# Patient Record
Sex: FEMALE | Race: WHITE | NOT HISPANIC OR LATINO | URBAN - METROPOLITAN AREA
[De-identification: names, ages, dates, MRNs, and addresses within clinical notes are randomized per-mention and may not be internally consistent; named-entity substitution may affect disease eponyms.]

---

## 2017-03-28 ENCOUNTER — OUTPATIENT (OUTPATIENT)
Dept: OUTPATIENT SERVICES | Facility: HOSPITAL | Age: 77
LOS: 1 days | Discharge: HOME | End: 2017-03-28

## 2017-06-27 DIAGNOSIS — Z12.31 ENCOUNTER FOR SCREENING MAMMOGRAM FOR MALIGNANT NEOPLASM OF BREAST: ICD-10-CM

## 2017-08-17 ENCOUNTER — OUTPATIENT (OUTPATIENT)
Dept: OUTPATIENT SERVICES | Facility: HOSPITAL | Age: 77
LOS: 1 days | Discharge: HOME | End: 2017-08-17

## 2017-08-17 DIAGNOSIS — Z01.818 ENCOUNTER FOR OTHER PREPROCEDURAL EXAMINATION: ICD-10-CM

## 2017-08-17 DIAGNOSIS — M16.12 UNILATERAL PRIMARY OSTEOARTHRITIS, LEFT HIP: ICD-10-CM

## 2017-08-17 DIAGNOSIS — M16.9 OSTEOARTHRITIS OF HIP, UNSPECIFIED: ICD-10-CM

## 2017-09-07 ENCOUNTER — INPATIENT (INPATIENT)
Facility: HOSPITAL | Age: 77
LOS: 2 days | Discharge: HOME HEALTH | End: 2017-09-10
Attending: ORTHOPAEDIC SURGERY

## 2017-09-07 DIAGNOSIS — M16.9 OSTEOARTHRITIS OF HIP, UNSPECIFIED: ICD-10-CM

## 2017-09-13 DIAGNOSIS — M16.12 UNILATERAL PRIMARY OSTEOARTHRITIS, LEFT HIP: ICD-10-CM

## 2017-09-13 DIAGNOSIS — I10 ESSENTIAL (PRIMARY) HYPERTENSION: ICD-10-CM

## 2017-09-13 DIAGNOSIS — Z87.891 PERSONAL HISTORY OF NICOTINE DEPENDENCE: ICD-10-CM

## 2017-09-13 DIAGNOSIS — E78.5 HYPERLIPIDEMIA, UNSPECIFIED: ICD-10-CM

## 2018-04-25 ENCOUNTER — OUTPATIENT (OUTPATIENT)
Dept: OUTPATIENT SERVICES | Facility: HOSPITAL | Age: 78
LOS: 1 days | Discharge: HOME | End: 2018-04-25

## 2018-04-25 DIAGNOSIS — Z12.31 ENCOUNTER FOR SCREENING MAMMOGRAM FOR MALIGNANT NEOPLASM OF BREAST: ICD-10-CM

## 2018-06-08 PROBLEM — Z00.00 ENCOUNTER FOR PREVENTIVE HEALTH EXAMINATION: Status: ACTIVE | Noted: 2018-06-08

## 2018-09-05 RX ORDER — MELOXICAM 15 MG/1
1 TABLET ORAL
Qty: 30 | Refills: 1
Start: 2018-09-05 | End: 2018-11-03

## 2018-12-20 ENCOUNTER — OUTPATIENT (OUTPATIENT)
Dept: OUTPATIENT SERVICES | Facility: HOSPITAL | Age: 78
LOS: 1 days | Discharge: HOME | End: 2018-12-20

## 2018-12-20 DIAGNOSIS — M54.89 OTHER DORSALGIA: ICD-10-CM

## 2019-02-14 ENCOUNTER — OUTPATIENT (OUTPATIENT)
Dept: OUTPATIENT SERVICES | Facility: HOSPITAL | Age: 79
LOS: 1 days | Discharge: HOME | End: 2019-02-14

## 2019-02-14 DIAGNOSIS — M54.89 OTHER DORSALGIA: ICD-10-CM

## 2019-05-09 ENCOUNTER — OUTPATIENT (OUTPATIENT)
Dept: OUTPATIENT SERVICES | Facility: HOSPITAL | Age: 79
LOS: 1 days | Discharge: HOME | End: 2019-05-09
Payer: MEDICARE

## 2019-05-09 DIAGNOSIS — Z12.31 ENCOUNTER FOR SCREENING MAMMOGRAM FOR MALIGNANT NEOPLASM OF BREAST: ICD-10-CM

## 2019-05-09 PROCEDURE — 77063 BREAST TOMOSYNTHESIS BI: CPT | Mod: 26

## 2019-05-09 PROCEDURE — 77067 SCR MAMMO BI INCL CAD: CPT | Mod: 26

## 2020-06-15 ENCOUNTER — OUTPATIENT (OUTPATIENT)
Dept: OUTPATIENT SERVICES | Facility: HOSPITAL | Age: 80
LOS: 1 days | Discharge: HOME | End: 2020-06-15
Payer: MEDICARE

## 2020-06-15 DIAGNOSIS — Z12.31 ENCOUNTER FOR SCREENING MAMMOGRAM FOR MALIGNANT NEOPLASM OF BREAST: ICD-10-CM

## 2020-06-15 PROCEDURE — 77063 BREAST TOMOSYNTHESIS BI: CPT | Mod: 26

## 2020-06-15 PROCEDURE — 77067 SCR MAMMO BI INCL CAD: CPT | Mod: 26

## 2021-01-13 ENCOUNTER — TRANSCRIPTION ENCOUNTER (OUTPATIENT)
Age: 81
End: 2021-01-13

## 2021-04-13 ENCOUNTER — TRANSCRIPTION ENCOUNTER (OUTPATIENT)
Age: 81
End: 2021-04-13

## 2021-06-23 ENCOUNTER — OUTPATIENT (OUTPATIENT)
Dept: OUTPATIENT SERVICES | Facility: HOSPITAL | Age: 81
LOS: 1 days | Discharge: HOME | End: 2021-06-23
Payer: MEDICARE

## 2021-06-23 DIAGNOSIS — Z12.31 ENCOUNTER FOR SCREENING MAMMOGRAM FOR MALIGNANT NEOPLASM OF BREAST: ICD-10-CM

## 2021-06-23 PROCEDURE — 77067 SCR MAMMO BI INCL CAD: CPT | Mod: 26

## 2021-06-23 PROCEDURE — 77063 BREAST TOMOSYNTHESIS BI: CPT | Mod: 26

## 2022-07-09 ENCOUNTER — OUTPATIENT (OUTPATIENT)
Dept: OUTPATIENT SERVICES | Facility: HOSPITAL | Age: 82
LOS: 1 days | Discharge: HOME | End: 2022-07-09

## 2022-07-09 DIAGNOSIS — Z12.31 ENCOUNTER FOR SCREENING MAMMOGRAM FOR MALIGNANT NEOPLASM OF BREAST: ICD-10-CM

## 2022-07-09 PROCEDURE — 77063 BREAST TOMOSYNTHESIS BI: CPT | Mod: 26

## 2022-07-09 PROCEDURE — 77067 SCR MAMMO BI INCL CAD: CPT | Mod: 26

## 2023-01-01 ENCOUNTER — TRANSCRIPTION ENCOUNTER (OUTPATIENT)
Age: 83
End: 2023-01-01

## 2023-01-01 ENCOUNTER — APPOINTMENT (OUTPATIENT)
Dept: SURGERY | Facility: CLINIC | Age: 83
End: 2023-01-01

## 2023-01-01 ENCOUNTER — APPOINTMENT (OUTPATIENT)
Dept: INFUSION THERAPY | Facility: CLINIC | Age: 83
End: 2023-01-01

## 2023-01-01 ENCOUNTER — OUTPATIENT (OUTPATIENT)
Dept: INPATIENT UNIT | Facility: HOSPITAL | Age: 83
LOS: 1 days | Discharge: HOME | End: 2023-01-01
Payer: MEDICARE

## 2023-01-01 ENCOUNTER — APPOINTMENT (OUTPATIENT)
Dept: HEMATOLOGY ONCOLOGY | Facility: CLINIC | Age: 83
End: 2023-01-01

## 2023-01-01 ENCOUNTER — APPOINTMENT (OUTPATIENT)
Dept: HEMATOLOGY ONCOLOGY | Facility: CLINIC | Age: 83
End: 2023-01-01
Payer: MEDICARE

## 2023-01-01 ENCOUNTER — APPOINTMENT (OUTPATIENT)
Age: 83
End: 2023-01-01

## 2023-01-01 ENCOUNTER — OUTPATIENT (OUTPATIENT)
Dept: OUTPATIENT SERVICES | Facility: HOSPITAL | Age: 83
LOS: 1 days | End: 2023-01-01
Payer: MEDICARE

## 2023-01-01 ENCOUNTER — NON-APPOINTMENT (OUTPATIENT)
Age: 83
End: 2023-01-01

## 2023-01-01 ENCOUNTER — APPOINTMENT (OUTPATIENT)
Dept: UROLOGY | Facility: CLINIC | Age: 83
End: 2023-01-01
Payer: MEDICARE

## 2023-01-01 ENCOUNTER — INPATIENT (INPATIENT)
Facility: HOSPITAL | Age: 83
LOS: 4 days | Discharge: HOME HEALTH | DRG: 698 | End: 2023-04-25
Attending: STUDENT IN AN ORGANIZED HEALTH CARE EDUCATION/TRAINING PROGRAM | Admitting: STUDENT IN AN ORGANIZED HEALTH CARE EDUCATION/TRAINING PROGRAM
Payer: MEDICARE

## 2023-01-01 ENCOUNTER — APPOINTMENT (OUTPATIENT)
Dept: UROLOGY | Facility: HOSPITAL | Age: 83
End: 2023-01-01

## 2023-01-01 ENCOUNTER — INPATIENT (INPATIENT)
Facility: HOSPITAL | Age: 83
LOS: 16 days | Discharge: HOME | DRG: 698 | End: 2023-07-22
Attending: STUDENT IN AN ORGANIZED HEALTH CARE EDUCATION/TRAINING PROGRAM | Admitting: HOSPITALIST
Payer: MEDICARE

## 2023-01-01 ENCOUNTER — INPATIENT (INPATIENT)
Facility: HOSPITAL | Age: 83
LOS: 4 days | Discharge: NURSING, REHAB OR LONG-TERM CARE | DRG: 330 | End: 2023-03-11
Attending: SURGERY | Admitting: SURGERY
Payer: MEDICARE

## 2023-01-01 ENCOUNTER — EMERGENCY (EMERGENCY)
Facility: HOSPITAL | Age: 83
LOS: 0 days | Discharge: HOME | End: 2023-03-16
Attending: EMERGENCY MEDICINE
Payer: MEDICARE

## 2023-01-01 ENCOUNTER — APPOINTMENT (OUTPATIENT)
Dept: GASTROENTEROLOGY | Facility: CLINIC | Age: 83
End: 2023-01-01

## 2023-01-01 ENCOUNTER — RESULT REVIEW (OUTPATIENT)
Age: 83
End: 2023-01-01

## 2023-01-01 ENCOUNTER — RX RENEWAL (OUTPATIENT)
Age: 83
End: 2023-01-01

## 2023-01-01 ENCOUNTER — LABORATORY RESULT (OUTPATIENT)
Age: 83
End: 2023-01-01

## 2023-01-01 ENCOUNTER — INPATIENT (INPATIENT)
Facility: HOSPITAL | Age: 83
LOS: 6 days | Discharge: NURSING, REHAB OR LONG-TERM CARE | DRG: 391 | End: 2023-02-28
Attending: SURGERY | Admitting: SURGERY
Payer: MEDICARE

## 2023-01-01 ENCOUNTER — APPOINTMENT (OUTPATIENT)
Dept: SURGERY | Facility: CLINIC | Age: 83
End: 2023-01-01
Payer: MEDICARE

## 2023-01-01 ENCOUNTER — EMERGENCY (EMERGENCY)
Facility: HOSPITAL | Age: 83
LOS: 0 days | Discharge: HOME | End: 2023-06-08
Attending: EMERGENCY MEDICINE
Payer: MEDICARE

## 2023-01-01 ENCOUNTER — APPOINTMENT (OUTPATIENT)
Dept: UROLOGY | Facility: CLINIC | Age: 83
End: 2023-01-01

## 2023-01-01 VITALS
HEART RATE: 86 BPM | SYSTOLIC BLOOD PRESSURE: 133 MMHG | RESPIRATION RATE: 18 BRPM | DIASTOLIC BLOOD PRESSURE: 63 MMHG | TEMPERATURE: 98 F

## 2023-01-01 VITALS — RESPIRATION RATE: 18 BRPM | OXYGEN SATURATION: 98 % | HEART RATE: 92 BPM

## 2023-01-01 VITALS
HEIGHT: 64 IN | SYSTOLIC BLOOD PRESSURE: 99 MMHG | BODY MASS INDEX: 19.97 KG/M2 | TEMPERATURE: 97.8 F | RESPIRATION RATE: 16 BRPM | HEART RATE: 112 BPM | DIASTOLIC BLOOD PRESSURE: 61 MMHG | WEIGHT: 117 LBS

## 2023-01-01 VITALS
HEIGHT: 64 IN | TEMPERATURE: 97 F | SYSTOLIC BLOOD PRESSURE: 103 MMHG | RESPIRATION RATE: 16 BRPM | HEART RATE: 101 BPM | DIASTOLIC BLOOD PRESSURE: 57 MMHG | OXYGEN SATURATION: 95 %

## 2023-01-01 VITALS
TEMPERATURE: 97.3 F | WEIGHT: 133 LBS | HEIGHT: 64 IN | OXYGEN SATURATION: 97 % | HEART RATE: 106 BPM | BODY MASS INDEX: 22.71 KG/M2 | DIASTOLIC BLOOD PRESSURE: 70 MMHG | SYSTOLIC BLOOD PRESSURE: 128 MMHG

## 2023-01-01 VITALS
TEMPERATURE: 97.1 F | OXYGEN SATURATION: 96 % | HEART RATE: 87 BPM | HEIGHT: 64 IN | SYSTOLIC BLOOD PRESSURE: 100 MMHG | DIASTOLIC BLOOD PRESSURE: 50 MMHG

## 2023-01-01 VITALS
TEMPERATURE: 97 F | WEIGHT: 139.99 LBS | DIASTOLIC BLOOD PRESSURE: 54 MMHG | RESPIRATION RATE: 18 BRPM | SYSTOLIC BLOOD PRESSURE: 129 MMHG | HEART RATE: 99 BPM

## 2023-01-01 VITALS
OXYGEN SATURATION: 99 % | TEMPERATURE: 97 F | HEIGHT: 64 IN | DIASTOLIC BLOOD PRESSURE: 70 MMHG | WEIGHT: 137 LBS | HEART RATE: 96 BPM | BODY MASS INDEX: 23.39 KG/M2 | SYSTOLIC BLOOD PRESSURE: 126 MMHG

## 2023-01-01 VITALS
SYSTOLIC BLOOD PRESSURE: 95 MMHG | TEMPERATURE: 97 F | HEIGHT: 64 IN | OXYGEN SATURATION: 98 % | HEART RATE: 92 BPM | WEIGHT: 108.03 LBS | RESPIRATION RATE: 16 BRPM | DIASTOLIC BLOOD PRESSURE: 52 MMHG

## 2023-01-01 VITALS
HEIGHT: 64 IN | TEMPERATURE: 98 F | OXYGEN SATURATION: 99 % | WEIGHT: 117.07 LBS | SYSTOLIC BLOOD PRESSURE: 111 MMHG | DIASTOLIC BLOOD PRESSURE: 64 MMHG | RESPIRATION RATE: 18 BRPM | HEART RATE: 92 BPM

## 2023-01-01 VITALS
RESPIRATION RATE: 19 BRPM | HEART RATE: 64 BPM | OXYGEN SATURATION: 97 % | DIASTOLIC BLOOD PRESSURE: 62 MMHG | TEMPERATURE: 98 F | SYSTOLIC BLOOD PRESSURE: 104 MMHG | HEIGHT: 64 IN

## 2023-01-01 VITALS
DIASTOLIC BLOOD PRESSURE: 59 MMHG | WEIGHT: 110.01 LBS | OXYGEN SATURATION: 98 % | HEART RATE: 80 BPM | HEIGHT: 64 IN | TEMPERATURE: 98 F | SYSTOLIC BLOOD PRESSURE: 107 MMHG | RESPIRATION RATE: 14 BRPM

## 2023-01-01 VITALS
DIASTOLIC BLOOD PRESSURE: 79 MMHG | TEMPERATURE: 98 F | SYSTOLIC BLOOD PRESSURE: 116 MMHG | HEART RATE: 88 BPM | OXYGEN SATURATION: 98 % | RESPIRATION RATE: 19 BRPM

## 2023-01-01 VITALS
BODY MASS INDEX: 19.46 KG/M2 | DIASTOLIC BLOOD PRESSURE: 67 MMHG | SYSTOLIC BLOOD PRESSURE: 100 MMHG | WEIGHT: 114 LBS | HEIGHT: 64 IN

## 2023-01-01 VITALS
HEIGHT: 64 IN | TEMPERATURE: 96.7 F | HEART RATE: 62 BPM | DIASTOLIC BLOOD PRESSURE: 64 MMHG | OXYGEN SATURATION: 98 % | SYSTOLIC BLOOD PRESSURE: 128 MMHG | BODY MASS INDEX: 23.98 KG/M2 | WEIGHT: 140.44 LBS

## 2023-01-01 VITALS
WEIGHT: 107 LBS | TEMPERATURE: 98 F | BODY MASS INDEX: 18.27 KG/M2 | DIASTOLIC BLOOD PRESSURE: 56 MMHG | HEIGHT: 64 IN | OXYGEN SATURATION: 99 % | HEART RATE: 80 BPM | RESPIRATION RATE: 16 BRPM | SYSTOLIC BLOOD PRESSURE: 112 MMHG

## 2023-01-01 VITALS
DIASTOLIC BLOOD PRESSURE: 60 MMHG | WEIGHT: 114 LBS | HEIGHT: 64 IN | OXYGEN SATURATION: 98 % | SYSTOLIC BLOOD PRESSURE: 90 MMHG | HEART RATE: 99 BPM | TEMPERATURE: 97.3 F | BODY MASS INDEX: 19.46 KG/M2

## 2023-01-01 VITALS
RESPIRATION RATE: 18 BRPM | SYSTOLIC BLOOD PRESSURE: 144 MMHG | HEART RATE: 55 BPM | TEMPERATURE: 98 F | DIASTOLIC BLOOD PRESSURE: 79 MMHG

## 2023-01-01 VITALS
SYSTOLIC BLOOD PRESSURE: 124 MMHG | HEART RATE: 100 BPM | OXYGEN SATURATION: 96 % | DIASTOLIC BLOOD PRESSURE: 72 MMHG | TEMPERATURE: 97.1 F

## 2023-01-01 VITALS
OXYGEN SATURATION: 94 % | SYSTOLIC BLOOD PRESSURE: 167 MMHG | RESPIRATION RATE: 22 BRPM | HEART RATE: 81 BPM | DIASTOLIC BLOOD PRESSURE: 77 MMHG

## 2023-01-01 VITALS
OXYGEN SATURATION: 98 % | SYSTOLIC BLOOD PRESSURE: 101 MMHG | WEIGHT: 100.09 LBS | HEART RATE: 103 BPM | HEIGHT: 64 IN | RESPIRATION RATE: 18 BRPM | DIASTOLIC BLOOD PRESSURE: 72 MMHG | TEMPERATURE: 98 F

## 2023-01-01 VITALS
DIASTOLIC BLOOD PRESSURE: 60 MMHG | TEMPERATURE: 98 F | SYSTOLIC BLOOD PRESSURE: 131 MMHG | OXYGEN SATURATION: 99 % | RESPIRATION RATE: 18 BRPM | HEART RATE: 56 BPM

## 2023-01-01 VITALS — DIASTOLIC BLOOD PRESSURE: 56 MMHG | SYSTOLIC BLOOD PRESSURE: 97 MMHG | HEART RATE: 90 BPM

## 2023-01-01 DIAGNOSIS — N13.30 UNSPECIFIED HYDRONEPHROSIS: ICD-10-CM

## 2023-01-01 DIAGNOSIS — Z98.891 HISTORY OF UTERINE SCAR FROM PREVIOUS SURGERY: Chronic | ICD-10-CM

## 2023-01-01 DIAGNOSIS — K86.89 OTHER SPECIFIED DISEASES OF PANCREAS: ICD-10-CM

## 2023-01-01 DIAGNOSIS — Z20.822 CONTACT WITH AND (SUSPECTED) EXPOSURE TO COVID-19: ICD-10-CM

## 2023-01-01 DIAGNOSIS — C25.1 MALIGNANT NEOPLASM OF BODY OF PANCREAS: ICD-10-CM

## 2023-01-01 DIAGNOSIS — I10 ESSENTIAL (PRIMARY) HYPERTENSION: ICD-10-CM

## 2023-01-01 DIAGNOSIS — E11.9 TYPE 2 DIABETES MELLITUS WITHOUT COMPLICATIONS: ICD-10-CM

## 2023-01-01 DIAGNOSIS — E83.42 HYPOMAGNESEMIA: ICD-10-CM

## 2023-01-01 DIAGNOSIS — R82.71 BACTERIURIA: ICD-10-CM

## 2023-01-01 DIAGNOSIS — Z79.84 LONG TERM (CURRENT) USE OF ORAL HYPOGLYCEMIC DRUGS: ICD-10-CM

## 2023-01-01 DIAGNOSIS — Z96.642 PRESENCE OF LEFT ARTIFICIAL HIP JOINT: Chronic | ICD-10-CM

## 2023-01-01 DIAGNOSIS — E43 UNSPECIFIED SEVERE PROTEIN-CALORIE MALNUTRITION: ICD-10-CM

## 2023-01-01 DIAGNOSIS — R53.1 WEAKNESS: ICD-10-CM

## 2023-01-01 DIAGNOSIS — N39.0 URINARY TRACT INFECTION, SITE NOT SPECIFIED: ICD-10-CM

## 2023-01-01 DIAGNOSIS — K57.30 DIVERTICULOSIS OF LARGE INTESTINE WITHOUT PERFORATION OR ABSCESS WITHOUT BLEEDING: ICD-10-CM

## 2023-01-01 DIAGNOSIS — E11.9 TYPE 2 DIABETES MELLITUS W/OUT COMPLICATIONS: ICD-10-CM

## 2023-01-01 DIAGNOSIS — E78.00 PURE HYPERCHOLESTEROLEMIA, UNSPECIFIED: ICD-10-CM

## 2023-01-01 DIAGNOSIS — Z96.642 PRESENCE OF LEFT ARTIFICIAL HIP JOINT: ICD-10-CM

## 2023-01-01 DIAGNOSIS — K59.00 CONSTIPATION, UNSPECIFIED: ICD-10-CM

## 2023-01-01 DIAGNOSIS — R65.10 SYSTEMIC INFLAMMATORY RESPONSE SYNDROME (SIRS) OF NON-INFECTIOUS ORIGIN WITHOUT ACUTE ORGAN DYSFUNCTION: ICD-10-CM

## 2023-01-01 DIAGNOSIS — R33.9 RETENTION OF URINE, UNSPECIFIED: ICD-10-CM

## 2023-01-01 DIAGNOSIS — A41.9 SEPSIS, UNSPECIFIED ORGANISM: ICD-10-CM
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
## 2023-01-01 DIAGNOSIS — K56.609 UNSPECIFIED INTESTINAL OBSTRUCTION, UNSPECIFIED AS TO PARTIAL VERSUS COMPLETE OBSTRUCTION: ICD-10-CM
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
## 2023-01-01 DIAGNOSIS — Z93.3 COLOSTOMY STATUS: ICD-10-CM

## 2023-01-01 DIAGNOSIS — T83.511A INFECTION AND INFLAMMATORY REACTION DUE TO INDWELLING URETHRAL CATHETER, INITIAL ENCOUNTER: ICD-10-CM

## 2023-01-01 DIAGNOSIS — K57.20 DIVERTICULITIS OF LARGE INTESTINE WITH PERFORATION AND ABSCESS WITHOUT BLEEDING: ICD-10-CM

## 2023-01-01 DIAGNOSIS — Z98.890 OTHER SPECIFIED POSTPROCEDURAL STATES: Chronic | ICD-10-CM

## 2023-01-01 DIAGNOSIS — A49.1 STREPTOCOCCAL INFECTION, UNSPECIFIED SITE: ICD-10-CM

## 2023-01-01 DIAGNOSIS — K94.19 OTHER COMPLICATIONS OF ENTEROSTOMY: ICD-10-CM

## 2023-01-01 DIAGNOSIS — Z01.818 ENCOUNTER FOR OTHER PREPROCEDURAL EXAMINATION: ICD-10-CM

## 2023-01-01 DIAGNOSIS — K57.30 DIVERTICULOSIS OF LARGE INTESTINE W/OUT PERFORATION OR ABSCESS W/OUT BLEEDING: ICD-10-CM

## 2023-01-01 DIAGNOSIS — I49.3 VENTRICULAR PREMATURE DEPOLARIZATION: ICD-10-CM

## 2023-01-01 DIAGNOSIS — K57.92 DIVERTICULITIS OF INTESTINE, PART UNSPECIFIED, WITHOUT PERFORATION OR ABSCESS WITHOUT BLEEDING: ICD-10-CM

## 2023-01-01 DIAGNOSIS — C25.9 MALIGNANT NEOPLASM OF PANCREAS, UNSPECIFIED: ICD-10-CM

## 2023-01-01 DIAGNOSIS — C78.00 SECONDARY MALIGNANT NEOPLASM OF UNSPECIFIED LUNG: ICD-10-CM

## 2023-01-01 DIAGNOSIS — N13.4 HYDROURETER: ICD-10-CM

## 2023-01-01 DIAGNOSIS — Z16.21 RESISTANCE TO VANCOMYCIN: ICD-10-CM

## 2023-01-01 DIAGNOSIS — D72.829 ELEVATED WHITE BLOOD CELL COUNT, UNSPECIFIED: ICD-10-CM

## 2023-01-01 DIAGNOSIS — Z51.5 ENCOUNTER FOR PALLIATIVE CARE: ICD-10-CM

## 2023-01-01 DIAGNOSIS — Z79.01 LONG TERM (CURRENT) USE OF ANTICOAGULANTS: ICD-10-CM

## 2023-01-01 DIAGNOSIS — E78.5 HYPERLIPIDEMIA, UNSPECIFIED: ICD-10-CM

## 2023-01-01 DIAGNOSIS — J98.11 ATELECTASIS: ICD-10-CM

## 2023-01-01 DIAGNOSIS — J91.0 MALIGNANT PLEURAL EFFUSION: ICD-10-CM

## 2023-01-01 DIAGNOSIS — T83.091A OTHER MECHANICAL COMPLICATION OF INDWELLING URETHRAL CATHETER, INITIAL ENCOUNTER: ICD-10-CM

## 2023-01-01 DIAGNOSIS — D63.0 ANEMIA IN NEOPLASTIC DISEASE: ICD-10-CM

## 2023-01-01 DIAGNOSIS — E87.6 HYPOKALEMIA: ICD-10-CM

## 2023-01-01 DIAGNOSIS — K29.50 UNSPECIFIED CHRONIC GASTRITIS WITHOUT BLEEDING: ICD-10-CM

## 2023-01-01 DIAGNOSIS — Z85.07 PERSONAL HISTORY OF MALIGNANT NEOPLASM OF PANCREAS: ICD-10-CM

## 2023-01-01 DIAGNOSIS — Z00.8 ENCOUNTER FOR OTHER GENERAL EXAMINATION: ICD-10-CM

## 2023-01-01 DIAGNOSIS — E44.0 MODERATE PROTEIN-CALORIE MALNUTRITION: ICD-10-CM

## 2023-01-01 DIAGNOSIS — A49.9 URINARY TRACT INFECTION, SITE NOT SPECIFIED: ICD-10-CM

## 2023-01-01 DIAGNOSIS — Z71.89 OTHER SPECIFIED COUNSELING: ICD-10-CM

## 2023-01-01 DIAGNOSIS — I49.1 ATRIAL PREMATURE DEPOLARIZATION: ICD-10-CM

## 2023-01-01 DIAGNOSIS — Z87.891 PERSONAL HISTORY OF NICOTINE DEPENDENCE: ICD-10-CM

## 2023-01-01 DIAGNOSIS — Z53.09 PROCEDURE AND TREATMENT NOT CARRIED OUT BECAUSE OF OTHER CONTRAINDICATION: ICD-10-CM

## 2023-01-01 DIAGNOSIS — J90 PLEURAL EFFUSION, NOT ELSEWHERE CLASSIFIED: ICD-10-CM

## 2023-01-01 DIAGNOSIS — K56.690 OTHER PARTIAL INTESTINAL OBSTRUCTION: ICD-10-CM

## 2023-01-01 DIAGNOSIS — Z87.448 PERSONAL HISTORY OF OTHER DISEASES OF URINARY SYSTEM: ICD-10-CM

## 2023-01-01 DIAGNOSIS — B95.2 ENTEROCOCCUS AS THE CAUSE OF DISEASES CLASSIFIED ELSEWHERE: ICD-10-CM

## 2023-01-01 DIAGNOSIS — Z09 ENCOUNTER FOR FOLLOW-UP EXAMINATION AFTER COMPLETED TREATMENT FOR CONDITIONS OTHER THAN MALIGNANT NEOPLASM: ICD-10-CM

## 2023-01-01 LAB
-  AMIKACIN: SIGNIFICANT CHANGE UP
-  AMIKACIN: SIGNIFICANT CHANGE UP
-  AMPICILLIN: SIGNIFICANT CHANGE UP
-  AZTREONAM: SIGNIFICANT CHANGE UP
-  AZTREONAM: SIGNIFICANT CHANGE UP
-  BLOOD PCR PANEL: SIGNIFICANT CHANGE UP
-  CEFEPIME: SIGNIFICANT CHANGE UP
-  CEFEPIME: SIGNIFICANT CHANGE UP
-  CEFTAZIDIME: SIGNIFICANT CHANGE UP
-  CEFTAZIDIME: SIGNIFICANT CHANGE UP
-  CIPROFLOXACIN: SIGNIFICANT CHANGE UP
-  DAPTOMYCIN: SIGNIFICANT CHANGE UP
-  GENTAMICIN: SIGNIFICANT CHANGE UP
-  GENTAMICIN: SIGNIFICANT CHANGE UP
-  IMIPENEM: SIGNIFICANT CHANGE UP
-  IMIPENEM: SIGNIFICANT CHANGE UP
-  LEVOFLOXACIN: SIGNIFICANT CHANGE UP
-  LINEZOLID: SIGNIFICANT CHANGE UP
-  MEROPENEM: SIGNIFICANT CHANGE UP
-  MEROPENEM: SIGNIFICANT CHANGE UP
-  NITROFURANTOIN: SIGNIFICANT CHANGE UP
-  PIPERACILLIN/TAZOBACTAM: SIGNIFICANT CHANGE UP
-  PIPERACILLIN/TAZOBACTAM: SIGNIFICANT CHANGE UP
-  TETRACYCLINE: SIGNIFICANT CHANGE UP
-  TOBRAMYCIN: SIGNIFICANT CHANGE UP
-  TOBRAMYCIN: SIGNIFICANT CHANGE UP
-  VANCOMYCIN: SIGNIFICANT CHANGE UP
A1C WITH ESTIMATED AVERAGE GLUCOSE RESULT: 6.6 % — HIGH (ref 4–5.6)
A1C WITH ESTIMATED AVERAGE GLUCOSE RESULT: 7.2 % — HIGH (ref 4–5.6)
A1C WITH ESTIMATED AVERAGE GLUCOSE RESULT: 7.3 % — HIGH (ref 4–5.6)
ALBUMIN FLD-MCNC: 2.8 G/DL — SIGNIFICANT CHANGE UP
ALBUMIN SERPL ELPH-MCNC: 2.5 G/DL — LOW (ref 3.5–5.2)
ALBUMIN SERPL ELPH-MCNC: 2.6 G/DL — LOW (ref 3.5–5.2)
ALBUMIN SERPL ELPH-MCNC: 2.7 G/DL — LOW (ref 3.5–5.2)
ALBUMIN SERPL ELPH-MCNC: 2.7 G/DL — LOW (ref 3.5–5.2)
ALBUMIN SERPL ELPH-MCNC: 3 G/DL — LOW (ref 3.5–5.2)
ALBUMIN SERPL ELPH-MCNC: 3.3 G/DL — LOW (ref 3.5–5.2)
ALBUMIN SERPL ELPH-MCNC: 3.3 G/DL — LOW (ref 3.5–5.2)
ALBUMIN SERPL ELPH-MCNC: 3.4 G/DL — LOW (ref 3.5–5.2)
ALBUMIN SERPL ELPH-MCNC: 3.5 G/DL — SIGNIFICANT CHANGE UP (ref 3.5–5.2)
ALBUMIN SERPL ELPH-MCNC: 3.7 G/DL — SIGNIFICANT CHANGE UP (ref 3.5–5.2)
ALBUMIN SERPL ELPH-MCNC: 3.7 G/DL — SIGNIFICANT CHANGE UP (ref 3.5–5.2)
ALBUMIN SERPL ELPH-MCNC: 4 G/DL — SIGNIFICANT CHANGE UP (ref 3.5–5.2)
ALBUMIN SERPL ELPH-MCNC: 4.1 G/DL
ALBUMIN SERPL ELPH-MCNC: 4.2 G/DL — SIGNIFICANT CHANGE UP (ref 3.5–5.2)
ALP BLD-CCNC: 119 U/L
ALP SERPL-CCNC: 100 U/L — SIGNIFICANT CHANGE UP (ref 30–115)
ALP SERPL-CCNC: 101 U/L — SIGNIFICANT CHANGE UP (ref 30–115)
ALP SERPL-CCNC: 105 U/L — SIGNIFICANT CHANGE UP (ref 30–115)
ALP SERPL-CCNC: 108 U/L — SIGNIFICANT CHANGE UP (ref 30–115)
ALP SERPL-CCNC: 111 U/L — SIGNIFICANT CHANGE UP (ref 30–115)
ALP SERPL-CCNC: 76 U/L — SIGNIFICANT CHANGE UP (ref 30–115)
ALP SERPL-CCNC: 79 U/L — SIGNIFICANT CHANGE UP (ref 30–115)
ALP SERPL-CCNC: 79 U/L — SIGNIFICANT CHANGE UP (ref 30–115)
ALP SERPL-CCNC: 81 U/L — SIGNIFICANT CHANGE UP (ref 30–115)
ALP SERPL-CCNC: 81 U/L — SIGNIFICANT CHANGE UP (ref 30–115)
ALP SERPL-CCNC: 84 U/L — SIGNIFICANT CHANGE UP (ref 30–115)
ALP SERPL-CCNC: 84 U/L — SIGNIFICANT CHANGE UP (ref 30–115)
ALP SERPL-CCNC: 85 U/L — SIGNIFICANT CHANGE UP (ref 30–115)
ALP SERPL-CCNC: 86 U/L — SIGNIFICANT CHANGE UP (ref 30–115)
ALP SERPL-CCNC: 86 U/L — SIGNIFICANT CHANGE UP (ref 30–115)
ALP SERPL-CCNC: 89 U/L — SIGNIFICANT CHANGE UP (ref 30–115)
ALP SERPL-CCNC: 89 U/L — SIGNIFICANT CHANGE UP (ref 30–115)
ALP SERPL-CCNC: 91 U/L — SIGNIFICANT CHANGE UP (ref 30–115)
ALP SERPL-CCNC: 92 U/L — SIGNIFICANT CHANGE UP (ref 30–115)
ALP SERPL-CCNC: 93 U/L — SIGNIFICANT CHANGE UP (ref 30–115)
ALT FLD-CCNC: 10 U/L — SIGNIFICANT CHANGE UP (ref 0–41)
ALT FLD-CCNC: 11 U/L — SIGNIFICANT CHANGE UP (ref 0–41)
ALT FLD-CCNC: 5 U/L — SIGNIFICANT CHANGE UP (ref 0–41)
ALT FLD-CCNC: 6 U/L — SIGNIFICANT CHANGE UP (ref 0–41)
ALT FLD-CCNC: 7 U/L — SIGNIFICANT CHANGE UP (ref 0–41)
ALT FLD-CCNC: 8 U/L — SIGNIFICANT CHANGE UP (ref 0–41)
ALT FLD-CCNC: <5 U/L — SIGNIFICANT CHANGE UP (ref 0–41)
ALT SERPL-CCNC: 8 U/L
ANION GAP SERPL CALC-SCNC: 10 MMOL/L — SIGNIFICANT CHANGE UP (ref 7–14)
ANION GAP SERPL CALC-SCNC: 11 MMOL/L — SIGNIFICANT CHANGE UP (ref 7–14)
ANION GAP SERPL CALC-SCNC: 12 MMOL/L — SIGNIFICANT CHANGE UP (ref 7–14)
ANION GAP SERPL CALC-SCNC: 13 MMOL/L
ANION GAP SERPL CALC-SCNC: 13 MMOL/L — SIGNIFICANT CHANGE UP (ref 7–14)
ANION GAP SERPL CALC-SCNC: 14 MMOL/L — SIGNIFICANT CHANGE UP (ref 7–14)
ANION GAP SERPL CALC-SCNC: 15 MMOL/L — HIGH (ref 7–14)
ANION GAP SERPL CALC-SCNC: 17 MMOL/L — HIGH (ref 7–14)
ANION GAP SERPL CALC-SCNC: 17 MMOL/L — HIGH (ref 7–14)
ANION GAP SERPL CALC-SCNC: 8 MMOL/L — SIGNIFICANT CHANGE UP (ref 7–14)
APPEARANCE UR: ABNORMAL
APPEARANCE UR: CLEAR — SIGNIFICANT CHANGE UP
APTT BLD: 28.6 SEC — SIGNIFICANT CHANGE UP (ref 27–39.2)
APTT BLD: 28.7 SEC — SIGNIFICANT CHANGE UP (ref 27–39.2)
APTT BLD: 29.8 SEC — SIGNIFICANT CHANGE UP (ref 27–39.2)
APTT BLD: 32.5 SEC — SIGNIFICANT CHANGE UP (ref 27–39.2)
APTT BLD: 33.2 SEC — SIGNIFICANT CHANGE UP (ref 27–39.2)
APTT BLD: 33.6 SEC — SIGNIFICANT CHANGE UP (ref 27–39.2)
APTT BLD: 34.3 SEC — SIGNIFICANT CHANGE UP (ref 27–39.2)
APTT BLD: 38.8 SEC — SIGNIFICANT CHANGE UP (ref 27–39.2)
AST SERPL-CCNC: 10 U/L — SIGNIFICANT CHANGE UP (ref 0–41)
AST SERPL-CCNC: 11 U/L — SIGNIFICANT CHANGE UP (ref 0–41)
AST SERPL-CCNC: 12 U/L — SIGNIFICANT CHANGE UP (ref 0–41)
AST SERPL-CCNC: 13 U/L
AST SERPL-CCNC: 13 U/L — SIGNIFICANT CHANGE UP (ref 0–41)
AST SERPL-CCNC: 14 U/L — SIGNIFICANT CHANGE UP (ref 0–41)
AST SERPL-CCNC: 15 U/L — SIGNIFICANT CHANGE UP (ref 0–41)
AST SERPL-CCNC: 16 U/L — SIGNIFICANT CHANGE UP (ref 0–41)
AST SERPL-CCNC: 18 U/L — SIGNIFICANT CHANGE UP (ref 0–41)
AST SERPL-CCNC: 19 U/L — SIGNIFICANT CHANGE UP (ref 0–41)
AST SERPL-CCNC: 25 U/L — SIGNIFICANT CHANGE UP (ref 0–41)
B PERT IGG+IGM PNL SER: CLEAR — SIGNIFICANT CHANGE UP
BACTERIA # UR AUTO: ABNORMAL
BACTERIA # UR AUTO: ABNORMAL
BASE EXCESS BLDV CALC-SCNC: 4 MMOL/L — HIGH (ref -2–3)
BASOPHILS # BLD AUTO: 0.02 K/UL — SIGNIFICANT CHANGE UP (ref 0–0.2)
BASOPHILS # BLD AUTO: 0.03 K/UL — SIGNIFICANT CHANGE UP (ref 0–0.2)
BASOPHILS # BLD AUTO: 0.04 K/UL — SIGNIFICANT CHANGE UP (ref 0–0.2)
BASOPHILS # BLD AUTO: 0.05 K/UL — SIGNIFICANT CHANGE UP (ref 0–0.2)
BASOPHILS # BLD AUTO: 0.06 K/UL — SIGNIFICANT CHANGE UP (ref 0–0.2)
BASOPHILS # BLD AUTO: 0.06 K/UL — SIGNIFICANT CHANGE UP (ref 0–0.2)
BASOPHILS # BLD AUTO: 0.07 K/UL — SIGNIFICANT CHANGE UP (ref 0–0.2)
BASOPHILS NFR BLD AUTO: 0.2 % — SIGNIFICANT CHANGE UP (ref 0–1)
BASOPHILS NFR BLD AUTO: 0.3 % — SIGNIFICANT CHANGE UP (ref 0–1)
BASOPHILS NFR BLD AUTO: 0.4 % — SIGNIFICANT CHANGE UP (ref 0–1)
BASOPHILS NFR BLD AUTO: 0.5 % — SIGNIFICANT CHANGE UP (ref 0–1)
BILIRUB DIRECT SERPL-MCNC: 0.2 MG/DL — SIGNIFICANT CHANGE UP (ref 0–0.3)
BILIRUB INDIRECT FLD-MCNC: 0.3 MG/DL — SIGNIFICANT CHANGE UP (ref 0.2–1.2)
BILIRUB SERPL-MCNC: 0.3 MG/DL — SIGNIFICANT CHANGE UP (ref 0.2–1.2)
BILIRUB SERPL-MCNC: 0.4 MG/DL — SIGNIFICANT CHANGE UP (ref 0.2–1.2)
BILIRUB SERPL-MCNC: 0.5 MG/DL — SIGNIFICANT CHANGE UP (ref 0.2–1.2)
BILIRUB SERPL-MCNC: 0.6 MG/DL
BILIRUB SERPL-MCNC: 0.6 MG/DL — SIGNIFICANT CHANGE UP (ref 0.2–1.2)
BILIRUB SERPL-MCNC: 0.7 MG/DL — SIGNIFICANT CHANGE UP (ref 0.2–1.2)
BILIRUB SERPL-MCNC: 0.7 MG/DL — SIGNIFICANT CHANGE UP (ref 0.2–1.2)
BILIRUB SERPL-MCNC: 0.8 MG/DL — SIGNIFICANT CHANGE UP (ref 0.2–1.2)
BILIRUB SERPL-MCNC: 0.8 MG/DL — SIGNIFICANT CHANGE UP (ref 0.2–1.2)
BILIRUB UR-MCNC: NEGATIVE — SIGNIFICANT CHANGE UP
BLD GP AB SCN SERPL QL: SIGNIFICANT CHANGE UP
BUN SERPL-MCNC: 10 MG/DL — SIGNIFICANT CHANGE UP (ref 10–20)
BUN SERPL-MCNC: 11 MG/DL — SIGNIFICANT CHANGE UP (ref 10–20)
BUN SERPL-MCNC: 11 MG/DL — SIGNIFICANT CHANGE UP (ref 10–20)
BUN SERPL-MCNC: 12 MG/DL — SIGNIFICANT CHANGE UP (ref 10–20)
BUN SERPL-MCNC: 12 MG/DL — SIGNIFICANT CHANGE UP (ref 10–20)
BUN SERPL-MCNC: 14 MG/DL — SIGNIFICANT CHANGE UP (ref 10–20)
BUN SERPL-MCNC: 15 MG/DL — SIGNIFICANT CHANGE UP (ref 10–20)
BUN SERPL-MCNC: 15 MG/DL — SIGNIFICANT CHANGE UP (ref 10–20)
BUN SERPL-MCNC: 16 MG/DL — SIGNIFICANT CHANGE UP (ref 10–20)
BUN SERPL-MCNC: 16 MG/DL — SIGNIFICANT CHANGE UP (ref 10–20)
BUN SERPL-MCNC: 17 MG/DL
BUN SERPL-MCNC: 19 MG/DL — SIGNIFICANT CHANGE UP (ref 10–20)
BUN SERPL-MCNC: 22 MG/DL — HIGH (ref 10–20)
BUN SERPL-MCNC: 22 MG/DL — HIGH (ref 10–20)
BUN SERPL-MCNC: 23 MG/DL — HIGH (ref 10–20)
BUN SERPL-MCNC: 25 MG/DL — HIGH (ref 10–20)
BUN SERPL-MCNC: 27 MG/DL — HIGH (ref 10–20)
BUN SERPL-MCNC: 27 MG/DL — HIGH (ref 10–20)
BUN SERPL-MCNC: 30 MG/DL — HIGH (ref 10–20)
BUN SERPL-MCNC: 32 MG/DL — HIGH (ref 10–20)
BUN SERPL-MCNC: 5 MG/DL — LOW (ref 10–20)
BUN SERPL-MCNC: 6 MG/DL — LOW (ref 10–20)
BUN SERPL-MCNC: 7 MG/DL — LOW (ref 10–20)
BUN SERPL-MCNC: 8 MG/DL — LOW (ref 10–20)
BUN SERPL-MCNC: 9 MG/DL — LOW (ref 10–20)
CA-I SERPL-SCNC: 1.25 MMOL/L — SIGNIFICANT CHANGE UP (ref 1.15–1.33)
CALCIUM SERPL-MCNC: 10.1 MG/DL — SIGNIFICANT CHANGE UP (ref 8.4–10.4)
CALCIUM SERPL-MCNC: 7.8 MG/DL — LOW (ref 8.4–10.5)
CALCIUM SERPL-MCNC: 7.9 MG/DL — LOW (ref 8.4–10.5)
CALCIUM SERPL-MCNC: 8.1 MG/DL — LOW (ref 8.4–10.4)
CALCIUM SERPL-MCNC: 8.1 MG/DL — LOW (ref 8.4–10.4)
CALCIUM SERPL-MCNC: 8.1 MG/DL — LOW (ref 8.4–10.5)
CALCIUM SERPL-MCNC: 8.2 MG/DL — LOW (ref 8.4–10.5)
CALCIUM SERPL-MCNC: 8.3 MG/DL — LOW (ref 8.4–10.5)
CALCIUM SERPL-MCNC: 8.4 MG/DL — SIGNIFICANT CHANGE UP (ref 8.4–10.5)
CALCIUM SERPL-MCNC: 8.5 MG/DL — SIGNIFICANT CHANGE UP (ref 8.4–10.4)
CALCIUM SERPL-MCNC: 8.5 MG/DL — SIGNIFICANT CHANGE UP (ref 8.4–10.5)
CALCIUM SERPL-MCNC: 8.6 MG/DL — SIGNIFICANT CHANGE UP (ref 8.4–10.4)
CALCIUM SERPL-MCNC: 8.6 MG/DL — SIGNIFICANT CHANGE UP (ref 8.4–10.5)
CALCIUM SERPL-MCNC: 8.8 MG/DL — SIGNIFICANT CHANGE UP (ref 8.4–10.5)
CALCIUM SERPL-MCNC: 8.9 MG/DL — SIGNIFICANT CHANGE UP (ref 8.4–10.5)
CALCIUM SERPL-MCNC: 9 MG/DL — SIGNIFICANT CHANGE UP (ref 8.4–10.4)
CALCIUM SERPL-MCNC: 9 MG/DL — SIGNIFICANT CHANGE UP (ref 8.4–10.4)
CALCIUM SERPL-MCNC: 9 MG/DL — SIGNIFICANT CHANGE UP (ref 8.4–10.5)
CALCIUM SERPL-MCNC: 9.1 MG/DL — SIGNIFICANT CHANGE UP (ref 8.4–10.5)
CALCIUM SERPL-MCNC: 9.2 MG/DL — SIGNIFICANT CHANGE UP (ref 8.4–10.5)
CALCIUM SERPL-MCNC: 9.3 MG/DL — SIGNIFICANT CHANGE UP (ref 8.4–10.5)
CALCIUM SERPL-MCNC: 9.5 MG/DL — SIGNIFICANT CHANGE UP (ref 8.4–10.4)
CALCIUM SERPL-MCNC: 9.5 MG/DL — SIGNIFICANT CHANGE UP (ref 8.4–10.5)
CALCIUM SERPL-MCNC: 9.9 MG/DL
CEA SERPL-MCNC: 24.4 NG/ML — HIGH (ref 0–3.8)
CHLORIDE SERPL-SCNC: 100 MMOL/L — SIGNIFICANT CHANGE UP (ref 98–110)
CHLORIDE SERPL-SCNC: 101 MMOL/L — SIGNIFICANT CHANGE UP (ref 98–110)
CHLORIDE SERPL-SCNC: 102 MMOL/L — SIGNIFICANT CHANGE UP (ref 98–110)
CHLORIDE SERPL-SCNC: 103 MMOL/L — SIGNIFICANT CHANGE UP (ref 98–110)
CHLORIDE SERPL-SCNC: 104 MMOL/L — SIGNIFICANT CHANGE UP (ref 98–110)
CHLORIDE SERPL-SCNC: 94 MMOL/L — LOW (ref 98–110)
CHLORIDE SERPL-SCNC: 95 MMOL/L — LOW (ref 98–110)
CHLORIDE SERPL-SCNC: 95 MMOL/L — LOW (ref 98–110)
CHLORIDE SERPL-SCNC: 96 MMOL/L
CHLORIDE SERPL-SCNC: 96 MMOL/L — LOW (ref 98–110)
CHLORIDE SERPL-SCNC: 98 MMOL/L — SIGNIFICANT CHANGE UP (ref 98–110)
CHLORIDE SERPL-SCNC: 99 MMOL/L — SIGNIFICANT CHANGE UP (ref 98–110)
CHOLEST SERPL-MCNC: 149 MG/DL — SIGNIFICANT CHANGE UP
CK MB CFR SERPL CALC: 1 NG/ML — SIGNIFICANT CHANGE UP (ref 0.6–6.3)
CK SERPL-CCNC: 32 U/L — SIGNIFICANT CHANGE UP (ref 0–225)
CO2 SERPL-SCNC: 20 MMOL/L — SIGNIFICANT CHANGE UP (ref 17–32)
CO2 SERPL-SCNC: 20 MMOL/L — SIGNIFICANT CHANGE UP (ref 17–32)
CO2 SERPL-SCNC: 21 MMOL/L — SIGNIFICANT CHANGE UP (ref 17–32)
CO2 SERPL-SCNC: 22 MMOL/L — SIGNIFICANT CHANGE UP (ref 17–32)
CO2 SERPL-SCNC: 23 MMOL/L — SIGNIFICANT CHANGE UP (ref 17–32)
CO2 SERPL-SCNC: 24 MMOL/L — SIGNIFICANT CHANGE UP (ref 17–32)
CO2 SERPL-SCNC: 25 MMOL/L — SIGNIFICANT CHANGE UP (ref 17–32)
CO2 SERPL-SCNC: 26 MMOL/L — SIGNIFICANT CHANGE UP (ref 17–32)
CO2 SERPL-SCNC: 27 MMOL/L — SIGNIFICANT CHANGE UP (ref 17–32)
CO2 SERPL-SCNC: 28 MMOL/L
CO2 SERPL-SCNC: 28 MMOL/L — SIGNIFICANT CHANGE UP (ref 17–32)
CO2 SERPL-SCNC: 29 MMOL/L — SIGNIFICANT CHANGE UP (ref 17–32)
COLOR FLD: YELLOW — SIGNIFICANT CHANGE UP
COLOR SPEC: ABNORMAL
COLOR SPEC: YELLOW — SIGNIFICANT CHANGE UP
COLOR SPEC: YELLOW — SIGNIFICANT CHANGE UP
COMMENT - URINE: SIGNIFICANT CHANGE UP
CREAT ?TM UR-MCNC: 50 MG/DL — SIGNIFICANT CHANGE UP
CREAT SERPL-MCNC: 0.5 MG/DL — LOW (ref 0.7–1.5)
CREAT SERPL-MCNC: 0.6 MG/DL — LOW (ref 0.7–1.5)
CREAT SERPL-MCNC: 0.7 MG/DL
CREAT SERPL-MCNC: 0.7 MG/DL — SIGNIFICANT CHANGE UP (ref 0.7–1.5)
CREAT SERPL-MCNC: 0.9 MG/DL — SIGNIFICANT CHANGE UP (ref 0.7–1.5)
CREAT SERPL-MCNC: <0.5 MG/DL — LOW (ref 0.7–1.5)
CULTURE RESULTS: SIGNIFICANT CHANGE UP
DIFF PNL FLD: ABNORMAL
DIFF PNL FLD: NEGATIVE — SIGNIFICANT CHANGE UP
EGFR: 100 ML/MIN/1.73M2 — SIGNIFICANT CHANGE UP
EGFR: 65 ML/MIN/1.73M2 — SIGNIFICANT CHANGE UP
EGFR: 87 ML/MIN/1.73M2
EGFR: 87 ML/MIN/1.73M2 — SIGNIFICANT CHANGE UP
EGFR: 91 ML/MIN/1.73M2 — SIGNIFICANT CHANGE UP
EGFR: 95 ML/MIN/1.73M2 — SIGNIFICANT CHANGE UP
EOSINOPHIL # BLD AUTO: 0.08 K/UL — SIGNIFICANT CHANGE UP (ref 0–0.7)
EOSINOPHIL # BLD AUTO: 0.09 K/UL — SIGNIFICANT CHANGE UP (ref 0–0.7)
EOSINOPHIL # BLD AUTO: 0.09 K/UL — SIGNIFICANT CHANGE UP (ref 0–0.7)
EOSINOPHIL # BLD AUTO: 0.1 K/UL — SIGNIFICANT CHANGE UP (ref 0–0.7)
EOSINOPHIL # BLD AUTO: 0.1 K/UL — SIGNIFICANT CHANGE UP (ref 0–0.7)
EOSINOPHIL # BLD AUTO: 0.11 K/UL — SIGNIFICANT CHANGE UP (ref 0–0.7)
EOSINOPHIL # BLD AUTO: 0.12 K/UL — SIGNIFICANT CHANGE UP (ref 0–0.7)
EOSINOPHIL # BLD AUTO: 0.12 K/UL — SIGNIFICANT CHANGE UP (ref 0–0.7)
EOSINOPHIL # BLD AUTO: 0.14 K/UL — SIGNIFICANT CHANGE UP (ref 0–0.7)
EOSINOPHIL # BLD AUTO: 0.15 K/UL — SIGNIFICANT CHANGE UP (ref 0–0.7)
EOSINOPHIL # BLD AUTO: 0.16 K/UL — SIGNIFICANT CHANGE UP (ref 0–0.7)
EOSINOPHIL # BLD AUTO: 0.19 K/UL — SIGNIFICANT CHANGE UP (ref 0–0.7)
EOSINOPHIL # BLD AUTO: 0.22 K/UL — SIGNIFICANT CHANGE UP (ref 0–0.7)
EOSINOPHIL # BLD AUTO: 0.31 K/UL — SIGNIFICANT CHANGE UP (ref 0–0.7)
EOSINOPHIL # BLD AUTO: 0.37 K/UL — SIGNIFICANT CHANGE UP (ref 0–0.7)
EOSINOPHIL # BLD AUTO: 0.48 K/UL — SIGNIFICANT CHANGE UP (ref 0–0.7)
EOSINOPHIL # BLD AUTO: 0.51 K/UL — SIGNIFICANT CHANGE UP (ref 0–0.7)
EOSINOPHIL # BLD AUTO: 0.57 K/UL — SIGNIFICANT CHANGE UP (ref 0–0.7)
EOSINOPHIL # BLD AUTO: 0.68 K/UL — SIGNIFICANT CHANGE UP (ref 0–0.7)
EOSINOPHIL # BLD AUTO: 0.73 K/UL — HIGH (ref 0–0.7)
EOSINOPHIL # BLD AUTO: 0.79 K/UL — HIGH (ref 0–0.7)
EOSINOPHIL # BLD AUTO: 0.88 K/UL — HIGH (ref 0–0.7)
EOSINOPHIL # BLD AUTO: 1.07 K/UL — HIGH (ref 0–0.7)
EOSINOPHIL # BLD AUTO: 1.1 K/UL — HIGH (ref 0–0.7)
EOSINOPHIL # BLD AUTO: 1.15 K/UL — HIGH (ref 0–0.7)
EOSINOPHIL # BLD AUTO: 1.37 K/UL — HIGH (ref 0–0.7)
EOSINOPHIL # BLD AUTO: 1.44 K/UL — HIGH (ref 0–0.7)
EOSINOPHIL # BLD AUTO: 1.5 K/UL — HIGH (ref 0–0.7)
EOSINOPHIL # BLD AUTO: 1.63 K/UL — HIGH (ref 0–0.7)
EOSINOPHIL # FLD: SIGNIFICANT CHANGE UP %
EOSINOPHIL NFR BLD AUTO: 0.4 % — SIGNIFICANT CHANGE UP (ref 0–8)
EOSINOPHIL NFR BLD AUTO: 0.6 % — SIGNIFICANT CHANGE UP (ref 0–8)
EOSINOPHIL NFR BLD AUTO: 0.7 % — SIGNIFICANT CHANGE UP (ref 0–8)
EOSINOPHIL NFR BLD AUTO: 0.9 % — SIGNIFICANT CHANGE UP (ref 0–8)
EOSINOPHIL NFR BLD AUTO: 1 % — SIGNIFICANT CHANGE UP (ref 0–8)
EOSINOPHIL NFR BLD AUTO: 1.1 % — SIGNIFICANT CHANGE UP (ref 0–8)
EOSINOPHIL NFR BLD AUTO: 1.1 % — SIGNIFICANT CHANGE UP (ref 0–8)
EOSINOPHIL NFR BLD AUTO: 1.2 % — SIGNIFICANT CHANGE UP (ref 0–8)
EOSINOPHIL NFR BLD AUTO: 1.3 % — SIGNIFICANT CHANGE UP (ref 0–8)
EOSINOPHIL NFR BLD AUTO: 1.4 % — SIGNIFICANT CHANGE UP (ref 0–8)
EOSINOPHIL NFR BLD AUTO: 1.5 % — SIGNIFICANT CHANGE UP (ref 0–8)
EOSINOPHIL NFR BLD AUTO: 1.5 % — SIGNIFICANT CHANGE UP (ref 0–8)
EOSINOPHIL NFR BLD AUTO: 1.7 % — SIGNIFICANT CHANGE UP (ref 0–8)
EOSINOPHIL NFR BLD AUTO: 1.9 % — SIGNIFICANT CHANGE UP (ref 0–8)
EOSINOPHIL NFR BLD AUTO: 11.6 % — HIGH (ref 0–8)
EOSINOPHIL NFR BLD AUTO: 12.2 % — HIGH (ref 0–8)
EOSINOPHIL NFR BLD AUTO: 2 % — SIGNIFICANT CHANGE UP (ref 0–8)
EOSINOPHIL NFR BLD AUTO: 2.6 % — SIGNIFICANT CHANGE UP (ref 0–8)
EOSINOPHIL NFR BLD AUTO: 3.5 % — SIGNIFICANT CHANGE UP (ref 0–8)
EOSINOPHIL NFR BLD AUTO: 4.6 % — SIGNIFICANT CHANGE UP (ref 0–8)
EOSINOPHIL NFR BLD AUTO: 5.2 % — SIGNIFICANT CHANGE UP (ref 0–8)
EOSINOPHIL NFR BLD AUTO: 5.9 % — SIGNIFICANT CHANGE UP (ref 0–8)
EOSINOPHIL NFR BLD AUTO: 6.4 % — SIGNIFICANT CHANGE UP (ref 0–8)
EOSINOPHIL NFR BLD AUTO: 7.1 % — SIGNIFICANT CHANGE UP (ref 0–8)
EOSINOPHIL NFR BLD AUTO: 7.5 % — SIGNIFICANT CHANGE UP (ref 0–8)
EOSINOPHIL NFR BLD AUTO: 7.6 % — SIGNIFICANT CHANGE UP (ref 0–8)
EOSINOPHIL NFR BLD AUTO: 8.6 % — HIGH (ref 0–8)
EOSINOPHIL NFR BLD AUTO: 8.6 % — HIGH (ref 0–8)
EOSINOPHIL NFR BLD AUTO: 8.7 % — HIGH (ref 0–8)
EOSINOPHIL NFR BLD AUTO: 9.5 % — HIGH (ref 0–8)
EOSINOPHIL NFR BLD AUTO: 9.9 % — HIGH (ref 0–8)
EPI CELLS # UR: 1 /HPF — SIGNIFICANT CHANGE UP (ref 0–5)
EPI CELLS # UR: 2 /HPF — SIGNIFICANT CHANGE UP (ref 0–5)
ESTIMATED AVERAGE GLUCOSE: 143 MG/DL — HIGH (ref 68–114)
ESTIMATED AVERAGE GLUCOSE: 160 MG/DL — HIGH (ref 68–114)
ESTIMATED AVERAGE GLUCOSE: 163 MG/DL — HIGH (ref 68–114)
FERRITIN SERPL-MCNC: 228 NG/ML
FLUAV AG NPH QL: SIGNIFICANT CHANGE UP
FLUAV AG NPH QL: SIGNIFICANT CHANGE UP
FLUBV AG NPH QL: SIGNIFICANT CHANGE UP
FLUBV AG NPH QL: SIGNIFICANT CHANGE UP
FOLATE SERPL-MCNC: 9.4 NG/ML — SIGNIFICANT CHANGE UP
GAS PNL BLDV: 132 MMOL/L — LOW (ref 136–145)
GAS PNL BLDV: SIGNIFICANT CHANGE UP
GAS PNL BLDV: SIGNIFICANT CHANGE UP
GLUCOSE BLDC GLUCOMTR-MCNC: 109 MG/DL — HIGH (ref 70–99)
GLUCOSE BLDC GLUCOMTR-MCNC: 109 MG/DL — HIGH (ref 70–99)
GLUCOSE BLDC GLUCOMTR-MCNC: 112 MG/DL — HIGH (ref 70–99)
GLUCOSE BLDC GLUCOMTR-MCNC: 113 MG/DL — HIGH (ref 70–99)
GLUCOSE BLDC GLUCOMTR-MCNC: 115 MG/DL — HIGH (ref 70–99)
GLUCOSE BLDC GLUCOMTR-MCNC: 115 MG/DL — HIGH (ref 70–99)
GLUCOSE BLDC GLUCOMTR-MCNC: 116 MG/DL — HIGH (ref 70–99)
GLUCOSE BLDC GLUCOMTR-MCNC: 118 MG/DL — HIGH (ref 70–99)
GLUCOSE BLDC GLUCOMTR-MCNC: 119 MG/DL — HIGH (ref 70–99)
GLUCOSE BLDC GLUCOMTR-MCNC: 119 MG/DL — HIGH (ref 70–99)
GLUCOSE BLDC GLUCOMTR-MCNC: 120 MG/DL — HIGH (ref 70–99)
GLUCOSE BLDC GLUCOMTR-MCNC: 121 MG/DL — HIGH (ref 70–99)
GLUCOSE BLDC GLUCOMTR-MCNC: 123 MG/DL — HIGH (ref 70–99)
GLUCOSE BLDC GLUCOMTR-MCNC: 124 MG/DL — HIGH (ref 70–99)
GLUCOSE BLDC GLUCOMTR-MCNC: 125 MG/DL — HIGH (ref 70–99)
GLUCOSE BLDC GLUCOMTR-MCNC: 126 MG/DL — HIGH (ref 70–99)
GLUCOSE BLDC GLUCOMTR-MCNC: 127 MG/DL — HIGH (ref 70–99)
GLUCOSE BLDC GLUCOMTR-MCNC: 128 MG/DL — HIGH (ref 70–99)
GLUCOSE BLDC GLUCOMTR-MCNC: 129 MG/DL — HIGH (ref 70–99)
GLUCOSE BLDC GLUCOMTR-MCNC: 129 MG/DL — HIGH (ref 70–99)
GLUCOSE BLDC GLUCOMTR-MCNC: 130 MG/DL — HIGH (ref 70–99)
GLUCOSE BLDC GLUCOMTR-MCNC: 131 MG/DL — HIGH (ref 70–99)
GLUCOSE BLDC GLUCOMTR-MCNC: 132 MG/DL — HIGH (ref 70–99)
GLUCOSE BLDC GLUCOMTR-MCNC: 133 MG/DL — HIGH (ref 70–99)
GLUCOSE BLDC GLUCOMTR-MCNC: 134 MG/DL — HIGH (ref 70–99)
GLUCOSE BLDC GLUCOMTR-MCNC: 135 MG/DL — HIGH (ref 70–99)
GLUCOSE BLDC GLUCOMTR-MCNC: 135 MG/DL — HIGH (ref 70–99)
GLUCOSE BLDC GLUCOMTR-MCNC: 136 MG/DL — HIGH (ref 70–99)
GLUCOSE BLDC GLUCOMTR-MCNC: 137 MG/DL — HIGH (ref 70–99)
GLUCOSE BLDC GLUCOMTR-MCNC: 137 MG/DL — HIGH (ref 70–99)
GLUCOSE BLDC GLUCOMTR-MCNC: 138 MG/DL — HIGH (ref 70–99)
GLUCOSE BLDC GLUCOMTR-MCNC: 139 MG/DL — HIGH (ref 70–99)
GLUCOSE BLDC GLUCOMTR-MCNC: 140 MG/DL — HIGH (ref 70–99)
GLUCOSE BLDC GLUCOMTR-MCNC: 140 MG/DL — HIGH (ref 70–99)
GLUCOSE BLDC GLUCOMTR-MCNC: 141 MG/DL — HIGH (ref 70–99)
GLUCOSE BLDC GLUCOMTR-MCNC: 141 MG/DL — HIGH (ref 70–99)
GLUCOSE BLDC GLUCOMTR-MCNC: 142 MG/DL — HIGH (ref 70–99)
GLUCOSE BLDC GLUCOMTR-MCNC: 145 MG/DL — HIGH (ref 70–99)
GLUCOSE BLDC GLUCOMTR-MCNC: 145 MG/DL — HIGH (ref 70–99)
GLUCOSE BLDC GLUCOMTR-MCNC: 146 MG/DL — HIGH (ref 70–99)
GLUCOSE BLDC GLUCOMTR-MCNC: 148 MG/DL — HIGH (ref 70–99)
GLUCOSE BLDC GLUCOMTR-MCNC: 150 MG/DL — HIGH (ref 70–99)
GLUCOSE BLDC GLUCOMTR-MCNC: 150 MG/DL — HIGH (ref 70–99)
GLUCOSE BLDC GLUCOMTR-MCNC: 151 MG/DL — HIGH (ref 70–99)
GLUCOSE BLDC GLUCOMTR-MCNC: 152 MG/DL — HIGH (ref 70–99)
GLUCOSE BLDC GLUCOMTR-MCNC: 153 MG/DL — HIGH (ref 70–99)
GLUCOSE BLDC GLUCOMTR-MCNC: 153 MG/DL — HIGH (ref 70–99)
GLUCOSE BLDC GLUCOMTR-MCNC: 156 MG/DL — HIGH (ref 70–99)
GLUCOSE BLDC GLUCOMTR-MCNC: 159 MG/DL — HIGH (ref 70–99)
GLUCOSE BLDC GLUCOMTR-MCNC: 161 MG/DL — HIGH (ref 70–99)
GLUCOSE BLDC GLUCOMTR-MCNC: 161 MG/DL — HIGH (ref 70–99)
GLUCOSE BLDC GLUCOMTR-MCNC: 163 MG/DL — HIGH (ref 70–99)
GLUCOSE BLDC GLUCOMTR-MCNC: 165 MG/DL — HIGH (ref 70–99)
GLUCOSE BLDC GLUCOMTR-MCNC: 166 MG/DL — HIGH (ref 70–99)
GLUCOSE BLDC GLUCOMTR-MCNC: 167 MG/DL — HIGH (ref 70–99)
GLUCOSE BLDC GLUCOMTR-MCNC: 169 MG/DL — HIGH (ref 70–99)
GLUCOSE BLDC GLUCOMTR-MCNC: 170 MG/DL — HIGH (ref 70–99)
GLUCOSE BLDC GLUCOMTR-MCNC: 171 MG/DL — HIGH (ref 70–99)
GLUCOSE BLDC GLUCOMTR-MCNC: 171 MG/DL — HIGH (ref 70–99)
GLUCOSE BLDC GLUCOMTR-MCNC: 176 MG/DL — HIGH (ref 70–99)
GLUCOSE BLDC GLUCOMTR-MCNC: 177 MG/DL — HIGH (ref 70–99)
GLUCOSE BLDC GLUCOMTR-MCNC: 182 MG/DL — HIGH (ref 70–99)
GLUCOSE BLDC GLUCOMTR-MCNC: 191 MG/DL — HIGH (ref 70–99)
GLUCOSE BLDC GLUCOMTR-MCNC: 194 MG/DL — HIGH (ref 70–99)
GLUCOSE BLDC GLUCOMTR-MCNC: 202 MG/DL — HIGH (ref 70–99)
GLUCOSE BLDC GLUCOMTR-MCNC: 243 MG/DL — HIGH (ref 70–99)
GLUCOSE FLD-MCNC: 128 MG/DL — SIGNIFICANT CHANGE UP
GLUCOSE SERPL-MCNC: 113 MG/DL — HIGH (ref 70–99)
GLUCOSE SERPL-MCNC: 114 MG/DL — HIGH (ref 70–99)
GLUCOSE SERPL-MCNC: 116 MG/DL — HIGH (ref 70–99)
GLUCOSE SERPL-MCNC: 121 MG/DL — HIGH (ref 70–99)
GLUCOSE SERPL-MCNC: 122 MG/DL — HIGH (ref 70–99)
GLUCOSE SERPL-MCNC: 123 MG/DL — HIGH (ref 70–99)
GLUCOSE SERPL-MCNC: 124 MG/DL — HIGH (ref 70–99)
GLUCOSE SERPL-MCNC: 125 MG/DL — HIGH (ref 70–99)
GLUCOSE SERPL-MCNC: 126 MG/DL — HIGH (ref 70–99)
GLUCOSE SERPL-MCNC: 127 MG/DL — HIGH (ref 70–99)
GLUCOSE SERPL-MCNC: 128 MG/DL — HIGH (ref 70–99)
GLUCOSE SERPL-MCNC: 128 MG/DL — HIGH (ref 70–99)
GLUCOSE SERPL-MCNC: 130 MG/DL — HIGH (ref 70–99)
GLUCOSE SERPL-MCNC: 131 MG/DL — HIGH (ref 70–99)
GLUCOSE SERPL-MCNC: 132 MG/DL — HIGH (ref 70–99)
GLUCOSE SERPL-MCNC: 134 MG/DL — HIGH (ref 70–99)
GLUCOSE SERPL-MCNC: 136 MG/DL — HIGH (ref 70–99)
GLUCOSE SERPL-MCNC: 137 MG/DL — HIGH (ref 70–99)
GLUCOSE SERPL-MCNC: 138 MG/DL — HIGH (ref 70–99)
GLUCOSE SERPL-MCNC: 138 MG/DL — HIGH (ref 70–99)
GLUCOSE SERPL-MCNC: 141 MG/DL — HIGH (ref 70–99)
GLUCOSE SERPL-MCNC: 145 MG/DL — HIGH (ref 70–99)
GLUCOSE SERPL-MCNC: 147 MG/DL — HIGH (ref 70–99)
GLUCOSE SERPL-MCNC: 149 MG/DL — HIGH (ref 70–99)
GLUCOSE SERPL-MCNC: 150 MG/DL — HIGH (ref 70–99)
GLUCOSE SERPL-MCNC: 151 MG/DL — HIGH (ref 70–99)
GLUCOSE SERPL-MCNC: 152 MG/DL — HIGH (ref 70–99)
GLUCOSE SERPL-MCNC: 154 MG/DL — HIGH (ref 70–99)
GLUCOSE SERPL-MCNC: 155 MG/DL — HIGH (ref 70–99)
GLUCOSE SERPL-MCNC: 157 MG/DL — HIGH (ref 70–99)
GLUCOSE SERPL-MCNC: 162 MG/DL — HIGH (ref 70–99)
GLUCOSE SERPL-MCNC: 168 MG/DL — HIGH (ref 70–99)
GLUCOSE SERPL-MCNC: 169 MG/DL — HIGH (ref 70–99)
GLUCOSE SERPL-MCNC: 177 MG/DL — HIGH (ref 70–99)
GLUCOSE SERPL-MCNC: 177 MG/DL — HIGH (ref 70–99)
GLUCOSE SERPL-MCNC: 178 MG/DL
GLUCOSE SERPL-MCNC: 178 MG/DL — HIGH (ref 70–99)
GLUCOSE SERPL-MCNC: 181 MG/DL — HIGH (ref 70–99)
GLUCOSE UR QL: NEGATIVE — SIGNIFICANT CHANGE UP
GRAM STN FLD: SIGNIFICANT CHANGE UP
GRAM STN FLD: SIGNIFICANT CHANGE UP
HCO3 BLDV-SCNC: 28 MMOL/L — SIGNIFICANT CHANGE UP (ref 22–29)
HCT VFR BLD CALC: 29.3 % — LOW (ref 37–47)
HCT VFR BLD CALC: 30.8 % — LOW (ref 37–47)
HCT VFR BLD CALC: 30.8 % — LOW (ref 37–47)
HCT VFR BLD CALC: 31.1 % — LOW (ref 37–47)
HCT VFR BLD CALC: 31.2 % — LOW (ref 37–47)
HCT VFR BLD CALC: 31.2 % — LOW (ref 37–47)
HCT VFR BLD CALC: 31.3 % — LOW (ref 37–47)
HCT VFR BLD CALC: 31.5 % — LOW (ref 37–47)
HCT VFR BLD CALC: 31.7 % — LOW (ref 37–47)
HCT VFR BLD CALC: 31.8 % — LOW (ref 37–47)
HCT VFR BLD CALC: 31.8 % — LOW (ref 37–47)
HCT VFR BLD CALC: 31.9 % — LOW (ref 37–47)
HCT VFR BLD CALC: 31.9 % — LOW (ref 37–47)
HCT VFR BLD CALC: 32 % — LOW (ref 37–47)
HCT VFR BLD CALC: 32.1 % — LOW (ref 37–47)
HCT VFR BLD CALC: 32.1 % — LOW (ref 37–47)
HCT VFR BLD CALC: 32.2 % — LOW (ref 37–47)
HCT VFR BLD CALC: 32.3 % — LOW (ref 37–47)
HCT VFR BLD CALC: 32.3 % — LOW (ref 37–47)
HCT VFR BLD CALC: 32.5 % — LOW (ref 37–47)
HCT VFR BLD CALC: 32.5 % — LOW (ref 37–47)
HCT VFR BLD CALC: 32.8 % — LOW (ref 37–47)
HCT VFR BLD CALC: 33.5 % — LOW (ref 37–47)
HCT VFR BLD CALC: 33.6 % — LOW (ref 37–47)
HCT VFR BLD CALC: 33.9 % — LOW (ref 37–47)
HCT VFR BLD CALC: 34.6 % — LOW (ref 37–47)
HCT VFR BLD CALC: 34.9 % — LOW (ref 37–47)
HCT VFR BLD CALC: 35.1 % — LOW (ref 37–47)
HCT VFR BLD CALC: 35.2 % — LOW (ref 37–47)
HCT VFR BLD CALC: 35.7 % — LOW (ref 37–47)
HCT VFR BLD CALC: 35.8 % — LOW (ref 37–47)
HCT VFR BLD CALC: 36 % — LOW (ref 37–47)
HCT VFR BLD CALC: 36.1 %
HCT VFR BLD CALC: 36.1 % — LOW (ref 37–47)
HCT VFR BLDA CALC: 51 % — SIGNIFICANT CHANGE UP (ref 39–51)
HDLC SERPL-MCNC: 57 MG/DL — SIGNIFICANT CHANGE UP
HGB BLD CALC-MCNC: 16.9 G/DL — SIGNIFICANT CHANGE UP (ref 12.6–17.4)
HGB BLD-MCNC: 10 G/DL — LOW (ref 12–16)
HGB BLD-MCNC: 10 G/DL — LOW (ref 12–16)
HGB BLD-MCNC: 10.1 G/DL — LOW (ref 12–16)
HGB BLD-MCNC: 10.2 G/DL — LOW (ref 12–16)
HGB BLD-MCNC: 10.3 G/DL — LOW (ref 12–16)
HGB BLD-MCNC: 10.4 G/DL — LOW (ref 12–16)
HGB BLD-MCNC: 10.4 G/DL — LOW (ref 12–16)
HGB BLD-MCNC: 10.5 G/DL — LOW (ref 12–16)
HGB BLD-MCNC: 10.5 G/DL — LOW (ref 12–16)
HGB BLD-MCNC: 10.6 G/DL — LOW (ref 12–16)
HGB BLD-MCNC: 10.6 G/DL — LOW (ref 12–16)
HGB BLD-MCNC: 10.7 G/DL — LOW (ref 12–16)
HGB BLD-MCNC: 10.7 G/DL — LOW (ref 12–16)
HGB BLD-MCNC: 10.8 G/DL — LOW (ref 12–16)
HGB BLD-MCNC: 10.8 G/DL — LOW (ref 12–16)
HGB BLD-MCNC: 10.9 G/DL — LOW (ref 12–16)
HGB BLD-MCNC: 11 G/DL — LOW (ref 12–16)
HGB BLD-MCNC: 11.3 G/DL — LOW (ref 12–16)
HGB BLD-MCNC: 11.3 G/DL — LOW (ref 12–16)
HGB BLD-MCNC: 11.4 G/DL — LOW (ref 12–16)
HGB BLD-MCNC: 11.6 G/DL
HGB BLD-MCNC: 11.6 G/DL — LOW (ref 12–16)
HGB BLD-MCNC: 11.6 G/DL — LOW (ref 12–16)
HGB BLD-MCNC: 11.9 G/DL — LOW (ref 12–16)
HGB BLD-MCNC: 9.5 G/DL — LOW (ref 12–16)
HYALINE CASTS # UR AUTO: 1 /LPF — SIGNIFICANT CHANGE UP (ref 0–7)
HYALINE CASTS # UR AUTO: 22 /LPF — HIGH (ref 0–7)
IMM GRANULOCYTES NFR BLD AUTO: 0.3 % — SIGNIFICANT CHANGE UP (ref 0.1–0.3)
IMM GRANULOCYTES NFR BLD AUTO: 0.4 % — HIGH (ref 0.1–0.3)
IMM GRANULOCYTES NFR BLD AUTO: 0.5 % — HIGH (ref 0.1–0.3)
IMM GRANULOCYTES NFR BLD AUTO: 0.6 % — HIGH (ref 0.1–0.3)
IMM GRANULOCYTES NFR BLD AUTO: 0.7 % — HIGH (ref 0.1–0.3)
IMM GRANULOCYTES NFR BLD AUTO: 0.8 % — HIGH (ref 0.1–0.3)
INR BLD: 1.15 RATIO — SIGNIFICANT CHANGE UP (ref 0.65–1.3)
INR BLD: 1.17 RATIO — SIGNIFICANT CHANGE UP (ref 0.65–1.3)
INR BLD: 1.25 RATIO — SIGNIFICANT CHANGE UP (ref 0.65–1.3)
INR BLD: 1.31 RATIO — HIGH (ref 0.65–1.3)
INR BLD: 1.34 RATIO — HIGH (ref 0.65–1.3)
INR BLD: 1.38 RATIO — HIGH (ref 0.65–1.3)
INR BLD: 1.55 RATIO — HIGH (ref 0.65–1.3)
INR BLD: 1.79 RATIO — HIGH (ref 0.65–1.3)
IRON SATN MFR SERPL: 12 % — LOW (ref 15–50)
IRON SATN MFR SERPL: 19 UG/DL — LOW (ref 35–150)
KETONES UR-MCNC: SIGNIFICANT CHANGE UP
LACTATE BLDV-MCNC: 1.6 MMOL/L — SIGNIFICANT CHANGE UP (ref 0.5–2)
LACTATE SERPL-SCNC: 1.3 MMOL/L — SIGNIFICANT CHANGE UP (ref 0.7–2)
LACTATE SERPL-SCNC: 1.5 MMOL/L — SIGNIFICANT CHANGE UP (ref 0.7–2)
LDH SERPL L TO P-CCNC: 94 U/L — SIGNIFICANT CHANGE UP
LEUKOCYTE ESTERASE UR-ACNC: ABNORMAL
LEUKOCYTE ESTERASE UR-ACNC: NEGATIVE — SIGNIFICANT CHANGE UP
LIDOCAIN IGE QN: 13 U/L — SIGNIFICANT CHANGE UP (ref 7–60)
LIDOCAIN IGE QN: 8 U/L — SIGNIFICANT CHANGE UP (ref 7–60)
LIDOCAIN IGE QN: 8 U/L — SIGNIFICANT CHANGE UP (ref 7–60)
LIDOCAIN IGE QN: 9 U/L — SIGNIFICANT CHANGE UP (ref 7–60)
LIPID PNL WITH DIRECT LDL SERPL: 58 MG/DL — SIGNIFICANT CHANGE UP
LYMPHOCYTES # BLD AUTO: 0.47 K/UL — LOW (ref 1.2–3.4)
LYMPHOCYTES # BLD AUTO: 0.57 K/UL — LOW (ref 1.2–3.4)
LYMPHOCYTES # BLD AUTO: 0.62 K/UL — LOW (ref 1.2–3.4)
LYMPHOCYTES # BLD AUTO: 0.62 K/UL — LOW (ref 1.2–3.4)
LYMPHOCYTES # BLD AUTO: 0.63 K/UL — LOW (ref 1.2–3.4)
LYMPHOCYTES # BLD AUTO: 0.66 K/UL — LOW (ref 1.2–3.4)
LYMPHOCYTES # BLD AUTO: 0.69 K/UL — LOW (ref 1.2–3.4)
LYMPHOCYTES # BLD AUTO: 0.7 K/UL — LOW (ref 1.2–3.4)
LYMPHOCYTES # BLD AUTO: 0.74 K/UL — LOW (ref 1.2–3.4)
LYMPHOCYTES # BLD AUTO: 0.75 K/UL — LOW (ref 1.2–3.4)
LYMPHOCYTES # BLD AUTO: 0.78 K/UL — LOW (ref 1.2–3.4)
LYMPHOCYTES # BLD AUTO: 0.79 K/UL — LOW (ref 1.2–3.4)
LYMPHOCYTES # BLD AUTO: 0.84 K/UL — LOW (ref 1.2–3.4)
LYMPHOCYTES # BLD AUTO: 0.85 K/UL — LOW (ref 1.2–3.4)
LYMPHOCYTES # BLD AUTO: 0.93 K/UL — LOW (ref 1.2–3.4)
LYMPHOCYTES # BLD AUTO: 0.95 K/UL — LOW (ref 1.2–3.4)
LYMPHOCYTES # BLD AUTO: 0.96 K/UL — LOW (ref 1.2–3.4)
LYMPHOCYTES # BLD AUTO: 1.03 K/UL — LOW (ref 1.2–3.4)
LYMPHOCYTES # BLD AUTO: 1.1 K/UL — LOW (ref 1.2–3.4)
LYMPHOCYTES # BLD AUTO: 1.14 K/UL — LOW (ref 1.2–3.4)
LYMPHOCYTES # BLD AUTO: 1.16 K/UL — LOW (ref 1.2–3.4)
LYMPHOCYTES # BLD AUTO: 1.21 K/UL — SIGNIFICANT CHANGE UP (ref 1.2–3.4)
LYMPHOCYTES # BLD AUTO: 1.3 K/UL — SIGNIFICANT CHANGE UP (ref 1.2–3.4)
LYMPHOCYTES # BLD AUTO: 1.31 K/UL — SIGNIFICANT CHANGE UP (ref 1.2–3.4)
LYMPHOCYTES # BLD AUTO: 1.36 K/UL — SIGNIFICANT CHANGE UP (ref 1.2–3.4)
LYMPHOCYTES # BLD AUTO: 1.43 K/UL — SIGNIFICANT CHANGE UP (ref 1.2–3.4)
LYMPHOCYTES # BLD AUTO: 1.44 K/UL — SIGNIFICANT CHANGE UP (ref 1.2–3.4)
LYMPHOCYTES # BLD AUTO: 1.46 K/UL — SIGNIFICANT CHANGE UP (ref 1.2–3.4)
LYMPHOCYTES # BLD AUTO: 1.51 K/UL — SIGNIFICANT CHANGE UP (ref 1.2–3.4)
LYMPHOCYTES # BLD AUTO: 1.65 K/UL — SIGNIFICANT CHANGE UP (ref 1.2–3.4)
LYMPHOCYTES # BLD AUTO: 1.71 K/UL — SIGNIFICANT CHANGE UP (ref 1.2–3.4)
LYMPHOCYTES # BLD AUTO: 10 % — LOW (ref 20.5–51.1)
LYMPHOCYTES # BLD AUTO: 11.2 % — LOW (ref 20.5–51.1)
LYMPHOCYTES # BLD AUTO: 11.5 % — LOW (ref 20.5–51.1)
LYMPHOCYTES # BLD AUTO: 12.2 % — LOW (ref 20.5–51.1)
LYMPHOCYTES # BLD AUTO: 12.4 % — LOW (ref 20.5–51.1)
LYMPHOCYTES # BLD AUTO: 12.7 % — LOW (ref 20.5–51.1)
LYMPHOCYTES # BLD AUTO: 16.1 % — LOW (ref 20.5–51.1)
LYMPHOCYTES # BLD AUTO: 16.3 % — LOW (ref 20.5–51.1)
LYMPHOCYTES # BLD AUTO: 3.9 % — LOW (ref 20.5–51.1)
LYMPHOCYTES # BLD AUTO: 4.9 % — LOW (ref 20.5–51.1)
LYMPHOCYTES # BLD AUTO: 5.1 % — LOW (ref 20.5–51.1)
LYMPHOCYTES # BLD AUTO: 6.2 % — LOW (ref 20.5–51.1)
LYMPHOCYTES # BLD AUTO: 6.2 % — LOW (ref 20.5–51.1)
LYMPHOCYTES # BLD AUTO: 6.3 % — LOW (ref 20.5–51.1)
LYMPHOCYTES # BLD AUTO: 6.9 % — LOW (ref 20.5–51.1)
LYMPHOCYTES # BLD AUTO: 7 % — LOW (ref 20.5–51.1)
LYMPHOCYTES # BLD AUTO: 7.3 % — LOW (ref 20.5–51.1)
LYMPHOCYTES # BLD AUTO: 7.4 % — LOW (ref 20.5–51.1)
LYMPHOCYTES # BLD AUTO: 7.5 % — LOW (ref 20.5–51.1)
LYMPHOCYTES # BLD AUTO: 7.9 % — LOW (ref 20.5–51.1)
LYMPHOCYTES # BLD AUTO: 7.9 % — LOW (ref 20.5–51.1)
LYMPHOCYTES # BLD AUTO: 8 % — LOW (ref 20.5–51.1)
LYMPHOCYTES # BLD AUTO: 8.1 % — LOW (ref 20.5–51.1)
LYMPHOCYTES # BLD AUTO: 8.7 % — LOW (ref 20.5–51.1)
LYMPHOCYTES # BLD AUTO: 8.9 % — LOW (ref 20.5–51.1)
LYMPHOCYTES # BLD AUTO: 9 % — LOW (ref 20.5–51.1)
LYMPHOCYTES # BLD AUTO: 9.1 % — LOW (ref 20.5–51.1)
LYMPHOCYTES # BLD AUTO: 9.3 % — LOW (ref 20.5–51.1)
LYMPHOCYTES # BLD AUTO: 9.6 % — LOW (ref 20.5–51.1)
LYMPHOCYTES # BLD AUTO: 9.8 % — LOW (ref 20.5–51.1)
LYMPHOCYTES # FLD: SIGNIFICANT CHANGE UP
MAGNESIUM SERPL-MCNC: 1.6 MG/DL — LOW (ref 1.8–2.4)
MAGNESIUM SERPL-MCNC: 1.7 MG/DL — LOW (ref 1.8–2.4)
MAGNESIUM SERPL-MCNC: 1.8 MG/DL — SIGNIFICANT CHANGE UP (ref 1.8–2.4)
MAGNESIUM SERPL-MCNC: 1.9 MG/DL — SIGNIFICANT CHANGE UP (ref 1.8–2.4)
MAGNESIUM SERPL-MCNC: 2 MG/DL — SIGNIFICANT CHANGE UP (ref 1.8–2.4)
MAGNESIUM SERPL-MCNC: 2.1 MG/DL — SIGNIFICANT CHANGE UP (ref 1.8–2.4)
MCHC RBC-ENTMCNC: 28.3 PG
MCHC RBC-ENTMCNC: 28.4 PG — SIGNIFICANT CHANGE UP (ref 27–31)
MCHC RBC-ENTMCNC: 28.5 PG — SIGNIFICANT CHANGE UP (ref 27–31)
MCHC RBC-ENTMCNC: 28.7 PG — SIGNIFICANT CHANGE UP (ref 27–31)
MCHC RBC-ENTMCNC: 28.7 PG — SIGNIFICANT CHANGE UP (ref 27–31)
MCHC RBC-ENTMCNC: 28.8 PG — SIGNIFICANT CHANGE UP (ref 27–31)
MCHC RBC-ENTMCNC: 28.9 PG — SIGNIFICANT CHANGE UP (ref 27–31)
MCHC RBC-ENTMCNC: 29 PG — SIGNIFICANT CHANGE UP (ref 27–31)
MCHC RBC-ENTMCNC: 29.1 PG — SIGNIFICANT CHANGE UP (ref 27–31)
MCHC RBC-ENTMCNC: 29.1 PG — SIGNIFICANT CHANGE UP (ref 27–31)
MCHC RBC-ENTMCNC: 29.2 PG — SIGNIFICANT CHANGE UP (ref 27–31)
MCHC RBC-ENTMCNC: 29.2 PG — SIGNIFICANT CHANGE UP (ref 27–31)
MCHC RBC-ENTMCNC: 29.3 PG — SIGNIFICANT CHANGE UP (ref 27–31)
MCHC RBC-ENTMCNC: 29.3 PG — SIGNIFICANT CHANGE UP (ref 27–31)
MCHC RBC-ENTMCNC: 29.4 PG — SIGNIFICANT CHANGE UP (ref 27–31)
MCHC RBC-ENTMCNC: 29.4 PG — SIGNIFICANT CHANGE UP (ref 27–31)
MCHC RBC-ENTMCNC: 29.5 PG — SIGNIFICANT CHANGE UP (ref 27–31)
MCHC RBC-ENTMCNC: 29.5 PG — SIGNIFICANT CHANGE UP (ref 27–31)
MCHC RBC-ENTMCNC: 29.6 PG — SIGNIFICANT CHANGE UP (ref 27–31)
MCHC RBC-ENTMCNC: 29.7 PG — SIGNIFICANT CHANGE UP (ref 27–31)
MCHC RBC-ENTMCNC: 29.9 PG — SIGNIFICANT CHANGE UP (ref 27–31)
MCHC RBC-ENTMCNC: 29.9 PG — SIGNIFICANT CHANGE UP (ref 27–31)
MCHC RBC-ENTMCNC: 30 PG — SIGNIFICANT CHANGE UP (ref 27–31)
MCHC RBC-ENTMCNC: 30.1 PG — SIGNIFICANT CHANGE UP (ref 27–31)
MCHC RBC-ENTMCNC: 30.4 PG — SIGNIFICANT CHANGE UP (ref 27–31)
MCHC RBC-ENTMCNC: 30.7 G/DL — LOW (ref 32–37)
MCHC RBC-ENTMCNC: 31.3 G/DL — LOW (ref 32–37)
MCHC RBC-ENTMCNC: 31.4 G/DL — LOW (ref 32–37)
MCHC RBC-ENTMCNC: 31.6 G/DL — LOW (ref 32–37)
MCHC RBC-ENTMCNC: 31.6 G/DL — LOW (ref 32–37)
MCHC RBC-ENTMCNC: 31.7 G/DL — LOW (ref 32–37)
MCHC RBC-ENTMCNC: 31.8 G/DL — LOW (ref 32–37)
MCHC RBC-ENTMCNC: 31.8 G/DL — LOW (ref 32–37)
MCHC RBC-ENTMCNC: 31.9 G/DL — LOW (ref 32–37)
MCHC RBC-ENTMCNC: 32.1 G/DL
MCHC RBC-ENTMCNC: 32.1 G/DL — SIGNIFICANT CHANGE UP (ref 32–37)
MCHC RBC-ENTMCNC: 32.2 G/DL — SIGNIFICANT CHANGE UP (ref 32–37)
MCHC RBC-ENTMCNC: 32.4 G/DL — SIGNIFICANT CHANGE UP (ref 32–37)
MCHC RBC-ENTMCNC: 32.5 G/DL — SIGNIFICANT CHANGE UP (ref 32–37)
MCHC RBC-ENTMCNC: 32.5 G/DL — SIGNIFICANT CHANGE UP (ref 32–37)
MCHC RBC-ENTMCNC: 32.6 G/DL — SIGNIFICANT CHANGE UP (ref 32–37)
MCHC RBC-ENTMCNC: 32.7 G/DL — SIGNIFICANT CHANGE UP (ref 32–37)
MCHC RBC-ENTMCNC: 32.7 G/DL — SIGNIFICANT CHANGE UP (ref 32–37)
MCHC RBC-ENTMCNC: 32.8 G/DL — SIGNIFICANT CHANGE UP (ref 32–37)
MCHC RBC-ENTMCNC: 32.8 G/DL — SIGNIFICANT CHANGE UP (ref 32–37)
MCHC RBC-ENTMCNC: 33 G/DL — SIGNIFICANT CHANGE UP (ref 32–37)
MCHC RBC-ENTMCNC: 33.1 G/DL — SIGNIFICANT CHANGE UP (ref 32–37)
MCHC RBC-ENTMCNC: 33.2 G/DL — SIGNIFICANT CHANGE UP (ref 32–37)
MCHC RBC-ENTMCNC: 33.3 G/DL — SIGNIFICANT CHANGE UP (ref 32–37)
MCV RBC AUTO: 87.3 FL — SIGNIFICANT CHANGE UP (ref 81–99)
MCV RBC AUTO: 87.5 FL — SIGNIFICANT CHANGE UP (ref 81–99)
MCV RBC AUTO: 87.7 FL — SIGNIFICANT CHANGE UP (ref 81–99)
MCV RBC AUTO: 88 FL
MCV RBC AUTO: 88 FL — SIGNIFICANT CHANGE UP (ref 81–99)
MCV RBC AUTO: 88.5 FL — SIGNIFICANT CHANGE UP (ref 81–99)
MCV RBC AUTO: 88.7 FL — SIGNIFICANT CHANGE UP (ref 81–99)
MCV RBC AUTO: 88.8 FL — SIGNIFICANT CHANGE UP (ref 81–99)
MCV RBC AUTO: 88.8 FL — SIGNIFICANT CHANGE UP (ref 81–99)
MCV RBC AUTO: 89.1 FL — SIGNIFICANT CHANGE UP (ref 81–99)
MCV RBC AUTO: 89.1 FL — SIGNIFICANT CHANGE UP (ref 81–99)
MCV RBC AUTO: 89.3 FL — SIGNIFICANT CHANGE UP (ref 81–99)
MCV RBC AUTO: 89.4 FL — SIGNIFICANT CHANGE UP (ref 81–99)
MCV RBC AUTO: 89.4 FL — SIGNIFICANT CHANGE UP (ref 81–99)
MCV RBC AUTO: 89.6 FL — SIGNIFICANT CHANGE UP (ref 81–99)
MCV RBC AUTO: 89.6 FL — SIGNIFICANT CHANGE UP (ref 81–99)
MCV RBC AUTO: 89.8 FL — SIGNIFICANT CHANGE UP (ref 81–99)
MCV RBC AUTO: 90.2 FL — SIGNIFICANT CHANGE UP (ref 81–99)
MCV RBC AUTO: 90.4 FL — SIGNIFICANT CHANGE UP (ref 81–99)
MCV RBC AUTO: 90.6 FL — SIGNIFICANT CHANGE UP (ref 81–99)
MCV RBC AUTO: 90.7 FL — SIGNIFICANT CHANGE UP (ref 81–99)
MCV RBC AUTO: 90.9 FL — SIGNIFICANT CHANGE UP (ref 81–99)
MCV RBC AUTO: 90.9 FL — SIGNIFICANT CHANGE UP (ref 81–99)
MCV RBC AUTO: 91.1 FL — SIGNIFICANT CHANGE UP (ref 81–99)
MCV RBC AUTO: 91.4 FL — SIGNIFICANT CHANGE UP (ref 81–99)
MCV RBC AUTO: 91.6 FL — SIGNIFICANT CHANGE UP (ref 81–99)
MCV RBC AUTO: 92 FL — SIGNIFICANT CHANGE UP (ref 81–99)
MCV RBC AUTO: 92.4 FL — SIGNIFICANT CHANGE UP (ref 81–99)
MCV RBC AUTO: 93 FL — SIGNIFICANT CHANGE UP (ref 81–99)
MCV RBC AUTO: 93 FL — SIGNIFICANT CHANGE UP (ref 81–99)
MCV RBC AUTO: 93.4 FL — SIGNIFICANT CHANGE UP (ref 81–99)
MCV RBC AUTO: 93.9 FL — SIGNIFICANT CHANGE UP (ref 81–99)
MCV RBC AUTO: 94 FL — SIGNIFICANT CHANGE UP (ref 81–99)
MCV RBC AUTO: 94.7 FL — SIGNIFICANT CHANGE UP (ref 81–99)
MCV RBC AUTO: 95.1 FL — SIGNIFICANT CHANGE UP (ref 81–99)
METHOD TYPE: SIGNIFICANT CHANGE UP
MONOCYTES # BLD AUTO: 0.65 K/UL — HIGH (ref 0.1–0.6)
MONOCYTES # BLD AUTO: 0.68 K/UL — HIGH (ref 0.1–0.6)
MONOCYTES # BLD AUTO: 0.7 K/UL — HIGH (ref 0.1–0.6)
MONOCYTES # BLD AUTO: 0.73 K/UL — HIGH (ref 0.1–0.6)
MONOCYTES # BLD AUTO: 0.74 K/UL — HIGH (ref 0.1–0.6)
MONOCYTES # BLD AUTO: 0.76 K/UL — HIGH (ref 0.1–0.6)
MONOCYTES # BLD AUTO: 0.77 K/UL — HIGH (ref 0.1–0.6)
MONOCYTES # BLD AUTO: 0.78 K/UL — HIGH (ref 0.1–0.6)
MONOCYTES # BLD AUTO: 0.8 K/UL — HIGH (ref 0.1–0.6)
MONOCYTES # BLD AUTO: 0.8 K/UL — HIGH (ref 0.1–0.6)
MONOCYTES # BLD AUTO: 0.82 K/UL — HIGH (ref 0.1–0.6)
MONOCYTES # BLD AUTO: 0.82 K/UL — HIGH (ref 0.1–0.6)
MONOCYTES # BLD AUTO: 0.83 K/UL — HIGH (ref 0.1–0.6)
MONOCYTES # BLD AUTO: 0.88 K/UL — HIGH (ref 0.1–0.6)
MONOCYTES # BLD AUTO: 0.88 K/UL — HIGH (ref 0.1–0.6)
MONOCYTES # BLD AUTO: 0.9 K/UL — HIGH (ref 0.1–0.6)
MONOCYTES # BLD AUTO: 0.91 K/UL — HIGH (ref 0.1–0.6)
MONOCYTES # BLD AUTO: 0.91 K/UL — HIGH (ref 0.1–0.6)
MONOCYTES # BLD AUTO: 0.93 K/UL — HIGH (ref 0.1–0.6)
MONOCYTES # BLD AUTO: 0.94 K/UL — HIGH (ref 0.1–0.6)
MONOCYTES # BLD AUTO: 0.95 K/UL — HIGH (ref 0.1–0.6)
MONOCYTES # BLD AUTO: 0.95 K/UL — HIGH (ref 0.1–0.6)
MONOCYTES # BLD AUTO: 0.96 K/UL — HIGH (ref 0.1–0.6)
MONOCYTES # BLD AUTO: 0.98 K/UL — HIGH (ref 0.1–0.6)
MONOCYTES # BLD AUTO: 1 K/UL — HIGH (ref 0.1–0.6)
MONOCYTES # BLD AUTO: 1.03 K/UL — HIGH (ref 0.1–0.6)
MONOCYTES # BLD AUTO: 1.03 K/UL — HIGH (ref 0.1–0.6)
MONOCYTES # BLD AUTO: 1.04 K/UL — HIGH (ref 0.1–0.6)
MONOCYTES # BLD AUTO: 1.06 K/UL — HIGH (ref 0.1–0.6)
MONOCYTES # BLD AUTO: 1.08 K/UL — HIGH (ref 0.1–0.6)
MONOCYTES # BLD AUTO: 1.11 K/UL — HIGH (ref 0.1–0.6)
MONOCYTES # BLD AUTO: 1.14 K/UL — HIGH (ref 0.1–0.6)
MONOCYTES # BLD AUTO: 1.25 K/UL — HIGH (ref 0.1–0.6)
MONOCYTES NFR BLD AUTO: 10.1 % — HIGH (ref 1.7–9.3)
MONOCYTES NFR BLD AUTO: 10.3 % — HIGH (ref 1.7–9.3)
MONOCYTES NFR BLD AUTO: 11.3 % — HIGH (ref 1.7–9.3)
MONOCYTES NFR BLD AUTO: 11.3 % — HIGH (ref 1.7–9.3)
MONOCYTES NFR BLD AUTO: 12.4 % — HIGH (ref 1.7–9.3)
MONOCYTES NFR BLD AUTO: 4.3 % — SIGNIFICANT CHANGE UP (ref 1.7–9.3)
MONOCYTES NFR BLD AUTO: 5.3 % — SIGNIFICANT CHANGE UP (ref 1.7–9.3)
MONOCYTES NFR BLD AUTO: 5.6 % — SIGNIFICANT CHANGE UP (ref 1.7–9.3)
MONOCYTES NFR BLD AUTO: 6.1 % — SIGNIFICANT CHANGE UP (ref 1.7–9.3)
MONOCYTES NFR BLD AUTO: 6.3 % — SIGNIFICANT CHANGE UP (ref 1.7–9.3)
MONOCYTES NFR BLD AUTO: 6.6 % — SIGNIFICANT CHANGE UP (ref 1.7–9.3)
MONOCYTES NFR BLD AUTO: 6.6 % — SIGNIFICANT CHANGE UP (ref 1.7–9.3)
MONOCYTES NFR BLD AUTO: 6.8 % — SIGNIFICANT CHANGE UP (ref 1.7–9.3)
MONOCYTES NFR BLD AUTO: 7.1 % — SIGNIFICANT CHANGE UP (ref 1.7–9.3)
MONOCYTES NFR BLD AUTO: 7.3 % — SIGNIFICANT CHANGE UP (ref 1.7–9.3)
MONOCYTES NFR BLD AUTO: 7.3 % — SIGNIFICANT CHANGE UP (ref 1.7–9.3)
MONOCYTES NFR BLD AUTO: 7.5 % — SIGNIFICANT CHANGE UP (ref 1.7–9.3)
MONOCYTES NFR BLD AUTO: 7.6 % — SIGNIFICANT CHANGE UP (ref 1.7–9.3)
MONOCYTES NFR BLD AUTO: 7.6 % — SIGNIFICANT CHANGE UP (ref 1.7–9.3)
MONOCYTES NFR BLD AUTO: 7.9 % — SIGNIFICANT CHANGE UP (ref 1.7–9.3)
MONOCYTES NFR BLD AUTO: 8 % — SIGNIFICANT CHANGE UP (ref 1.7–9.3)
MONOCYTES NFR BLD AUTO: 8.2 % — SIGNIFICANT CHANGE UP (ref 1.7–9.3)
MONOCYTES NFR BLD AUTO: 8.2 % — SIGNIFICANT CHANGE UP (ref 1.7–9.3)
MONOCYTES NFR BLD AUTO: 8.3 % — SIGNIFICANT CHANGE UP (ref 1.7–9.3)
MONOCYTES NFR BLD AUTO: 8.5 % — SIGNIFICANT CHANGE UP (ref 1.7–9.3)
MONOCYTES NFR BLD AUTO: 8.5 % — SIGNIFICANT CHANGE UP (ref 1.7–9.3)
MONOCYTES NFR BLD AUTO: 8.6 % — SIGNIFICANT CHANGE UP (ref 1.7–9.3)
MONOCYTES NFR BLD AUTO: 8.8 % — SIGNIFICANT CHANGE UP (ref 1.7–9.3)
MONOCYTES NFR BLD AUTO: 8.9 % — SIGNIFICANT CHANGE UP (ref 1.7–9.3)
MONOCYTES NFR BLD AUTO: 9 % — SIGNIFICANT CHANGE UP (ref 1.7–9.3)
MONOCYTES NFR BLD AUTO: 9.1 % — SIGNIFICANT CHANGE UP (ref 1.7–9.3)
MONOCYTES NFR BLD AUTO: 9.6 % — HIGH (ref 1.7–9.3)
MONOCYTES NFR BLD AUTO: 9.8 % — HIGH (ref 1.7–9.3)
MONOS+MACROS # FLD: SIGNIFICANT CHANGE UP %
NEUTROPHILS # BLD AUTO: 10.81 K/UL — HIGH (ref 1.4–6.5)
NEUTROPHILS # BLD AUTO: 11.62 K/UL — HIGH (ref 1.4–6.5)
NEUTROPHILS # BLD AUTO: 11.85 K/UL — HIGH (ref 1.4–6.5)
NEUTROPHILS # BLD AUTO: 11.89 K/UL — HIGH (ref 1.4–6.5)
NEUTROPHILS # BLD AUTO: 11.99 K/UL — HIGH (ref 1.4–6.5)
NEUTROPHILS # BLD AUTO: 12.27 K/UL — HIGH (ref 1.4–6.5)
NEUTROPHILS # BLD AUTO: 13.57 K/UL — HIGH (ref 1.4–6.5)
NEUTROPHILS # BLD AUTO: 14.47 K/UL — HIGH (ref 1.4–6.5)
NEUTROPHILS # BLD AUTO: 16.34 K/UL — HIGH (ref 1.4–6.5)
NEUTROPHILS # BLD AUTO: 5.14 K/UL — SIGNIFICANT CHANGE UP (ref 1.4–6.5)
NEUTROPHILS # BLD AUTO: 5.77 K/UL — SIGNIFICANT CHANGE UP (ref 1.4–6.5)
NEUTROPHILS # BLD AUTO: 6.14 K/UL — SIGNIFICANT CHANGE UP (ref 1.4–6.5)
NEUTROPHILS # BLD AUTO: 6.22 K/UL — SIGNIFICANT CHANGE UP (ref 1.4–6.5)
NEUTROPHILS # BLD AUTO: 6.57 K/UL — HIGH (ref 1.4–6.5)
NEUTROPHILS # BLD AUTO: 6.59 K/UL — HIGH (ref 1.4–6.5)
NEUTROPHILS # BLD AUTO: 6.86 K/UL — HIGH (ref 1.4–6.5)
NEUTROPHILS # BLD AUTO: 6.93 K/UL — HIGH (ref 1.4–6.5)
NEUTROPHILS # BLD AUTO: 7.12 K/UL — HIGH (ref 1.4–6.5)
NEUTROPHILS # BLD AUTO: 7.2 K/UL — HIGH (ref 1.4–6.5)
NEUTROPHILS # BLD AUTO: 7.34 K/UL — HIGH (ref 1.4–6.5)
NEUTROPHILS # BLD AUTO: 7.96 K/UL — HIGH (ref 1.4–6.5)
NEUTROPHILS # BLD AUTO: 7.97 K/UL — HIGH (ref 1.4–6.5)
NEUTROPHILS # BLD AUTO: 8.22 K/UL — HIGH (ref 1.4–6.5)
NEUTROPHILS # BLD AUTO: 8.27 K/UL — HIGH (ref 1.4–6.5)
NEUTROPHILS # BLD AUTO: 8.42 K/UL — HIGH (ref 1.4–6.5)
NEUTROPHILS # BLD AUTO: 8.45 K/UL — HIGH (ref 1.4–6.5)
NEUTROPHILS # BLD AUTO: 8.6 K/UL — HIGH (ref 1.4–6.5)
NEUTROPHILS # BLD AUTO: 8.86 K/UL — HIGH (ref 1.4–6.5)
NEUTROPHILS # BLD AUTO: 8.88 K/UL — HIGH (ref 1.4–6.5)
NEUTROPHILS # BLD AUTO: 9 K/UL — HIGH (ref 1.4–6.5)
NEUTROPHILS # BLD AUTO: 9.14 K/UL — HIGH (ref 1.4–6.5)
NEUTROPHILS # BLD AUTO: 9.28 K/UL — HIGH (ref 1.4–6.5)
NEUTROPHILS # BLD AUTO: 9.79 K/UL — HIGH (ref 1.4–6.5)
NEUTROPHILS NFR BLD AUTO: 67.4 % — SIGNIFICANT CHANGE UP (ref 42.2–75.2)
NEUTROPHILS NFR BLD AUTO: 67.6 % — SIGNIFICANT CHANGE UP (ref 42.2–75.2)
NEUTROPHILS NFR BLD AUTO: 68.5 % — SIGNIFICANT CHANGE UP (ref 42.2–75.2)
NEUTROPHILS NFR BLD AUTO: 69.9 % — SIGNIFICANT CHANGE UP (ref 42.2–75.2)
NEUTROPHILS NFR BLD AUTO: 70.2 % — SIGNIFICANT CHANGE UP (ref 42.2–75.2)
NEUTROPHILS NFR BLD AUTO: 71.6 % — SIGNIFICANT CHANGE UP (ref 42.2–75.2)
NEUTROPHILS NFR BLD AUTO: 72.7 % — SIGNIFICANT CHANGE UP (ref 42.2–75.2)
NEUTROPHILS NFR BLD AUTO: 73.6 % — SIGNIFICANT CHANGE UP (ref 42.2–75.2)
NEUTROPHILS NFR BLD AUTO: 74 % — SIGNIFICANT CHANGE UP (ref 42.2–75.2)
NEUTROPHILS NFR BLD AUTO: 74.4 % — SIGNIFICANT CHANGE UP (ref 42.2–75.2)
NEUTROPHILS NFR BLD AUTO: 74.6 % — SIGNIFICANT CHANGE UP (ref 42.2–75.2)
NEUTROPHILS NFR BLD AUTO: 76.3 % — HIGH (ref 42.2–75.2)
NEUTROPHILS NFR BLD AUTO: 76.5 % — HIGH (ref 42.2–75.2)
NEUTROPHILS NFR BLD AUTO: 76.6 % — HIGH (ref 42.2–75.2)
NEUTROPHILS NFR BLD AUTO: 77.4 % — HIGH (ref 42.2–75.2)
NEUTROPHILS NFR BLD AUTO: 77.9 % — HIGH (ref 42.2–75.2)
NEUTROPHILS NFR BLD AUTO: 78 % — HIGH (ref 42.2–75.2)
NEUTROPHILS NFR BLD AUTO: 78.9 % — HIGH (ref 42.2–75.2)
NEUTROPHILS NFR BLD AUTO: 79.2 % — HIGH (ref 42.2–75.2)
NEUTROPHILS NFR BLD AUTO: 79.4 % — HIGH (ref 42.2–75.2)
NEUTROPHILS NFR BLD AUTO: 79.9 % — HIGH (ref 42.2–75.2)
NEUTROPHILS NFR BLD AUTO: 81.1 % — HIGH (ref 42.2–75.2)
NEUTROPHILS NFR BLD AUTO: 81.2 % — HIGH (ref 42.2–75.2)
NEUTROPHILS NFR BLD AUTO: 81.3 % — HIGH (ref 42.2–75.2)
NEUTROPHILS NFR BLD AUTO: 81.8 % — HIGH (ref 42.2–75.2)
NEUTROPHILS NFR BLD AUTO: 82.1 % — HIGH (ref 42.2–75.2)
NEUTROPHILS NFR BLD AUTO: 82.8 % — HIGH (ref 42.2–75.2)
NEUTROPHILS NFR BLD AUTO: 83 % — HIGH (ref 42.2–75.2)
NEUTROPHILS NFR BLD AUTO: 84.9 % — HIGH (ref 42.2–75.2)
NEUTROPHILS NFR BLD AUTO: 85.9 % — HIGH (ref 42.2–75.2)
NEUTROPHILS NFR BLD AUTO: 86.8 % — HIGH (ref 42.2–75.2)
NEUTROPHILS NFR BLD AUTO: 87.9 % — HIGH (ref 42.2–75.2)
NEUTROPHILS NFR BLD AUTO: 90.3 % — HIGH (ref 42.2–75.2)
NEUTROPHILS-BODY FLUID: SIGNIFICANT CHANGE UP %
NITRITE UR-MCNC: NEGATIVE — SIGNIFICANT CHANGE UP
NITRITE UR-MCNC: POSITIVE
NITRITE UR-MCNC: POSITIVE
NON HDL CHOLESTEROL: 92 MG/DL — SIGNIFICANT CHANGE UP
NON-GYNECOLOGICAL CYTOLOGY STUDY: SIGNIFICANT CHANGE UP
NRBC # BLD: 0 /100 WBCS — SIGNIFICANT CHANGE UP (ref 0–0)
NT-PROBNP SERPL-SCNC: 369 PG/ML — HIGH (ref 0–300)
NT-PROBNP SERPL-SCNC: 504 PG/ML — HIGH (ref 0–300)
NT-PROBNP SERPL-SCNC: 780 PG/ML — HIGH (ref 0–300)
NT-PROBNP SERPL-SCNC: 795 PG/ML — HIGH (ref 0–300)
ORGANISM # SPEC MICROSCOPIC CNT: SIGNIFICANT CHANGE UP
OSMOLALITY UR: 466 MOS/KG — SIGNIFICANT CHANGE UP (ref 50–1200)
PCO2 BLDV: 41 MMHG — SIGNIFICANT CHANGE UP (ref 39–42)
PH BLDV: 7.45 — HIGH (ref 7.32–7.43)
PH FLD: 7.7 — SIGNIFICANT CHANGE UP
PH UR: 6 — SIGNIFICANT CHANGE UP (ref 5–8)
PH UR: 6.5 — SIGNIFICANT CHANGE UP (ref 5–8)
PH UR: 6.5 — SIGNIFICANT CHANGE UP (ref 5–8)
PHOSPHATE SERPL-MCNC: 2.1 MG/DL — SIGNIFICANT CHANGE UP (ref 2.1–4.9)
PHOSPHATE SERPL-MCNC: 2.5 MG/DL — SIGNIFICANT CHANGE UP (ref 2.1–4.9)
PHOSPHATE SERPL-MCNC: 2.6 MG/DL — SIGNIFICANT CHANGE UP (ref 2.1–4.9)
PHOSPHATE SERPL-MCNC: 2.7 MG/DL — SIGNIFICANT CHANGE UP (ref 2.1–4.9)
PHOSPHATE SERPL-MCNC: 3 MG/DL — SIGNIFICANT CHANGE UP (ref 2.1–4.9)
PHOSPHATE SERPL-MCNC: 3 MG/DL — SIGNIFICANT CHANGE UP (ref 2.1–4.9)
PHOSPHATE SERPL-MCNC: 3.1 MG/DL — SIGNIFICANT CHANGE UP (ref 2.1–4.9)
PHOSPHATE SERPL-MCNC: 3.2 MG/DL — SIGNIFICANT CHANGE UP (ref 2.1–4.9)
PHOSPHATE SERPL-MCNC: 3.4 MG/DL — SIGNIFICANT CHANGE UP (ref 2.1–4.9)
PHOSPHATE SERPL-MCNC: 3.6 MG/DL — SIGNIFICANT CHANGE UP (ref 2.1–4.9)
PHOSPHATE SERPL-MCNC: 3.8 MG/DL — SIGNIFICANT CHANGE UP (ref 2.1–4.9)
PHOSPHATE SERPL-MCNC: 3.9 MG/DL — SIGNIFICANT CHANGE UP (ref 2.1–4.9)
PLATELET # BLD AUTO: 158 K/UL — SIGNIFICANT CHANGE UP (ref 130–400)
PLATELET # BLD AUTO: 161 K/UL — SIGNIFICANT CHANGE UP (ref 130–400)
PLATELET # BLD AUTO: 185 K/UL — SIGNIFICANT CHANGE UP (ref 130–400)
PLATELET # BLD AUTO: 187 K/UL — SIGNIFICANT CHANGE UP (ref 130–400)
PLATELET # BLD AUTO: 207 K/UL — SIGNIFICANT CHANGE UP (ref 130–400)
PLATELET # BLD AUTO: 227 K/UL — SIGNIFICANT CHANGE UP (ref 130–400)
PLATELET # BLD AUTO: 232 K/UL — SIGNIFICANT CHANGE UP (ref 130–400)
PLATELET # BLD AUTO: 244 K/UL — SIGNIFICANT CHANGE UP (ref 130–400)
PLATELET # BLD AUTO: 259 K/UL — SIGNIFICANT CHANGE UP (ref 130–400)
PLATELET # BLD AUTO: 260 K/UL — SIGNIFICANT CHANGE UP (ref 130–400)
PLATELET # BLD AUTO: 261 K/UL — SIGNIFICANT CHANGE UP (ref 130–400)
PLATELET # BLD AUTO: 277 K/UL — SIGNIFICANT CHANGE UP (ref 130–400)
PLATELET # BLD AUTO: 287 K/UL — SIGNIFICANT CHANGE UP (ref 130–400)
PLATELET # BLD AUTO: 288 K/UL — SIGNIFICANT CHANGE UP (ref 130–400)
PLATELET # BLD AUTO: 294 K/UL — SIGNIFICANT CHANGE UP (ref 130–400)
PLATELET # BLD AUTO: 295 K/UL — SIGNIFICANT CHANGE UP (ref 130–400)
PLATELET # BLD AUTO: 303 K/UL — SIGNIFICANT CHANGE UP (ref 130–400)
PLATELET # BLD AUTO: 305 K/UL — SIGNIFICANT CHANGE UP (ref 130–400)
PLATELET # BLD AUTO: 309 K/UL — SIGNIFICANT CHANGE UP (ref 130–400)
PLATELET # BLD AUTO: 317 K/UL — SIGNIFICANT CHANGE UP (ref 130–400)
PLATELET # BLD AUTO: 321 K/UL — SIGNIFICANT CHANGE UP (ref 130–400)
PLATELET # BLD AUTO: 322 K/UL — SIGNIFICANT CHANGE UP (ref 130–400)
PLATELET # BLD AUTO: 330 K/UL — SIGNIFICANT CHANGE UP (ref 130–400)
PLATELET # BLD AUTO: 331 K/UL — SIGNIFICANT CHANGE UP (ref 130–400)
PLATELET # BLD AUTO: 333 K/UL — SIGNIFICANT CHANGE UP (ref 130–400)
PLATELET # BLD AUTO: 340 K/UL — SIGNIFICANT CHANGE UP (ref 130–400)
PLATELET # BLD AUTO: 347 K/UL — SIGNIFICANT CHANGE UP (ref 130–400)
PLATELET # BLD AUTO: 349 K/UL — SIGNIFICANT CHANGE UP (ref 130–400)
PLATELET # BLD AUTO: 358 K/UL — SIGNIFICANT CHANGE UP (ref 130–400)
PLATELET # BLD AUTO: 380 K/UL — SIGNIFICANT CHANGE UP (ref 130–400)
PLATELET # BLD AUTO: 417 K/UL
PLATELET # BLD AUTO: 418 K/UL — HIGH (ref 130–400)
PLATELET # BLD AUTO: 419 K/UL — HIGH (ref 130–400)
PLATELET # BLD AUTO: 476 K/UL — HIGH (ref 130–400)
PLATELET # BLD AUTO: 521 K/UL — HIGH (ref 130–400)
PMV BLD: 10.1 FL — SIGNIFICANT CHANGE UP (ref 7.4–10.4)
PMV BLD: 10.3 FL — SIGNIFICANT CHANGE UP (ref 7.4–10.4)
PMV BLD: 10.3 FL — SIGNIFICANT CHANGE UP (ref 7.4–10.4)
PMV BLD: 10.4 FL — SIGNIFICANT CHANGE UP (ref 7.4–10.4)
PMV BLD: 10.5 FL — HIGH (ref 7.4–10.4)
PMV BLD: 10.6 FL — HIGH (ref 7.4–10.4)
PMV BLD: 10.7 FL — HIGH (ref 7.4–10.4)
PMV BLD: 10.8 FL — HIGH (ref 7.4–10.4)
PMV BLD: 10.8 FL — HIGH (ref 7.4–10.4)
PMV BLD: 11 FL — HIGH (ref 7.4–10.4)
PMV BLD: 11.1 FL — HIGH (ref 7.4–10.4)
PMV BLD: 11.1 FL — HIGH (ref 7.4–10.4)
PMV BLD: 11.2 FL — HIGH (ref 7.4–10.4)
PMV BLD: 9.2 FL
PO2 BLDV: 44 MMHG — SIGNIFICANT CHANGE UP
POTASSIUM BLDV-SCNC: 3.7 MMOL/L — SIGNIFICANT CHANGE UP (ref 3.5–5.1)
POTASSIUM SERPL-MCNC: 2.7 MMOL/L — CRITICAL LOW (ref 3.5–5)
POTASSIUM SERPL-MCNC: 3.2 MMOL/L — LOW (ref 3.5–5)
POTASSIUM SERPL-MCNC: 3.3 MMOL/L — LOW (ref 3.5–5)
POTASSIUM SERPL-MCNC: 3.4 MMOL/L — LOW (ref 3.5–5)
POTASSIUM SERPL-MCNC: 3.4 MMOL/L — LOW (ref 3.5–5)
POTASSIUM SERPL-MCNC: 3.5 MMOL/L — SIGNIFICANT CHANGE UP (ref 3.5–5)
POTASSIUM SERPL-MCNC: 3.6 MMOL/L — SIGNIFICANT CHANGE UP (ref 3.5–5)
POTASSIUM SERPL-MCNC: 3.7 MMOL/L — SIGNIFICANT CHANGE UP (ref 3.5–5)
POTASSIUM SERPL-MCNC: 3.8 MMOL/L — SIGNIFICANT CHANGE UP (ref 3.5–5)
POTASSIUM SERPL-MCNC: 3.9 MMOL/L — SIGNIFICANT CHANGE UP (ref 3.5–5)
POTASSIUM SERPL-MCNC: 4 MMOL/L — SIGNIFICANT CHANGE UP (ref 3.5–5)
POTASSIUM SERPL-MCNC: 4.1 MMOL/L — SIGNIFICANT CHANGE UP (ref 3.5–5)
POTASSIUM SERPL-MCNC: 4.2 MMOL/L — SIGNIFICANT CHANGE UP (ref 3.5–5)
POTASSIUM SERPL-MCNC: 4.2 MMOL/L — SIGNIFICANT CHANGE UP (ref 3.5–5)
POTASSIUM SERPL-MCNC: 4.3 MMOL/L — SIGNIFICANT CHANGE UP (ref 3.5–5)
POTASSIUM SERPL-MCNC: 4.4 MMOL/L — SIGNIFICANT CHANGE UP (ref 3.5–5)
POTASSIUM SERPL-MCNC: 4.6 MMOL/L — SIGNIFICANT CHANGE UP (ref 3.5–5)
POTASSIUM SERPL-SCNC: 2.7 MMOL/L — CRITICAL LOW (ref 3.5–5)
POTASSIUM SERPL-SCNC: 3.2 MMOL/L — LOW (ref 3.5–5)
POTASSIUM SERPL-SCNC: 3.3 MMOL/L — LOW (ref 3.5–5)
POTASSIUM SERPL-SCNC: 3.4 MMOL/L — LOW (ref 3.5–5)
POTASSIUM SERPL-SCNC: 3.4 MMOL/L — LOW (ref 3.5–5)
POTASSIUM SERPL-SCNC: 3.5 MMOL/L — SIGNIFICANT CHANGE UP (ref 3.5–5)
POTASSIUM SERPL-SCNC: 3.6 MMOL/L — SIGNIFICANT CHANGE UP (ref 3.5–5)
POTASSIUM SERPL-SCNC: 3.7 MMOL/L — SIGNIFICANT CHANGE UP (ref 3.5–5)
POTASSIUM SERPL-SCNC: 3.8 MMOL/L — SIGNIFICANT CHANGE UP (ref 3.5–5)
POTASSIUM SERPL-SCNC: 3.9 MMOL/L — SIGNIFICANT CHANGE UP (ref 3.5–5)
POTASSIUM SERPL-SCNC: 4 MMOL/L — SIGNIFICANT CHANGE UP (ref 3.5–5)
POTASSIUM SERPL-SCNC: 4.1 MMOL/L — SIGNIFICANT CHANGE UP (ref 3.5–5)
POTASSIUM SERPL-SCNC: 4.2 MMOL/L — SIGNIFICANT CHANGE UP (ref 3.5–5)
POTASSIUM SERPL-SCNC: 4.2 MMOL/L — SIGNIFICANT CHANGE UP (ref 3.5–5)
POTASSIUM SERPL-SCNC: 4.3 MMOL/L
POTASSIUM SERPL-SCNC: 4.3 MMOL/L — SIGNIFICANT CHANGE UP (ref 3.5–5)
POTASSIUM SERPL-SCNC: 4.4 MMOL/L — SIGNIFICANT CHANGE UP (ref 3.5–5)
POTASSIUM SERPL-SCNC: 4.6 MMOL/L — SIGNIFICANT CHANGE UP (ref 3.5–5)
PROCALCITONIN SERPL-MCNC: 0.09 NG/ML — SIGNIFICANT CHANGE UP (ref 0.02–0.1)
PROT FLD-MCNC: 3.9 G/DL — SIGNIFICANT CHANGE UP
PROT SERPL-MCNC: 4.4 G/DL — LOW (ref 6–8)
PROT SERPL-MCNC: 4.4 G/DL — LOW (ref 6–8)
PROT SERPL-MCNC: 4.5 G/DL — LOW (ref 6–8)
PROT SERPL-MCNC: 4.6 G/DL — LOW (ref 6–8)
PROT SERPL-MCNC: 4.6 G/DL — LOW (ref 6–8)
PROT SERPL-MCNC: 4.7 G/DL — LOW (ref 6–8)
PROT SERPL-MCNC: 4.8 G/DL — LOW (ref 6–8)
PROT SERPL-MCNC: 5.1 G/DL — LOW (ref 6–8)
PROT SERPL-MCNC: 5.3 G/DL — LOW (ref 6–8)
PROT SERPL-MCNC: 5.4 G/DL — LOW (ref 6–8)
PROT SERPL-MCNC: 5.6 G/DL — LOW (ref 6–8)
PROT SERPL-MCNC: 5.7 G/DL — LOW (ref 6–8)
PROT SERPL-MCNC: 5.8 G/DL — LOW (ref 6–8)
PROT SERPL-MCNC: 6 G/DL — SIGNIFICANT CHANGE UP (ref 6–8)
PROT SERPL-MCNC: 6 G/DL — SIGNIFICANT CHANGE UP (ref 6–8)
PROT SERPL-MCNC: 6.4 G/DL — SIGNIFICANT CHANGE UP (ref 6–8)
PROT SERPL-MCNC: 6.8 G/DL — SIGNIFICANT CHANGE UP (ref 6–8)
PROT SERPL-MCNC: 7 G/DL
PROT SERPL-MCNC: 7.1 G/DL — SIGNIFICANT CHANGE UP (ref 6–8)
PROT UR-MCNC: ABNORMAL
PROTHROM AB SERPL-ACNC: 13.2 SEC — HIGH (ref 9.95–12.87)
PROTHROM AB SERPL-ACNC: 13.4 SEC — HIGH (ref 9.95–12.87)
PROTHROM AB SERPL-ACNC: 14.4 SEC — HIGH (ref 9.95–12.87)
PROTHROM AB SERPL-ACNC: 15 SEC — HIGH (ref 9.95–12.87)
PROTHROM AB SERPL-ACNC: 15.4 SEC — HIGH (ref 9.95–12.87)
PROTHROM AB SERPL-ACNC: 15.9 SEC — HIGH (ref 9.95–12.87)
PROTHROM AB SERPL-ACNC: 17.9 SEC — HIGH (ref 9.95–12.87)
PROTHROM AB SERPL-ACNC: 20.8 SEC — HIGH (ref 9.95–12.87)
RBC # BLD: 3.3 M/UL — LOW (ref 4.2–5.4)
RBC # BLD: 3.4 M/UL — LOW (ref 4.2–5.4)
RBC # BLD: 3.41 M/UL — LOW (ref 4.2–5.4)
RBC # BLD: 3.42 M/UL — LOW (ref 4.2–5.4)
RBC # BLD: 3.44 M/UL — LOW (ref 4.2–5.4)
RBC # BLD: 3.45 M/UL — LOW (ref 4.2–5.4)
RBC # BLD: 3.45 M/UL — LOW (ref 4.2–5.4)
RBC # BLD: 3.47 M/UL — LOW (ref 4.2–5.4)
RBC # BLD: 3.48 M/UL — LOW (ref 4.2–5.4)
RBC # BLD: 3.48 M/UL — LOW (ref 4.2–5.4)
RBC # BLD: 3.49 M/UL — LOW (ref 4.2–5.4)
RBC # BLD: 3.5 M/UL — LOW (ref 4.2–5.4)
RBC # BLD: 3.52 M/UL — LOW (ref 4.2–5.4)
RBC # BLD: 3.53 M/UL — LOW (ref 4.2–5.4)
RBC # BLD: 3.53 M/UL — LOW (ref 4.2–5.4)
RBC # BLD: 3.56 M/UL — LOW (ref 4.2–5.4)
RBC # BLD: 3.57 M/UL — LOW (ref 4.2–5.4)
RBC # BLD: 3.57 M/UL — LOW (ref 4.2–5.4)
RBC # BLD: 3.58 M/UL — LOW (ref 4.2–5.4)
RBC # BLD: 3.59 M/UL — LOW (ref 4.2–5.4)
RBC # BLD: 3.59 M/UL — LOW (ref 4.2–5.4)
RBC # BLD: 3.61 M/UL — LOW (ref 4.2–5.4)
RBC # BLD: 3.62 M/UL — LOW (ref 4.2–5.4)
RBC # BLD: 3.63 M/UL — LOW (ref 4.2–5.4)
RBC # BLD: 3.67 M/UL — LOW (ref 4.2–5.4)
RBC # BLD: 3.69 M/UL — LOW (ref 4.2–5.4)
RBC # BLD: 3.72 M/UL — LOW (ref 4.2–5.4)
RBC # BLD: 3.75 M/UL — LOW (ref 4.2–5.4)
RBC # BLD: 3.75 M/UL — LOW (ref 4.2–5.4)
RBC # BLD: 3.78 M/UL — LOW (ref 4.2–5.4)
RBC # BLD: 3.83 M/UL — LOW (ref 4.2–5.4)
RBC # BLD: 3.84 M/UL — LOW (ref 4.2–5.4)
RBC # BLD: 3.93 M/UL — LOW (ref 4.2–5.4)
RBC # BLD: 3.93 M/UL — LOW (ref 4.2–5.4)
RBC # BLD: 3.94 M/UL — LOW (ref 4.2–5.4)
RBC # BLD: 3.96 M/UL — LOW (ref 4.2–5.4)
RBC # BLD: 4.1 M/UL
RBC # FLD: 13.6 % — SIGNIFICANT CHANGE UP (ref 11.5–14.5)
RBC # FLD: 13.8 %
RBC # FLD: 13.8 % — SIGNIFICANT CHANGE UP (ref 11.5–14.5)
RBC # FLD: 13.8 % — SIGNIFICANT CHANGE UP (ref 11.5–14.5)
RBC # FLD: 13.9 % — SIGNIFICANT CHANGE UP (ref 11.5–14.5)
RBC # FLD: 14 % — SIGNIFICANT CHANGE UP (ref 11.5–14.5)
RBC # FLD: 14.1 % — SIGNIFICANT CHANGE UP (ref 11.5–14.5)
RBC # FLD: 14.2 % — SIGNIFICANT CHANGE UP (ref 11.5–14.5)
RBC # FLD: 14.2 % — SIGNIFICANT CHANGE UP (ref 11.5–14.5)
RBC # FLD: 14.3 % — SIGNIFICANT CHANGE UP (ref 11.5–14.5)
RBC # FLD: 15 % — HIGH (ref 11.5–14.5)
RBC # FLD: 15 % — HIGH (ref 11.5–14.5)
RBC # FLD: 15.1 % — HIGH (ref 11.5–14.5)
RBC # FLD: 15.1 % — HIGH (ref 11.5–14.5)
RBC # FLD: 15.2 % — HIGH (ref 11.5–14.5)
RBC # FLD: 15.3 % — HIGH (ref 11.5–14.5)
RBC # FLD: 15.3 % — HIGH (ref 11.5–14.5)
RBC # FLD: 15.4 % — HIGH (ref 11.5–14.5)
RBC # FLD: 15.5 % — HIGH (ref 11.5–14.5)
RBC # FLD: 15.7 % — HIGH (ref 11.5–14.5)
RBC # FLD: 15.7 % — HIGH (ref 11.5–14.5)
RBC # FLD: 16 % — HIGH (ref 11.5–14.5)
RBC # FLD: 16.4 % — HIGH (ref 11.5–14.5)
RBC # FLD: 16.7 % — HIGH (ref 11.5–14.5)
RBC # FLD: 17 % — HIGH (ref 11.5–14.5)
RBC # FLD: 17.5 % — HIGH (ref 11.5–14.5)
RBC # FLD: 17.7 % — HIGH (ref 11.5–14.5)
RBC # FLD: 17.9 % — HIGH (ref 11.5–14.5)
RBC # FLD: 17.9 % — HIGH (ref 11.5–14.5)
RBC # FLD: 18 % — HIGH (ref 11.5–14.5)
RBC CASTS # UR COMP ASSIST: 116 /HPF — HIGH (ref 0–4)
RBC CASTS # UR COMP ASSIST: 241 /HPF — HIGH (ref 0–4)
RCV VOL RI: 0 /UL — SIGNIFICANT CHANGE UP (ref 0–0)
RSV RNA NPH QL NAA+NON-PROBE: SIGNIFICANT CHANGE UP
RSV RNA NPH QL NAA+NON-PROBE: SIGNIFICANT CHANGE UP
SAO2 % BLDV: 78.6 % — SIGNIFICANT CHANGE UP
SARS-COV-2 RNA SPEC QL NAA+PROBE: SIGNIFICANT CHANGE UP
SODIUM SERPL-SCNC: 134 MMOL/L — LOW (ref 135–146)
SODIUM SERPL-SCNC: 135 MMOL/L — SIGNIFICANT CHANGE UP (ref 135–146)
SODIUM SERPL-SCNC: 136 MMOL/L — SIGNIFICANT CHANGE UP (ref 135–146)
SODIUM SERPL-SCNC: 137 MMOL/L
SODIUM SERPL-SCNC: 137 MMOL/L — SIGNIFICANT CHANGE UP (ref 135–146)
SODIUM SERPL-SCNC: 138 MMOL/L — SIGNIFICANT CHANGE UP (ref 135–146)
SODIUM SERPL-SCNC: 139 MMOL/L — SIGNIFICANT CHANGE UP (ref 135–146)
SODIUM SERPL-SCNC: 140 MMOL/L — SIGNIFICANT CHANGE UP (ref 135–146)
SODIUM SERPL-SCNC: 141 MMOL/L — SIGNIFICANT CHANGE UP (ref 135–146)
SODIUM UR-SCNC: 37 MMOL/L — SIGNIFICANT CHANGE UP
SP GR SPEC: 1.02 — SIGNIFICANT CHANGE UP (ref 1.01–1.03)
SP GR SPEC: 1.02 — SIGNIFICANT CHANGE UP (ref 1.01–1.03)
SP GR SPEC: 1.03 — HIGH (ref 1.01–1.03)
SP GR SPEC: 1.04 — HIGH (ref 1.01–1.03)
SP GR SPEC: >1.05 (ref 1.01–1.03)
SPECIMEN SOURCE: SIGNIFICANT CHANGE UP
SURGICAL PATHOLOGY STUDY: SIGNIFICANT CHANGE UP
TIBC SERPL-MCNC: 165 UG/DL — LOW (ref 220–430)
TOTAL NUCLEATED CELL COUNT, BODY FLUID: 326 /UL — SIGNIFICANT CHANGE UP
TRIGL SERPL-MCNC: 167 MG/DL — HIGH
TROPONIN T SERPL-MCNC: <0.01 NG/ML — SIGNIFICANT CHANGE UP
TSH SERPL-MCNC: 3.81 UIU/ML — SIGNIFICANT CHANGE UP (ref 0.27–4.2)
UIBC SERPL-MCNC: 146 UG/DL — SIGNIFICANT CHANGE UP (ref 110–370)
URINE CULTURE <10: ABNORMAL
UROBILINOGEN FLD QL: SIGNIFICANT CHANGE UP
UUN UR-MCNC: 504 MG/DL — SIGNIFICANT CHANGE UP
VIT B12 SERPL-MCNC: 1412 PG/ML — HIGH (ref 232–1245)
WBC # BLD: 10.05 K/UL — SIGNIFICANT CHANGE UP (ref 4.8–10.8)
WBC # BLD: 10.25 K/UL — SIGNIFICANT CHANGE UP (ref 4.8–10.8)
WBC # BLD: 10.46 K/UL — SIGNIFICANT CHANGE UP (ref 4.8–10.8)
WBC # BLD: 10.53 K/UL — SIGNIFICANT CHANGE UP (ref 4.8–10.8)
WBC # BLD: 10.68 K/UL — SIGNIFICANT CHANGE UP (ref 4.8–10.8)
WBC # BLD: 10.69 K/UL — SIGNIFICANT CHANGE UP (ref 4.8–10.8)
WBC # BLD: 10.71 K/UL — SIGNIFICANT CHANGE UP (ref 4.8–10.8)
WBC # BLD: 10.96 K/UL — HIGH (ref 4.8–10.8)
WBC # BLD: 11.57 K/UL — HIGH (ref 4.8–10.8)
WBC # BLD: 11.78 K/UL — HIGH (ref 4.8–10.8)
WBC # BLD: 11.83 K/UL — HIGH (ref 4.8–10.8)
WBC # BLD: 12.13 K/UL — HIGH (ref 4.8–10.8)
WBC # BLD: 12.74 K/UL — HIGH (ref 4.8–10.8)
WBC # BLD: 12.75 K/UL — HIGH (ref 4.8–10.8)
WBC # BLD: 13.23 K/UL — HIGH (ref 4.8–10.8)
WBC # BLD: 14.54 K/UL — HIGH (ref 4.8–10.8)
WBC # BLD: 15.17 K/UL — HIGH (ref 4.8–10.8)
WBC # BLD: 15.22 K/UL — HIGH (ref 4.8–10.8)
WBC # BLD: 15.43 K/UL — HIGH (ref 4.8–10.8)
WBC # BLD: 15.71 K/UL — HIGH (ref 4.8–10.8)
WBC # BLD: 16 K/UL — HIGH (ref 4.8–10.8)
WBC # BLD: 16.44 K/UL — HIGH (ref 4.8–10.8)
WBC # BLD: 18.83 K/UL — HIGH (ref 4.8–10.8)
WBC # BLD: 7.5 K/UL — SIGNIFICANT CHANGE UP (ref 4.8–10.8)
WBC # BLD: 7.65 K/UL — SIGNIFICANT CHANGE UP (ref 4.8–10.8)
WBC # BLD: 7.93 K/UL — SIGNIFICANT CHANGE UP (ref 4.8–10.8)
WBC # BLD: 7.93 K/UL — SIGNIFICANT CHANGE UP (ref 4.8–10.8)
WBC # BLD: 8.31 K/UL — SIGNIFICANT CHANGE UP (ref 4.8–10.8)
WBC # BLD: 8.42 K/UL — SIGNIFICANT CHANGE UP (ref 4.8–10.8)
WBC # BLD: 8.45 K/UL — SIGNIFICANT CHANGE UP (ref 4.8–10.8)
WBC # BLD: 8.52 K/UL — SIGNIFICANT CHANGE UP (ref 4.8–10.8)
WBC # BLD: 8.85 K/UL — SIGNIFICANT CHANGE UP (ref 4.8–10.8)
WBC # BLD: 9.04 K/UL — SIGNIFICANT CHANGE UP (ref 4.8–10.8)
WBC # BLD: 9.26 K/UL — SIGNIFICANT CHANGE UP (ref 4.8–10.8)
WBC # BLD: 9.63 K/UL — SIGNIFICANT CHANGE UP (ref 4.8–10.8)
WBC # BLD: 9.68 K/UL — SIGNIFICANT CHANGE UP (ref 4.8–10.8)
WBC # FLD AUTO: 10.05 K/UL — SIGNIFICANT CHANGE UP (ref 4.8–10.8)
WBC # FLD AUTO: 10.25 K/UL — SIGNIFICANT CHANGE UP (ref 4.8–10.8)
WBC # FLD AUTO: 10.46 K/UL — SIGNIFICANT CHANGE UP (ref 4.8–10.8)
WBC # FLD AUTO: 10.53 K/UL — SIGNIFICANT CHANGE UP (ref 4.8–10.8)
WBC # FLD AUTO: 10.68 K/UL — SIGNIFICANT CHANGE UP (ref 4.8–10.8)
WBC # FLD AUTO: 10.69 K/UL — SIGNIFICANT CHANGE UP (ref 4.8–10.8)
WBC # FLD AUTO: 10.71 K/UL — SIGNIFICANT CHANGE UP (ref 4.8–10.8)
WBC # FLD AUTO: 10.96 K/UL — HIGH (ref 4.8–10.8)
WBC # FLD AUTO: 11.57 K/UL — HIGH (ref 4.8–10.8)
WBC # FLD AUTO: 11.78 K/UL — HIGH (ref 4.8–10.8)
WBC # FLD AUTO: 11.83 K/UL — HIGH (ref 4.8–10.8)
WBC # FLD AUTO: 12.13 K/UL — HIGH (ref 4.8–10.8)
WBC # FLD AUTO: 12.74 K/UL — HIGH (ref 4.8–10.8)
WBC # FLD AUTO: 12.75 K/UL — HIGH (ref 4.8–10.8)
WBC # FLD AUTO: 13.23 K/UL — HIGH (ref 4.8–10.8)
WBC # FLD AUTO: 14.54 K/UL — HIGH (ref 4.8–10.8)
WBC # FLD AUTO: 15.17 K/UL — HIGH (ref 4.8–10.8)
WBC # FLD AUTO: 15.22 K/UL — HIGH (ref 4.8–10.8)
WBC # FLD AUTO: 15.43 K/UL — HIGH (ref 4.8–10.8)
WBC # FLD AUTO: 15.71 K/UL — HIGH (ref 4.8–10.8)
WBC # FLD AUTO: 16 K/UL — HIGH (ref 4.8–10.8)
WBC # FLD AUTO: 16.44 K/UL — HIGH (ref 4.8–10.8)
WBC # FLD AUTO: 18.83 K/UL — HIGH (ref 4.8–10.8)
WBC # FLD AUTO: 22.82 K/UL
WBC # FLD AUTO: 7.5 K/UL — SIGNIFICANT CHANGE UP (ref 4.8–10.8)
WBC # FLD AUTO: 7.65 K/UL — SIGNIFICANT CHANGE UP (ref 4.8–10.8)
WBC # FLD AUTO: 7.93 K/UL — SIGNIFICANT CHANGE UP (ref 4.8–10.8)
WBC # FLD AUTO: 7.93 K/UL — SIGNIFICANT CHANGE UP (ref 4.8–10.8)
WBC # FLD AUTO: 8.31 K/UL — SIGNIFICANT CHANGE UP (ref 4.8–10.8)
WBC # FLD AUTO: 8.42 K/UL — SIGNIFICANT CHANGE UP (ref 4.8–10.8)
WBC # FLD AUTO: 8.45 K/UL — SIGNIFICANT CHANGE UP (ref 4.8–10.8)
WBC # FLD AUTO: 8.52 K/UL — SIGNIFICANT CHANGE UP (ref 4.8–10.8)
WBC # FLD AUTO: 8.85 K/UL — SIGNIFICANT CHANGE UP (ref 4.8–10.8)
WBC # FLD AUTO: 9.04 K/UL — SIGNIFICANT CHANGE UP (ref 4.8–10.8)
WBC # FLD AUTO: 9.26 K/UL — SIGNIFICANT CHANGE UP (ref 4.8–10.8)
WBC # FLD AUTO: 9.63 K/UL — SIGNIFICANT CHANGE UP (ref 4.8–10.8)
WBC # FLD AUTO: 9.68 K/UL — SIGNIFICANT CHANGE UP (ref 4.8–10.8)
WBC UR QL: >720 /HPF — HIGH (ref 0–5)
WBC UR QL: >720 /HPF — HIGH (ref 0–5)

## 2023-01-01 PROCEDURE — 99285 EMERGENCY DEPT VISIT HI MDM: CPT | Mod: GC

## 2023-01-01 PROCEDURE — 99233 SBSQ HOSP IP/OBS HIGH 50: CPT

## 2023-01-01 PROCEDURE — 84484 ASSAY OF TROPONIN QUANT: CPT

## 2023-01-01 PROCEDURE — 51701 INSERT BLADDER CATHETER: CPT

## 2023-01-01 PROCEDURE — 99231 SBSQ HOSP IP/OBS SF/LOW 25: CPT

## 2023-01-01 PROCEDURE — U0003: CPT

## 2023-01-01 PROCEDURE — 71045 X-RAY EXAM CHEST 1 VIEW: CPT | Mod: 26

## 2023-01-01 PROCEDURE — 36415 COLL VENOUS BLD VENIPUNCTURE: CPT

## 2023-01-01 PROCEDURE — 84157 ASSAY OF PROTEIN OTHER: CPT

## 2023-01-01 PROCEDURE — 82962 GLUCOSE BLOOD TEST: CPT

## 2023-01-01 PROCEDURE — 99285 EMERGENCY DEPT VISIT HI MDM: CPT | Mod: FS

## 2023-01-01 PROCEDURE — 84540 ASSAY OF URINE/UREA-N: CPT

## 2023-01-01 PROCEDURE — 83880 ASSAY OF NATRIURETIC PEPTIDE: CPT

## 2023-01-01 PROCEDURE — 99223 1ST HOSP IP/OBS HIGH 75: CPT

## 2023-01-01 PROCEDURE — 83935 ASSAY OF URINE OSMOLALITY: CPT

## 2023-01-01 PROCEDURE — 93005 ELECTROCARDIOGRAM TRACING: CPT

## 2023-01-01 PROCEDURE — 97162 PT EVAL MOD COMPLEX 30 MIN: CPT | Mod: GP

## 2023-01-01 PROCEDURE — 99222 1ST HOSP IP/OBS MODERATE 55: CPT

## 2023-01-01 PROCEDURE — 88173 CYTOPATH EVAL FNA REPORT: CPT

## 2023-01-01 PROCEDURE — 83036 HEMOGLOBIN GLYCOSYLATED A1C: CPT

## 2023-01-01 PROCEDURE — 78815 PET IMAGE W/CT SKULL-THIGH: CPT | Mod: 26,PI,MH

## 2023-01-01 PROCEDURE — 99024 POSTOP FOLLOW-UP VISIT: CPT

## 2023-01-01 PROCEDURE — 32550 INSERT PLEURAL CATH: CPT

## 2023-01-01 PROCEDURE — 99233 SBSQ HOSP IP/OBS HIGH 50: CPT | Mod: 25

## 2023-01-01 PROCEDURE — 99214 OFFICE O/P EST MOD 30 MIN: CPT

## 2023-01-01 PROCEDURE — 99232 SBSQ HOSP IP/OBS MODERATE 35: CPT

## 2023-01-01 PROCEDURE — 85025 COMPLETE CBC W/AUTO DIFF WBC: CPT

## 2023-01-01 PROCEDURE — 87086 URINE CULTURE/COLONY COUNT: CPT

## 2023-01-01 PROCEDURE — 71260 CT THORAX DX C+: CPT | Mod: 26,MH

## 2023-01-01 PROCEDURE — C9290: CPT

## 2023-01-01 PROCEDURE — 99222 1ST HOSP IP/OBS MODERATE 55: CPT | Mod: 25

## 2023-01-01 PROCEDURE — 85027 COMPLETE CBC AUTOMATED: CPT

## 2023-01-01 PROCEDURE — 93306 TTE W/DOPPLER COMPLETE: CPT

## 2023-01-01 PROCEDURE — C1769: CPT

## 2023-01-01 PROCEDURE — C1758: CPT

## 2023-01-01 PROCEDURE — 80053 COMPREHEN METABOLIC PANEL: CPT

## 2023-01-01 PROCEDURE — 86900 BLOOD TYPING SEROLOGIC ABO: CPT

## 2023-01-01 PROCEDURE — 99205 OFFICE O/P NEW HI 60 MIN: CPT

## 2023-01-01 PROCEDURE — 88173 CYTOPATH EVAL FNA REPORT: CPT | Mod: 26

## 2023-01-01 PROCEDURE — U0005: CPT

## 2023-01-01 PROCEDURE — 86850 RBC ANTIBODY SCREEN: CPT

## 2023-01-01 PROCEDURE — 88312 SPECIAL STAINS GROUP 1: CPT

## 2023-01-01 PROCEDURE — 99213 OFFICE O/P EST LOW 20 MIN: CPT

## 2023-01-01 PROCEDURE — 88172 CYTP DX EVAL FNA 1ST EA SITE: CPT

## 2023-01-01 PROCEDURE — 80061 LIPID PANEL: CPT

## 2023-01-01 PROCEDURE — 87186 SC STD MICRODIL/AGAR DIL: CPT

## 2023-01-01 PROCEDURE — 99239 HOSP IP/OBS DSCHRG MGMT >30: CPT

## 2023-01-01 PROCEDURE — 74018 RADEX ABDOMEN 1 VIEW: CPT | Mod: 26

## 2023-01-01 PROCEDURE — 82607 VITAMIN B-12: CPT

## 2023-01-01 PROCEDURE — 88313 SPECIAL STAINS GROUP 2: CPT | Mod: 26

## 2023-01-01 PROCEDURE — 99284 EMERGENCY DEPT VISIT MOD MDM: CPT | Mod: 25

## 2023-01-01 PROCEDURE — 85610 PROTHROMBIN TIME: CPT

## 2023-01-01 PROCEDURE — 84100 ASSAY OF PHOSPHORUS: CPT

## 2023-01-01 PROCEDURE — 43239 EGD BIOPSY SINGLE/MULTIPLE: CPT | Mod: XU

## 2023-01-01 PROCEDURE — 32555 ASPIRATE PLEURA W/ IMAGING: CPT | Mod: LT

## 2023-01-01 PROCEDURE — 83735 ASSAY OF MAGNESIUM: CPT

## 2023-01-01 PROCEDURE — 86901 BLOOD TYPING SEROLOGIC RH(D): CPT

## 2023-01-01 PROCEDURE — 88342 IMHCHEM/IMCYTCHM 1ST ANTB: CPT

## 2023-01-01 PROCEDURE — 82553 CREATINE MB FRACTION: CPT

## 2023-01-01 PROCEDURE — 88342 IMHCHEM/IMCYTCHM 1ST ANTB: CPT | Mod: 26

## 2023-01-01 PROCEDURE — 80048 BASIC METABOLIC PNL TOTAL CA: CPT

## 2023-01-01 PROCEDURE — 44320 COLOSTOMY: CPT

## 2023-01-01 PROCEDURE — 87077 CULTURE AEROBIC IDENTIFY: CPT

## 2023-01-01 PROCEDURE — 88341 IMHCHEM/IMCYTCHM EA ADD ANTB: CPT

## 2023-01-01 PROCEDURE — C2625: CPT

## 2023-01-01 PROCEDURE — 99221 1ST HOSP IP/OBS SF/LOW 40: CPT

## 2023-01-01 PROCEDURE — 88172 CYTP DX EVAL FNA 1ST EA SITE: CPT | Mod: 26

## 2023-01-01 PROCEDURE — 93010 ELECTROCARDIOGRAM REPORT: CPT

## 2023-01-01 PROCEDURE — 88112 CYTOPATH CELL ENHANCE TECH: CPT

## 2023-01-01 PROCEDURE — 99214 OFFICE O/P EST MOD 30 MIN: CPT | Mod: 25

## 2023-01-01 PROCEDURE — 83550 IRON BINDING TEST: CPT

## 2023-01-01 PROCEDURE — 88341 IMHCHEM/IMCYTCHM EA ADD ANTB: CPT | Mod: 26

## 2023-01-01 PROCEDURE — 78815 PET IMAGE W/CT SKULL-THIGH: CPT | Mod: PI

## 2023-01-01 PROCEDURE — 87635 SARS-COV-2 COVID-19 AMP PRB: CPT

## 2023-01-01 PROCEDURE — 88305 TISSUE EXAM BY PATHOLOGIST: CPT | Mod: 26

## 2023-01-01 PROCEDURE — 85730 THROMBOPLASTIN TIME PARTIAL: CPT

## 2023-01-01 PROCEDURE — 71046 X-RAY EXAM CHEST 2 VIEWS: CPT

## 2023-01-01 PROCEDURE — 87040 BLOOD CULTURE FOR BACTERIA: CPT

## 2023-01-01 PROCEDURE — C2617: CPT

## 2023-01-01 PROCEDURE — A9552: CPT

## 2023-01-01 PROCEDURE — 99283 EMERGENCY DEPT VISIT LOW MDM: CPT

## 2023-01-01 PROCEDURE — 71275 CT ANGIOGRAPHY CHEST: CPT

## 2023-01-01 PROCEDURE — 71045 X-RAY EXAM CHEST 1 VIEW: CPT

## 2023-01-01 PROCEDURE — 88112 CYTOPATH CELL ENHANCE TECH: CPT | Mod: 26

## 2023-01-01 PROCEDURE — 82042 OTHER SOURCE ALBUMIN QUAN EA: CPT

## 2023-01-01 PROCEDURE — 87205 SMEAR GRAM STAIN: CPT

## 2023-01-01 PROCEDURE — 43242 EGD US FINE NEEDLE BX/ASPIR: CPT

## 2023-01-01 PROCEDURE — 71275 CT ANGIOGRAPHY CHEST: CPT | Mod: 26

## 2023-01-01 PROCEDURE — 84145 PROCALCITONIN (PCT): CPT

## 2023-01-01 PROCEDURE — 93306 TTE W/DOPPLER COMPLETE: CPT | Mod: 26

## 2023-01-01 PROCEDURE — 88305 TISSUE EXAM BY PATHOLOGIST: CPT

## 2023-01-01 PROCEDURE — 82945 GLUCOSE OTHER FLUID: CPT

## 2023-01-01 PROCEDURE — 81001 URINALYSIS AUTO W/SCOPE: CPT

## 2023-01-01 PROCEDURE — 99448 NTRPROF PH1/NTRNET/EHR 21-30: CPT

## 2023-01-01 PROCEDURE — 71260 CT THORAX DX C+: CPT

## 2023-01-01 PROCEDURE — 89051 BODY FLUID CELL COUNT: CPT

## 2023-01-01 PROCEDURE — 83540 ASSAY OF IRON: CPT

## 2023-01-01 PROCEDURE — 74018 RADEX ABDOMEN 1 VIEW: CPT

## 2023-01-01 PROCEDURE — 82378 CARCINOEMBRYONIC ANTIGEN: CPT

## 2023-01-01 PROCEDURE — 76770 US EXAM ABDO BACK WALL COMP: CPT

## 2023-01-01 PROCEDURE — 74177 CT ABD & PELVIS W/CONTRAST: CPT

## 2023-01-01 PROCEDURE — 74177 CT ABD & PELVIS W/CONTRAST: CPT | Mod: 26,MA

## 2023-01-01 PROCEDURE — 88344 IMHCHEM/IMCYTCHM EA MLT ANTB: CPT

## 2023-01-01 PROCEDURE — 45330 DIAGNOSTIC SIGMOIDOSCOPY: CPT | Mod: 53

## 2023-01-01 PROCEDURE — 87070 CULTURE OTHR SPECIMN AEROBIC: CPT

## 2023-01-01 PROCEDURE — 82550 ASSAY OF CK (CPK): CPT

## 2023-01-01 PROCEDURE — 82570 ASSAY OF URINE CREATININE: CPT

## 2023-01-01 PROCEDURE — 88313 SPECIAL STAINS GROUP 2: CPT

## 2023-01-01 PROCEDURE — 83986 ASSAY PH BODY FLUID NOS: CPT

## 2023-01-01 PROCEDURE — 84300 ASSAY OF URINE SODIUM: CPT

## 2023-01-01 PROCEDURE — 71046 X-RAY EXAM CHEST 2 VIEWS: CPT | Mod: 26

## 2023-01-01 PROCEDURE — 82746 ASSAY OF FOLIC ACID SERUM: CPT

## 2023-01-01 PROCEDURE — 82728 ASSAY OF FERRITIN: CPT

## 2023-01-01 PROCEDURE — C9399: CPT

## 2023-01-01 PROCEDURE — 99223 1ST HOSP IP/OBS HIGH 75: CPT | Mod: 24

## 2023-01-01 PROCEDURE — 96374 THER/PROPH/DIAG INJ IV PUSH: CPT

## 2023-01-01 PROCEDURE — 88312 SPECIAL STAINS GROUP 1: CPT | Mod: 26

## 2023-01-01 PROCEDURE — 83615 LACTATE (LD) (LDH) ENZYME: CPT

## 2023-01-01 PROCEDURE — 99285 EMERGENCY DEPT VISIT HI MDM: CPT

## 2023-01-01 PROCEDURE — 97116 GAIT TRAINING THERAPY: CPT | Mod: GP

## 2023-01-01 PROCEDURE — 99232 SBSQ HOSP IP/OBS MODERATE 35: CPT | Mod: GC

## 2023-01-01 PROCEDURE — 76770 US EXAM ABDO BACK WALL COMP: CPT | Mod: 26

## 2023-01-01 PROCEDURE — 71275 CT ANGIOGRAPHY CHEST: CPT | Mod: 26,MA

## 2023-01-01 PROCEDURE — 87102 FUNGUS ISOLATION CULTURE: CPT

## 2023-01-01 PROCEDURE — 99223 1ST HOSP IP/OBS HIGH 75: CPT | Mod: AI

## 2023-01-01 PROCEDURE — 74177 CT ABD & PELVIS W/CONTRAST: CPT | Mod: 26,MH

## 2023-01-01 PROCEDURE — 71045 X-RAY EXAM CHEST 1 VIEW: CPT | Mod: 26,76

## 2023-01-01 PROCEDURE — 71260 CT THORAX DX C+: CPT | Mod: MH

## 2023-01-01 PROCEDURE — 52332 CYSTOSCOPY AND TREATMENT: CPT | Mod: LT,RT

## 2023-01-01 PROCEDURE — 97110 THERAPEUTIC EXERCISES: CPT | Mod: GP

## 2023-01-01 PROCEDURE — 87075 CULTR BACTERIA EXCEPT BLOOD: CPT

## 2023-01-01 PROCEDURE — 84443 ASSAY THYROID STIM HORMONE: CPT

## 2023-01-01 RX ORDER — DEXTROSE 50 % IN WATER 50 %
15 SYRINGE (ML) INTRAVENOUS ONCE
Refills: 0 | Status: DISCONTINUED | OUTPATIENT
Start: 2023-01-01 | End: 2023-01-01

## 2023-01-01 RX ORDER — SODIUM CHLORIDE 9 MG/ML
1000 INJECTION, SOLUTION INTRAVENOUS ONCE
Refills: 0 | Status: COMPLETED | OUTPATIENT
Start: 2023-01-01 | End: 2023-01-01

## 2023-01-01 RX ORDER — SODIUM CHLORIDE 9 MG/ML
1000 INJECTION, SOLUTION INTRAVENOUS
Refills: 0 | Status: DISCONTINUED | OUTPATIENT
Start: 2023-01-01 | End: 2023-01-01

## 2023-01-01 RX ORDER — POTASSIUM CHLORIDE 20 MEQ
20 PACKET (EA) ORAL
Refills: 0 | Status: COMPLETED | OUTPATIENT
Start: 2023-01-01 | End: 2023-01-01

## 2023-01-01 RX ORDER — ENOXAPARIN SODIUM 100 MG/ML
40 INJECTION SUBCUTANEOUS EVERY 24 HOURS
Refills: 0 | Status: DISCONTINUED | OUTPATIENT
Start: 2023-01-01 | End: 2023-01-01

## 2023-01-01 RX ORDER — MAGNESIUM SULFATE 500 MG/ML
2 VIAL (ML) INJECTION ONCE
Refills: 0 | Status: COMPLETED | OUTPATIENT
Start: 2023-01-01 | End: 2023-01-01

## 2023-01-01 RX ORDER — ROSUVASTATIN CALCIUM 5 MG/1
1 TABLET ORAL
Qty: 30 | Refills: 0
Start: 2023-01-01 | End: 2023-01-01

## 2023-01-01 RX ORDER — SODIUM CHLORIDE 9 MG/ML
500 INJECTION INTRAMUSCULAR; INTRAVENOUS; SUBCUTANEOUS ONCE
Refills: 0 | Status: COMPLETED | OUTPATIENT
Start: 2023-01-01 | End: 2023-01-01

## 2023-01-01 RX ORDER — TAMSULOSIN HYDROCHLORIDE 0.4 MG/1
0.4 CAPSULE ORAL AT BEDTIME
Refills: 0 | Status: DISCONTINUED | OUTPATIENT
Start: 2023-01-01 | End: 2023-01-01

## 2023-01-01 RX ORDER — METFORMIN HYDROCHLORIDE 850 MG/1
1 TABLET ORAL
Qty: 60 | Refills: 0
Start: 2023-01-01 | End: 2023-01-01

## 2023-01-01 RX ORDER — DEXTROSE 50 % IN WATER 50 %
25 SYRINGE (ML) INTRAVENOUS ONCE
Refills: 0 | Status: DISCONTINUED | OUTPATIENT
Start: 2023-01-01 | End: 2023-01-01

## 2023-01-01 RX ORDER — AMOXICILLIN AND CLAVULANATE POTASSIUM 875; 125 MG/1; MG/1
875-125 TABLET, COATED ORAL
Qty: 14 | Refills: 2 | Status: ACTIVE | COMMUNITY
Start: 2023-01-01 | End: 1900-01-01

## 2023-01-01 RX ORDER — DEXTROSE 50 % IN WATER 50 %
12.5 SYRINGE (ML) INTRAVENOUS ONCE
Refills: 0 | Status: DISCONTINUED | OUTPATIENT
Start: 2023-01-01 | End: 2023-01-01

## 2023-01-01 RX ORDER — ACETAMINOPHEN 500 MG
2 TABLET ORAL
Qty: 0 | Refills: 0 | DISCHARGE
Start: 2023-01-01

## 2023-01-01 RX ORDER — IBUPROFEN 200 MG
400 TABLET ORAL EVERY 6 HOURS
Refills: 0 | Status: DISCONTINUED | OUTPATIENT
Start: 2023-01-01 | End: 2023-01-01

## 2023-01-01 RX ORDER — POLYETHYLENE GLYCOL 3350 17 G/17G
17 POWDER, FOR SOLUTION ORAL
Qty: 1020 | Refills: 0
Start: 2023-01-01 | End: 2023-01-01

## 2023-01-01 RX ORDER — VANCOMYCIN HCL 1 G
1000 VIAL (EA) INTRAVENOUS ONCE
Refills: 0 | Status: COMPLETED | OUTPATIENT
Start: 2023-01-01 | End: 2023-01-01

## 2023-01-01 RX ORDER — LACTULOSE 10 G/15ML
15 SOLUTION ORAL
Qty: 630 | Refills: 0
Start: 2023-01-01 | End: 2023-01-01

## 2023-01-01 RX ORDER — ATORVASTATIN CALCIUM 80 MG/1
20 TABLET, FILM COATED ORAL AT BEDTIME
Refills: 0 | Status: DISCONTINUED | OUTPATIENT
Start: 2023-01-01 | End: 2023-01-01

## 2023-01-01 RX ORDER — PANTOPRAZOLE SODIUM 20 MG/1
1 TABLET, DELAYED RELEASE ORAL
Qty: 30 | Refills: 0
Start: 2023-01-01 | End: 2023-01-01

## 2023-01-01 RX ORDER — IRON SUCROSE 20 MG/ML
200 INJECTION, SOLUTION INTRAVENOUS EVERY 24 HOURS
Refills: 0 | Status: COMPLETED | OUTPATIENT
Start: 2023-01-01 | End: 2023-01-01

## 2023-01-01 RX ORDER — ACETAMINOPHEN 500 MG
650 TABLET ORAL ONCE
Refills: 0 | Status: COMPLETED | OUTPATIENT
Start: 2023-01-01 | End: 2023-01-01

## 2023-01-01 RX ORDER — TAMSULOSIN HYDROCHLORIDE 0.4 MG/1
1 CAPSULE ORAL
Qty: 30 | Refills: 0
Start: 2023-01-01 | End: 2023-01-01

## 2023-01-01 RX ORDER — FERROUS FUMARATE 350(115)MG
1 TABLET ORAL
Refills: 0 | DISCHARGE

## 2023-01-01 RX ORDER — POLYETHYLENE GLYCOL 3350 17 G/17G
17 POWDER, FOR SOLUTION ORAL
Qty: 0 | Refills: 0 | DISCHARGE
Start: 2023-01-01

## 2023-01-01 RX ORDER — INSULIN LISPRO 100/ML
VIAL (ML) SUBCUTANEOUS
Refills: 0 | Status: DISCONTINUED | OUTPATIENT
Start: 2023-01-01 | End: 2023-01-01

## 2023-01-01 RX ORDER — MIDODRINE HYDROCHLORIDE 2.5 MG/1
1 TABLET ORAL
Qty: 90 | Refills: 0
Start: 2023-01-01 | End: 2023-08-17

## 2023-01-01 RX ORDER — PANTOPRAZOLE SODIUM 20 MG/1
40 TABLET, DELAYED RELEASE ORAL
Refills: 0 | Status: DISCONTINUED | OUTPATIENT
Start: 2023-01-01 | End: 2023-01-01

## 2023-01-01 RX ORDER — MIDODRINE HYDROCHLORIDE 2.5 MG/1
5 TABLET ORAL THREE TIMES A DAY
Refills: 0 | Status: DISCONTINUED | OUTPATIENT
Start: 2023-01-01 | End: 2023-01-01

## 2023-01-01 RX ORDER — PIPERACILLIN AND TAZOBACTAM 4; .5 G/20ML; G/20ML
3.38 INJECTION, POWDER, LYOPHILIZED, FOR SOLUTION INTRAVENOUS EVERY 8 HOURS
Refills: 0 | Status: DISCONTINUED | OUTPATIENT
Start: 2023-01-01 | End: 2023-01-01

## 2023-01-01 RX ORDER — FUROSEMIDE 40 MG
20 TABLET ORAL ONCE
Refills: 0 | Status: COMPLETED | OUTPATIENT
Start: 2023-01-01 | End: 2023-01-01

## 2023-01-01 RX ORDER — ONDANSETRON 8 MG/1
4 TABLET, FILM COATED ORAL ONCE
Refills: 0 | Status: COMPLETED | OUTPATIENT
Start: 2023-01-01 | End: 2023-01-01

## 2023-01-01 RX ORDER — LINEZOLID 600 MG/300ML
600 INJECTION, SOLUTION INTRAVENOUS EVERY 12 HOURS
Refills: 0 | Status: DISCONTINUED | OUTPATIENT
Start: 2023-01-01 | End: 2023-01-01

## 2023-01-01 RX ORDER — TAMSULOSIN HYDROCHLORIDE 0.4 MG/1
0.4 CAPSULE ORAL
Qty: 90 | Refills: 0 | Status: ACTIVE | COMMUNITY
Start: 2023-01-01 | End: 1900-01-01

## 2023-01-01 RX ORDER — PIPERACILLIN AND TAZOBACTAM 4; .5 G/20ML; G/20ML
3.38 INJECTION, POWDER, LYOPHILIZED, FOR SOLUTION INTRAVENOUS ONCE
Refills: 0 | Status: COMPLETED | OUTPATIENT
Start: 2023-01-01 | End: 2023-01-01

## 2023-01-01 RX ORDER — SODIUM CHLORIDE 9 MG/ML
500 INJECTION, SOLUTION INTRAVENOUS ONCE
Refills: 0 | Status: COMPLETED | OUTPATIENT
Start: 2023-01-01 | End: 2023-01-01

## 2023-01-01 RX ORDER — LINEZOLID 600 MG/300ML
600 INJECTION, SOLUTION INTRAVENOUS ONCE
Refills: 0 | Status: COMPLETED | OUTPATIENT
Start: 2023-01-01 | End: 2023-01-01

## 2023-01-01 RX ORDER — TAMSULOSIN HYDROCHLORIDE 0.4 MG/1
1 CAPSULE ORAL
Qty: 0 | Refills: 0 | DISCHARGE
Start: 2023-01-01

## 2023-01-01 RX ORDER — SENNA PLUS 8.6 MG/1
2 TABLET ORAL
Qty: 60 | Refills: 0
Start: 2023-01-01 | End: 2023-01-01

## 2023-01-01 RX ORDER — LACTULOSE 10 G/15ML
30 SOLUTION ORAL ONCE
Refills: 0 | Status: COMPLETED | OUTPATIENT
Start: 2023-01-01 | End: 2023-01-01

## 2023-01-01 RX ORDER — HEPARIN SODIUM 5000 [USP'U]/ML
5000 INJECTION INTRAVENOUS; SUBCUTANEOUS EVERY 12 HOURS
Refills: 0 | Status: DISCONTINUED | OUTPATIENT
Start: 2023-01-01 | End: 2023-01-01

## 2023-01-01 RX ORDER — POTASSIUM CHLORIDE 20 MEQ
20 PACKET (EA) ORAL ONCE
Refills: 0 | Status: COMPLETED | OUTPATIENT
Start: 2023-01-01 | End: 2023-01-01

## 2023-01-01 RX ORDER — ENOXAPARIN SODIUM 100 MG/ML
40 INJECTION SUBCUTANEOUS
Qty: 0 | Refills: 0 | DISCHARGE
Start: 2023-01-01

## 2023-01-01 RX ORDER — ENOXAPARIN SODIUM 100 MG/ML
40 INJECTION SUBCUTANEOUS
Qty: 0 | Refills: 0 | DISCHARGE
Start: 2023-01-01 | End: 2023-01-01

## 2023-01-01 RX ORDER — HYDROMORPHONE HYDROCHLORIDE 2 MG/ML
1 INJECTION INTRAMUSCULAR; INTRAVENOUS; SUBCUTANEOUS
Refills: 0 | Status: DISCONTINUED | OUTPATIENT
Start: 2023-01-01 | End: 2023-01-01

## 2023-01-01 RX ORDER — OXYCODONE HYDROCHLORIDE 5 MG/1
5 TABLET ORAL EVERY 6 HOURS
Refills: 0 | Status: DISCONTINUED | OUTPATIENT
Start: 2023-01-01 | End: 2023-01-01

## 2023-01-01 RX ORDER — CEFEPIME 1 G/1
1000 INJECTION, POWDER, FOR SOLUTION INTRAMUSCULAR; INTRAVENOUS ONCE
Refills: 0 | Status: COMPLETED | OUTPATIENT
Start: 2023-01-01 | End: 2023-01-01

## 2023-01-01 RX ORDER — METRONIDAZOLE 500 MG
TABLET ORAL
Refills: 0 | Status: DISCONTINUED | OUTPATIENT
Start: 2023-01-01 | End: 2023-01-01

## 2023-01-01 RX ORDER — METFORMIN HYDROCHLORIDE 850 MG/1
1 TABLET ORAL
Qty: 0 | Refills: 0 | DISCHARGE

## 2023-01-01 RX ORDER — POLYETHYLENE GLYCOL 3350 17 G/17G
17 POWDER, FOR SOLUTION ORAL
Refills: 0 | Status: COMPLETED | OUTPATIENT
Start: 2023-01-01 | End: 2023-01-01

## 2023-01-01 RX ORDER — SENNA PLUS 8.6 MG/1
1 TABLET ORAL DAILY
Refills: 0 | Status: DISCONTINUED | OUTPATIENT
Start: 2023-01-01 | End: 2023-01-01

## 2023-01-01 RX ORDER — ONDANSETRON 8 MG/1
4 TABLET, FILM COATED ORAL EVERY 6 HOURS
Refills: 0 | Status: DISCONTINUED | OUTPATIENT
Start: 2023-01-01 | End: 2023-01-01

## 2023-01-01 RX ORDER — ROSUVASTATIN CALCIUM 5 MG/1
1 TABLET ORAL
Qty: 0 | Refills: 0 | DISCHARGE

## 2023-01-01 RX ORDER — HYDROMORPHONE HYDROCHLORIDE 2 MG/ML
0.5 INJECTION INTRAMUSCULAR; INTRAVENOUS; SUBCUTANEOUS
Refills: 0 | Status: DISCONTINUED | OUTPATIENT
Start: 2023-01-01 | End: 2023-01-01

## 2023-01-01 RX ORDER — DIATRIZOATE MEGLUMINE 180 MG/ML
30 INJECTION, SOLUTION INTRAVESICAL ONCE
Refills: 0 | Status: COMPLETED | OUTPATIENT
Start: 2023-01-01 | End: 2023-01-01

## 2023-01-01 RX ORDER — SODIUM CHLORIDE 9 MG/ML
1000 INJECTION INTRAMUSCULAR; INTRAVENOUS; SUBCUTANEOUS
Refills: 0 | Status: DISCONTINUED | OUTPATIENT
Start: 2023-01-01 | End: 2023-01-01

## 2023-01-01 RX ORDER — POLYETHYLENE GLYCOL 3350 17 G/17G
17 POWDER, FOR SOLUTION ORAL DAILY
Refills: 0 | Status: DISCONTINUED | OUTPATIENT
Start: 2023-01-01 | End: 2023-01-01

## 2023-01-01 RX ORDER — MEROPENEM 1 G/30ML
1000 INJECTION INTRAVENOUS ONCE
Refills: 0 | Status: COMPLETED | OUTPATIENT
Start: 2023-01-01 | End: 2023-01-01

## 2023-01-01 RX ORDER — ACETAMINOPHEN 500 MG
650 TABLET ORAL EVERY 6 HOURS
Refills: 0 | Status: DISCONTINUED | OUTPATIENT
Start: 2023-01-01 | End: 2023-01-01

## 2023-01-01 RX ORDER — MORPHINE SULFATE 50 MG/1
4 CAPSULE, EXTENDED RELEASE ORAL ONCE
Refills: 0 | Status: DISCONTINUED | OUTPATIENT
Start: 2023-01-01 | End: 2023-01-01

## 2023-01-01 RX ORDER — SODIUM CHLORIDE 9 MG/ML
1000 INJECTION, SOLUTION INTRAVENOUS
Refills: 0 | Status: COMPLETED | OUTPATIENT
Start: 2023-01-01 | End: 2023-01-01

## 2023-01-01 RX ORDER — SOD SULF/SODIUM/NAHCO3/KCL/PEG
4000 SOLUTION, RECONSTITUTED, ORAL ORAL ONCE
Refills: 0 | Status: DISCONTINUED | OUTPATIENT
Start: 2023-01-01 | End: 2023-01-01

## 2023-01-01 RX ORDER — POLYETHYLENE GLYCOL 3350 17 G/17G
17 POWDER, FOR SOLUTION ORAL
Qty: 2 | Refills: 0
Start: 2023-01-01 | End: 2023-01-01

## 2023-01-01 RX ORDER — KETOROLAC TROMETHAMINE 30 MG/ML
15 SYRINGE (ML) INJECTION ONCE
Refills: 0 | Status: DISCONTINUED | OUTPATIENT
Start: 2023-01-01 | End: 2023-01-01

## 2023-01-01 RX ORDER — VANCOMYCIN HCL 1 G
750 VIAL (EA) INTRAVENOUS EVERY 12 HOURS
Refills: 0 | Status: COMPLETED | OUTPATIENT
Start: 2023-01-01 | End: 2023-01-01

## 2023-01-01 RX ORDER — FERROUS SULFATE 325(65) MG
325 TABLET ORAL
Refills: 0 | Status: DISCONTINUED | OUTPATIENT
Start: 2023-01-01 | End: 2023-01-01

## 2023-01-01 RX ORDER — HYDROCHLOROTHIAZIDE 25 MG
1 TABLET ORAL
Qty: 0 | Refills: 0 | DISCHARGE

## 2023-01-01 RX ORDER — MAGNESIUM SULFATE 500 MG/ML
1 VIAL (ML) INJECTION ONCE
Refills: 0 | Status: COMPLETED | OUTPATIENT
Start: 2023-01-01 | End: 2023-01-01

## 2023-01-01 RX ORDER — POTASSIUM CHLORIDE 20 MEQ
40 PACKET (EA) ORAL ONCE
Refills: 0 | Status: COMPLETED | OUTPATIENT
Start: 2023-01-01 | End: 2023-01-01

## 2023-01-01 RX ORDER — GLUCAGON INJECTION, SOLUTION 0.5 MG/.1ML
1 INJECTION, SOLUTION SUBCUTANEOUS ONCE
Refills: 0 | Status: DISCONTINUED | OUTPATIENT
Start: 2023-01-01 | End: 2023-01-01

## 2023-01-01 RX ORDER — SODIUM CHLORIDE 9 MG/ML
2000 INJECTION, SOLUTION INTRAVENOUS ONCE
Refills: 0 | Status: COMPLETED | OUTPATIENT
Start: 2023-01-01 | End: 2023-01-01

## 2023-01-01 RX ORDER — FUROSEMIDE 40 MG
40 TABLET ORAL ONCE
Refills: 0 | Status: COMPLETED | OUTPATIENT
Start: 2023-01-01 | End: 2023-01-01

## 2023-01-01 RX ORDER — METRONIDAZOLE 500 MG
500 TABLET ORAL ONCE
Refills: 0 | Status: COMPLETED | OUTPATIENT
Start: 2023-01-01 | End: 2023-01-01

## 2023-01-01 RX ORDER — LACTULOSE 10 G/15ML
20 SOLUTION ORAL ONCE
Refills: 0 | Status: DISCONTINUED | OUTPATIENT
Start: 2023-01-01 | End: 2023-01-01

## 2023-01-01 RX ORDER — VANCOMYCIN HCL 1 G
VIAL (EA) INTRAVENOUS
Refills: 0 | Status: COMPLETED | OUTPATIENT
Start: 2023-01-01 | End: 2023-01-01

## 2023-01-01 RX ORDER — HYDROMORPHONE HYDROCHLORIDE 2 MG/ML
0.5 INJECTION INTRAMUSCULAR; INTRAVENOUS; SUBCUTANEOUS EVERY 4 HOURS
Refills: 0 | Status: DISCONTINUED | OUTPATIENT
Start: 2023-01-01 | End: 2023-01-01

## 2023-01-01 RX ORDER — POTASSIUM PHOSPHATE, MONOBASIC POTASSIUM PHOSPHATE, DIBASIC 236; 224 MG/ML; MG/ML
30 INJECTION, SOLUTION INTRAVENOUS ONCE
Refills: 0 | Status: COMPLETED | OUTPATIENT
Start: 2023-01-01 | End: 2023-01-01

## 2023-01-01 RX ORDER — ONDANSETRON 8 MG/1
4 TABLET, FILM COATED ORAL ONCE
Refills: 0 | Status: DISCONTINUED | OUTPATIENT
Start: 2023-01-01 | End: 2023-01-01

## 2023-01-01 RX ORDER — CEFEPIME 1 G/1
1000 INJECTION, POWDER, FOR SOLUTION INTRAMUSCULAR; INTRAVENOUS EVERY 8 HOURS
Refills: 0 | Status: DISCONTINUED | OUTPATIENT
Start: 2023-01-01 | End: 2023-01-01

## 2023-01-01 RX ORDER — SENNA PLUS 8.6 MG/1
2 TABLET ORAL AT BEDTIME
Refills: 0 | Status: DISCONTINUED | OUTPATIENT
Start: 2023-01-01 | End: 2023-01-01

## 2023-01-01 RX ORDER — MORPHINE SULFATE 50 MG/1
2 CAPSULE, EXTENDED RELEASE ORAL EVERY 4 HOURS
Refills: 0 | Status: DISCONTINUED | OUTPATIENT
Start: 2023-01-01 | End: 2023-01-01

## 2023-01-01 RX ORDER — ONDANSETRON 8 MG/1
8 TABLET ORAL EVERY 8 HOURS
Qty: 30 | Refills: 3 | Status: ACTIVE | COMMUNITY
Start: 2023-01-01 | End: 1900-01-01

## 2023-01-01 RX ORDER — CEFEPIME 1 G/1
INJECTION, POWDER, FOR SOLUTION INTRAMUSCULAR; INTRAVENOUS
Refills: 0 | Status: DISCONTINUED | OUTPATIENT
Start: 2023-01-01 | End: 2023-01-01

## 2023-01-01 RX ORDER — LACTULOSE 10 G/15ML
30 SOLUTION ORAL EVERY 6 HOURS
Refills: 0 | Status: DISCONTINUED | OUTPATIENT
Start: 2023-01-01 | End: 2023-01-01

## 2023-01-01 RX ORDER — DOCUSATE SODIUM 100 MG
1 CAPSULE ORAL
Qty: 42 | Refills: 0
Start: 2023-01-01 | End: 2023-01-01

## 2023-01-01 RX ORDER — MEROPENEM 1 G/30ML
INJECTION INTRAVENOUS
Refills: 0 | Status: DISCONTINUED | OUTPATIENT
Start: 2023-01-01 | End: 2023-01-01

## 2023-01-01 RX ORDER — LACTULOSE 10 G/15ML
10 SOLUTION ORAL ONCE
Refills: 0 | Status: COMPLETED | OUTPATIENT
Start: 2023-01-01 | End: 2023-01-01

## 2023-01-01 RX ORDER — PROCHLORPERAZINE MALEATE 10 MG/1
10 TABLET ORAL EVERY 6 HOURS
Qty: 30 | Refills: 3 | Status: ACTIVE | COMMUNITY
Start: 2023-01-01 | End: 1900-01-01

## 2023-01-01 RX ORDER — POLYETHYLENE GLYCOL 3350 17 G/17G
17 POWDER, FOR SOLUTION ORAL
Refills: 0 | Status: DISCONTINUED | OUTPATIENT
Start: 2023-01-01 | End: 2023-01-01

## 2023-01-01 RX ORDER — SOD SULF/SODIUM/NAHCO3/KCL/PEG
4000 SOLUTION, RECONSTITUTED, ORAL ORAL ONCE
Refills: 0 | Status: COMPLETED | OUTPATIENT
Start: 2023-01-01 | End: 2023-01-01

## 2023-01-01 RX ORDER — POTASSIUM CHLORIDE 20 MEQ
20 PACKET (EA) ORAL
Refills: 0 | Status: DISCONTINUED | OUTPATIENT
Start: 2023-01-01 | End: 2023-01-01

## 2023-01-01 RX ORDER — SODIUM CHLORIDE 9 MG/ML
1000 INJECTION INTRAMUSCULAR; INTRAVENOUS; SUBCUTANEOUS ONCE
Refills: 0 | Status: COMPLETED | OUTPATIENT
Start: 2023-01-01 | End: 2023-01-01

## 2023-01-01 RX ORDER — MORPHINE SULFATE 50 MG/1
6 CAPSULE, EXTENDED RELEASE ORAL
Refills: 0 | Status: DISCONTINUED | OUTPATIENT
Start: 2023-01-01 | End: 2023-01-01

## 2023-01-01 RX ORDER — MAGNESIUM HYDROXIDE 400 MG/1
30 TABLET, CHEWABLE ORAL DAILY
Refills: 0 | Status: DISCONTINUED | OUTPATIENT
Start: 2023-01-01 | End: 2023-01-01

## 2023-01-01 RX ORDER — MEROPENEM 1 G/30ML
1000 INJECTION INTRAVENOUS EVERY 8 HOURS
Refills: 0 | Status: DISCONTINUED | OUTPATIENT
Start: 2023-01-01 | End: 2023-01-01

## 2023-01-01 RX ORDER — PANTOPRAZOLE SODIUM 20 MG/1
1 TABLET, DELAYED RELEASE ORAL
Qty: 0 | Refills: 0 | DISCHARGE
Start: 2023-01-01

## 2023-01-01 RX ORDER — METRONIDAZOLE 500 MG
500 TABLET ORAL EVERY 8 HOURS
Refills: 0 | Status: DISCONTINUED | OUTPATIENT
Start: 2023-01-01 | End: 2023-01-01

## 2023-01-01 RX ADMIN — LACTULOSE 10 GRAM(S): 10 SOLUTION ORAL at 11:33

## 2023-01-01 RX ADMIN — Medication 100 GRAM(S): at 15:01

## 2023-01-01 RX ADMIN — PIPERACILLIN AND TAZOBACTAM 25 GRAM(S): 4; .5 INJECTION, POWDER, LYOPHILIZED, FOR SOLUTION INTRAVENOUS at 05:13

## 2023-01-01 RX ADMIN — Medication 400 MILLIGRAM(S): at 22:24

## 2023-01-01 RX ADMIN — LINEZOLID 300 MILLIGRAM(S): 600 INJECTION, SOLUTION INTRAVENOUS at 18:09

## 2023-01-01 RX ADMIN — Medication 50 MILLIEQUIVALENT(S): at 01:38

## 2023-01-01 RX ADMIN — Medication 650 MILLIGRAM(S): at 17:37

## 2023-01-01 RX ADMIN — ATORVASTATIN CALCIUM 20 MILLIGRAM(S): 80 TABLET, FILM COATED ORAL at 21:54

## 2023-01-01 RX ADMIN — Medication 400 MILLIGRAM(S): at 06:13

## 2023-01-01 RX ADMIN — HEPARIN SODIUM 5000 UNIT(S): 5000 INJECTION INTRAVENOUS; SUBCUTANEOUS at 05:16

## 2023-01-01 RX ADMIN — HEPARIN SODIUM 5000 UNIT(S): 5000 INJECTION INTRAVENOUS; SUBCUTANEOUS at 06:10

## 2023-01-01 RX ADMIN — ONDANSETRON 4 MILLIGRAM(S): 8 TABLET, FILM COATED ORAL at 14:03

## 2023-01-01 RX ADMIN — ATORVASTATIN CALCIUM 20 MILLIGRAM(S): 80 TABLET, FILM COATED ORAL at 21:22

## 2023-01-01 RX ADMIN — CEFEPIME 100 MILLIGRAM(S): 1 INJECTION, POWDER, FOR SOLUTION INTRAMUSCULAR; INTRAVENOUS at 04:09

## 2023-01-01 RX ADMIN — Medication 25 GRAM(S): at 00:21

## 2023-01-01 RX ADMIN — Medication 2: at 17:25

## 2023-01-01 RX ADMIN — IRON SUCROSE 110 MILLIGRAM(S): 20 INJECTION, SOLUTION INTRAVENOUS at 13:04

## 2023-01-01 RX ADMIN — HEPARIN SODIUM 5000 UNIT(S): 5000 INJECTION INTRAVENOUS; SUBCUTANEOUS at 18:16

## 2023-01-01 RX ADMIN — TAMSULOSIN HYDROCHLORIDE 0.4 MILLIGRAM(S): 0.4 CAPSULE ORAL at 21:03

## 2023-01-01 RX ADMIN — Medication 650 MILLIGRAM(S): at 17:04

## 2023-01-01 RX ADMIN — SENNA PLUS 1 TABLET(S): 8.6 TABLET ORAL at 11:17

## 2023-01-01 RX ADMIN — SODIUM CHLORIDE 75 MILLILITER(S): 9 INJECTION, SOLUTION INTRAVENOUS at 11:41

## 2023-01-01 RX ADMIN — SODIUM CHLORIDE 100 MILLILITER(S): 9 INJECTION, SOLUTION INTRAVENOUS at 06:43

## 2023-01-01 RX ADMIN — HEPARIN SODIUM 5000 UNIT(S): 5000 INJECTION INTRAVENOUS; SUBCUTANEOUS at 18:07

## 2023-01-01 RX ADMIN — TAMSULOSIN HYDROCHLORIDE 0.4 MILLIGRAM(S): 0.4 CAPSULE ORAL at 21:41

## 2023-01-01 RX ADMIN — Medication 2: at 12:04

## 2023-01-01 RX ADMIN — Medication 20 MILLIEQUIVALENT(S): at 05:20

## 2023-01-01 RX ADMIN — MIDODRINE HYDROCHLORIDE 5 MILLIGRAM(S): 2.5 TABLET ORAL at 05:27

## 2023-01-01 RX ADMIN — Medication 650 MILLIGRAM(S): at 05:14

## 2023-01-01 RX ADMIN — Medication 650 MILLIGRAM(S): at 17:07

## 2023-01-01 RX ADMIN — Medication 2: at 17:32

## 2023-01-01 RX ADMIN — CEFEPIME 100 MILLIGRAM(S): 1 INJECTION, POWDER, FOR SOLUTION INTRAMUSCULAR; INTRAVENOUS at 15:29

## 2023-01-01 RX ADMIN — TAMSULOSIN HYDROCHLORIDE 0.4 MILLIGRAM(S): 0.4 CAPSULE ORAL at 21:58

## 2023-01-01 RX ADMIN — TAMSULOSIN HYDROCHLORIDE 0.4 MILLIGRAM(S): 0.4 CAPSULE ORAL at 21:39

## 2023-01-01 RX ADMIN — Medication 325 MILLIGRAM(S): at 17:04

## 2023-01-01 RX ADMIN — PANTOPRAZOLE SODIUM 40 MILLIGRAM(S): 20 TABLET, DELAYED RELEASE ORAL at 05:23

## 2023-01-01 RX ADMIN — ENOXAPARIN SODIUM 40 MILLIGRAM(S): 100 INJECTION SUBCUTANEOUS at 17:11

## 2023-01-01 RX ADMIN — ENOXAPARIN SODIUM 40 MILLIGRAM(S): 100 INJECTION SUBCUTANEOUS at 12:06

## 2023-01-01 RX ADMIN — Medication 250 MILLIGRAM(S): at 05:39

## 2023-01-01 RX ADMIN — Medication 100 MILLIGRAM(S): at 21:57

## 2023-01-01 RX ADMIN — Medication 650 MILLIGRAM(S): at 01:00

## 2023-01-01 RX ADMIN — HEPARIN SODIUM 5000 UNIT(S): 5000 INJECTION INTRAVENOUS; SUBCUTANEOUS at 17:26

## 2023-01-01 RX ADMIN — SENNA PLUS 1 TABLET(S): 8.6 TABLET ORAL at 12:03

## 2023-01-01 RX ADMIN — PANTOPRAZOLE SODIUM 40 MILLIGRAM(S): 20 TABLET, DELAYED RELEASE ORAL at 05:44

## 2023-01-01 RX ADMIN — SENNA PLUS 1 TABLET(S): 8.6 TABLET ORAL at 11:14

## 2023-01-01 RX ADMIN — PANTOPRAZOLE SODIUM 40 MILLIGRAM(S): 20 TABLET, DELAYED RELEASE ORAL at 08:29

## 2023-01-01 RX ADMIN — CEFEPIME 100 MILLIGRAM(S): 1 INJECTION, POWDER, FOR SOLUTION INTRAMUSCULAR; INTRAVENOUS at 05:05

## 2023-01-01 RX ADMIN — Medication 25 GRAM(S): at 00:48

## 2023-01-01 RX ADMIN — Medication 100 MILLIGRAM(S): at 05:41

## 2023-01-01 RX ADMIN — Medication 50 MILLIEQUIVALENT(S): at 01:57

## 2023-01-01 RX ADMIN — Medication 20 MILLIGRAM(S): at 21:04

## 2023-01-01 RX ADMIN — TAMSULOSIN HYDROCHLORIDE 0.4 MILLIGRAM(S): 0.4 CAPSULE ORAL at 21:01

## 2023-01-01 RX ADMIN — CEFEPIME 100 MILLIGRAM(S): 1 INJECTION, POWDER, FOR SOLUTION INTRAMUSCULAR; INTRAVENOUS at 22:04

## 2023-01-01 RX ADMIN — TAMSULOSIN HYDROCHLORIDE 0.4 MILLIGRAM(S): 0.4 CAPSULE ORAL at 22:49

## 2023-01-01 RX ADMIN — Medication 25 GRAM(S): at 11:44

## 2023-01-01 RX ADMIN — ENOXAPARIN SODIUM 40 MILLIGRAM(S): 100 INJECTION SUBCUTANEOUS at 17:33

## 2023-01-01 RX ADMIN — Medication 650 MILLIGRAM(S): at 11:54

## 2023-01-01 RX ADMIN — Medication 100 MILLIGRAM(S): at 14:49

## 2023-01-01 RX ADMIN — PANTOPRAZOLE SODIUM 40 MILLIGRAM(S): 20 TABLET, DELAYED RELEASE ORAL at 05:51

## 2023-01-01 RX ADMIN — PANTOPRAZOLE SODIUM 40 MILLIGRAM(S): 20 TABLET, DELAYED RELEASE ORAL at 05:05

## 2023-01-01 RX ADMIN — Medication 100 MILLIGRAM(S): at 13:18

## 2023-01-01 RX ADMIN — Medication 100 MILLIGRAM(S): at 21:25

## 2023-01-01 RX ADMIN — MORPHINE SULFATE 6 MILLIGRAM(S): 50 CAPSULE, EXTENDED RELEASE ORAL at 12:41

## 2023-01-01 RX ADMIN — HEPARIN SODIUM 5000 UNIT(S): 5000 INJECTION INTRAVENOUS; SUBCUTANEOUS at 05:40

## 2023-01-01 RX ADMIN — MIDODRINE HYDROCHLORIDE 5 MILLIGRAM(S): 2.5 TABLET ORAL at 05:53

## 2023-01-01 RX ADMIN — TAMSULOSIN HYDROCHLORIDE 0.4 MILLIGRAM(S): 0.4 CAPSULE ORAL at 21:20

## 2023-01-01 RX ADMIN — PANTOPRAZOLE SODIUM 40 MILLIGRAM(S): 20 TABLET, DELAYED RELEASE ORAL at 06:05

## 2023-01-01 RX ADMIN — Medication 50 MILLIEQUIVALENT(S): at 02:25

## 2023-01-01 RX ADMIN — Medication 100 MILLIGRAM(S): at 04:09

## 2023-01-01 RX ADMIN — CEFEPIME 100 MILLIGRAM(S): 1 INJECTION, POWDER, FOR SOLUTION INTRAMUSCULAR; INTRAVENOUS at 21:57

## 2023-01-01 RX ADMIN — SENNA PLUS 1 TABLET(S): 8.6 TABLET ORAL at 11:43

## 2023-01-01 RX ADMIN — Medication 1 ENEMA: at 04:30

## 2023-01-01 RX ADMIN — PANTOPRAZOLE SODIUM 40 MILLIGRAM(S): 20 TABLET, DELAYED RELEASE ORAL at 06:03

## 2023-01-01 RX ADMIN — Medication 400 MILLIGRAM(S): at 12:04

## 2023-01-01 RX ADMIN — HEPARIN SODIUM 5000 UNIT(S): 5000 INJECTION INTRAVENOUS; SUBCUTANEOUS at 17:04

## 2023-01-01 RX ADMIN — POLYETHYLENE GLYCOL 3350 17 GRAM(S): 17 POWDER, FOR SOLUTION ORAL at 12:07

## 2023-01-01 RX ADMIN — Medication 2: at 11:35

## 2023-01-01 RX ADMIN — Medication 25 GRAM(S): at 12:01

## 2023-01-01 RX ADMIN — Medication 5 MILLIGRAM(S): at 14:49

## 2023-01-01 RX ADMIN — ATORVASTATIN CALCIUM 20 MILLIGRAM(S): 80 TABLET, FILM COATED ORAL at 21:38

## 2023-01-01 RX ADMIN — Medication 20 MILLIEQUIVALENT(S): at 22:24

## 2023-01-01 RX ADMIN — HYDROMORPHONE HYDROCHLORIDE 0.5 MILLIGRAM(S): 2 INJECTION INTRAMUSCULAR; INTRAVENOUS; SUBCUTANEOUS at 16:13

## 2023-01-01 RX ADMIN — Medication 20 MILLIEQUIVALENT(S): at 09:51

## 2023-01-01 RX ADMIN — Medication 100 MILLIGRAM(S): at 14:46

## 2023-01-01 RX ADMIN — HEPARIN SODIUM 5000 UNIT(S): 5000 INJECTION INTRAVENOUS; SUBCUTANEOUS at 17:08

## 2023-01-01 RX ADMIN — SODIUM CHLORIDE 75 MILLILITER(S): 9 INJECTION, SOLUTION INTRAVENOUS at 05:43

## 2023-01-01 RX ADMIN — Medication 650 MILLIGRAM(S): at 05:58

## 2023-01-01 RX ADMIN — HEPARIN SODIUM 5000 UNIT(S): 5000 INJECTION INTRAVENOUS; SUBCUTANEOUS at 18:11

## 2023-01-01 RX ADMIN — ATORVASTATIN CALCIUM 20 MILLIGRAM(S): 80 TABLET, FILM COATED ORAL at 21:09

## 2023-01-01 RX ADMIN — TAMSULOSIN HYDROCHLORIDE 0.4 MILLIGRAM(S): 0.4 CAPSULE ORAL at 21:16

## 2023-01-01 RX ADMIN — POLYETHYLENE GLYCOL 3350 17 GRAM(S): 17 POWDER, FOR SOLUTION ORAL at 12:03

## 2023-01-01 RX ADMIN — CEFEPIME 100 MILLIGRAM(S): 1 INJECTION, POWDER, FOR SOLUTION INTRAMUSCULAR; INTRAVENOUS at 13:06

## 2023-01-01 RX ADMIN — POTASSIUM PHOSPHATE, MONOBASIC POTASSIUM PHOSPHATE, DIBASIC 83.33 MILLIMOLE(S): 236; 224 INJECTION, SOLUTION INTRAVENOUS at 02:22

## 2023-01-01 RX ADMIN — Medication 50 MILLIEQUIVALENT(S): at 03:56

## 2023-01-01 RX ADMIN — Medication 2: at 17:04

## 2023-01-01 RX ADMIN — SODIUM CHLORIDE 75 MILLILITER(S): 9 INJECTION, SOLUTION INTRAVENOUS at 15:43

## 2023-01-01 RX ADMIN — ATORVASTATIN CALCIUM 20 MILLIGRAM(S): 80 TABLET, FILM COATED ORAL at 22:24

## 2023-01-01 RX ADMIN — Medication 50 MILLIEQUIVALENT(S): at 20:34

## 2023-01-01 RX ADMIN — ATORVASTATIN CALCIUM 20 MILLIGRAM(S): 80 TABLET, FILM COATED ORAL at 21:21

## 2023-01-01 RX ADMIN — ENOXAPARIN SODIUM 40 MILLIGRAM(S): 100 INJECTION SUBCUTANEOUS at 05:33

## 2023-01-01 RX ADMIN — POLYETHYLENE GLYCOL 3350 17 GRAM(S): 17 POWDER, FOR SOLUTION ORAL at 17:11

## 2023-01-01 RX ADMIN — HEPARIN SODIUM 5000 UNIT(S): 5000 INJECTION INTRAVENOUS; SUBCUTANEOUS at 06:08

## 2023-01-01 RX ADMIN — PANTOPRAZOLE SODIUM 40 MILLIGRAM(S): 20 TABLET, DELAYED RELEASE ORAL at 05:33

## 2023-01-01 RX ADMIN — SENNA PLUS 1 TABLET(S): 8.6 TABLET ORAL at 13:04

## 2023-01-01 RX ADMIN — Medication 2: at 08:15

## 2023-01-01 RX ADMIN — HEPARIN SODIUM 5000 UNIT(S): 5000 INJECTION INTRAVENOUS; SUBCUTANEOUS at 17:05

## 2023-01-01 RX ADMIN — Medication 325 MILLIGRAM(S): at 06:06

## 2023-01-01 RX ADMIN — Medication 15 MILLIGRAM(S): at 12:27

## 2023-01-01 RX ADMIN — Medication 2: at 13:42

## 2023-01-01 RX ADMIN — Medication 2: at 11:37

## 2023-01-01 RX ADMIN — Medication 400 MILLIGRAM(S): at 17:40

## 2023-01-01 RX ADMIN — Medication 650 MILLIGRAM(S): at 02:14

## 2023-01-01 RX ADMIN — POLYETHYLENE GLYCOL 3350 17 GRAM(S): 17 POWDER, FOR SOLUTION ORAL at 18:02

## 2023-01-01 RX ADMIN — POLYETHYLENE GLYCOL 3350 17 GRAM(S): 17 POWDER, FOR SOLUTION ORAL at 11:24

## 2023-01-01 RX ADMIN — SODIUM CHLORIDE 75 MILLILITER(S): 9 INJECTION, SOLUTION INTRAVENOUS at 06:05

## 2023-01-01 RX ADMIN — Medication 400 MILLIGRAM(S): at 11:34

## 2023-01-01 RX ADMIN — Medication 650 MILLIGRAM(S): at 00:10

## 2023-01-01 RX ADMIN — MIDODRINE HYDROCHLORIDE 5 MILLIGRAM(S): 2.5 TABLET ORAL at 11:16

## 2023-01-01 RX ADMIN — MIDODRINE HYDROCHLORIDE 5 MILLIGRAM(S): 2.5 TABLET ORAL at 17:45

## 2023-01-01 RX ADMIN — Medication 650 MILLIGRAM(S): at 06:54

## 2023-01-01 RX ADMIN — Medication 20 MILLIEQUIVALENT(S): at 06:13

## 2023-01-01 RX ADMIN — PIPERACILLIN AND TAZOBACTAM 200 GRAM(S): 4; .5 INJECTION, POWDER, LYOPHILIZED, FOR SOLUTION INTRAVENOUS at 20:40

## 2023-01-01 RX ADMIN — HEPARIN SODIUM 5000 UNIT(S): 5000 INJECTION INTRAVENOUS; SUBCUTANEOUS at 05:33

## 2023-01-01 RX ADMIN — Medication 650 MILLIGRAM(S): at 02:47

## 2023-01-01 RX ADMIN — TAMSULOSIN HYDROCHLORIDE 0.4 MILLIGRAM(S): 0.4 CAPSULE ORAL at 21:14

## 2023-01-01 RX ADMIN — Medication 400 MILLIGRAM(S): at 05:14

## 2023-01-01 RX ADMIN — POLYETHYLENE GLYCOL 3350 17 GRAM(S): 17 POWDER, FOR SOLUTION ORAL at 13:08

## 2023-01-01 RX ADMIN — LACTULOSE 30 GRAM(S): 10 SOLUTION ORAL at 17:00

## 2023-01-01 RX ADMIN — ATORVASTATIN CALCIUM 20 MILLIGRAM(S): 80 TABLET, FILM COATED ORAL at 21:25

## 2023-01-01 RX ADMIN — MIDODRINE HYDROCHLORIDE 5 MILLIGRAM(S): 2.5 TABLET ORAL at 13:03

## 2023-01-01 RX ADMIN — SODIUM CHLORIDE 40 MILLILITER(S): 9 INJECTION, SOLUTION INTRAVENOUS at 04:10

## 2023-01-01 RX ADMIN — MORPHINE SULFATE 6 MILLIGRAM(S): 50 CAPSULE, EXTENDED RELEASE ORAL at 20:08

## 2023-01-01 RX ADMIN — ATORVASTATIN CALCIUM 20 MILLIGRAM(S): 80 TABLET, FILM COATED ORAL at 21:20

## 2023-01-01 RX ADMIN — Medication 325 MILLIGRAM(S): at 18:08

## 2023-01-01 RX ADMIN — SODIUM CHLORIDE 75 MILLILITER(S): 9 INJECTION, SOLUTION INTRAVENOUS at 05:26

## 2023-01-01 RX ADMIN — PIPERACILLIN AND TAZOBACTAM 200 GRAM(S): 4; .5 INJECTION, POWDER, LYOPHILIZED, FOR SOLUTION INTRAVENOUS at 20:39

## 2023-01-01 RX ADMIN — Medication 650 MILLIGRAM(S): at 03:00

## 2023-01-01 RX ADMIN — Medication 50 MILLIEQUIVALENT(S): at 01:48

## 2023-01-01 RX ADMIN — Medication 400 MILLIGRAM(S): at 23:00

## 2023-01-01 RX ADMIN — Medication 650 MILLIGRAM(S): at 11:35

## 2023-01-01 RX ADMIN — POLYETHYLENE GLYCOL 3350 17 GRAM(S): 17 POWDER, FOR SOLUTION ORAL at 05:49

## 2023-01-01 RX ADMIN — Medication 650 MILLIGRAM(S): at 14:55

## 2023-01-01 RX ADMIN — ATORVASTATIN CALCIUM 20 MILLIGRAM(S): 80 TABLET, FILM COATED ORAL at 21:24

## 2023-01-01 RX ADMIN — HEPARIN SODIUM 5000 UNIT(S): 5000 INJECTION INTRAVENOUS; SUBCUTANEOUS at 05:04

## 2023-01-01 RX ADMIN — Medication 400 MILLIGRAM(S): at 06:45

## 2023-01-01 RX ADMIN — Medication 2: at 16:58

## 2023-01-01 RX ADMIN — MORPHINE SULFATE 6 MILLIGRAM(S): 50 CAPSULE, EXTENDED RELEASE ORAL at 05:54

## 2023-01-01 RX ADMIN — SENNA PLUS 1 TABLET(S): 8.6 TABLET ORAL at 12:45

## 2023-01-01 RX ADMIN — Medication 100 MILLIGRAM(S): at 21:44

## 2023-01-01 RX ADMIN — Medication 50 MILLIEQUIVALENT(S): at 04:15

## 2023-01-01 RX ADMIN — Medication 650 MILLIGRAM(S): at 17:39

## 2023-01-01 RX ADMIN — Medication 100 MILLIGRAM(S): at 21:04

## 2023-01-01 RX ADMIN — HEPARIN SODIUM 5000 UNIT(S): 5000 INJECTION INTRAVENOUS; SUBCUTANEOUS at 05:26

## 2023-01-01 RX ADMIN — SODIUM CHLORIDE 1000 MILLILITER(S): 9 INJECTION INTRAMUSCULAR; INTRAVENOUS; SUBCUTANEOUS at 21:41

## 2023-01-01 RX ADMIN — PIPERACILLIN AND TAZOBACTAM 25 GRAM(S): 4; .5 INJECTION, POWDER, LYOPHILIZED, FOR SOLUTION INTRAVENOUS at 13:30

## 2023-01-01 RX ADMIN — TAMSULOSIN HYDROCHLORIDE 0.4 MILLIGRAM(S): 0.4 CAPSULE ORAL at 22:17

## 2023-01-01 RX ADMIN — PANTOPRAZOLE SODIUM 40 MILLIGRAM(S): 20 TABLET, DELAYED RELEASE ORAL at 05:38

## 2023-01-01 RX ADMIN — LINEZOLID 300 MILLIGRAM(S): 600 INJECTION, SOLUTION INTRAVENOUS at 05:40

## 2023-01-01 RX ADMIN — PANTOPRAZOLE SODIUM 40 MILLIGRAM(S): 20 TABLET, DELAYED RELEASE ORAL at 06:06

## 2023-01-01 RX ADMIN — Medication 50 MILLIEQUIVALENT(S): at 06:26

## 2023-01-01 RX ADMIN — HEPARIN SODIUM 5000 UNIT(S): 5000 INJECTION INTRAVENOUS; SUBCUTANEOUS at 05:51

## 2023-01-01 RX ADMIN — PIPERACILLIN AND TAZOBACTAM 25 GRAM(S): 4; .5 INJECTION, POWDER, LYOPHILIZED, FOR SOLUTION INTRAVENOUS at 21:14

## 2023-01-01 RX ADMIN — SODIUM CHLORIDE 100 MILLILITER(S): 9 INJECTION, SOLUTION INTRAVENOUS at 18:48

## 2023-01-01 RX ADMIN — CEFEPIME 100 MILLIGRAM(S): 1 INJECTION, POWDER, FOR SOLUTION INTRAMUSCULAR; INTRAVENOUS at 05:46

## 2023-01-01 RX ADMIN — Medication 25 GRAM(S): at 09:51

## 2023-01-01 RX ADMIN — PANTOPRAZOLE SODIUM 40 MILLIGRAM(S): 20 TABLET, DELAYED RELEASE ORAL at 06:53

## 2023-01-01 RX ADMIN — PANTOPRAZOLE SODIUM 40 MILLIGRAM(S): 20 TABLET, DELAYED RELEASE ORAL at 06:04

## 2023-01-01 RX ADMIN — ATORVASTATIN CALCIUM 20 MILLIGRAM(S): 80 TABLET, FILM COATED ORAL at 21:42

## 2023-01-01 RX ADMIN — SODIUM CHLORIDE 100 MILLILITER(S): 9 INJECTION, SOLUTION INTRAVENOUS at 15:56

## 2023-01-01 RX ADMIN — TAMSULOSIN HYDROCHLORIDE 0.4 MILLIGRAM(S): 0.4 CAPSULE ORAL at 22:24

## 2023-01-01 RX ADMIN — PIPERACILLIN AND TAZOBACTAM 25 GRAM(S): 4; .5 INJECTION, POWDER, LYOPHILIZED, FOR SOLUTION INTRAVENOUS at 22:52

## 2023-01-01 RX ADMIN — Medication 20 MILLIGRAM(S): at 09:17

## 2023-01-01 RX ADMIN — PIPERACILLIN AND TAZOBACTAM 25 GRAM(S): 4; .5 INJECTION, POWDER, LYOPHILIZED, FOR SOLUTION INTRAVENOUS at 21:28

## 2023-01-01 RX ADMIN — Medication 20 MILLIEQUIVALENT(S): at 11:58

## 2023-01-01 RX ADMIN — SODIUM CHLORIDE 60 MILLILITER(S): 9 INJECTION, SOLUTION INTRAVENOUS at 22:49

## 2023-01-01 RX ADMIN — PANTOPRAZOLE SODIUM 40 MILLIGRAM(S): 20 TABLET, DELAYED RELEASE ORAL at 06:08

## 2023-01-01 RX ADMIN — Medication 2: at 07:52

## 2023-01-01 RX ADMIN — PIPERACILLIN AND TAZOBACTAM 25 GRAM(S): 4; .5 INJECTION, POWDER, LYOPHILIZED, FOR SOLUTION INTRAVENOUS at 16:34

## 2023-01-01 RX ADMIN — Medication 50 MILLIEQUIVALENT(S): at 03:46

## 2023-01-01 RX ADMIN — PIPERACILLIN AND TAZOBACTAM 25 GRAM(S): 4; .5 INJECTION, POWDER, LYOPHILIZED, FOR SOLUTION INTRAVENOUS at 06:12

## 2023-01-01 RX ADMIN — Medication 2: at 08:21

## 2023-01-01 RX ADMIN — Medication 50 MILLIEQUIVALENT(S): at 11:23

## 2023-01-01 RX ADMIN — MORPHINE SULFATE 4 MILLIGRAM(S): 50 CAPSULE, EXTENDED RELEASE ORAL at 15:35

## 2023-01-01 RX ADMIN — SODIUM CHLORIDE 1000 MILLILITER(S): 9 INJECTION, SOLUTION INTRAVENOUS at 14:32

## 2023-01-01 RX ADMIN — MIDODRINE HYDROCHLORIDE 5 MILLIGRAM(S): 2.5 TABLET ORAL at 18:08

## 2023-01-01 RX ADMIN — SODIUM CHLORIDE 500 MILLILITER(S): 9 INJECTION, SOLUTION INTRAVENOUS at 15:01

## 2023-01-01 RX ADMIN — HEPARIN SODIUM 5000 UNIT(S): 5000 INJECTION INTRAVENOUS; SUBCUTANEOUS at 06:04

## 2023-01-01 RX ADMIN — SODIUM CHLORIDE 75 MILLILITER(S): 9 INJECTION, SOLUTION INTRAVENOUS at 18:09

## 2023-01-01 RX ADMIN — SODIUM CHLORIDE 75 MILLILITER(S): 9 INJECTION, SOLUTION INTRAVENOUS at 00:36

## 2023-01-01 RX ADMIN — ATORVASTATIN CALCIUM 20 MILLIGRAM(S): 80 TABLET, FILM COATED ORAL at 21:56

## 2023-01-01 RX ADMIN — MORPHINE SULFATE 6 MILLIGRAM(S): 50 CAPSULE, EXTENDED RELEASE ORAL at 21:18

## 2023-01-01 RX ADMIN — Medication 20 MILLIEQUIVALENT(S): at 12:31

## 2023-01-01 RX ADMIN — POLYETHYLENE GLYCOL 3350 17 GRAM(S): 17 POWDER, FOR SOLUTION ORAL at 11:16

## 2023-01-01 RX ADMIN — PIPERACILLIN AND TAZOBACTAM 25 GRAM(S): 4; .5 INJECTION, POWDER, LYOPHILIZED, FOR SOLUTION INTRAVENOUS at 06:00

## 2023-01-01 RX ADMIN — ATORVASTATIN CALCIUM 20 MILLIGRAM(S): 80 TABLET, FILM COATED ORAL at 21:58

## 2023-01-01 RX ADMIN — CEFEPIME 100 MILLIGRAM(S): 1 INJECTION, POWDER, FOR SOLUTION INTRAMUSCULAR; INTRAVENOUS at 13:18

## 2023-01-01 RX ADMIN — CEFEPIME 100 MILLIGRAM(S): 1 INJECTION, POWDER, FOR SOLUTION INTRAMUSCULAR; INTRAVENOUS at 14:49

## 2023-01-01 RX ADMIN — MEROPENEM 100 MILLIGRAM(S): 1 INJECTION INTRAVENOUS at 14:44

## 2023-01-01 RX ADMIN — Medication 2: at 12:09

## 2023-01-01 RX ADMIN — ATORVASTATIN CALCIUM 20 MILLIGRAM(S): 80 TABLET, FILM COATED ORAL at 21:01

## 2023-01-01 RX ADMIN — ENOXAPARIN SODIUM 40 MILLIGRAM(S): 100 INJECTION SUBCUTANEOUS at 18:02

## 2023-01-01 RX ADMIN — Medication 650 MILLIGRAM(S): at 21:43

## 2023-01-01 RX ADMIN — ENOXAPARIN SODIUM 40 MILLIGRAM(S): 100 INJECTION SUBCUTANEOUS at 05:59

## 2023-01-01 RX ADMIN — PIPERACILLIN AND TAZOBACTAM 200 GRAM(S): 4; .5 INJECTION, POWDER, LYOPHILIZED, FOR SOLUTION INTRAVENOUS at 15:56

## 2023-01-01 RX ADMIN — MIDODRINE HYDROCHLORIDE 5 MILLIGRAM(S): 2.5 TABLET ORAL at 12:42

## 2023-01-01 RX ADMIN — SODIUM CHLORIDE 100 MILLILITER(S): 9 INJECTION, SOLUTION INTRAVENOUS at 20:32

## 2023-01-01 RX ADMIN — HEPARIN SODIUM 5000 UNIT(S): 5000 INJECTION INTRAVENOUS; SUBCUTANEOUS at 18:27

## 2023-01-01 RX ADMIN — HEPARIN SODIUM 5000 UNIT(S): 5000 INJECTION INTRAVENOUS; SUBCUTANEOUS at 05:38

## 2023-01-01 RX ADMIN — LACTULOSE 30 GRAM(S): 10 SOLUTION ORAL at 17:01

## 2023-01-01 RX ADMIN — Medication 100 MILLIGRAM(S): at 05:23

## 2023-01-01 RX ADMIN — CEFEPIME 100 MILLIGRAM(S): 1 INJECTION, POWDER, FOR SOLUTION INTRAMUSCULAR; INTRAVENOUS at 22:58

## 2023-01-01 RX ADMIN — Medication 40 MILLIGRAM(S): at 18:24

## 2023-01-01 RX ADMIN — MIDODRINE HYDROCHLORIDE 5 MILLIGRAM(S): 2.5 TABLET ORAL at 11:42

## 2023-01-01 RX ADMIN — Medication 50 MILLIEQUIVALENT(S): at 05:45

## 2023-01-01 RX ADMIN — PANTOPRAZOLE SODIUM 40 MILLIGRAM(S): 20 TABLET, DELAYED RELEASE ORAL at 05:22

## 2023-01-01 RX ADMIN — CEFEPIME 100 MILLIGRAM(S): 1 INJECTION, POWDER, FOR SOLUTION INTRAMUSCULAR; INTRAVENOUS at 05:24

## 2023-01-01 RX ADMIN — Medication 650 MILLIGRAM(S): at 12:05

## 2023-01-01 RX ADMIN — TAMSULOSIN HYDROCHLORIDE 0.4 MILLIGRAM(S): 0.4 CAPSULE ORAL at 21:22

## 2023-01-01 RX ADMIN — Medication 400 MILLIGRAM(S): at 15:03

## 2023-01-01 RX ADMIN — Medication 400 MILLIGRAM(S): at 17:04

## 2023-01-01 RX ADMIN — CEFEPIME 100 MILLIGRAM(S): 1 INJECTION, POWDER, FOR SOLUTION INTRAMUSCULAR; INTRAVENOUS at 21:54

## 2023-01-01 RX ADMIN — ENOXAPARIN SODIUM 40 MILLIGRAM(S): 100 INJECTION SUBCUTANEOUS at 11:24

## 2023-01-01 RX ADMIN — SODIUM CHLORIDE 40 MILLILITER(S): 9 INJECTION, SOLUTION INTRAVENOUS at 22:38

## 2023-01-01 RX ADMIN — PANTOPRAZOLE SODIUM 40 MILLIGRAM(S): 20 TABLET, DELAYED RELEASE ORAL at 05:40

## 2023-01-01 RX ADMIN — Medication 100 MILLIGRAM(S): at 05:06

## 2023-01-01 RX ADMIN — Medication 100 GRAM(S): at 01:51

## 2023-01-01 RX ADMIN — Medication 0: at 11:19

## 2023-01-01 RX ADMIN — HEPARIN SODIUM 5000 UNIT(S): 5000 INJECTION INTRAVENOUS; SUBCUTANEOUS at 06:14

## 2023-01-01 RX ADMIN — PANTOPRAZOLE SODIUM 40 MILLIGRAM(S): 20 TABLET, DELAYED RELEASE ORAL at 06:20

## 2023-01-01 RX ADMIN — TAMSULOSIN HYDROCHLORIDE 0.4 MILLIGRAM(S): 0.4 CAPSULE ORAL at 21:09

## 2023-01-01 RX ADMIN — Medication 2: at 18:12

## 2023-01-01 RX ADMIN — IRON SUCROSE 110 MILLIGRAM(S): 20 INJECTION, SOLUTION INTRAVENOUS at 17:02

## 2023-01-01 RX ADMIN — SODIUM CHLORIDE 75 MILLILITER(S): 9 INJECTION, SOLUTION INTRAVENOUS at 11:14

## 2023-01-01 RX ADMIN — PIPERACILLIN AND TAZOBACTAM 25 GRAM(S): 4; .5 INJECTION, POWDER, LYOPHILIZED, FOR SOLUTION INTRAVENOUS at 05:19

## 2023-01-01 RX ADMIN — Medication 2: at 08:30

## 2023-01-01 RX ADMIN — CEFEPIME 100 MILLIGRAM(S): 1 INJECTION, POWDER, FOR SOLUTION INTRAMUSCULAR; INTRAVENOUS at 15:32

## 2023-01-01 RX ADMIN — Medication 2: at 08:55

## 2023-01-01 RX ADMIN — Medication 650 MILLIGRAM(S): at 06:45

## 2023-01-01 RX ADMIN — ATORVASTATIN CALCIUM 20 MILLIGRAM(S): 80 TABLET, FILM COATED ORAL at 21:51

## 2023-01-01 RX ADMIN — MAGNESIUM HYDROXIDE 30 MILLILITER(S): 400 TABLET, CHEWABLE ORAL at 02:29

## 2023-01-01 RX ADMIN — Medication 650 MILLIGRAM(S): at 11:24

## 2023-01-01 RX ADMIN — POLYETHYLENE GLYCOL 3350 17 GRAM(S): 17 POWDER, FOR SOLUTION ORAL at 11:43

## 2023-01-01 RX ADMIN — HEPARIN SODIUM 5000 UNIT(S): 5000 INJECTION INTRAVENOUS; SUBCUTANEOUS at 05:43

## 2023-01-01 RX ADMIN — Medication 650 MILLIGRAM(S): at 23:59

## 2023-01-01 RX ADMIN — ATORVASTATIN CALCIUM 20 MILLIGRAM(S): 80 TABLET, FILM COATED ORAL at 21:31

## 2023-01-01 RX ADMIN — IRON SUCROSE 110 MILLIGRAM(S): 20 INJECTION, SOLUTION INTRAVENOUS at 16:30

## 2023-01-01 RX ADMIN — MORPHINE SULFATE 4 MILLIGRAM(S): 50 CAPSULE, EXTENDED RELEASE ORAL at 15:05

## 2023-01-01 RX ADMIN — MEROPENEM 100 MILLIGRAM(S): 1 INJECTION INTRAVENOUS at 05:43

## 2023-01-01 RX ADMIN — CEFEPIME 100 MILLIGRAM(S): 1 INJECTION, POWDER, FOR SOLUTION INTRAMUSCULAR; INTRAVENOUS at 05:40

## 2023-01-01 RX ADMIN — HYDROMORPHONE HYDROCHLORIDE 0.5 MILLIGRAM(S): 2 INJECTION INTRAMUSCULAR; INTRAVENOUS; SUBCUTANEOUS at 11:03

## 2023-01-01 RX ADMIN — Medication 100 MILLIGRAM(S): at 11:55

## 2023-01-01 RX ADMIN — ATORVASTATIN CALCIUM 20 MILLIGRAM(S): 80 TABLET, FILM COATED ORAL at 21:34

## 2023-01-01 RX ADMIN — Medication 650 MILLIGRAM(S): at 21:58

## 2023-01-01 RX ADMIN — POLYETHYLENE GLYCOL 3350 17 GRAM(S): 17 POWDER, FOR SOLUTION ORAL at 12:53

## 2023-01-01 RX ADMIN — Medication 50 MILLIEQUIVALENT(S): at 03:35

## 2023-01-01 RX ADMIN — PIPERACILLIN AND TAZOBACTAM 25 GRAM(S): 4; .5 INJECTION, POWDER, LYOPHILIZED, FOR SOLUTION INTRAVENOUS at 22:24

## 2023-01-01 RX ADMIN — PIPERACILLIN AND TAZOBACTAM 25 GRAM(S): 4; .5 INJECTION, POWDER, LYOPHILIZED, FOR SOLUTION INTRAVENOUS at 05:06

## 2023-01-01 RX ADMIN — HEPARIN SODIUM 5000 UNIT(S): 5000 INJECTION INTRAVENOUS; SUBCUTANEOUS at 05:58

## 2023-01-01 RX ADMIN — HYDROMORPHONE HYDROCHLORIDE 0.5 MILLIGRAM(S): 2 INJECTION INTRAMUSCULAR; INTRAVENOUS; SUBCUTANEOUS at 00:15

## 2023-01-01 RX ADMIN — Medication 50 MILLIEQUIVALENT(S): at 06:13

## 2023-01-01 RX ADMIN — TAMSULOSIN HYDROCHLORIDE 0.4 MILLIGRAM(S): 0.4 CAPSULE ORAL at 21:31

## 2023-01-01 RX ADMIN — SODIUM CHLORIDE 60 MILLILITER(S): 9 INJECTION, SOLUTION INTRAVENOUS at 02:21

## 2023-01-01 RX ADMIN — MORPHINE SULFATE 2 MILLIGRAM(S): 50 CAPSULE, EXTENDED RELEASE ORAL at 13:47

## 2023-01-01 RX ADMIN — SODIUM CHLORIDE 100 MILLILITER(S): 9 INJECTION, SOLUTION INTRAVENOUS at 21:43

## 2023-01-01 RX ADMIN — PIPERACILLIN AND TAZOBACTAM 25 GRAM(S): 4; .5 INJECTION, POWDER, LYOPHILIZED, FOR SOLUTION INTRAVENOUS at 21:35

## 2023-01-01 RX ADMIN — Medication 50 MILLIEQUIVALENT(S): at 05:10

## 2023-01-01 RX ADMIN — Medication 325 MILLIGRAM(S): at 17:23

## 2023-01-01 RX ADMIN — HYDROMORPHONE HYDROCHLORIDE 0.5 MILLIGRAM(S): 2 INJECTION INTRAMUSCULAR; INTRAVENOUS; SUBCUTANEOUS at 06:05

## 2023-01-01 RX ADMIN — TAMSULOSIN HYDROCHLORIDE 0.4 MILLIGRAM(S): 0.4 CAPSULE ORAL at 21:00

## 2023-01-01 RX ADMIN — POLYETHYLENE GLYCOL 3350 17 GRAM(S): 17 POWDER, FOR SOLUTION ORAL at 11:17

## 2023-01-01 RX ADMIN — SODIUM CHLORIDE 100 MILLILITER(S): 9 INJECTION, SOLUTION INTRAVENOUS at 05:26

## 2023-01-01 RX ADMIN — PANTOPRAZOLE SODIUM 40 MILLIGRAM(S): 20 TABLET, DELAYED RELEASE ORAL at 07:19

## 2023-01-01 RX ADMIN — SENNA PLUS 1 TABLET(S): 8.6 TABLET ORAL at 12:53

## 2023-01-01 RX ADMIN — POLYETHYLENE GLYCOL 3350 17 GRAM(S): 17 POWDER, FOR SOLUTION ORAL at 12:58

## 2023-01-01 RX ADMIN — Medication 50 MILLIEQUIVALENT(S): at 14:28

## 2023-01-01 RX ADMIN — SODIUM CHLORIDE 100 MILLILITER(S): 9 INJECTION, SOLUTION INTRAVENOUS at 18:11

## 2023-01-01 RX ADMIN — POLYETHYLENE GLYCOL 3350 17 GRAM(S): 17 POWDER, FOR SOLUTION ORAL at 11:38

## 2023-01-01 RX ADMIN — ENOXAPARIN SODIUM 40 MILLIGRAM(S): 100 INJECTION SUBCUTANEOUS at 14:56

## 2023-01-01 RX ADMIN — Medication 50 MILLIEQUIVALENT(S): at 02:29

## 2023-01-01 RX ADMIN — MIDODRINE HYDROCHLORIDE 5 MILLIGRAM(S): 2.5 TABLET ORAL at 17:19

## 2023-01-01 RX ADMIN — CEFEPIME 100 MILLIGRAM(S): 1 INJECTION, POWDER, FOR SOLUTION INTRAMUSCULAR; INTRAVENOUS at 15:11

## 2023-01-01 RX ADMIN — Medication 50 MILLIEQUIVALENT(S): at 15:49

## 2023-01-01 RX ADMIN — Medication 650 MILLIGRAM(S): at 06:23

## 2023-01-01 RX ADMIN — TAMSULOSIN HYDROCHLORIDE 0.4 MILLIGRAM(S): 0.4 CAPSULE ORAL at 21:51

## 2023-01-01 RX ADMIN — PIPERACILLIN AND TAZOBACTAM 25 GRAM(S): 4; .5 INJECTION, POWDER, LYOPHILIZED, FOR SOLUTION INTRAVENOUS at 05:10

## 2023-01-01 RX ADMIN — PIPERACILLIN AND TAZOBACTAM 25 GRAM(S): 4; .5 INJECTION, POWDER, LYOPHILIZED, FOR SOLUTION INTRAVENOUS at 13:55

## 2023-01-01 RX ADMIN — POLYETHYLENE GLYCOL 3350 17 GRAM(S): 17 POWDER, FOR SOLUTION ORAL at 02:39

## 2023-01-01 RX ADMIN — ATORVASTATIN CALCIUM 20 MILLIGRAM(S): 80 TABLET, FILM COATED ORAL at 21:43

## 2023-01-01 RX ADMIN — Medication 40 MILLIEQUIVALENT(S): at 15:15

## 2023-01-01 RX ADMIN — SODIUM CHLORIDE 1000 MILLILITER(S): 9 INJECTION, SOLUTION INTRAVENOUS at 12:28

## 2023-01-01 RX ADMIN — HEPARIN SODIUM 5000 UNIT(S): 5000 INJECTION INTRAVENOUS; SUBCUTANEOUS at 18:10

## 2023-01-01 RX ADMIN — SODIUM CHLORIDE 2000 MILLILITER(S): 9 INJECTION, SOLUTION INTRAVENOUS at 15:05

## 2023-01-01 RX ADMIN — Medication 20 MILLIEQUIVALENT(S): at 08:36

## 2023-01-01 RX ADMIN — MORPHINE SULFATE 6 MILLIGRAM(S): 50 CAPSULE, EXTENDED RELEASE ORAL at 08:56

## 2023-01-01 RX ADMIN — Medication 20 MILLIEQUIVALENT(S): at 20:33

## 2023-01-01 RX ADMIN — Medication 250 MILLIGRAM(S): at 21:28

## 2023-01-01 RX ADMIN — MIDODRINE HYDROCHLORIDE 5 MILLIGRAM(S): 2.5 TABLET ORAL at 06:03

## 2023-01-01 RX ADMIN — PANTOPRAZOLE SODIUM 40 MILLIGRAM(S): 20 TABLET, DELAYED RELEASE ORAL at 05:53

## 2023-01-01 RX ADMIN — ATORVASTATIN CALCIUM 20 MILLIGRAM(S): 80 TABLET, FILM COATED ORAL at 22:49

## 2023-01-01 RX ADMIN — MORPHINE SULFATE 4 MILLIGRAM(S): 50 CAPSULE, EXTENDED RELEASE ORAL at 18:49

## 2023-01-01 RX ADMIN — Medication 50 MILLIEQUIVALENT(S): at 14:39

## 2023-01-01 RX ADMIN — HEPARIN SODIUM 5000 UNIT(S): 5000 INJECTION INTRAVENOUS; SUBCUTANEOUS at 17:23

## 2023-01-01 RX ADMIN — CEFEPIME 100 MILLIGRAM(S): 1 INJECTION, POWDER, FOR SOLUTION INTRAMUSCULAR; INTRAVENOUS at 21:50

## 2023-01-01 RX ADMIN — PIPERACILLIN AND TAZOBACTAM 25 GRAM(S): 4; .5 INJECTION, POWDER, LYOPHILIZED, FOR SOLUTION INTRAVENOUS at 17:37

## 2023-01-01 RX ADMIN — HYDROMORPHONE HYDROCHLORIDE 0.5 MILLIGRAM(S): 2 INJECTION INTRAMUSCULAR; INTRAVENOUS; SUBCUTANEOUS at 16:48

## 2023-01-01 RX ADMIN — Medication 100 MILLIGRAM(S): at 13:32

## 2023-01-01 RX ADMIN — Medication 10 MILLIGRAM(S): at 08:57

## 2023-01-01 RX ADMIN — TAMSULOSIN HYDROCHLORIDE 0.4 MILLIGRAM(S): 0.4 CAPSULE ORAL at 21:34

## 2023-01-01 RX ADMIN — ONDANSETRON 4 MILLIGRAM(S): 8 TABLET, FILM COATED ORAL at 23:59

## 2023-01-01 RX ADMIN — SODIUM CHLORIDE 40 MILLILITER(S): 9 INJECTION, SOLUTION INTRAVENOUS at 21:04

## 2023-01-01 RX ADMIN — ATORVASTATIN CALCIUM 20 MILLIGRAM(S): 80 TABLET, FILM COATED ORAL at 23:42

## 2023-01-01 RX ADMIN — TAMSULOSIN HYDROCHLORIDE 0.4 MILLIGRAM(S): 0.4 CAPSULE ORAL at 21:19

## 2023-01-01 RX ADMIN — POLYETHYLENE GLYCOL 3350 17 GRAM(S): 17 POWDER, FOR SOLUTION ORAL at 12:45

## 2023-01-01 RX ADMIN — SODIUM CHLORIDE 75 MILLILITER(S): 9 INJECTION, SOLUTION INTRAVENOUS at 21:50

## 2023-01-01 RX ADMIN — Medication 650 MILLIGRAM(S): at 00:28

## 2023-01-01 RX ADMIN — Medication 50 MILLIEQUIVALENT(S): at 03:06

## 2023-01-01 RX ADMIN — Medication 20 MILLIEQUIVALENT(S): at 01:50

## 2023-01-01 RX ADMIN — Medication 325 MILLIGRAM(S): at 05:51

## 2023-01-01 RX ADMIN — SODIUM CHLORIDE 1000 MILLILITER(S): 9 INJECTION, SOLUTION INTRAVENOUS at 06:33

## 2023-01-01 RX ADMIN — Medication 100 MILLIGRAM(S): at 22:53

## 2023-01-01 RX ADMIN — ATORVASTATIN CALCIUM 20 MILLIGRAM(S): 80 TABLET, FILM COATED ORAL at 21:16

## 2023-01-01 RX ADMIN — TAMSULOSIN HYDROCHLORIDE 0.4 MILLIGRAM(S): 0.4 CAPSULE ORAL at 21:30

## 2023-01-01 RX ADMIN — SENNA PLUS 1 TABLET(S): 8.6 TABLET ORAL at 11:37

## 2023-01-01 RX ADMIN — Medication 50 MILLIEQUIVALENT(S): at 23:06

## 2023-01-01 RX ADMIN — SENNA PLUS 1 TABLET(S): 8.6 TABLET ORAL at 11:39

## 2023-01-01 RX ADMIN — POLYETHYLENE GLYCOL 3350 17 GRAM(S): 17 POWDER, FOR SOLUTION ORAL at 11:39

## 2023-01-01 RX ADMIN — Medication 100 MILLIGRAM(S): at 22:04

## 2023-01-01 RX ADMIN — Medication 100 MILLIGRAM(S): at 15:30

## 2023-01-01 RX ADMIN — ATORVASTATIN CALCIUM 20 MILLIGRAM(S): 80 TABLET, FILM COATED ORAL at 21:13

## 2023-01-01 RX ADMIN — Medication 25 GRAM(S): at 01:01

## 2023-01-01 RX ADMIN — Medication 650 MILLIGRAM(S): at 06:13

## 2023-01-01 RX ADMIN — PIPERACILLIN AND TAZOBACTAM 25 GRAM(S): 4; .5 INJECTION, POWDER, LYOPHILIZED, FOR SOLUTION INTRAVENOUS at 20:23

## 2023-01-01 RX ADMIN — ATORVASTATIN CALCIUM 20 MILLIGRAM(S): 80 TABLET, FILM COATED ORAL at 21:30

## 2023-01-01 RX ADMIN — Medication 4000 MILLILITER(S): at 17:26

## 2023-01-01 RX ADMIN — ATORVASTATIN CALCIUM 20 MILLIGRAM(S): 80 TABLET, FILM COATED ORAL at 21:19

## 2023-01-01 RX ADMIN — Medication 25 GRAM(S): at 18:55

## 2023-01-01 RX ADMIN — PANTOPRAZOLE SODIUM 40 MILLIGRAM(S): 20 TABLET, DELAYED RELEASE ORAL at 06:02

## 2023-01-01 RX ADMIN — ATORVASTATIN CALCIUM 20 MILLIGRAM(S): 80 TABLET, FILM COATED ORAL at 22:52

## 2023-01-01 RX ADMIN — SENNA PLUS 1 TABLET(S): 8.6 TABLET ORAL at 11:16

## 2023-01-01 RX ADMIN — SENNA PLUS 1 TABLET(S): 8.6 TABLET ORAL at 11:42

## 2023-01-01 RX ADMIN — HEPARIN SODIUM 5000 UNIT(S): 5000 INJECTION INTRAVENOUS; SUBCUTANEOUS at 05:27

## 2023-01-01 RX ADMIN — Medication 2: at 11:24

## 2023-01-01 RX ADMIN — SENNA PLUS 1 TABLET(S): 8.6 TABLET ORAL at 14:30

## 2023-01-01 RX ADMIN — PANTOPRAZOLE SODIUM 40 MILLIGRAM(S): 20 TABLET, DELAYED RELEASE ORAL at 09:01

## 2023-01-01 RX ADMIN — Medication 400 MILLIGRAM(S): at 17:37

## 2023-01-01 RX ADMIN — SODIUM CHLORIDE 75 MILLILITER(S): 9 INJECTION, SOLUTION INTRAVENOUS at 11:46

## 2023-01-01 RX ADMIN — Medication 400 MILLIGRAM(S): at 16:35

## 2023-01-01 RX ADMIN — ATORVASTATIN CALCIUM 20 MILLIGRAM(S): 80 TABLET, FILM COATED ORAL at 22:05

## 2023-01-01 RX ADMIN — PIPERACILLIN AND TAZOBACTAM 25 GRAM(S): 4; .5 INJECTION, POWDER, LYOPHILIZED, FOR SOLUTION INTRAVENOUS at 14:55

## 2023-01-01 RX ADMIN — HYDROMORPHONE HYDROCHLORIDE 0.5 MILLIGRAM(S): 2 INJECTION INTRAMUSCULAR; INTRAVENOUS; SUBCUTANEOUS at 11:09

## 2023-01-01 RX ADMIN — PANTOPRAZOLE SODIUM 40 MILLIGRAM(S): 20 TABLET, DELAYED RELEASE ORAL at 05:26

## 2023-01-01 RX ADMIN — POLYETHYLENE GLYCOL 3350 17 GRAM(S): 17 POWDER, FOR SOLUTION ORAL at 11:20

## 2023-01-01 RX ADMIN — ENOXAPARIN SODIUM 40 MILLIGRAM(S): 100 INJECTION SUBCUTANEOUS at 11:38

## 2023-01-01 RX ADMIN — PIPERACILLIN AND TAZOBACTAM 25 GRAM(S): 4; .5 INJECTION, POWDER, LYOPHILIZED, FOR SOLUTION INTRAVENOUS at 05:21

## 2023-01-01 RX ADMIN — SODIUM CHLORIDE 75 MILLILITER(S): 9 INJECTION INTRAMUSCULAR; INTRAVENOUS; SUBCUTANEOUS at 01:41

## 2023-01-01 RX ADMIN — Medication 650 MILLIGRAM(S): at 05:20

## 2023-01-01 RX ADMIN — LINEZOLID 300 MILLIGRAM(S): 600 INJECTION, SOLUTION INTRAVENOUS at 00:57

## 2023-01-01 RX ADMIN — MIDODRINE HYDROCHLORIDE 5 MILLIGRAM(S): 2.5 TABLET ORAL at 09:20

## 2023-01-01 RX ADMIN — ATORVASTATIN CALCIUM 20 MILLIGRAM(S): 80 TABLET, FILM COATED ORAL at 21:03

## 2023-01-01 RX ADMIN — POLYETHYLENE GLYCOL 3350 17 GRAM(S): 17 POWDER, FOR SOLUTION ORAL at 11:15

## 2023-01-01 RX ADMIN — ATORVASTATIN CALCIUM 20 MILLIGRAM(S): 80 TABLET, FILM COATED ORAL at 21:49

## 2023-01-01 RX ADMIN — Medication 100 MILLIGRAM(S): at 05:22

## 2023-01-01 RX ADMIN — SENNA PLUS 1 TABLET(S): 8.6 TABLET ORAL at 12:41

## 2023-01-01 RX ADMIN — MORPHINE SULFATE 6 MILLIGRAM(S): 50 CAPSULE, EXTENDED RELEASE ORAL at 22:16

## 2023-01-01 RX ADMIN — Medication 50 MILLIEQUIVALENT(S): at 02:08

## 2023-01-01 RX ADMIN — DIATRIZOATE MEGLUMINE 30 MILLILITER(S): 180 INJECTION, SOLUTION INTRAVESICAL at 14:11

## 2023-01-01 RX ADMIN — PANTOPRAZOLE SODIUM 40 MILLIGRAM(S): 20 TABLET, DELAYED RELEASE ORAL at 06:13

## 2023-01-01 RX ADMIN — Medication 100 MILLIGRAM(S): at 05:45

## 2023-01-01 RX ADMIN — Medication 2: at 11:50

## 2023-01-01 RX ADMIN — PANTOPRAZOLE SODIUM 40 MILLIGRAM(S): 20 TABLET, DELAYED RELEASE ORAL at 05:06

## 2023-01-01 RX ADMIN — Medication 400 MILLIGRAM(S): at 21:39

## 2023-01-01 RX ADMIN — POLYETHYLENE GLYCOL 3350 17 GRAM(S): 17 POWDER, FOR SOLUTION ORAL at 12:41

## 2023-01-01 RX ADMIN — Medication 12.5 GRAM(S): at 02:29

## 2023-01-01 RX ADMIN — Medication 50 MILLIEQUIVALENT(S): at 17:37

## 2023-01-01 RX ADMIN — ENOXAPARIN SODIUM 40 MILLIGRAM(S): 100 INJECTION SUBCUTANEOUS at 05:38

## 2023-01-01 RX ADMIN — Medication 25 GRAM(S): at 01:55

## 2023-01-01 RX ADMIN — Medication 2: at 13:02

## 2023-01-01 RX ADMIN — Medication 25 GRAM(S): at 02:08

## 2023-01-01 RX ADMIN — CEFEPIME 100 MILLIGRAM(S): 1 INJECTION, POWDER, FOR SOLUTION INTRAMUSCULAR; INTRAVENOUS at 05:23

## 2023-01-01 RX ADMIN — Medication 100 MILLIGRAM(S): at 15:32

## 2023-01-01 RX ADMIN — SODIUM CHLORIDE 500 MILLILITER(S): 9 INJECTION INTRAMUSCULAR; INTRAVENOUS; SUBCUTANEOUS at 05:33

## 2023-01-01 RX ADMIN — CEFEPIME 100 MILLIGRAM(S): 1 INJECTION, POWDER, FOR SOLUTION INTRAMUSCULAR; INTRAVENOUS at 21:05

## 2023-01-01 RX ADMIN — MORPHINE SULFATE 2 MILLIGRAM(S): 50 CAPSULE, EXTENDED RELEASE ORAL at 14:21

## 2023-01-01 RX ADMIN — SODIUM CHLORIDE 60 MILLILITER(S): 9 INJECTION, SOLUTION INTRAVENOUS at 09:31

## 2023-01-01 RX ADMIN — ATORVASTATIN CALCIUM 20 MILLIGRAM(S): 80 TABLET, FILM COATED ORAL at 21:04

## 2023-01-01 RX ADMIN — PIPERACILLIN AND TAZOBACTAM 25 GRAM(S): 4; .5 INJECTION, POWDER, LYOPHILIZED, FOR SOLUTION INTRAVENOUS at 14:39

## 2023-01-01 RX ADMIN — ATORVASTATIN CALCIUM 20 MILLIGRAM(S): 80 TABLET, FILM COATED ORAL at 21:39

## 2023-01-01 RX ADMIN — SENNA PLUS 1 TABLET(S): 8.6 TABLET ORAL at 11:20

## 2023-01-01 RX ADMIN — Medication 400 MILLIGRAM(S): at 21:20

## 2023-01-01 RX ADMIN — PANTOPRAZOLE SODIUM 40 MILLIGRAM(S): 20 TABLET, DELAYED RELEASE ORAL at 05:58

## 2023-01-01 RX ADMIN — ONDANSETRON 4 MILLIGRAM(S): 8 TABLET, FILM COATED ORAL at 15:05

## 2023-01-01 RX ADMIN — Medication 25 GRAM(S): at 01:56

## 2023-01-01 RX ADMIN — HEPARIN SODIUM 5000 UNIT(S): 5000 INJECTION INTRAVENOUS; SUBCUTANEOUS at 17:00

## 2023-01-01 RX ADMIN — POLYETHYLENE GLYCOL 3350 17 GRAM(S): 17 POWDER, FOR SOLUTION ORAL at 05:45

## 2023-01-01 RX ADMIN — Medication 25 GRAM(S): at 16:39

## 2023-01-01 RX ADMIN — Medication 50 MILLIEQUIVALENT(S): at 00:21

## 2023-01-01 RX ADMIN — Medication 20 MILLIEQUIVALENT(S): at 12:05

## 2023-01-01 RX ADMIN — HEPARIN SODIUM 5000 UNIT(S): 5000 INJECTION INTRAVENOUS; SUBCUTANEOUS at 17:03

## 2023-01-19 PROBLEM — E11.9 DIABETES: Status: ACTIVE | Noted: 2023-01-01

## 2023-01-20 NOTE — HISTORY OF PRESENT ILLNESS
[Home] : at home, [unfilled] , at the time of the visit. [Other Location: e.g. Home (Enter Location, City,State)___] : at [unfilled] [Verbal consent obtained from patient] : the patient, [unfilled] [FreeTextEntry4] : [place]

## 2023-01-20 NOTE — REASON FOR VISIT
[Consultation] : a consultation visit [FreeTextEntry1] : NP - Ref by Dr. De Santiago for EUS- Possible Pancreatic Mass

## 2023-01-21 NOTE — HISTORY OF PRESENT ILLNESS
[de-identified] : Patient referred by Dr. Agrawal for evaluation of pancreatic mass. Patient reports 10 lb weight loss over the last month.History goes back to the fall when patient had difficulty moving her bowels. PAtient was found to have a positive cologuard. Referred to Gi DR. Agrawal for a colonoscopy. Colonoscopy revealed a serrated adenoma in the rectum and a tubular adenoma october 2022. No malignancy was noted. PAtient report not feeling well since late last fall when she was diagnosed with influenza.  January 2023 patient had a ct scan for continued complaints of abdominal pain/ rule out diverticulitis. \par CT abd/ pelvis 1/16/23 revealed findings suspicious for pancreatic tumor and right pelvic pericolonic mass

## 2023-01-21 NOTE — CONSULT LETTER
[Dear  ___] : Dear  [unfilled], [Consult Letter:] : I had the pleasure of evaluating your patient, [unfilled]. [Please see my note below.] : Please see my note below. [Consult Closing:] : Thank you very much for allowing me to participate in the care of this patient.  If you have any questions, please do not hesitate to contact me. [Sincerely,] : Sincerely, [FreeTextEntry3] : Sanjuanita De Santiago MD

## 2023-01-21 NOTE — HISTORY OF PRESENT ILLNESS
[de-identified] : Patient referred by Dr. Agrawal for evaluation of pancreatic mass. Patient reports 10 lb weight loss over the last month.History goes back to the fall when patient had difficulty moving her bowels. PAtient was found to have a positive cologuard. Referred to Gi DR. Agrawal for a colonoscopy. Colonoscopy revealed a serrated adenoma in the rectum and a tubular adenoma october 2022. No malignancy was noted. PAtient report not feeling well since late last fall when she was diagnosed with influenza.  January 2023 patient had a ct scan for continued complaints of abdominal pain/ rule out diverticulitis. \par CT abd/ pelvis 1/16/23 revealed findings suspicious for pancreatic tumor and right pelvic pericolonic mass

## 2023-01-21 NOTE — ASSESSMENT
[FreeTextEntry1] : Patient referred by Dr. Agrawal for evaluation of pancreatic mass. Patient reports 10 lb weight loss over the last month.History goes back to the fall when patient had difficulty moving her bowels. PAtient was found to have a positive cologuard. Referred to Gi DR. Agrawal for a colonoscopy. Colonoscopy revealed a serrated adenoma in the rectum and a tubular adenoma october 2022. No malignancy was noted. PAtient report not feeling well since late last fall when she was diagnosed with influenza.  January 2023 patient had a ct scan for continued complaints of abdominal pain/ rule out diverticulitis. \par CT abd/ pelvis 1/16/23 revealed findings suspicious for pancreatic tumor and right pelvic pericolonic mass \par \par \par 1/17/2023: discussed possible DD inflammatory vs neoplastic including diverticulitis/ovarian/colonic/pancreatic neoplasm\par \par sending for CT and tumor markers (ordered already by Dr. Vaca, will also send for EUS for her panc mass\par \par the above plan of care with discussed in details to the patient and all questions were answered to patient satisfaction. patient instructed to follow up with the referring physician and patient primary care provider\par

## 2023-01-21 NOTE — DATA REVIEWED
[FreeTextEntry1] : [facility] CT abd/pelvis 1/16/23 -> Poorly marginated 3.5 cm masslike process in the proximal pancreas, partly encasing celiac trunk. There is obstruction of the splenic vein. BAckground of marked pancreatic fatty replacement. Sigmoid diverticulitis. Extrinsic soft tissue mass inseparable from the sigmoid diverticular disease measuring 4.7 cm suspicious for phlegmon. There is secondary moderate right hydronephrosis to the level of the phlegmon. The right ovary is not seen separate from the phlegmon. A right ovarian mass cannot be excluded. Mild left para-aortic adenopathy. Cholelithiasis.

## 2023-02-13 NOTE — ASU DISCHARGE PLAN (ADULT/PEDIATRIC) - CARE PROVIDER_API CALL
Vic Godoy)  Gastroenterology; Internal Medicine  [address] [place] [address]  Phone: [phone]  Fax: [phone]  Follow Up Time: 2 weeks

## 2023-02-13 NOTE — CHART NOTE - NSCHARTNOTEFT_GEN_A_CORE
PACU ANESTHESIA ADMISSION NOTE      Procedure:   Post op diagnosis:      ____  Intubated  TV:______       Rate: ______      FiO2: ______    __x__  Patent Airway    __x__  Full return of protective reflexes    __x__  Full recovery from anesthesia / back to baseline status    Vitals  HR: 81  BP: 133/62  RR: 18  O2 Sat: 98  Temp: 97.3    Mental Status:  __x__ Awake   ___x__ Alert   _____ Drowsy   _____ Sedated    Nausea/Vomiting:  __x__ NO  ______Yes,   See Post - Op Orders          Pain Scale (0-10):  _____    Treatment: ____ None    __x__ See Post - Op/PCA Orders    Post - Operative Fluids:   ____ Oral   __x__ See Post - Op Orders    Plan: Discharge when criteria met:   _x___Home       _____Floor     _____Critical Care   Other:_________________    Comments: Patient had smooth intraoperative event, no anesthesia complication.

## 2023-02-13 NOTE — H&P PST ADULT - HISTORY OF PRESENT ILLNESS
an 83 yo lady is here for EUS with FNB for pancreatic mass  CT 2/8/23  IMPRESSION:  Since 1/16/2023:    1. Fatty atrophy of pancreas, with unchanged enhancing 3.6 cm soft tissue   density lesion, abutting portosplenic vein confluence, encasing splenic   artery, abutting common hepatic artery. Splenic vein not visualized, with   collateral vessel formation, suggesting chronic splenic vein thrombosis.   Findings suspicious for primary pancreatic neoplasm.    2. Unchanged perisigmoid inflammatory stranding, right pericolonic   heterogeneous 4 cm structure with several foci of gas, contiguous with   sigmoid, tethering of right ureter (associated unchanged mild-to-moderate   right hydroureteronephrosis). Findings suspicious for sigmoid   diverticulitis, with either large inflamed diverticulum, perisigmoid   phlegmonous collection or colo-ovarian fistula.    Spoke with Dr. De Santiago

## 2023-02-13 NOTE — ASU DISCHARGE PLAN (ADULT/PEDIATRIC) - NS MD DC FALL RISK RISK
For information on Fall & Injury Prevention, visit: [email or web address] OR  [email or web address] OR  https://www.cdc.gov/steadi/patient.html

## 2023-02-14 PROBLEM — E78.00 PURE HYPERCHOLESTEROLEMIA, UNSPECIFIED: Chronic | Status: ACTIVE | Noted: 2023-01-01

## 2023-02-14 PROBLEM — I10 ESSENTIAL (PRIMARY) HYPERTENSION: Chronic | Status: ACTIVE | Noted: 2023-01-01

## 2023-02-14 PROBLEM — E11.9 TYPE 2 DIABETES MELLITUS WITHOUT COMPLICATIONS: Chronic | Status: ACTIVE | Noted: 2023-01-01

## 2023-02-18 NOTE — ASSESSMENT
[FreeTextEntry1] : Patient referred by Dr. Agrawal for evaluation of pancreatic mass. Patient reports 10 lb weight loss over the last month.History goes back to the fall when patient had difficulty moving her bowels. PAtient was found to have a positive cologuard. Referred to Gi DR. Agrawal for a colonoscopy. Colonoscopy revealed a serrated adenoma in the rectum and a tubular adenoma october 2022. No malignancy was noted. PAtient report not feeling well since late last fall when she was diagnosed with influenza.  January 2023 patient had a ct scan for continued complaints of abdominal pain/ rule out diverticulitis. \par CT abd/ pelvis 1/16/23 revealed findings suspicious for pancreatic tumor and right pelvic pericolonic mass \par \par \par 1/17/2023: discussed possible DD inflammatory vs neoplastic including diverticulitis/ovarian/colonic/pancreatic neoplasm\par \par sending for CT and tumor markers (ordered already by Dr. Vaca, will also send for EUS for her panc mass\par \par 2/13/2023 endoscopic ultrasound and pancreatic body mass core needle biopsy\par \par 2/14/2023: She reported no new symptoms, waiting for final path of EUS\par ordered PET to eval her binta lesions\par \par the above plan of care with discussed in details to the patient and all questions were answered to patient satisfaction. patient instructed to follow up with the referring physician and patient primary care provider\par

## 2023-02-18 NOTE — HISTORY OF PRESENT ILLNESS
[de-identified] : Patient referred by Dr. Agrawal for evaluation of pancreatic mass. Patient reports 10 lb weight loss over the last month.History goes back to the fall when patient had difficulty moving her bowels. PAtient was found to have a positive cologuard. Referred to Gi DR. Agrawal for a colonoscopy. Colonoscopy revealed a serrated adenoma in the rectum and a tubular adenoma october 2022. No malignancy was noted. PAtient report not feeling well since late last fall when she was diagnosed with influenza.  January 2023 patient had a ct scan for continued complaints of abdominal pain/ rule out diverticulitis. \par CT abd/ pelvis 1/16/23 revealed findings suspicious for pancreatic tumor and right pelvic pericolonic mass \par \par 2/8/2023: CT chest showed left lung base lower nodule 4 mm,pleural-based mass adjacent measuring pleural-based mass adjacent measuring 2.7 cm\par 2/13/2023 endoscopic ultrasound and pancreatic body mass core needle biopsy\par \par 2/14/2023: She reported no new symptoms

## 2023-02-21 NOTE — ED ADULT NURSE NOTE - NSIMPLEMENTINTERV_GEN_ALL_ED
Implemented All Fall Risk Interventions:  [place] to call system. Call bell, personal items and telephone within reach. Instruct patient to call for assistance. Room bathroom lighting operational. Non-slip footwear when patient is off stretcher. Physically safe environment: no spills, clutter or unnecessary equipment. Stretcher in lowest position, wheels locked, appropriate side rails in place. Provide visual cue, wrist band, yellow gown, etc. Monitor gait and stability. Monitor for mental status changes and reorient to person, place, and time. Review medications for side effects contributing to fall risk. Reinforce activity limits and safety measures with patient and family.

## 2023-02-21 NOTE — ED PROVIDER NOTE - OBJECTIVE STATEMENT
82-year-old female past medical history of diabetes and hypertension with new diagnosis of pancreatic cancer today on antibiotics for diverticulitis diagnosed a few weeks ago.  Patient sent in by Dr. Mead to r/o out diverticulitis as patient is to start chemo soon.  Spoke to Dr. De Santiago who wants patient admitted under his service.  No other complaints.

## 2023-02-21 NOTE — H&P ADULT - ASSESSMENT
ASSESSMENT:  82yF w/ PMHx of HTN, DM, HLD, h/o  (50yrs ago), recent dx of pancreatic adenocarcinoma (23) presents with vague intermittent lower abdominal pain. Patient was diagnosed with diverticulitis recently and has been on antibiotics for the last week. Denies fevers/chills or n/v. Last BM was yesterday (small). Patient reports decreased PO intake over the last couple months 2/2 decrease appetite. Patient currently following Dr. De Santiago and Dr. Mead.  Outpatient PET scan (23) shows 2 left basilar nodular opacities; Rt pelvic pericolonic soft tissue mass; severe uptake in sigmoid colon). Last c-scope was 10/2022 - patient reports polypectomy and suspicious sigmoid lesion.. Physical exam findings, imaging, and labs as documented above.     PLAN:  -Admit to surgical service under Dr. De Santiago   -NPO w/ IVF   -ID c/s for Abx   -Urology c/s for ureteral stent placement  -IR c/s or possible drainage / possible sigmoid bx  -Repeat CT PO/IV contrast tomorrow  -CEA  -Home medications started  -ISS; glucose monitoring  -CEA level   -DVT/GI ppx     Above plan discussed with Attending Surgeon Dr. De Santiago    Blue Team Spectra: [phone]

## 2023-02-21 NOTE — ASSESSMENT
[FreeTextEntry1] : Patient referred by Dr. Agrawal for evaluation of pancreatic mass. Patient reports 10 lb weight loss over the last month.History goes back to the fall when patient had difficulty moving her bowels. PAtient was found to have a positive cologuard. Referred to Gi DR. Agrawal for a colonoscopy. Colonoscopy revealed a serrated adenoma in the rectum and a tubular adenoma october 2022. No malignancy was noted. PAtient report not feeling well since late last fall when she was diagnosed with influenza.  January 2023 patient had a ct scan for continued complaints of abdominal pain/ rule out diverticulitis. \par CT abd/ pelvis 1/16/23 revealed findings suspicious for pancreatic tumor and right pelvic pericolonic mass \par \par \par 1/17/2023: discussed possible DD inflammatory vs neoplastic including diverticulitis/ovarian/colonic/pancreatic neoplasm\par \par sending for CT and tumor markers (ordered already by Dr. Vaca, will also send for EUS for her panc mass\par \par 2/13/2023 endoscopic ultrasound and pancreatic body mass core needle biopsy\par \par 2/14/2023: She reported no new symptoms, waiting for final path of EUS\par ordered PET to eval her binta lesions\par \par 2/21/2023: PET showed \par FDG avid pancreatic body mass, SUV max 7.6 consistent with biopsy-proven malignancy.\par \par 2 left basilar nodular opacities are FDG avid, SUV max 3.0, measures up to 2.7 cm, unchanged since recent chest CT, indeterminate for biologic tumor activity.\par \par Right upper lobe groundglass opacity is FDG avid on nonattenuation corrected images, suspicious for biologic tumor activity (adenocarcinoma).\par \par FDG avid right pelvic pericolonic soft tissue, SUV max 15.0 is anatomically unchanged since prior CT on 2/8/2023. Differential diagnosis includes a contained perforation versus colo-ovarian fistula.\par \par Stable chronic moderate right hydroureteronephrosis secondary to mass effect from the right pelvic soft tissue.\par \par her EUS bx showed adenocarcinoma\par \par will send her to dr Mead for systemic therapy, will defer decision of lung bx to dr Mead, her sigmoid process could be met vs inflammatory process , being >3 months of possible last diverticulitis would agree to proceed with chemo, she was instructed if she developed sepsis/peritonitis picture to go to ED and call for me for possible izzy for [place] \par

## 2023-02-21 NOTE — ED ADULT NURSE NOTE - OBJECTIVE STATEMENT
Pt reports having abd discomfort for a few weeks, was dx with diverticulitis and was sent in by her MD to having CT scan of ABD.

## 2023-02-21 NOTE — HISTORY OF PRESENT ILLNESS
[de-identified] : Patient referred by Dr. Agrawal for evaluation of pancreatic mass. Patient reports 10 lb weight loss over the last month.History goes back to the fall when patient had difficulty moving her bowels. PAtient was found to have a positive cologuard. Referred to Gi DR. Agrawal for a colonoscopy. Colonoscopy revealed a serrated adenoma in the rectum and a tubular adenoma october 2022. No malignancy was noted. PAtient report not feeling well since late last fall when she was diagnosed with influenza.  January 2023 patient had a ct scan for continued complaints of abdominal pain/ rule out diverticulitis. \par CT abd/ pelvis 1/16/23 revealed findings suspicious for pancreatic tumor and right pelvic pericolonic mass \par \par 2/8/2023: CT chest showed left lung base lower nodule 4 mm,pleural-based mass adjacent measuring pleural-based mass adjacent measuring 2.7 cm\par 2/13/2023 endoscopic ultrasound and pancreatic body mass core needle biopsy\par \par 2/14/2023: She reported no new symptoms\par \par 2/21/2023: PET showed \par FDG avid pancreatic body mass, SUV max 7.6 consistent with biopsy-proven malignancy.\par \par 2 left basilar nodular opacities are FDG avid, SUV max 3.0, measures up to 2.7 cm, unchanged since recent chest CT, indeterminate for biologic tumor activity.\par \par Right upper lobe groundglass opacity is FDG avid on nonattenuation corrected images, suspicious for biologic tumor activity (adenocarcinoma).\par \par FDG avid right pelvic pericolonic soft tissue, SUV max 15.0 is anatomically unchanged since prior CT on 2/8/2023. Differential diagnosis includes a contained perforation versus colo-ovarian fistula.\par \par Stable chronic moderate right hydroureteronephrosis secondary to mass effect from the right pelvic soft tissue.\par \par

## 2023-02-21 NOTE — CONSULT NOTE ADULT - NS ATTEND AMEND GEN_ALL_CORE FT
CT reviewed.  severe right hydronephrosis to level of diverticular phlegmon.  rec IR consult for possible n tube placement.  preferable to avoid cystoscopic stenting procedure as this would transverse inflammatory process. discussed with pt

## 2023-02-21 NOTE — ED PROVIDER NOTE - ATTENDING CONTRIBUTION TO CARE
82 y.o. female, PMHx of HTN, DM, HLD, recent dx of pancreatic adenocarcinoma (2/13/23) presents with vague intermittent lower abdominal pain. Patient was diagnosed with diverticulitis recently and has been on antibiotics for the last week. Denies fevers/chills or n/v. Last BM was yesterday (small). Patient reports decreased PO intake over the last couple months 2/2 decrease appetite. Patient currently following Dr. De Santiago and Dr. Mead.  Outpatient PET scan (2/16/23) shows 2 left basilar nodular opacities; Rt pelvic pericolonic soft tissue mass; severe uptake in sigmoid colon). Last c-scope was 10/2022 - patient reports polypectomy and suspicious sigmoid lesion. Denies CP, SOB, dysuria, hematochezia, weakness/numbness. ON exam, pt in NAD, AAOx3, head NC/AT, CN II-XII intact, PEERL, EOMi, neck (-) midline tenderness, lungs CTA B/L, CV S1S2 regular, abdomen soft/(+) discomfort over LLQ/ND/(+)BS, ext (-) edema, motor 5/5x4, sensation intact, ambulating with steady gait. Work up reviewed. IV Abx given. WIll admit.

## 2023-02-21 NOTE — PATIENT PROFILE ADULT - FALL HARM RISK - RISK INTERVENTIONS

## 2023-02-21 NOTE — CONSULT NOTE ADULT - ASSESSMENT
81 yo F with PMH of HTN, HLD, Recent dx of pancreatic adenocarcinoma (2/13/23) presents with vague intermittent lower abdominal pain. CT A/P shows slightly increased moderate right-sided hydronephroureter with associated delayed right nephrogram, extending to the level of the right pelvic inflammatory process. Previous CT A/P with oral and IV contrast shows Unchanged perisigmoid inflammatory stranding, right pericolonic heterogeneous 4 cm structure with several foci of gas, contiguous with sigmoid, tethering of right ureter (associated unchanged mild-to-moderate right hydroureteronephrosis.    Plan:   - Obtain UA/UCx   - Continue to Trend Cr, stable 0.5 at this time. If Cr worsens consider surgical intervention for stent vs PCN placement   - Continue management per primary team   - Will d/w attending    83 yo F with PMH of HTN, HLD, Recent dx of pancreatic adenocarcinoma (2/13/23) presents with vague intermittent lower abdominal pain. CT A/P shows slightly increased moderate right-sided hydronephroureter with associated delayed right nephrogram, extending to the level of the right pelvic inflammatory process. Previous CT A/P with oral and IV contrast shows Unchanged perisigmoid inflammatory stranding, right pericolonic heterogeneous 4 cm structure with several foci of gas, contiguous with sigmoid, tethering of right ureter (associated unchanged mild-to-moderate right hydroureteronephrosis.    Plan:   - Obtain UA/UCx   - Continue to Trend Cr, stable 0.5 at this time. If Cr worsens consider surgical intervention for PCN placement  - Continue management per primary team   - Will d/w attending    83 yo F with PMH of HTN, HLD, Recent dx of pancreatic adenocarcinoma (2/13/23) presents with vague intermittent lower abdominal pain. CT A/P shows slightly increased moderate right-sided hydronephroureter with associated delayed right nephrogram, extending to the level of the right pelvic inflammatory process. Previous CT A/P with oral and IV contrast shows Unchanged perisigmoid inflammatory stranding, right pericolonic heterogeneous 4 cm structure with several foci of gas, contiguous with sigmoid, tethering of right ureter (associated unchanged mild-to-moderate right hydroureteronephrosis.    Plan:   - Obtain UA/UCx   - Continue to Trend Cr, stable 0.5 at this time. If Cr worsens consider IR consult for PCN placement  - Continue management per primary team   - Will d/w attending

## 2023-02-21 NOTE — H&P ADULT - HISTORY OF PRESENT ILLNESS
82 year old female with PMHx of HTN, DM, HLD, recent dx of pancreatic adenocarcinoma (2/13/23) presents with vague intermittent lower abdominal pain. Patient was diagnosed with diverticulitis recently and has been on antibiotics for the last week. Denies fevers/chills or n/v. Last BM was yesterday (small). Patient reports decreased PO intake over the last couple months 2/2 decrease appetite. Patient currently following Dr. De Santiago and Dr. Mead.  Outpatient PET scan (2/16/23) shows 2 left basilar nodular opacities; Rt pelvic pericolonic soft tissue mass; severe uptake in sigmoid colon). Last c-scope was 10/2022 - patient reports polypectomy and suspicious sigmoid lesion. Denies CP, SOB, dysuria, hematochezia, weakness/numbness.

## 2023-02-21 NOTE — ED PROVIDER NOTE - CARE PLAN
Principal Discharge DX:	Diverticulitis  Secondary Diagnosis:	Pancreatic cancer   1 [Follow-Up: _____] : a [unfilled] follow-up visit

## 2023-02-21 NOTE — H&P ADULT - NSHPPHYSICALEXAM_GEN_ALL_CORE
PHYSICAL EXAM:  General: NAD, AAOx3, calm and cooperative  HEENT: NCAT, EOMI  Cardiac: S1, S2  Respiratory: normal respiratory effort, b/l breath sounds   Abdomen: Soft, non-distended, mild suprapubic and LLQ tenderness, no rebound, no guarding  Skin:  no jaundice

## 2023-02-21 NOTE — ED ADULT TRIAGE NOTE - PATIENT ON (OXYGEN DELIVERY METHOD)
66 yo female pt  w/ hx of HTN, HLD, DM, CKD, OA chronic knee pain, anxiety, presenting s/p mechanical fall yesterday. Pt states she tripped over her granddaughter's toys while walking falling onto her BL knees and then forward hitting her head. Pt remembers all event regarding the fall. Pt later went to bed with no complaints, then woke up this morning with headache and confusion. Pt lives with son who noticed that pt was more somnolent than usual. Pt endorses bl shin and knee pain at the time of my examination. She denies any headache, dizziness, nausea, vomit, chest pain, SOB, diarrhea, or constipation. ROS was positive for urinary dribbling and urgency for the past week, but no pain upon urination. Pt did also mention that her PO intake has decreased over the past few weeks.     ED course:   VS afebrile, HR 72, SpO2 99% on RA, BP 83/38 improved to 110/56 sp 2L LR   labs: no white cnt, Hgb 11. 3, Crea 3.3   Trauma w/up including CTH, CT cervical spine, CTAP, CT chest, XRAY pelvis negative for fracture  EKG:   room air 68 yo female pt  w/ hx of HTN, HLD, DM, CKD, OA chronic knee pain, anxiety, presenting s/p mechanical fall yesterday. Pt states she tripped over her granddaughter's toys while walking falling onto her BL knees and then forward hitting her head. Pt remembers all event regarding the fall. Pt later went to bed with no complaints, then woke up this morning with headache and confusion. Pt lives with son who noticed that pt was more somnolent than usual. Pt endorses bl shin and knee pain at the time of my examination. She denies any headache, dizziness, nausea, vomit, chest pain, SOB, diarrhea, or constipation. ROS was positive for urinary dribbling and urgency for the past week, but no pain upon urination. Pt did also mention that her PO intake has decreased over the past few weeks.     ED course:   VS afebrile, HR 72, SpO2 99% on RA, BP 83/38 improved to 110/56 sp 2L LR   labs: no white cnt, Hgb 11. 3, Crea 3.3,    Trauma w/up including CTH, CT cervical spine, CTAP, CT chest, XRAY pelvis negative for fracture

## 2023-02-21 NOTE — H&P ADULT - NSHPLABSRESULTS_GEN_ALL_CORE
LAB/STUDIES:                        10.9   18.83 )-----------( 358      ( 21 Feb 2023 12:44 )             33.6     02-21    136  |  95<L>  |  15  ----------------------------<  130<H>  3.8   |  28  |  0.5<L>    Ca    9.3      21 Feb 2023 12:44    TPro  6.4  /  Alb  3.7  /  TBili  0.5  /  DBili  0.2  /  AST  12  /  ALT  7   /  AlkPhos  111  02-21    LIVER FUNCTIONS - ( 21 Feb 2023 12:44 )  Alb: 3.7 g/dL / Pro: 6.4 g/dL / ALK PHOS: 111 U/L / ALT: 7 U/L / AST: 12 U/L / GGT: x           CARDIAC MARKERS ( 21 Feb 2023 12:44 )  x     / <0.01 ng/mL / x     / x     / x        IMAGING:  < from: CT Abdomen and Pelvis w/ IV Cont (02.21.23 @ 13:00) >  IMPRESSION:    1. Since February 8, 2023, no significant change in moderate to severe   mural thickening involving a short segment of sigmoid colon, with   pericolonic fat stranding and overall unchanged 4.1 cm heterogeneous   right pericolonic mixed density structure, possibly reflecting phlegmon,   however underlying neoplastic process is a possibility; slightly   increased moderate right-sided hydronephroureter with associated delayed   right nephrogram, extending to the level of the right pelvic inflammatory   process.    2. Remainder of findings are overall unchanged.  --- End of Report ---

## 2023-02-21 NOTE — H&P ADULT - ATTENDING COMMENTS
panc body adenoca encasing splenic artery, abutting hepatic artery, pet avid lung lesions suggestive of met disease, right pelvic inflammatory /neoplastic mass    discussed iv abx with possible surgical intervention to address her colonic inflammatory mass

## 2023-02-21 NOTE — CONSULT NOTE ADULT - SUBJECTIVE AND OBJECTIVE BOX
INTERVENTIONAL RADIOLOGY CONSULT:     Procedure Requested: LLQ drainage / sigmoid biopsy    HPI:  82 year old female with PMHx of HTN, DM, HLD, recent dx of pancreatic adenocarcinoma (23) presents with vague intermittent lower abdominal pain. Patient was diagnosed with diverticulitis recently and has been on antibiotics for the last week. Denies fevers/chills or n/v. Last BM was yesterday (small). Patient reports decreased PO intake over the last couple months 2/2 decrease appetite. Patient currently following Dr. De Santiago and Dr. Mead.  Outpatient PET scan (23) shows 2 left basilar nodular opacities; Rt pelvic pericolonic soft tissue mass; severe uptake in sigmoid colon). Last c-scope was 10/2022 - patient reports polypectomy and suspicious sigmoid lesion. Denies CP, SOB, dysuria, hematochezia, weakness/numbness.  (2023 14:04)      PAST MEDICAL & SURGICAL HISTORY:  HTN (hypertension)      Diabetes      Hypercholesteremia      History of           MEDICATIONS  (STANDING):  atorvastatin 20 milliGRAM(s) Oral at bedtime  dextrose 5%. 1000 milliLiter(s) (50 mL/Hr) IV Continuous <Continuous>  dextrose 5%. 1000 milliLiter(s) (100 mL/Hr) IV Continuous <Continuous>  dextrose 50% Injectable 25 Gram(s) IV Push once  dextrose 50% Injectable 12.5 Gram(s) IV Push once  dextrose 50% Injectable 25 Gram(s) IV Push once  glucagon  Injectable 1 milliGRAM(s) IntraMuscular once  hydrochlorothiazide 12.5 milliGRAM(s) Oral daily  insulin lispro (ADMELOG) corrective regimen sliding scale   SubCutaneous three times a day before meals  lactated ringers. 1000 milliLiter(s) (100 mL/Hr) IV Continuous <Continuous>  pantoprazole    Tablet 40 milliGRAM(s) Oral before breakfast  piperacillin/tazobactam IVPB. 3.375 Gram(s) IV Intermittent once  piperacillin/tazobactam IVPB.- 3.375 Gram(s) IV Intermittent once    MEDICATIONS  (PRN):  acetaminophen     Tablet .. 650 milliGRAM(s) Oral every 6 hours PRN Temp greater or equal to 38C (100.4F), Mild Pain (1 - 3)  dextrose Oral Gel 15 Gram(s) Oral once PRN Blood Glucose LESS THAN 70 milliGRAM(s)/deciliter      Allergies    No Known Allergies    Intolerances        Social History:   Smoking: Yes [ ]  No [ ]   ______pk yrs  ETOH  Yes [ ]  No [ ]  Social [ ]  DRUGS:  Yes [ ]  No [ ]  if so what______________    FAMILY HISTORY:      Physical Exam:   Vital Signs Last 24 Hrs  T(C): 36.8 (2023 11:04), Max: 36.8 (2023 11:04)  T(F): 98.2 (2023 11:04), Max: 98.2 (2023 11:04)  HR: 56 (:04) (56 - 56)  BP: 131/60 (2023 11:04) (131/60 - 131/60)  BP(mean): --  RR: 18 (:) (18 - 18)  SpO2: 99% (:) (99% - 99%)    Parameters below as of 2023 11:04  Patient On (Oxygen Delivery Method): room air      Labs:                         10.9   18. )-----------( 358      ( 2023 12:44 )             33.6     -    136  |  95<L>  |  15  ----------------------------<  130<H>  3.8   |  28  |  0.5<L>    Ca    9.3      2023 12:44    TPro  6.4  /  Alb  3.7  /  TBili  0.5  /  DBili  0.2  /  AST  12  /  ALT  7   /  AlkPhos  111  -        Pertinent labs:                      10.9   18.83 )-----------( 358      ( 2023 12:44 )             33.6       02-    136  |  95<L>  |  15  ----------------------------<  130<H>  3.8   |  28  |  0.5<L>    Ca    9.3      2023 12:44    TPro  6.4  /  Alb  3.7  /  TBili  0.5  /  DBili  0.2  /  AST  12  /  ALT  7   /  AlkPhos  111        Radiology & Additional Studies:     IMPRESSION:    1. Since 2023, no significant change in moderate to severe   mural thickening involving a short segment of sigmoid colon, with   pericolonic fat stranding and overall unchanged 4.1 cm heterogeneous   right pericolonic mixed density structure, possibly reflecting phlegmon,   however underlying neoplastic process is a possibility; slightly   increased moderate right-sided hydronephroureter with associated delayed   right nephrogram, extending to the level of the right pelvic inflammatory   process.    2. Remainder of findings are overall unchanged.    --- End of Report ---      Radiology imaging reviewed.       ASSESSMENT/ PLAN:   82 year old female with PMHx of HTN, DM, HLD, recent dx of pancreatic adenocarcinoma (23) presents with vague intermittent lower abdominal pain. Patient was diagnosed with diverticulitis recently and has been on antibiotics for the last week. Denies fevers/chills or n/v. Last BM was yesterday (small). Patient reports decreased PO intake over the last couple months 2/2 decrease appetite. Patient currently following Dr. De Santiago and Dr. Mead.  Outpatient PET scan (23) shows 2 left basilar nodular opacities; Rt pelvic pericolonic soft tissue mass; severe uptake in sigmoid colon). Dr. De Santiago recommended possible drainage/possible sigmoid biopsy by IR.   - images reviewed by attending   - no significant collection to drain at this time  - IR does not do sigmoid biopsies, please consult GI   - no IR intervention at this time     Thank you for the courtesy of this consult, please call [phone] with any further questions.

## 2023-02-21 NOTE — ED PROVIDER NOTE - PHYSICAL EXAMINATION
CONSTITUTIONAL: nontoxic appearing, in no acute distress  HEAD:  normocephalic, atraumatic  EYES:  no conjunctival injection, no eye discharge, tracking well  ENT:  tympanic membranes intact bilaterally, moist mucous membranes, no oropharyngeal ulcerations or lesions, no tonsillar swelling or erythema, no tonsillar exudates  NECK:  supple, no masses, no tender anterior/posterior cervical lymphadenopathy  CV:  regular rate and rhythm, cap refill < 2 seconds  RESP:  normal respiratory effort, lungs clear to auscultation bilaterally, no wheezes, no crackles, no retractions, no stridor  ABD:  soft, LLQ ttp, nondistended, no masses, no organomegaly  LYMPH:  no significant lymphadenopathy  MSK/NEURO:  normal movement, normal tone  SKIN:  warm, dry, no rash

## 2023-02-21 NOTE — ED ADULT NURSE NOTE - EXTENSIONS OF SELF_ADULT
May 9, 2022      [facility] - Pediatrics  [address] [place] [address]  Phone: [phone]  Fax: [phone]       Patient: Annamaria Pradhan   YOB: 2014  Date of Visit: 05/09/2022    To Whom It May Concern:    Aleksey Pradhan  was at [facility] on 05/09/2022. The patient may return to work/school on 05.11.2022 with no restrictions. If you have any questions or concerns, or if I can be of further assistance, please do not hesitate to contact me.    Sincerely,    Rosita Love RN     
Adequate: hears normal conversation without difficulty
Cane

## 2023-02-21 NOTE — CONSULT NOTE ADULT - SUBJECTIVE AND OBJECTIVE BOX
UROLOGY CONSULT     81 yo F with PMH of HTN, HLD, Recent dx of pancreatic adenocarcinoma (23) presents with vague intermittent lower abdominal pain. Patient recently diagnosed with diverticulitis, prescribed antibiotics this week -  c/s for evaluation for ureteral stent placement. Patient seen and examined at bedside. She reports she has been having trouble with bowel movements, last one yesterday which was small.  Patient denies any urological symptoms including urinary frequency, urgency, dysuria, hematuria, flank pain. Patient denies following an urologist.     CT A/P shows slightly increased moderate right-sided hydronephroureter with associated delayed right nephrogram, extending to the level of the right pelvic inflammatory process. Previous CT A/P with oral and IV contrast shows Unchanged perisigmoid inflammatory stranding, right pericolonic heterogeneous 4 cm structure with several foci of gas, contiguous with sigmoid, tethering of right ureter (associated unchanged mild-to-moderate right hydroureteronephrosis.    HPI:  82 year old female with PMHx of HTN, DM, HLD, recent dx of pancreatic adenocarcinoma (23) presents with vague intermittent lower abdominal pain. Patient was diagnosed with diverticulitis recently and has been on antibiotics for the last week. Denies fevers/chills or n/v. Last BM was yesterday (small). Patient reports decreased PO intake over the last couple months 2/2 decrease appetite. Patient currently following Dr. De Santiago and Dr. Mead.  Outpatient PET scan (23) shows 2 left basilar nodular opacities; Rt pelvic pericolonic soft tissue mass; severe uptake in sigmoid colon). Last c-scope was 10/2022 - patient reports polypectomy and suspicious sigmoid lesion. Denies CP, SOB, dysuria, hematochezia, weakness/numbness.  (2023 14:04)      PAST MEDICAL & SURGICAL HISTORY:  HTN (hypertension)      Diabetes      Hypercholesteremia      History of           MEDICATIONS  (STANDING):  atorvastatin 20 milliGRAM(s) Oral at bedtime  dextrose 5%. 1000 milliLiter(s) (50 mL/Hr) IV Continuous <Continuous>  dextrose 5%. 1000 milliLiter(s) (100 mL/Hr) IV Continuous <Continuous>  dextrose 50% Injectable 25 Gram(s) IV Push once  dextrose 50% Injectable 12.5 Gram(s) IV Push once  dextrose 50% Injectable 25 Gram(s) IV Push once  glucagon  Injectable 1 milliGRAM(s) IntraMuscular once  hydrochlorothiazide 12.5 milliGRAM(s) Oral daily  insulin lispro (ADMELOG) corrective regimen sliding scale   SubCutaneous three times a day before meals  lactated ringers. 1000 milliLiter(s) (100 mL/Hr) IV Continuous <Continuous>  pantoprazole    Tablet 40 milliGRAM(s) Oral before breakfast  piperacillin/tazobactam IVPB.- 3.375 Gram(s) IV Intermittent once    MEDICATIONS  (PRN):  acetaminophen     Tablet .. 650 milliGRAM(s) Oral every 6 hours PRN Temp greater or equal to 38C (100.4F), Mild Pain (1 - 3)  dextrose Oral Gel 15 Gram(s) Oral once PRN Blood Glucose LESS THAN 70 milliGRAM(s)/deciliter      Allergies    No Known Allergies    Intolerances        SOCIAL HISTORY: No illicit drug use    FAMILY HISTORY:      REVIEW OF SYSTEMS:  [ ] A ten-point review of systems was otherwise negative except as noted.     Vital Signs Last 24 Hrs  T(C): 36.8 (2023 11:04), Max: 36.8 (2023 11:04)  T(F): 98.2 (2023 11:04), Max: 98.2 (2023 11:04)  HR: 56 (2023 11:04) (56 - 56)  BP: 131/60 (2023 11:04) (131/60 - 131/60)  RR: 18 (2023 11:04) (18 - 18)  SpO2: 99% (2023 11:04) (99% - 99%)    Parameters below as of 2023 11:04  Patient On (Oxygen Delivery Method): room air        PHYSICAL EXAM:    GEN: NAD, well-developed, awake and alert.  SKIN: Good color, non diaphoretic.  HEENT: NC/AT.  RESP: No dyspnea, non-labored breathing. No use of accessory muscles.  CARDIO: +S1/S2  ABDO: Soft, NT/ND, no palpable bladder, no suprapubic tenderness  BACK: No CVAT B/L  : Patient voids freely       I&O's Summary      LABS:                        10.9   18.83 )-----------( 358      ( 2023 12:44 )             33.6         136  |  95<L>  |  15  ----------------------------<  130<H>  3.8   |  28  |  0.5<L>    Ca    9.3      2023 12:44    TPro  6.4  /  Alb  3.7  /  TBili  0.5  /  DBili  0.2  /  AST  12  /  ALT  7   /  AlkPhos  111        RADIOLOGY & ADDITIONAL STUDIES:  < from: CT Abdomen and Pelvis w/ IV Cont (23 @ 13:00) >  ACC: [id number] EXAM:  CT ABDOMEN AND PELVIS IC   ORDERED BY: SHABBIR CRAWFORD     PROCEDURE DATE:  2023          INTERPRETATION:  CLINICAL STATEMENT: Diverticulitis.    TECHNIQUE: Contiguous axial CT images were obtained from the lower chest   to the pubic symphysis following administration of 100cc Optiray 320   intravenous contrast.  Oral contrast was not administered.  Reformatted   images in the coronal and sagittal planes were acquired.    COMPARISON CT: 2023. Correlation is made with PET/CT dated   2023.    FINDINGS:    LOWER CHEST: No significant change in a partially imaged left basilar   nodular opacities, which were previously demonstrated to be mildly FDG   avid.    HEPATOBILIARY: Diffuse hepatic steatosis. Coarse calcification along the   right hepatic dome. Cholelithiasis. No intrahepatic or extra hepatic   biliary ductal dilatation.    SPLEEN: Unremarkable. No significant change in diffuse fatty atrophy of   the pancreas, with unchanged 3.6 cm soft tissue density lesion abutting   the magdalena splenic confluence indicating the splenic artery. 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    PANCREAS: Unremarkable.    ADRENAL GLANDS: Unremarkable.    KIDNEYS: Slightly increased moderate right-sided hydronephroureter with   associated delayed nephrogram extending to the level of a pelvic   inflammatory process. Unremarkable left kidney.    ABDOMINOPELVIC NODES: Unremarkable.    PELVIC ORGANS: Unchanged lobulated fibroid uterus,with multiple coarse   calcifications. Urinary bladder is decompressed, limiting further   evaluation.    PERITONEUM/MESENTERY/BOWEL: No significant change in moderate to severe   mural thickening involving a short segment of sigmoid colon, with   pericolonic fat stranding and an overall unchanged 4.1 x 3.0 x 3.2 cm   heterogeneous right pericolonic mixed density structure containing   several foci of gas, possibly reflecting phlegmon. No evidence of bowel   obstruction. No ascites. Normal caliber appendix.    BONES/SOFT TISSUES: Status post left total hip replacement, with a stable   postoperative appearance. Unchanged T12 vertebral body hemangioma.   Degenerative changes of the spine. No acute osseous abnormality.    OTHER: Atherosclerotic vascular calcification.      IMPRESSION:    1. Since 2023, no significant change in moderate to severe   mural thickening involving a short segment of sigmoid colon, with   pericolonic fat stranding and overall unchanged 4.1 cm heterogeneous   right pericolonic mixed density structure, possibly reflecting phlegmon,   however underlying neoplastic process is a possibility; slightly   increased moderate right-sided hydronephroureter with associated delayed   right nephrogram, extending to the level of the right pelvic inflammatory   process.    2. Remainder of findings are overall unchanged.    --- End of Report ---      NORMA GALLEGOS MD; Attending Radiologist  This document has been electronically signed. 2023  1:29PM        < from: CT Abdomen and Pelvis w/ Oral Cont and w/ IV Cont (23 @ 12:51) >  IMPRESSION:  Since 2023:    1. Fatty atrophy of pancreas, with unchanged enhancing 3.6 cm soft tissue   density lesion, abutting portosplenic vein confluence, encasing splenic   artery, abutting common hepatic artery. Splenic vein not visualized, with   collateral vessel formation, suggesting chronic splenic vein thrombosis.   Findings suspicious for primary pancreatic neoplasm.    2. Unchanged perisigmoid inflammatory stranding, right pericolonic   heterogeneous 4 cm structure with several foci of gas, contiguous with   sigmoid, tethering of right ureter (associated unchanged mild-to-moderate   right hydroureteronephrosis). Findings suspicious for sigmoid   diverticulitis, with either large inflamed diverticulum, perisigmoid   phlegmonous collection or colo-ovarian fistula.    Spoke with Dr. De Santiago    --- End of Report ---      MARIAH WOMACK MD; Attending Radiologist  This document has been electronically signed. 2023  2:03PM    < end of copied text >

## 2023-02-22 NOTE — CONSULT NOTE ADULT - ASSESSMENT
82 year old female with PMHx of HTN, DM, HLD, recent dx of pancreatic adenocarcinoma (2/13/23) presents with vague intermittent lower abdominal pain. Patient was diagnosed with diverticulitis recently and has been on antibiotics for the last week. Denies fevers/chills or n/v. Last BM was yesterday (small). Patient reports decreased PO intake over the last couple months 2/2 decrease appetite. Patient currently following Dr. De Santiago and Dr. Mead.  Outpatient PET scan (2/16/23) shows 2 left basilar nodular opacities; Rt pelvic pericolonic soft tissue mass; severe uptake in sigmoid colon). Last c-scope was 10/2022 - patient reports polypectomy and suspicious sigmoid lesion.    IMPRESSION;   Sigmoid diverticulitis with possible phlegmon  Rt pelvic pericolonic soft tissue mass  2/21 IVR no intervention.  WBc 18.8  Clinically no pyelonephritis  R hydroureter probably related to Rt pelvic pericolonic soft tissue mass    RECOMMENDATIONS;  Sx f/u  UCx   BCx  Cefepime 1 gm iv q8h  Flagyl 500 mg iv q8h

## 2023-02-22 NOTE — CONSULT NOTE ADULT - RESPIRATORY
On Treatment Visit       Patient: Marisa Portillo   YOB: 1957   Medical Record Number: [id number]     Date of Visit  November 22, 2022   Primary Diagnosis:Malignant neoplasm of overlapping sites of right breast in female, estrogen receptor positive (HCC) [C50.811, Z17.0]  Cancer Staging: Cancer Staging   Malignant neoplasm of overlapping sites of right breast in female, estrogen receptor positive (HCC)  Staging form: Breast, AJCC 8th Edition  - Pathologic stage from 12/28/2021: Stage IB (pT2, pN2a, cM0, G2, ER+, HI+, HER2-) - Signed by Starr Mendez MD PhD on 1/30/2022         was seen today for an on treatment visit.  She is receiving radiation therapy to the right chest wall. She  has received 1400 cGy in 7 fractions out of a planned dose of 5000 cGy in 25 fractions.     Today on exam the patient is tolerating radiation therapy well and has no new disease or treatment-related complaints.                                           Review of Systems:   Review of Systems   Constitutional: Positive for fatigue (1-2/10). Negative for appetite change and unexpected weight change.   HENT: Negative for sore throat and trouble swallowing.    Respiratory: Negative for cough and shortness of breath.    Cardiovascular: Negative for chest pain, palpitations and leg swelling.   Gastrointestinal: Negative for constipation, diarrhea, nausea and vomiting.   Endocrine: Positive for heat intolerance. Polyphagia: On Effexor, tolerable.   Genitourinary: Negative for difficulty urinating, dysuria, frequency and urgency.   Musculoskeletal: Positive for arthralgias (Ongoing, r/t osteoarthritis) and joint swelling (Left ankle, ongoing). Negative for back pain and gait problem.        Occasional right sided chest pain (Muscle)     Skin: Positive for color change (mild erythema). Negative for rash.   Neurological: Negative for dizziness, weakness and headaches.   Psychiatric/Behavioral: Positive for sleep  disturbance (Insomnia, ongoing since last December.).       Vitals:     Vitals:    11/22/22 1047   BP: 135/82   Pulse: 85   Resp: 16   Temp: 98.2 °F (36.8 °C)       Weight:   Wt Readings from Last 3 Encounters:   11/22/22 79.8 kg (175 lb 14.8 oz)   11/15/22 81.2 kg (179 lb 0.2 oz)   11/07/22 79.8 kg (175 lb 14.8 oz)      Pain:    There were no vitals filed for this visit.      Physical Exam:  Gen: WD/WN; NAD  HEENT: MMM  Trachea: midline  Chest: symmetric; faint rubor  Resp: normal respiratory effort  Extr: warm, well-perfused  Neuro: awake and alert; no aphasia or neglect    Plan: I have reviewed treatment setup notes, checked and approved the daily guidance images.  I reviewed dose delivery, treatment parameters and deemed them appropriate. We plan to continue radiation therapy as prescribed.    Skin care instructions provided as well as emollients provided      Radiation Oncology    Electronically signed by Dudley Martinez MD 11/22/2022  11:42 EST    normal/clear to auscultation bilaterally/no wheezes/no rales/no rhonchi

## 2023-02-22 NOTE — CONSULT NOTE ADULT - SUBJECTIVE AND OBJECTIVE BOX
MYRNADELORIS  82y, Female  Allergy: No Known Allergies      All historical available data reviewed.    HPI:  82 year old female with PMHx of HTN, DM, HLD, recent dx of pancreatic adenocarcinoma (23) presents with vague intermittent lower abdominal pain. Patient was diagnosed with diverticulitis recently and has been on antibiotics for the last week. Denies fevers/chills or n/v. Last BM was yesterday (small). Patient reports decreased PO intake over the last couple months 2/2 decrease appetite. Patient currently following Dr. De Santiago and Dr. Mead.  Outpatient PET scan (23) shows 2 left basilar nodular opacities; Rt pelvic pericolonic soft tissue mass; severe uptake in sigmoid colon). Last c-scope was 10/2022 - patient reports polypectomy and suspicious sigmoid lesion. Denies CP, SOB, dysuria, hematochezia, weakness/numbness.  (2023 14:04)    FAMILY HISTORY:    PAST MEDICAL & SURGICAL HISTORY:  HTN (hypertension)      Diabetes      Hypercholesteremia      History of             VITALS:  T(F): 97.5, Max: 98.2 (23 @ 11:04)  HR: 75  BP: 121/62  RR: 19Vital Signs Last 24 Hrs  T(C): 36.4 (2023 07:54), Max: 36.8 (2023 11:04)  T(F): 97.5 (2023 07:54), Max: 98.2 (2023 11:04)  HR: 75 (2023 07:54) (56 - 80)  BP: 121/62 (2023 07:54) (115/58 - 131/60)  BP(mean): --  RR: 19 (2023 07:54) (18 - 19)  SpO2: 98% (2023 18:09) (98% - 99%)    Parameters below as of 2023 18:09  Patient On (Oxygen Delivery Method): room air        TESTS & MEASUREMENTS:                        10.0   12.13 )-----------( 322      ( 2023 20:59 )             31.1     02-21    134<L>  |  96<L>  |  14  ----------------------------<  122<H>  4.1   |  25  |  0.6<L>    Ca    8.6      2023 20:59  Phos  3.8       Mg     1.7         TPro  6.4  /  Alb  3.7  /  TBili  0.5  /  DBili  0.2  /  AST  12  /  ALT  7   /  AlkPhos  111      LIVER FUNCTIONS - ( 2023 12:44 )  Alb: 3.7 g/dL / Pro: 6.4 g/dL / ALK PHOS: 111 U/L / ALT: 7 U/L / AST: 12 U/L / GGT: x             Urinalysis Basic - ( 2023 05:15 )    Color: Yellow / Appearance: Clear / SG: >1.050 / pH: x  Gluc: x / Ketone: Trace  / Bili: Negative / Urobili: <2 mg/dL   Blood: x / Protein: 30 mg/dL / Nitrite: Negative   Leuk Esterase: Negative / RBC: 5 /HPF / WBC 4 /HPF   Sq Epi: x / Non Sq Epi: 3 /HPF / Bacteria: Negative          RADIOLOGY & ADDITIONAL TESTS:  Personal review of radiological diagnostics performed  Echo and EKG results noted when applicable.     MEDICATIONS:  acetaminophen     Tablet .. 650 milliGRAM(s) Oral every 6 hours PRN  atorvastatin 20 milliGRAM(s) Oral at bedtime  dextrose 5%. 1000 milliLiter(s) IV Continuous <Continuous>  dextrose 5%. 1000 milliLiter(s) IV Continuous <Continuous>  dextrose 50% Injectable 25 Gram(s) IV Push once  dextrose 50% Injectable 12.5 Gram(s) IV Push once  dextrose 50% Injectable 25 Gram(s) IV Push once  dextrose Oral Gel 15 Gram(s) Oral once PRN  enoxaparin Injectable 40 milliGRAM(s) SubCutaneous every 24 hours  glucagon  Injectable 1 milliGRAM(s) IntraMuscular once  hydrochlorothiazide 12.5 milliGRAM(s) Oral daily  insulin lispro (ADMELOG) corrective regimen sliding scale   SubCutaneous three times a day before meals  lactated ringers. 1000 milliLiter(s) IV Continuous <Continuous>  pantoprazole    Tablet 40 milliGRAM(s) Oral before breakfast  piperacillin/tazobactam IVPB.. 3.375 Gram(s) IV Intermittent every 8 hours      ANTIBIOTICS:  piperacillin/tazobactam IVPB.. 3.375 Gram(s) IV Intermittent every 8 hours

## 2023-02-22 NOTE — CONSULT NOTE ADULT - ASSESSMENT
Impression:     #diverticulitis  - Currently on cefepime + flagyl per ID  - ID: no intervention at this time  - 4.1 cm hyperdensity possibly reflecting phlegmon, but cannot r/o neoplastic process with moderate to severe mural thickening in short segment in sigmoid colon    #Pancreatic Adenocarinoma  - follows with Dr. De Santiago and Dr. Mead    Plan:   Impression:     #diverticulitis  - Currently on cefepime + flagyl per ID  - ID: no intervention at this time  - 4.1 cm hyperdensity possibly reflecting phlegmon, but cannot r/o neoplastic process with moderate to severe mural thickening in short segment in sigmoid colon      #Pancreatic Adenocarinoma  - follows with Dr. De Santiago and Dr. Mead    Plan:  - continue IV Abx  - supportive care by primary team  - avoid constipation  - GI and DVT PPX, bowel regimine   Impression:     # Diverticulitis  - Currently on cefepime + flagyl per ID  - ID: no intervention at this time  - 4.1 cm hyperdensity possibly reflecting phlegmon, but cannot r/o neoplastic process with moderate to severe mural thickening in short segment in sigmoid colon    # Pancreatic Adenocarcinoma  - follows with Dr. De Santiago and Dr. Mead    Plan:  - continue IV Abx  - supportive care by primary team  - avoid constipation  - GI and DVT PPX, bowel regimen       Impression:     # complicated Diverticulitis with collection: r/o underlying neoplastic process:  - Currently on cefepime + flagyl per ID  - ID: no intervention at this time  - 4.1 cm hyperdensity possibly reflecting phlegmon, but cannot r/o neoplastic process with moderate to severe mural thickening in short segment in sigmoid colon  - IR evaluated the patient and not amenable for drainage  - elevated CEA  - recent colonoscopy in 10/2022 (no report avlaible)    Plan:  - clear liquid diet and advance as tolerated  - continue IV Abx  - supportive care by primary team  - avoid constipation  - GI and DVT PPX, bowel regimen  - patient will need colonoscopy in 6-8 weeks after resolution of the infection, can follow up with primary GI Dr. Ivan or can Follow up with our [facility] located at [address] [place] [address]. Phone Number: [phone]    - discussed with patient and understands importance of follow up for colonoscopy     # Pancreatic Adenocarcinoma  - follows with Dr. De Santiago and Dr. Mead  - outpatient follow up     recall PRN         82 year old female with PMHx of HTN, DM, HLD, recent dx of pancreatic adenocarcinoma (2/13/23) presents with vague intermittent lower abdominal pain, evaluated for diverticulitis.     # complicated Diverticulitis with collection: r/o underlying neoplastic process  # constipation  - Currently on cefepime + flagyl per ID  - IR: no intervention at this time  - 4.1 cm hyperdensity possibly reflecting phlegmon, but cannot r/o neoplastic process with moderate to severe mural thickening in short segment in sigmoid colon  - IR evaluated the patient and not amenable for drainage  - elevated CEA  - recent colonoscopy in 10/2022 (no report available    Plan:  - clear liquid diet for now  - continue IV Abx  - supportive care by primary team  - IR recs appreciated  - avoid constipation - recommend miralax TID for constipation  - GI and DVT PPX, bowel regimen    Patient had a colonoscopy in October 2022 so we will obtain records from Dr. Miller's office. Pending review of patient's last colonoscopy, may consider inpatient colonoscopy prior to discharge.  Ideally, would wait 4 to 6 weeks prior to performing colonoscopy in patients with diverticulitis due to slightly increased risk of perforation. However, given patient's persistent mural thickening and inability to rule out a malignant process on imaging, may consider inpatient colonoscopy with minimal insufflation.    - discussed with patient and understands importance of follow up for colonoscopy     # Pancreatic Adenocarcinoma  - follows with Dr. De Santiago and Dr. Mead  - outpatient follow up   - no evidence of biliary obstruction    we will follow

## 2023-02-22 NOTE — CONSULT NOTE ADULT - SUBJECTIVE AND OBJECTIVE BOX
Gastroenterology Consultation:    Patient is a 82y old  Female who presents with a chief complaint of       Admitted on: 23      HPI:  82 year old female with PMHx of HTN, DM, HLD, recent dx of pancreatic adenocarcinoma (23) presents with vague intermittent lower abdominal pain. Patient was diagnosed with diverticulitis recently and has been on antibiotics for the last week. Denies fevers/chills or n/v. Last BM was yesterday (small). Patient reports decreased PO intake over the last couple months 2/2 decrease appetite. Patient currently following Dr. De Santiago and Dr. Mead.  Outpatient PET scan (23) shows 2 left basilar nodular opacities; Rt pelvic pericolonic soft tissue mass; severe uptake in sigmoid colon). Last c-scope was 10/2022 - patient reports polypectomy and suspicious sigmoid lesion. Denies CP, SOB, dysuria, hematochezia, weakness/numbness.  (2023 14:04)        Prior EGD: 2023    Prior Colonoscopy: 10/2022      PAST MEDICAL & SURGICAL HISTORY:  HTN (hypertension)      Diabetes      Hypercholesteremia      History of             FAMILY HISTORY: Possible colon cancer in father      Social History:  Tobacco: 10-15 years, 1 pack a day quit in 1970s  Alcohol: none  Drugs: none    Home Medications:  hydroCHLOROthiazide 12.5 mg oral capsule: 1 cap(s) orally once a day (2023 08:41)  metFORMIN 500 mg oral tablet: 1 tab(s) orally 2 times a day (2023 08:41)  rosuvastatin 5 mg oral tablet: 1 tab(s) orally once a day (2023 08:41)        MEDICATIONS  (STANDING):  atorvastatin 20 milliGRAM(s) Oral at bedtime  dextrose 5%. 1000 milliLiter(s) (50 mL/Hr) IV Continuous <Continuous>  dextrose 5%. 1000 milliLiter(s) (100 mL/Hr) IV Continuous <Continuous>  dextrose 50% Injectable 25 Gram(s) IV Push once  dextrose 50% Injectable 12.5 Gram(s) IV Push once  dextrose 50% Injectable 25 Gram(s) IV Push once  enoxaparin Injectable 40 milliGRAM(s) SubCutaneous every 24 hours  glucagon  Injectable 1 milliGRAM(s) IntraMuscular once  hydrochlorothiazide 12.5 milliGRAM(s) Oral daily  insulin lispro (ADMELOG) corrective regimen sliding scale   SubCutaneous three times a day before meals  lactated ringers. 1000 milliLiter(s) (100 mL/Hr) IV Continuous <Continuous>  pantoprazole    Tablet 40 milliGRAM(s) Oral before breakfast  piperacillin/tazobactam IVPB.. 3.375 Gram(s) IV Intermittent every 8 hours    MEDICATIONS  (PRN):  acetaminophen     Tablet .. 650 milliGRAM(s) Oral every 6 hours PRN Temp greater or equal to 38C (100.4F), Mild Pain (1 - 3)  dextrose Oral Gel 15 Gram(s) Oral once PRN Blood Glucose LESS THAN 70 milliGRAM(s)/deciliter      Allergies  No Known Allergies      Review of Systems:   Constitutional:  No Fever, No Chills  ENT/Mouth:  No Hearing Changes,  No Difficulty Swallowing  Eyes:  No Eye Pain, No Vision Changes  Cardiovascular:  No Chest Pain, No Palpitations  Respiratory:  No Cough, No Dyspnea  Gastrointestinal:  As described in HPI  Musculoskeletal:  No Joint Swelling, No Back Pain  Skin:  No Skin Lesions, No Jaundice  Neuro:  No Syncope, No Dizziness  Heme/Lymph:  No Bruising, No Bleeding.          Physical Examination:  T(C): 36.4 (23 @ 07:54), Max: 36.8 (23 @ 11:04)  HR: 75 (23 @ 07:54) (56 - 80)  BP: 121/62 (23 @ 07:54) (115/58 - 131/60)  RR: 19 (23 @ 07:54) (18 - 19)  SpO2: 98% (23 @ 18:09) (98% - 99%)      23 @ 07:01  -  23 @ 07:00  --------------------------------------------------------  IN: 125 mL / OUT: 0 mL / NET: 125 mL    23 @ 07:01  -  23 @ 10:50  --------------------------------------------------------  IN: 350 mL / OUT: 0 mL / NET: 350 mL          GENERAL: AAOx3, no acute distress.  HEAD:  Atraumatic, Normocephalic  EYES: conjunctiva and sclera clear  NECK: Supple, no JVD or thyromegaly  CHEST/LUNG: Clear to auscultation bilaterally; No wheeze, rhonchi, or rales  HEART: Regular rate and rhythm; normal S1, S2, No murmurs.  ABDOMEN: Soft, nontender, nondistended; Bowel sounds present  NEUROLOGY: No asterixis or tremor.   SKIN: Intact, no jaundice        Data:                        10.0   12 )-----------( 322      ( 2023 20:59 )             31.1     Hgb Trend:  10.0  23 @ 20:59  10.9  23 @ 12:44            134<L>  |  96<L>  |  14  ----------------------------<  122<H>  4.1   |  25  |  0.6<L>    Ca    8.6      2023 20:59  Phos  3.8       Mg     1.7         TPro  6.4  /  Alb  3.7  /  TBili  0.5  /  DBili  0.2  /  AST  12  /  ALT  7   /  AlkPhos  111      Liver panel trend:  TBili 0.5   /   AST 12   /   ALT 7   /   AlkP 111   /   Tptn 6.4   /   Alb 3.7    /   DBili 0.2            PT/INR - ( 2023 20:59 )   PT: 15.40 sec;   INR: 1.34 ratio         PTT - ( 2023 20:59 )  PTT:33.6 sec        Radiology:  CT Abdomen and Pelvis w/ IV Cont:   ACC: [id number] EXAM:  CT ABDOMEN AND PELVIS IC   ORDERED BY: SHABBIR CRAWFORD     PROCEDURE DATE:  2023          INTERPRETATION:  CLINICAL STATEMENT: Diverticulitis.    TECHNIQUE: Contiguous axial CT images were obtained from the lower chest   to the pubic symphysis following administration of 100cc Optiray 320   intravenous contrast.  Oral contrast was not administered.  Reformatted   images in the coronal and sagittal planes were acquired.    COMPARISON CT: 2023. Correlation is made with PET/CT dated   2023.    FINDINGS:    LOWER CHEST: No significant change in a partially imaged left basilar   nodular opacities, which were previously demonstrated to be mildly FDG   avid.    HEPATOBILIARY: Diffuse hepatic steatosis. Coarse calcification along the   right hepatic dome. Cholelithiasis. No intrahepatic or extra hepatic   biliary ductal dilatation.    SPLEEN: Unremarkable. No significant change in diffuse fatty atrophy of   the pancreas, with unchanged 3.6 cm soft tissue density lesion abutting   the magdalena splenic confluence indicating the splenic artery. 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    PANCREAS: Unremarkable.    ADRENAL GLANDS: Unremarkable.    KIDNEYS: Slightly increased moderate right-sided hydronephroureter with   associated delayed nephrogram extending to the level of a pelvic   inflammatory process. Unremarkable left kidney.    ABDOMINOPELVIC NODES: Unremarkable.    PELVIC ORGANS: Unchanged lobulated fibroid uterus,with multiple coarse   calcifications. Urinary bladder is decompressed, limiting further   evaluation.    PERITONEUM/MESENTERY/BOWEL: No significant change in moderate to severe   mural thickening involving a short segment of sigmoid colon, with   pericolonic fat stranding and an overall unchanged 4.1 x 3.0 x 3.2 cm   heterogeneous right pericolonic mixed density structure containing   several foci of gas, possibly reflecting phlegmon. No evidence of bowel   obstruction. No ascites. Normal caliber appendix.    BONES/SOFT TISSUES: Status post left total hip replacement, with a stable   postoperative appearance. Unchanged T12 vertebral body hemangioma.   Degenerative changes of the spine. No acute osseous abnormality.    OTHER: Atherosclerotic vascular calcification.      IMPRESSION:    1. Since 2023, no significant change in moderate to severe   mural thickening involving a short segment of sigmoid colon, with   pericolonic fat stranding and overall unchanged 4.1 cm heterogeneous   right pericolonic mixed density structure, possibly reflecting phlegmon,   however underlying neoplastic process is a possibility; slightly   increased moderate right-sided hydronephroureter with associated delayed   right nephrogram, extending to the level of the right pelvic inflammatory   process.    2. Remainder of findings are overall unchanged.    --- End of Report ---            NORMA GALLEGOS MD; Attending Radiologist  This document has been electronically signed. 2023  1:29PM (23 @ 13:00)       Gastroenterology Consultation:    Patient is a 82y old  Female who presents with a chief complaint of abdominal pain       Admitted on: 23      HPI:  82 year old female with PMHx of HTN, DM, HLD, recent dx of pancreatic adenocarcinoma (23) presents with vague intermittent lower abdominal pain. Patient was diagnosed with diverticulitis recently and has been on antibiotics for the last week. Denies fevers/chills or n/v. Last BM was yesterday (small). Patient reports decreased PO intake over the last couple months 2/2 decrease appetite. Patient currently following Dr. De Santiago and Dr. Mead.  Outpatient PET scan (23) shows 2 left basilar nodular opacities; Rt pelvic pericolonic soft tissue mass; severe uptake in sigmoid colon). Last c-scope was 10/2022 - patient reports polypectomy and suspicious sigmoid lesion. Denies CP, SOB, dysuria, hematochezia, weakness/numbness.          Prior EGD: 2023    Prior Colonoscopy: 10/2022      PAST MEDICAL & SURGICAL HISTORY:  HTN (hypertension)      Diabetes      Hypercholesteremia      History of             FAMILY HISTORY: Possible colon cancer in father      Social History:  Tobacco: 10-15 years, 1 pack a day quit in 1970s  Alcohol: none  Drugs: none    Home Medications:  hydroCHLOROthiazide 12.5 mg oral capsule: 1 cap(s) orally once a day (2023 08:41)  metFORMIN 500 mg oral tablet: 1 tab(s) orally 2 times a day (2023 08:41)  rosuvastatin 5 mg oral tablet: 1 tab(s) orally once a day (2023 08:41)        MEDICATIONS  (STANDING):  atorvastatin 20 milliGRAM(s) Oral at bedtime  dextrose 5%. 1000 milliLiter(s) (50 mL/Hr) IV Continuous <Continuous>  dextrose 5%. 1000 milliLiter(s) (100 mL/Hr) IV Continuous <Continuous>  dextrose 50% Injectable 25 Gram(s) IV Push once  dextrose 50% Injectable 12.5 Gram(s) IV Push once  dextrose 50% Injectable 25 Gram(s) IV Push once  enoxaparin Injectable 40 milliGRAM(s) SubCutaneous every 24 hours  glucagon  Injectable 1 milliGRAM(s) IntraMuscular once  hydrochlorothiazide 12.5 milliGRAM(s) Oral daily  insulin lispro (ADMELOG) corrective regimen sliding scale   SubCutaneous three times a day before meals  lactated ringers. 1000 milliLiter(s) (100 mL/Hr) IV Continuous <Continuous>  pantoprazole    Tablet 40 milliGRAM(s) Oral before breakfast  piperacillin/tazobactam IVPB.. 3.375 Gram(s) IV Intermittent every 8 hours    MEDICATIONS  (PRN):  acetaminophen     Tablet .. 650 milliGRAM(s) Oral every 6 hours PRN Temp greater or equal to 38C (100.4F), Mild Pain (1 - 3)  dextrose Oral Gel 15 Gram(s) Oral once PRN Blood Glucose LESS THAN 70 milliGRAM(s)/deciliter      Allergies  No Known Allergies      Review of Systems:   Constitutional:  No Fever, No Chills  ENT/Mouth:  No Hearing Changes,  No Difficulty Swallowing  Eyes:  No Eye Pain, No Vision Changes  Cardiovascular:  No Chest Pain, No Palpitations  Respiratory:  No Cough, No Dyspnea  Gastrointestinal:  As described in HPI  Musculoskeletal:  No Joint Swelling, No Back Pain  Skin:  No Skin Lesions, No Jaundice  Neuro:  No Syncope, No Dizziness  Heme/Lymph:  No Bruising, No Bleeding.          Physical Examination:  T(C): 36.4 (23 @ 07:54), Max: 36.8 (23 @ 11:04)  HR: 75 (23 @ 07:54) (56 - 80)  BP: 121/62 (23 @ 07:54) (115/58 - 131/60)  RR: 19 (23 @ 07:54) (18 - 19)  SpO2: 98% (23 @ 18:09) (98% - 99%)      23 @ 07:01  -  23 @ 07:00  --------------------------------------------------------  IN: 125 mL / OUT: 0 mL / NET: 125 mL    23 @ 07:01  -  23 @ 10:50  --------------------------------------------------------  IN: 350 mL / OUT: 0 mL / NET: 350 mL          GENERAL: AAOx3, no acute distress.  HEAD:  Atraumatic, Normocephalic  EYES: conjunctiva and sclera clear  NECK: Supple, no JVD or thyromegaly  CHEST/LUNG: Clear to auscultation bilaterally; No wheeze, rhonchi, or rales  HEART: Regular rate and rhythm; normal S1, S2, No murmurs.  ABDOMEN: Soft, nontender, nondistended; Bowel sounds present  NEUROLOGY: No asterixis or tremor.   SKIN: Intact, no jaundice        Data:                        10.0   12.13 )-----------( 322      ( 2023 20:59 )             31.1     Hgb Trend:  10.0  23 @ 20:59  10.9  23 @ 12:44            134<L>  |  96<L>  |  14  ----------------------------<  122<H>  4.1   |  25  |  0.6<L>    Ca    8.6      2023 20:59  Phos  3.8       Mg     1.7         TPro  6.4  /  Alb  3.7  /  TBili  0.5  /  DBili  0.2  /  AST  12  /  ALT  7   /  AlkPhos  111      Liver panel trend:  TBili 0.5   /   AST 12   /   ALT 7   /   AlkP 111   /   Tptn 6.4   /   Alb 3.7    /   DBili 0.2            PT/INR - ( 2023 20:59 )   PT: 15.40 sec;   INR: 1.34 ratio         PTT - ( 2023 20:59 )  PTT:33.6 sec        Radiology:  CT Abdomen and Pelvis w/ IV Cont:   ACC: [id number] EXAM:  CT ABDOMEN AND PELVIS IC   ORDERED BY: SHABBIR CRAWFORD     PROCEDURE DATE:  2023          INTERPRETATION:  CLINICAL STATEMENT: Diverticulitis.    TECHNIQUE: Contiguous axial CT images were obtained from the lower chest   to the pubic symphysis following administration of 100cc Optiray 320   intravenous contrast.  Oral contrast was not administered.  Reformatted   images in the coronal and sagittal planes were acquired.    COMPARISON CT: 2023. Correlation is made with PET/CT dated   2023.    FINDINGS:    LOWER CHEST: No significant change in a partially imaged left basilar   nodular opacities, which were previously demonstrated to be mildly FDG   avid.    HEPATOBILIARY: Diffuse hepatic steatosis. Coarse calcification along the   right hepatic dome. Cholelithiasis. No intrahepatic or extra hepatic   biliary ductal dilatation.    SPLEEN: Unremarkable. No significant change in diffuse fatty atrophy of   the pancreas, with unchanged 3.6 cm soft tissue density lesion abutting   the magdalena splenic confluence indicating the splenic artery. 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    PANCREAS: Unremarkable.    ADRENAL GLANDS: Unremarkable.    KIDNEYS: Slightly increased moderate right-sided hydronephroureter with   associated delayed nephrogram extending to the level of a pelvic   inflammatory process. Unremarkable left kidney.    ABDOMINOPELVIC NODES: Unremarkable.    PELVIC ORGANS: Unchanged lobulated fibroid uterus,with multiple coarse   calcifications. Urinary bladder is decompressed, limiting further   evaluation.    PERITONEUM/MESENTERY/BOWEL: No significant change in moderate to severe   mural thickening involving a short segment of sigmoid colon, with   pericolonic fat stranding and an overall unchanged 4.1 x 3.0 x 3.2 cm   heterogeneous right pericolonic mixed density structure containing   several foci of gas, possibly reflecting phlegmon. No evidence of bowel   obstruction. No ascites. Normal caliber appendix.    BONES/SOFT TISSUES: Status post left total hip replacement, with a stable   postoperative appearance. Unchanged T12 vertebral body hemangioma.   Degenerative changes of the spine. No acute osseous abnormality.    OTHER: Atherosclerotic vascular calcification.      IMPRESSION:    1. Since 2023, no significant change in moderate to severe   mural thickening involving a short segment of sigmoid colon, with   pericolonic fat stranding and overall unchanged 4.1 cm heterogeneous   right pericolonic mixed density structure, possibly reflecting phlegmon,   however underlying neoplastic process is a possibility; slightly   increased moderate right-sided hydronephroureter with associated delayed   right nephrogram, extending to the level of the right pelvic inflammatory   process.    2. Remainder of findings are overall unchanged.    --- End of Report ---            NORMA GALLEGOS MD; Attending Radiologist  This document has been electronically signed. 2023  1:29PM (23 @ 13:00)       Gastroenterology Consultation:    Patient is a 82y old  Female who presents with a chief complaint of abdominal pain       Admitted on: 23      HPI:  82 year old female with PMHx of HTN, DM, HLD, recent dx of pancreatic adenocarcinoma (23) presents with vague intermittent lower abdominal pain, evaluated for diverticulitis.   Patient was diagnosed with diverticulitis recently and has been on antibiotics for the last week. Denies fevers/chills or n/v. Last BM was yesterday (small).   Patient reports decreased PO intake over the last couple months due to decrease appetite.   Patient currently following Dr. De Santiago and Dr. Mead for her pancreatic adenoCa.    Denies CP, SOB, dysuria, hematochezia, weakness/numbness.  She reports constipation but denies any hematochezia or melena.  Outpatient PET scan (23) shows 2 left basilar nodular opacities; Rt pelvic pericolonic soft tissue mass; severe uptake in sigmoid colon).     Last c-scope was 10/2022 - patient reports polypectomy and suspicious sigmoid lesion.   Patient had recent CT of the abdomen on 2021 showing perisigmoid inflammatory stranding in the right pericolonic heterogeneous 4 cm structure with foci of gas contiguous with the sigmoid suggestive of sigmoid diverticulitis with large inflamed diverticulum versus phlegmon versus colovaginal fistula.    On admission, she had a CT with IV contrast revealing no change in the moderate to severe mural thickening of the sigmoid with pericolonic fat stranding and an overall unchanged 4.1 x 3.0 x 3.2 cm heterogeneous right pericolonic mixed density structure containing several foci of gas, possibly reflecting phlegmon however underlying neoplastic process is a possibility. No evidence of bowel obstruction. No ascites. Normal caliber appendix.      Prior EGD: 2023    Prior Colonoscopy: 10/2022      PAST MEDICAL & SURGICAL HISTORY:  HTN (hypertension)  Diabetes  Hypercholesteremia  History of       FAMILY HISTORY: Possible colon cancer in father      Social History:  Tobacco: 10-15 years, 1 pack a day quit in   Alcohol: none  Drugs: none    Home Medications:  hydroCHLOROthiazide 12.5 mg oral capsule: 1 cap(s) orally once a day (2023 08:41)  metFORMIN 500 mg oral tablet: 1 tab(s) orally 2 times a day (2023 08:41)  rosuvastatin 5 mg oral tablet: 1 tab(s) orally once a day (2023 08:41)        MEDICATIONS  (STANDING):  atorvastatin 20 milliGRAM(s) Oral at bedtime  dextrose 5%. 1000 milliLiter(s) (50 mL/Hr) IV Continuous <Continuous>  dextrose 5%. 1000 milliLiter(s) (100 mL/Hr) IV Continuous <Continuous>  dextrose 50% Injectable 25 Gram(s) IV Push once  dextrose 50% Injectable 12.5 Gram(s) IV Push once  dextrose 50% Injectable 25 Gram(s) IV Push once  enoxaparin Injectable 40 milliGRAM(s) SubCutaneous every 24 hours  glucagon  Injectable 1 milliGRAM(s) IntraMuscular once  hydrochlorothiazide 12.5 milliGRAM(s) Oral daily  insulin lispro (ADMELOG) corrective regimen sliding scale   SubCutaneous three times a day before meals  lactated ringers. 1000 milliLiter(s) (100 mL/Hr) IV Continuous <Continuous>  pantoprazole    Tablet 40 milliGRAM(s) Oral before breakfast  piperacillin/tazobactam IVPB.. 3.375 Gram(s) IV Intermittent every 8 hours    MEDICATIONS  (PRN):  acetaminophen     Tablet .. 650 milliGRAM(s) Oral every 6 hours PRN Temp greater or equal to 38C (100.4F), Mild Pain (1 - 3)  dextrose Oral Gel 15 Gram(s) Oral once PRN Blood Glucose LESS THAN 70 milliGRAM(s)/deciliter      Allergies  No Known Allergies      Review of Systems:   Constitutional:  No Fever, No Chills  ENT/Mouth:  No Hearing Changes,  No Difficulty Swallowing  Eyes:  No Eye Pain, No Vision Changes  Cardiovascular:  No Chest Pain, No Palpitations  Respiratory:  No Cough, No Dyspnea  Gastrointestinal:  As described in HPI  Musculoskeletal:  No Joint Swelling, No Back Pain  Skin:  No Skin Lesions, No Jaundice  Neuro:  No Syncope, No Dizziness  Heme/Lymph:  No Bruising, No Bleeding.          Physical Examination:  T(C): 36.4 (23 @ 07:54), Max: 36.8 (23 @ 11:04)  HR: 75 (23 @ 07:54) (56 - 80)  BP: 121/62 (23 @ 07:54) (115/58 - 131/60)  RR: 19 (23 @ 07:54) (18 - 19)  SpO2: 98% (23 @ 18:09) (98% - 99%)      23 @ 07:01  -  23 @ 07:00  --------------------------------------------------------  IN: 125 mL / OUT: 0 mL / NET: 125 mL    23 @ 07:01  -  23 @ 10:50  --------------------------------------------------------  IN: 350 mL / OUT: 0 mL / NET: 350 mL          GENERAL: AAOx3, no acute distress.  HEAD:  Atraumatic, Normocephalic  EYES: conjunctiva and sclera clear  NECK: Supple, no JVD or thyromegaly  CHEST/LUNG: Clear to auscultation bilaterally; No wheeze, rhonchi, or rales  HEART: Regular rate and rhythm; normal S1, S2, No murmurs.  ABDOMEN: Soft, mild LLQ tenderness, nondistended; Bowel sounds present  NEUROLOGY: No asterixis or tremor.   SKIN: Intact, no jaundice        Data:                        10.0   12.13 )-----------( 322      ( 2023 20:59 )             31.1     Hgb Trend:  10.0  23 @ 20:59  10.9  23 @ 12:44        02-21    134<L>  |  96<L>  |  14  ----------------------------<  122<H>  4.1   |  25  |  0.6<L>    Ca    8.6      2023 20:59  Phos  3.8       Mg     1.7         TPro  6.4  /  Alb  3.7  /  TBili  0.5  /  DBili  0.2  /  AST  12    ALT  7   /  AlkPhos  111      Liver panel trend:  TBili 0.5   /   AST 12   /   ALT 7   /   AlkP 111   /   Tptn 6.4   /   Alb 3.7    /   DBili 0.2            PT/INR - ( 2023 20:59 )   PT: 15.40 sec;   INR: 1.34 ratio         PTT - ( 2023 20:59 )  PTT:33.6 sec        Radiology:  CT Abdomen and Pelvis w/ IV Cont:   ACC: [id number] EXAM:  CT ABDOMEN AND PELVIS IC   ORDERED BY: SHABBIR CRAWFORD     PROCEDURE DATE:  2023          INTERPRETATION:  CLINICAL STATEMENT: Diverticulitis.    TECHNIQUE: Contiguous axial CT images were obtained from the lower chest   to the pubic symphysis following administration of 100cc Optiray 320   intravenous contrast.  Oral contrast was not administered.  Reformatted   images in the coronal and sagittal planes were acquired.    COMPARISON CT: 2023. Correlation is made with PET/CT dated   2023.    FINDINGS:    LOWER CHEST: No significant change in a partially imaged left basilar   nodular opacities, which were previously demonstrated to be mildly FDG   avid.    HEPATOBILIARY: Diffuse hepatic steatosis. Coarse calcification along the   right hepatic dome. Cholelithiasis. No intrahepatic or extra hepatic   biliary ductal dilatation.    SPLEEN: Unremarkable. No significant change in diffuse fatty atrophy of   the pancreas, with unchanged 3.6 cm soft tissue density lesion abutting   the magdalena splenic confluence indicating the splenic artery. 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    PANCREAS: Unremarkable.    ADRENAL GLANDS: Unremarkable.    KIDNEYS: Slightly increased moderate right-sided hydronephroureter with   associated delayed nephrogram extending to the level of a pelvic   inflammatory process. Unremarkable left kidney.    ABDOMINOPELVIC NODES: Unremarkable.    PELVIC ORGANS: Unchanged lobulated fibroid uterus,with multiple coarse   calcifications. Urinary bladder is decompressed, limiting further   evaluation.    PERITONEUM/MESENTERY/BOWEL: No significant change in moderate to severe   mural thickening involving a short segment of sigmoid colon, with   pericolonic fat stranding and an overall unchanged 4.1 x 3.0 x 3.2 cm   heterogeneous right pericolonic mixed density structure containing   several foci of gas, possibly reflecting phlegmon. No evidence of bowel   obstruction. No ascites. Normal caliber appendix.    BONES/SOFT TISSUES: Status post left total hip replacement, with a stable   postoperative appearance. Unchanged T12 vertebral body hemangioma.   Degenerative changes of the spine. No acute osseous abnormality.    OTHER: Atherosclerotic vascular calcification.      IMPRESSION:    1. Since 2023, no significant change in moderate to severe   mural thickening involving a short segment of sigmoid colon, with   pericolonic fat stranding and overall unchanged 4.1 cm heterogeneous   right pericolonic mixed density structure, possibly reflecting phlegmon,   however underlying neoplastic process is a possibility; slightly   increased moderate right-sided hydronephroureter with associated delayed   right nephrogram, extending to the level of the right pelvic inflammatory   process.    2. Remainder of findings are overall unchanged.    --- End of Report ---            NORMA GALLEGOS MD; Attending Radiologist  This document has been electronically signed. 2023  1:29PM (23 @ 13:00)

## 2023-02-22 NOTE — PROGRESS NOTE ADULT - ASSESSMENT
82yF w/ PMHx of HTN, DM, HLD, h/o  (50yrs ago), recent dx of pancreatic adenocarcinoma (23) presents with vague intermittent lower abdominal pain. Patient was diagnosed with diverticulitis recently and has been on antibiotics for the last week. Denies fevers/chills or n/v. Last BM was yesterday (small). Patient reports decreased PO intake over the last couple months 2/2 decrease appetite. Patient currently following Dr. De Santiago and Dr. Mead.  Outpatient PET scan (23) shows 2 left basilar nodular opacities; Rt pelvic pericolonic soft tissue mass; severe uptake in sigmoid colon). Last c-scope was 10/2022 - patient reports polypectomy and suspicious sigmoid lesion.. Physical exam findings, imaging, and labs as documented above.     PLAN:  #Sigmoid Diverticulitis, possible mass   - CLD, monitor for N/V   - Monitor I/O   - IV abx (Cefepime / Flagyl)   - F/u urology recs for possible ureteral stent / nephrostomy tube   - Heme/Onc consult pending (Dr. Michele)   - GI consult pending for colonoscopy -> sigmoid biopsy   - F/u CEA   - Pain control: APAP    #HTN  #Atorvastatin   - HCTZ 12.5mg   - Atorvastatin 20mg    #DM   - ISS   - Monitor FS    [facility]  SPECTRA [id number]   82yF w/ PMHx of HTN, DM, HLD, h/o  (50yrs ago), recent dx of pancreatic adenocarcinoma (23) presents with vague intermittent lower abdominal pain. Patient was diagnosed with diverticulitis recently and has been on antibiotics for the last week. Denies fevers/chills or n/v. Last BM was yesterday (small). Patient reports decreased PO intake over the last couple months 2/2 decrease appetite. Patient currently following Dr. De Santiago and Dr. Mead.  Outpatient PET scan (23) shows 2 left basilar nodular opacities; Rt pelvic pericolonic soft tissue mass; severe uptake in sigmoid colon). Last c-scope was 10/2022 - patient reports polypectomy and suspicious sigmoid lesion.. Physical exam findings, imaging, and labs as documented above.     PLAN:  #Sigmoid Diverticulitis, possible mass   - CLD, monitor for N/V   - Monitor I/O   - IV abx (Cefepime / Flagyl)   - F/u urology recs for possible ureteral stent / nephrostomy tube   - Heme/Onc consult pending (Dr. Michele)   - GI consult pending for colonoscopy -> sigmoid biopsy   - F/u CEA   - Pain control: APAP    #HTN  #Atorvastatin   - HCTZ 12.5mg   - Atorvastatin 20mg    #DM   - ISS   - Monitor FS    #Other  Diet: CLD  GI ppx: IV protonix 40  DVT ppx: lovenox 40 q24  Dispo: pending    [facility]  SPECTRA [id number]

## 2023-02-22 NOTE — PROGRESS NOTE ADULT - SUBJECTIVE AND OBJECTIVE BOX
GENERAL SURGERY PROGRESS NOTE    Admission: Malignant neoplasm of pancreas  Diverticulitis of intestine without perforation or abscess without bleeding  Sigmoid Mass    Hospital Day: 2    24 Hour Events:  Patient seen resting comfortably in bed.   N/V(-)  Pain controlled at rest.   No other complaints at this time.     Vitals:  T(F): 97.5 (02-22-23 @ 07:54), Max: 98.1 (02-21-23 @ 18:09)  HR: 75 (02-22-23 @ 07:54)  BP: 121/62 (02-22-23 @ 07:54)  RR: 19 (02-22-23 @ 07:54)  SpO2: 98% (02-21-23 @ 18:09)    Diet, Clear Liquid    Fluids: lactated ringers.: Solution, 1000 milliLiter(s) infuse at 100 mL/Hr    I & O's:    02-21-23 @ 07:01  -  02-22-23 @ 07:00  --------------------------------------------------------  IN:    IV PiggyBack: 25 mL    Lactated Ringers: 100 mL  Total IN: 125 mL    OUT:  Total OUT: 0 mL    Total NET: 125 mL    PHYSICAL EXAM:  General: NAD  Cardiac: RRR  Respiratory: unlabored breathing at rest  Abdomen: Soft, non-distended, non-tender, no rebound, no guarding.  Musculoskeletal: FROM in b/l UE and LE  Neuro: Sensation grossly intact and equal throughout, no focal deficits  Skin: Warm/dry, normal color, no jaundice    MEDICATIONS  (STANDING):  atorvastatin 20 milliGRAM(s) Oral at bedtime  dextrose 5%. 1000 milliLiter(s) (50 mL/Hr) IV Continuous <Continuous>  dextrose 5%. 1000 milliLiter(s) (100 mL/Hr) IV Continuous <Continuous>  dextrose 50% Injectable 25 Gram(s) IV Push once  dextrose 50% Injectable 12.5 Gram(s) IV Push once  dextrose 50% Injectable 25 Gram(s) IV Push once  enoxaparin Injectable 40 milliGRAM(s) SubCutaneous every 24 hours  glucagon  Injectable 1 milliGRAM(s) IntraMuscular once  hydrochlorothiazide 12.5 milliGRAM(s) Oral daily  insulin lispro (ADMELOG) corrective regimen sliding scale   SubCutaneous three times a day before meals  lactated ringers. 1000 milliLiter(s) (100 mL/Hr) IV Continuous <Continuous>  pantoprazole    Tablet 40 milliGRAM(s) Oral before breakfast  piperacillin/tazobactam IVPB.. 3.375 Gram(s) IV Intermittent every 8 hours    MEDICATIONS  (PRN):  acetaminophen     Tablet .. 650 milliGRAM(s) Oral every 6 hours PRN Temp greater or equal to 38C (100.4F), Mild Pain (1 - 3)  dextrose Oral Gel 15 Gram(s) Oral once PRN Blood Glucose LESS THAN 70 milliGRAM(s)/deciliter    DVT PROPHYLAXIS: enoxaparin Injectable 40 milliGRAM(s) SubCutaneous every 24 hours    GI PROPHYLAXIS: pantoprazole    Tablet 40 milliGRAM(s) Oral before breakfast    ANTICOAGULATION:   ANTIBIOTICS:  piperacillin/tazobactam IVPB.. 3.375 Gram(s)    LAB/STUDIES:  Labs:  CAPILLARY BLOOD GLUCOSE    POCT Blood Glucose.: 150 mg/dL (22 Feb 2023 08:03)  POCT Blood Glucose.: 136 mg/dL (21 Feb 2023 20:28)                          10.0   12.13 )-----------( 322      ( 21 Feb 2023 20:59 )             31.1       Auto Neutrophil %: 76.5 % (02-21-23 @ 20:59)  Auto Immature Granulocyte %: 0.6 % (02-21-23 @ 20:59)  Auto Neutrophil %: 86.8 % (02-21-23 @ 12:44)  Auto Immature Granulocyte %: 0.7 % (02-21-23 @ 12:44)    02-21    134<L>  |  96<L>  |  14  ----------------------------<  122<H>  4.1   |  25  |  0.6<L>    Calcium, Total Serum: 8.6 mg/dL (02-21-23 @ 20:59)    LFTs:             6.4  | 0.5  | 12       ------------------[111     ( 21 Feb 2023 12:44 )  3.7  | 0.2  | 7           Lipase:8      Amylase:x             Coags:     15.40  ----< 1.34    ( 21 Feb 2023 20:59 )     33.6     CARDIAC MARKERS ( 21 Feb 2023 12:44 )  x     / <0.01 ng/mL / x     / x     / x        Serum Pro-Brain Natriuretic Peptide: 780 pg/mL (02-21-23 @ 12:44)    Urinalysis Basic - ( 22 Feb 2023 05:15 )    Color: Yellow / Appearance: Clear / SG: >1.050 / pH: x  Gluc: x / Ketone: Trace  / Bili: Negative / Urobili: <2 mg/dL   Blood: x / Protein: 30 mg/dL / Nitrite: Negative   Leuk Esterase: Negative / RBC: 5 /HPF / WBC 4 /HPF   Sq Epi: x / Non Sq Epi: 3 /HPF / Bacteria: Negative

## 2023-02-23 NOTE — PROGRESS NOTE ADULT - SUBJECTIVE AND OBJECTIVE BOX
Interventional Radiology Follow- Up Note    Procedure Requested: LLQ drainage / sigmoid biopsy    HPI:  82 year old female with PMHx of HTN, DM, HLD, recent dx of pancreatic adenocarcinoma (2/13/23) presents with vague intermittent lower abdominal pain. Patient was diagnosed with diverticulitis recently and has been on antibiotics for the last week. Denies fevers/chills or n/v. Last BM was yesterday (small). Patient reports decreased PO intake over the last couple months 2/2 decrease appetite. Patient currently following Dr. De Santiago and Dr. Mead.  Outpatient PET scan (2/16/23) shows 2 left basilar nodular opacities; Rt pelvic pericolonic soft tissue mass; severe uptake in sigmoid colon). Last c-scope was 10/2022 - patient reports polypectomy and suspicious sigmoid lesion. Denies CP, SOB, dysuria, hematochezia, weakness/numbness.  (21 Feb 2023 14:04)    Vitals: T(F): 97.9 (02-22-23 @ 23:58), Max: 99 (02-22-23 @ 16:07)  HR: 91 (02-22-23 @ 23:58) (63 - 91)  BP: 134/63 (02-22-23 @ 23:58) (130/61 - 134/63)  RR: 18 (02-22-23 @ 23:58) (18 - 18)  SpO2: 99% (02-22-23 @ 16:07) (99% - 99%)  Wt(kg): --    LABS:                        10.3   13.23 )-----------( 340      ( 23 Feb 2023 00:38 )             32.0     02-23    139  |  100  |  10  ----------------------------<  128<H>  4.0   |  26  |  0.7    Ca    8.6      23 Feb 2023 00:38  Phos  3.9     02-23  Mg     2.0     02-23    TPro  6.4  /  Alb  3.7  /  TBili  0.5  /  DBili  0.2  /  AST  12  /  ALT  7   /  AlkPhos  111  02-21    PT/INR - ( 21 Feb 2023 20:59 )   PT: 15.40 sec;   INR: 1.34 ratio         PTT - ( 21 Feb 2023 20:59 )  PTT:33.6 sec          Impression: 82y Female admitted with Malignant neoplasm of pancreas    Diverticulitis of intestine without perforation or abscess without bleeding    ASSESSMENT/ PLAN:   82 year old female with PMHx of HTN, DM, HLD, recent dx of pancreatic adenocarcinoma (2/13/23) presents with vague intermittent lower abdominal pain. Patient was diagnosed with diverticulitis recently and has been on antibiotics for the last week. Denies fevers/chills or n/v. Last BM was yesterday (small). Patient reports decreased PO intake over the last couple months 2/2 decrease appetite. Patient currently following Dr. De Santiago and Dr. Mead.  Outpatient PET scan (2/16/23) shows 2 left basilar nodular opacities; Rt pelvic pericolonic soft tissue mass; severe uptake in sigmoid colon). Dr. De Santiago recommended possible drainage/possible sigmoid biopsy by IR. At time of initial consult, images were reviewed by attending, deemed no significant collection to drain. Additionally IR does not do sigmoid biopsies, please recommended consulting GI. Re-consulted for R PCN placement as CT shows slightly increased moderate right sided hydronephroureter with associated delayed right nephrogram, extending to the level of the right pelvic inflammatory process. Urology initially consulted for intervention but states preferable to avoid cystoscopic stenting procedure as this would transverse inflammatory process.   - patient HD stable, WBC: 13.23 (trending down since admission), Cr normal (0.7)  - can plan for routine R PCN placement as outpatient  - please contact IR at [phone] prior to discharge for scheduling     Please call Interventional Radiology [phone] with any questions, concerns, or issues regarding above.

## 2023-02-23 NOTE — PROGRESS NOTE ADULT - ASSESSMENT
82yF w/ PMHx of HTN, DM, HLD, h/o  (50yrs ago), recent dx of pancreatic adenocarcinoma (23) presents with vague intermittent lower abdominal pain. Patient was diagnosed with diverticulitis recently and has been on antibiotics for the last week. Denies fevers/chills or n/v. Last BM was yesterday (small). Patient reports decreased PO intake over the last couple months 2/2 decrease appetite. Patient currently following Dr. De Santiago and Dr. Mead.  Outpatient PET scan (23) shows 2 left basilar nodular opacities; Rt pelvic pericolonic soft tissue mass; severe uptake in sigmoid colon). Last c-scope was 10/2022 - patient reports polypectomy and suspicious sigmoid lesion.. Physical exam findings, imaging, and labs as documented above.     PLAN:  #Sigmoid Diverticulitis, possible mass   - CLD, monitor for N/V   - Monitor I/O   - IV abx (Cefepime / Flagyl) continue for now, plan for Ertapenem on discharge  -Patient to follow up with Heme Onc outpatient  -Patient potentially to have IR procedure as outpatient   - Pain control: APAP    #HTN  #Atorvastatin   - HCTZ 12.5mg   - Atorvastatin 20mg    #DM   - ISS   - Monitor FS    #Other  Diet: CLD  GI ppx: IV protonix 40  DVT ppx: lovenox 40 q24  Dispo: pending    [facility]

## 2023-02-23 NOTE — PROGRESS NOTE ADULT - SUBJECTIVE AND OBJECTIVE BOX
DELORIS NORRIS  82y, Female    All available historical data reviewed    OVERNIGHT EVENTS:  feels well and has no new complaints  No fevers     ROS:  General: Denies rigors, nightsweats  HEENT: Denies headache, rhinorrhea, sore throat, eye pain  CV: Denies CP, palpitations  PULM: Denies wheezing, hemoptysis  GI: Denies hematemesis, hematochezia, melena  : Denies discharge, hematuria  MSK: Denies arthralgias, myalgias  SKIN: Denies rash, lesions  NEURO: Denies paresthesias, weakness  PSYCH: Denies depression, anxiety    VITALS:  T(F): 97.9, Max: 99 (02-22-23 @ 16:07)  HR: 91  BP: 134/63  RR: 18Vital Signs Last 24 Hrs  T(C): 36.6 (22 Feb 2023 23:58), Max: 37.2 (22 Feb 2023 16:07)  T(F): 97.9 (22 Feb 2023 23:58), Max: 99 (22 Feb 2023 16:07)  HR: 91 (22 Feb 2023 23:58) (63 - 91)  BP: 134/63 (22 Feb 2023 23:58) (130/61 - 134/63)  BP(mean): --  RR: 18 (22 Feb 2023 23:58) (18 - 18)  SpO2: 99% (22 Feb 2023 16:07) (99% - 99%)    Parameters below as of 22 Feb 2023 16:07  Patient On (Oxygen Delivery Method): room air        TESTS & MEASUREMENTS:                        10.3   13.23 )-----------( 340      ( 23 Feb 2023 00:38 )             32.0     02-23    139  |  100  |  10  ----------------------------<  128<H>  4.0   |  26  |  0.7    Ca    8.6      23 Feb 2023 00:38  Phos  3.9     02-23  Mg     2.0     02-23    TPro  6.4  /  Alb  3.7  /  TBili  0.5  /  DBili  0.2  /  AST  12  /  ALT  7   /  AlkPhos  111  02-21    LIVER FUNCTIONS - ( 21 Feb 2023 12:44 )  Alb: 3.7 g/dL / Pro: 6.4 g/dL / ALK PHOS: 111 U/L / ALT: 7 U/L / AST: 12 U/L / GGT: x             Urinalysis Basic - ( 22 Feb 2023 05:15 )    Color: Yellow / Appearance: Clear / SG: >1.050 / pH: x  Gluc: x / Ketone: Trace  / Bili: Negative / Urobili: <2 mg/dL   Blood: x / Protein: 30 mg/dL / Nitrite: Negative   Leuk Esterase: Negative / RBC: 5 /HPF / WBC 4 /HPF   Sq Epi: x / Non Sq Epi: 3 /HPF / Bacteria: Negative          RADIOLOGY & ADDITIONAL TESTS:  Personal review of radiological diagnostics performed  Echo and EKG results noted when applicable.     MEDICATIONS:  acetaminophen     Tablet .. 650 milliGRAM(s) Oral every 6 hours PRN  atorvastatin 20 milliGRAM(s) Oral at bedtime  cefepime   IVPB      cefepime   IVPB 1000 milliGRAM(s) IV Intermittent every 8 hours  dextrose 5% + sodium chloride 0.45%. 1000 milliLiter(s) IV Continuous <Continuous>  dextrose 5%. 1000 milliLiter(s) IV Continuous <Continuous>  dextrose 5%. 1000 milliLiter(s) IV Continuous <Continuous>  dextrose 50% Injectable 25 Gram(s) IV Push once  dextrose 50% Injectable 12.5 Gram(s) IV Push once  dextrose 50% Injectable 25 Gram(s) IV Push once  dextrose Oral Gel 15 Gram(s) Oral once PRN  enoxaparin Injectable 40 milliGRAM(s) SubCutaneous every 24 hours  glucagon  Injectable 1 milliGRAM(s) IntraMuscular once  hydrochlorothiazide 12.5 milliGRAM(s) Oral daily  insulin lispro (ADMELOG) corrective regimen sliding scale   SubCutaneous three times a day before meals  metroNIDAZOLE  IVPB      metroNIDAZOLE  IVPB 500 milliGRAM(s) IV Intermittent every 8 hours  pantoprazole    Tablet 40 milliGRAM(s) Oral before breakfast      ANTIBIOTICS:  cefepime   IVPB      cefepime   IVPB 1000 milliGRAM(s) IV Intermittent every 8 hours  metroNIDAZOLE  IVPB      metroNIDAZOLE  IVPB 500 milliGRAM(s) IV Intermittent every 8 hours

## 2023-02-23 NOTE — PROGRESS NOTE ADULT - SUBJECTIVE AND OBJECTIVE BOX
Gastroenterology Consultation:    Patient is a 82y old  Female who presents with a chief complaint of diverticulitis (2023 08:30)        Admitted on: 23      HPI:  82 year old female with PMHx of HTN, DM, HLD, recent dx of pancreatic adenocarcinoma (23) presents with vague intermittent lower abdominal pain. Patient was diagnosed with diverticulitis recently and has been on antibiotics for the last week. Denies fevers/chills or n/v. Last BM was yesterday (small). Patient reports decreased PO intake over the last couple months 2/2 decrease appetite. Patient currently following Dr. De Santiago and Dr. Mead.  Outpatient PET scan (23) shows 2 left basilar nodular opacities; Rt pelvic pericolonic soft tissue mass; severe uptake in sigmoid colon). Last c-scope was 10/2022 - patient reports polypectomy and suspicious sigmoid lesion. Denies CP, SOB, dysuria, hematochezia, weakness/numbness.  (2023 14:04)        Prior EGD:    Prior Colonoscopy:      PAST MEDICAL & SURGICAL HISTORY:  HTN (hypertension)      Diabetes      Hypercholesteremia      History of             FAMILY HISTORY:      Social History:  Tobacco:  Alcohol:  Drugs:    Home Medications:  hydroCHLOROthiazide 12.5 mg oral capsule: 1 cap(s) orally once a day (2023 08:41)  metFORMIN 500 mg oral tablet: 1 tab(s) orally 2 times a day (2023 08:41)  rosuvastatin 5 mg oral tablet: 1 tab(s) orally once a day (2023 08:41)        MEDICATIONS  (STANDING):  atorvastatin 20 milliGRAM(s) Oral at bedtime  cefepime   IVPB      cefepime   IVPB 1000 milliGRAM(s) IV Intermittent every 8 hours  dextrose 5% + sodium chloride 0.45%. 1000 milliLiter(s) (40 mL/Hr) IV Continuous <Continuous>  dextrose 5%. 1000 milliLiter(s) (50 mL/Hr) IV Continuous <Continuous>  dextrose 5%. 1000 milliLiter(s) (100 mL/Hr) IV Continuous <Continuous>  dextrose 50% Injectable 25 Gram(s) IV Push once  dextrose 50% Injectable 12.5 Gram(s) IV Push once  dextrose 50% Injectable 25 Gram(s) IV Push once  enoxaparin Injectable 40 milliGRAM(s) SubCutaneous every 24 hours  glucagon  Injectable 1 milliGRAM(s) IntraMuscular once  hydrochlorothiazide 12.5 milliGRAM(s) Oral daily  insulin lispro (ADMELOG) corrective regimen sliding scale   SubCutaneous three times a day before meals  metroNIDAZOLE  IVPB      metroNIDAZOLE  IVPB 500 milliGRAM(s) IV Intermittent every 8 hours  pantoprazole    Tablet 40 milliGRAM(s) Oral before breakfast    MEDICATIONS  (PRN):  acetaminophen     Tablet .. 650 milliGRAM(s) Oral every 6 hours PRN Temp greater or equal to 38C (100.4F), Mild Pain (1 - 3)  dextrose Oral Gel 15 Gram(s) Oral once PRN Blood Glucose LESS THAN 70 milliGRAM(s)/deciliter      Allergies  No Known Allergies      Review of Systems:   Constitutional:  No Fever, No Chills  ENT/Mouth:  No Hearing Changes,  No Difficulty Swallowing  Eyes:  No Eye Pain, No Vision Changes  Cardiovascular:  No Chest Pain, No Palpitations  Respiratory:  No Cough, No Dyspnea  Gastrointestinal:  As described in HPI  Musculoskeletal:  No Joint Swelling, No Back Pain  Skin:  No Skin Lesions, No Jaundice  Neuro:  No Syncope, No Dizziness  Heme/Lymph:  No Bruising, No Bleeding.          Physical Examination:  T(C): 36.6 (23 @ 23:58), Max: 37.2 (23 @ 16:07)  HR: 91 (23 @ 23:58) (63 - 91)  BP: 134/63 (23 @ 23:58) (130/61 - 134/63)  RR: 18 (23 @ 23:58) (18 - 18)  SpO2: 99% (23 @ 16:07) (99% - 99%)      23 @ 07:01  -  23 @ 07:00  --------------------------------------------------------  IN: 125 mL / OUT: 0 mL / NET: 125 mL    23 @ 07:01  -  23 @ 07:00  --------------------------------------------------------  IN: 1320 mL / OUT: 0 mL / NET: 1320 mL          GENERAL: AAOx3, no acute distress.  HEAD:  Atraumatic, Normocephalic  EYES: conjunctiva and sclera clear  NECK: Supple, no JVD or thyromegaly  CHEST/LUNG: Clear to auscultation bilaterally; No wheeze, rhonchi, or rales  HEART: Regular rate and rhythm; normal S1, S2, No murmurs.  ABDOMEN: Soft, nontender, nondistended; Bowel sounds present  NEUROLOGY: No asterixis or tremor.   SKIN: Intact, no jaundice        Data:                        10.3    )-----------( 340      ( 2023 00:38 )             32.0     Hgb Trend:  10.3  23 @ 00:38  10.0  23 @ 20:59  10.9  23 @ 12:44        23    139  |  100  |  10  ----------------------------<  128<H>  4.0   |  26  |  0.7    Ca    8.6      2023 00:38  Phos  3.9       Mg     2.0         TPro  6.4  /  Alb  3.7  /  TBili  0.5  /  DBili  0.2  /  AST  12  /  ALT  7   /  AlkPhos  111      Liver panel trend:  TBili 0.5   /   AST 12   /   ALT 7   /   AlkP 111   /   Tptn 6.4   /   Alb 3.7    /   DBili 0.2            PT/INR - ( 2023 20:59 )   PT: 15.40 sec;   INR: 1.34 ratio         PTT - ( 2023 20:59 )  PTT:33.6 sec        Radiology:       Gastroenterology progress note:     Patient is a 82y old  Female who presents with a chief complaint of lower abdominal pain (2023 10:29)       Admitted on: 23    We are following the patient for: diverticulitis        Interval History:    No acute events overnight.   had 1 bm this morning       PAST MEDICAL & SURGICAL HISTORY:  HTN (hypertension)      Diabetes      Hypercholesteremia      History of           MEDICATIONS  (STANDING):  atorvastatin 20 milliGRAM(s) Oral at bedtime  cefepime   IVPB      cefepime   IVPB 1000 milliGRAM(s) IV Intermittent every 8 hours  dextrose 5% + sodium chloride 0.45%. 1000 milliLiter(s) (40 mL/Hr) IV Continuous <Continuous>  dextrose 5%. 1000 milliLiter(s) (50 mL/Hr) IV Continuous <Continuous>  dextrose 5%. 1000 milliLiter(s) (100 mL/Hr) IV Continuous <Continuous>  dextrose 50% Injectable 25 Gram(s) IV Push once  dextrose 50% Injectable 12.5 Gram(s) IV Push once  dextrose 50% Injectable 25 Gram(s) IV Push once  enoxaparin Injectable 40 milliGRAM(s) SubCutaneous every 24 hours  glucagon  Injectable 1 milliGRAM(s) IntraMuscular once  hydrochlorothiazide 12.5 milliGRAM(s) Oral daily  insulin lispro (ADMELOG) corrective regimen sliding scale   SubCutaneous three times a day before meals  metroNIDAZOLE  IVPB      metroNIDAZOLE  IVPB 500 milliGRAM(s) IV Intermittent every 8 hours  pantoprazole    Tablet 40 milliGRAM(s) Oral before breakfast    MEDICATIONS  (PRN):  acetaminophen     Tablet .. 650 milliGRAM(s) Oral every 6 hours PRN Temp greater or equal to 38C (100.4F), Mild Pain (1 - 3)  dextrose Oral Gel 15 Gram(s) Oral once PRN Blood Glucose LESS THAN 70 milliGRAM(s)/deciliter      Allergies  No Known Allergies      Review of Systems:   Cardiovascular:  No Chest Pain, No Palpitations  Respiratory:  No Cough, No Dyspnea  Gastrointestinal:  As described in HPI  Skin:  No Skin Lesions, No Jaundice  Neuro:  No Syncope, No Dizziness    Physical Examination:  T(C): 36.6 (23 @ 23:58), Max: 37.2 (23 @ 16:07)  HR: 91 (23 @ 23:58) (63 - 91)  BP: 134/63 (23 @ 23:58) (130/61 - 134/63)  RR: 18 (23 @ 23:58) (18 - 18)  SpO2: 99% (23 @ 16:07) (99% - 99%)      23 @ 07:01  -  23 @ 07:00  --------------------------------------------------------  IN: 1320 mL / OUT: 0 mL / NET: 1320 mL        GENERAL: AAOx3, no acute distress.  HEAD:  Atraumatic, Normocephalic  EYES: conjunctiva and sclera clear  NECK: Supple, no JVD or thyromegaly  CHEST/LUNG: Clear to auscultation bilaterally; No wheeze, rhonchi, or rales  HEART: Regular rate and rhythm; normal S1, S2, No murmurs.  ABDOMEN: Soft, nontender, nondistended; Bowel sounds present  NEUROLOGY: No asterixis or tremor.   SKIN: Intact, no jaundice     Data:                        10.3    )-----------( 340      ( 2023 00:38 )             32.0     Hgb trend:  10.3  23 @ 00:38  10.0  23 @ 20:59  10.9  23 @ 12:44            139  |  100  |  10  ----------------------------<  128<H>  4.0   |  26  |  0.7    Ca    8.6      2023 00:38  Phos  3.9       Mg     2.0           Liver panel trend:  TBili 0.5   /   AST 12   /   ALT 7   /   AlkP 111   /   Tptn 6.4   /   Alb 3.7    /   DBili 0.2            PT/INR - ( 2023 20:59 )   PT: 15.40 sec;   INR: 1.34 ratio         PTT - ( 2023 20:59 )  PTT:33.6 sec     :       Gastroenterology progress note:     Patient is a 82y old  Female who presents with a chief complaint of lower abdominal pain (2023 10:29)       Admitted on: 23    We are following the patient for: diverticulitis        Interval History:    No acute events overnight.   had 1 bm this morning       PAST MEDICAL & SURGICAL HISTORY:  HTN (hypertension)      Diabetes      Hypercholesteremia      History of           MEDICATIONS  (STANDING):  atorvastatin 20 milliGRAM(s) Oral at bedtime  cefepime   IVPB      cefepime   IVPB 1000 milliGRAM(s) IV Intermittent every 8 hours  dextrose 5% + sodium chloride 0.45%. 1000 milliLiter(s) (40 mL/Hr) IV Continuous <Continuous>  dextrose 5%. 1000 milliLiter(s) (50 mL/Hr) IV Continuous <Continuous>  dextrose 5%. 1000 milliLiter(s) (100 mL/Hr) IV Continuous <Continuous>  dextrose 50% Injectable 25 Gram(s) IV Push once  dextrose 50% Injectable 12.5 Gram(s) IV Push once  dextrose 50% Injectable 25 Gram(s) IV Push once  enoxaparin Injectable 40 milliGRAM(s) SubCutaneous every 24 hours  glucagon  Injectable 1 milliGRAM(s) IntraMuscular once  hydrochlorothiazide 12.5 milliGRAM(s) Oral daily  insulin lispro (ADMELOG) corrective regimen sliding scale   SubCutaneous three times a day before meals  metroNIDAZOLE  IVPB      metroNIDAZOLE  IVPB 500 milliGRAM(s) IV Intermittent every 8 hours  pantoprazole    Tablet 40 milliGRAM(s) Oral before breakfast    MEDICATIONS  (PRN):  acetaminophen     Tablet .. 650 milliGRAM(s) Oral every 6 hours PRN Temp greater or equal to 38C (100.4F), Mild Pain (1 - 3)  dextrose Oral Gel 15 Gram(s) Oral once PRN Blood Glucose LESS THAN 70 milliGRAM(s)/deciliter      Allergies  No Known Allergies      Review of Systems:   Cardiovascular:  No Chest Pain, No Palpitations  Respiratory:  No Cough, No Dyspnea  Gastrointestinal:  As described in HPI  Skin:  No Skin Lesions, No Jaundice  Neuro:  No Syncope, No Dizziness    Physical Examination:  T(C): 36.6 (23 @ 23:58), Max: 37.2 (23 @ 16:07)  HR: 91 (23 @ 23:58) (63 - 91)  BP: 134/63 (23 @ 23:58) (130/61 - 134/63)  RR: 18 (23 @ 23:58) (18 - 18)  SpO2: 99% (23 @ 16:07) (99% - 99%)      23 @ 07:01  -  23 @ 07:00  --------------------------------------------------------  IN: 1320 mL / OUT: 0 mL / NET: 1320 mL        GENERAL: AAOx3, no acute distress.  HEAD:  Atraumatic, Normocephalic  EYES: conjunctiva and sclera clear  NECK: Supple, no JVD or thyromegaly  CHEST/LUNG: Clear to auscultation bilaterally; No wheeze, rhonchi, or rales  HEART: Regular rate and rhythm; normal S1, S2, No murmurs.  ABDOMEN: Soft, mild tender in LLQ, mild distention; Bowel sounds present  NEUROLOGY: No asterixis or tremor.   SKIN: Intact, no jaundice     Data:                        10.3    )-----------( 340      ( 2023 00:38 )             32.0     Hgb trend:  10.3  23 @ 00:38  10.0  23 @ 20:59  10.9  23 @ 12:44            139  |  100  |  10  ----------------------------<  128<H>  4.0   |  26  |  0.7    Ca    8.6      2023 00:38  Phos  3.9       Mg     2.0           Liver panel trend:  TBili 0.5   /   AST 12   /   ALT 7   /   AlkP 111   /   Tptn 6.4   /   Alb 3.7    /   DBili 0.2            PT/INR - ( 2023 20:59 )   PT: 15.40 sec;   INR: 1.34 ratio         PTT - ( 2023 20:59 )  PTT:33.6 sec     :

## 2023-02-23 NOTE — ASSESSMENT
[FreeTextEntry1] : 82 year old female with newly diagnosed pancreatic cancer . \par History of diverticulitis , leukocytosis , FDG avid pericolonic collection , right hydronephrosis . rule out abscess. \par Nodular lung opacities ( mets v/s inflammatory ) , markedly elevated CA19.9\par Plan : Situation was discussed at length with patient , son and Dr De Santiago . They are aware of incurable nature of her disease,   We discussed systemic chemotherapy \par with Gemcitabine and abraxane given in a modified schedule ( every 2 weeks ) , she is recommended first further evaluation of the pelvic collection with repeat CT scan in ED and possible drainage by IR , also need for IV antibiotics. COnsider lung biopsy to rule out metastatic disease , however this may not alter the management significantly .  She will also need central venous access.

## 2023-02-23 NOTE — CONSULT NOTE ADULT - ASSESSMENT
82 year old female with PMHx of HTN, DM, HLD, recent dx of pancreatic adenocarcinoma (2/13/23) presents with vague intermittent lower abdominal pain. Patient was diagnosed with diverticulitis recently and has been on antibiotics for the last week.    [facility] Onc consulted for the history of known Pancreatic Ca    Impression:    # New diagnosed Pancreatic Adenocarcinoma (basilar lung nodules, metastatic?)    - CT abd/ pelvis on 1/16/23 revealed findings suspicious for pancreatic tumor 3.6 cm encasing splenic artery and abutting hepatic artery.and right pelvic pericolonic collection 4 cm containing gas , contiguous with sigmoid colon and with mild to moderate right hydronephrosis.  - EUS with biopsy showed adenocarcinoma , CA19.9 : 12,795 CEA: 24  - PET scan : pancreatic mass with SUV : 7.6 , right pericolonic soft tissue density with SUV : 15 , left basillar lung nodular density 2.7 cm , SUV : 3.o and 7 mm left basillar pleural based nodule.    # Left colonic mass/phlegmon with fat stranding    - Recent colonoscopy on Oct 2022, negative for any malignancy  - IR : no drainable collection  - ID : c/w Abx  - persistent leukocytocis, LLQ pain and Hx of diverticulitis favor diverticulitis related abscess/ phlegmon    Recommendations:    - This is a patient with new diagnosed pancreatic Ca. She was sent to the hospital for the concern of underlying infection (diverticulitis) as seen on High SUV uptake on the PET scan and CT Abdomen. Plan for Colonoscopy. She might benefit from surgical vs endoscopic drainage? for source control since Abx have failed.     - Once acute infection is ruled out or resolved she can follow up at [facility] with Dr Mead to start chemotherapy for new diagnosed Pancreatic Ca.     for any questions call [phone] or text via [employer]

## 2023-02-23 NOTE — CONSULT NOTE ADULT - CONSULT REASON
diverticulitis
LLQ drainage / sigmoid biopsy
evaluation of ureteral stent
sigmoid colonic mass
diverticulitis

## 2023-02-23 NOTE — PROGRESS NOTE ADULT - SUBJECTIVE AND OBJECTIVE BOX
GENERAL SURGERY PROGRESS NOTE    Patient: DELORIS NORRIS , 82y ([birth year])Female   MRN: [id number]  Location: [address]  Visit: 23 Inpatient  Date: 23 @ 10:50    Events of past 24 hours:  Patient seen and examined at bedside  Patient denies nausea or vomiting  Patient passing flatus, no BM, patient has been OOB and voiding    PAST MEDICAL & SURGICAL HISTORY:  HTN (hypertension)      Diabetes      Hypercholesteremia      History of           Vitals:   T(F): 97.9 (23 @ 23:58), Max: 99 (23 @ 16:07)  HR: 91 (23 @ 23:58)  BP: 134/63 (23 @ 23:58)  RR: 18 (23 @ 23:58)  SpO2: 99% (23 @ 16:07)      Diet, Clear Liquid      Fluids: dextrose 5% + sodium chloride 0.45%.: Solution, 1000 milliLiter(s) infuse at 40 mL/Hr      I & O's:    23 @ 07:01  -  23 @ 07:00  --------------------------------------------------------  IN:    IV PiggyBack: 50 mL    IV PiggyBack: 100 mL    IV PiggyBack: 50 mL    Lactated Ringers: 1000 mL    Oral Fluid: 120 mL  Total IN: 1320 mL    OUT:  Total OUT: 0 mL    Total NET: 1320 mL    PHYSICAL EXAM:  General: NAD  Cardiac: RRR  Respiratory: unlabored breathing at rest  Abdomen: Soft, non-distended, non-tender, no rebound, no guarding.  Neuro: Sensation grossly intact and equal throughout, no focal deficits  Skin: Warm/dry, normal color, no jaundice    MEDICATIONS  (STANDING):  atorvastatin 20 milliGRAM(s) Oral at bedtime  cefepime   IVPB      cefepime   IVPB 1000 milliGRAM(s) IV Intermittent every 8 hours  dextrose 5% + sodium chloride 0.45%. 1000 milliLiter(s) (40 mL/Hr) IV Continuous <Continuous>  dextrose 5%. 1000 milliLiter(s) (50 mL/Hr) IV Continuous <Continuous>  dextrose 5%. 1000 milliLiter(s) (100 mL/Hr) IV Continuous <Continuous>  dextrose 50% Injectable 25 Gram(s) IV Push once  dextrose 50% Injectable 12.5 Gram(s) IV Push once  dextrose 50% Injectable 25 Gram(s) IV Push once  enoxaparin Injectable 40 milliGRAM(s) SubCutaneous every 24 hours  glucagon  Injectable 1 milliGRAM(s) IntraMuscular once  hydrochlorothiazide 12.5 milliGRAM(s) Oral daily  insulin lispro (ADMELOG) corrective regimen sliding scale   SubCutaneous three times a day before meals  metroNIDAZOLE  IVPB      metroNIDAZOLE  IVPB 500 milliGRAM(s) IV Intermittent every 8 hours  pantoprazole    Tablet 40 milliGRAM(s) Oral before breakfast    MEDICATIONS  (PRN):  acetaminophen     Tablet .. 650 milliGRAM(s) Oral every 6 hours PRN Temp greater or equal to 38C (100.4F), Mild Pain (1 - 3)  dextrose Oral Gel 15 Gram(s) Oral once PRN Blood Glucose LESS THAN 70 milliGRAM(s)/deciliter      DVT PROPHYLAXIS: enoxaparin Injectable 40 milliGRAM(s) SubCutaneous every 24 hours    GI PROPHYLAXIS: pantoprazole    Tablet 40 milliGRAM(s) Oral before breakfast    ANTICOAGULATION:   ANTIBIOTICS:  cefepime   IVPB    cefepime   IVPB 1000 milliGRAM(s)  metroNIDAZOLE  IVPB    metroNIDAZOLE  IVPB 500 milliGRAM(s)            LAB/STUDIES:  Labs:  CAPILLARY BLOOD GLUCOSE      POCT Blood Glucose.: 127 mg/dL (2023 06:44)  POCT Blood Glucose.: 131 mg/dL (2023 13:03)                          10.3   13.23 )-----------( 340      ( 2023 00:38 )             32.0       Auto Neutrophil %: 74.0 % (23 @ 00:38)  Auto Immature Granulocyte %: 0.8 % (23 @ 00:38)        139  |  100  |  10  ----------------------------<  128<H>  4.0   |  26  |  0.7      Calcium, Total Serum: 8.6 mg/dL (23 @ 00:38)      LFTs:             6.4  | 0.5  | 12       ------------------[111     ( 2023 12:44 )  3.7  | 0.2  | 7           Lipase:8      Amylase:x             Coags:     15.40  ----< 1.34    ( 2023 20:59 )     33.6        CARDIAC MARKERS ( 2023 12:44 )  x     / <0.01 ng/mL / x     / x     / x          Serum Pro-Brain Natriuretic Peptide: 780 pg/mL (23 @ 12:44)      Urinalysis Basic - ( 2023 05:15 )    Color: Yellow / Appearance: Clear / SG: >1.050 / pH: x  Gluc: x / Ketone: Trace  / Bili: Negative / Urobili: <2 mg/dL   Blood: x / Protein: 30 mg/dL / Nitrite: Negative   Leuk Esterase: Negative / RBC: 5 /HPF / WBC 4 /HPF   Sq Epi: x / Non Sq Epi: 3 /HPF / Bacteria: Negative

## 2023-02-23 NOTE — PROGRESS NOTE ADULT - ASSESSMENT
· Assessment	  82 year old female with PMHx of HTN, DM, HLD, recent dx of pancreatic adenocarcinoma (2/13/23) presents with vague intermittent lower abdominal pain. Patient was diagnosed with diverticulitis recently and has been on antibiotics for the last week. Denies fevers/chills or n/v. Last BM was yesterday (small). Patient reports decreased PO intake over the last couple months 2/2 decrease appetite. Patient currently following Dr. De Santiago and Dr. Mead.  Outpatient PET scan (2/16/23) shows 2 left basilar nodular opacities; Rt pelvic pericolonic soft tissue mass; severe uptake in sigmoid colon). Last c-scope was 10/2022 - patient reports polypectomy and suspicious sigmoid lesion.    IMPRESSION;   Sigmoid diverticulitis with possible phlegmon  Rt pelvic pericolonic soft tissue mass> GI notes read. They will f/u as outpt  2/21 IVR no intervention> possible routine R PCN prior to discharge.  WBc 18.8> 13.2   Clinically no pyelonephritis  R hydroureter probably related to Rt pelvic pericolonic soft tissue mass    RECOMMENDATIONS;  Cefepime 1 gm iv q8h  Flagyl 500 mg iv q8h  Midline > Ertapenem 1 gm iv q24h for 2 weeks on discharge  f/u with Dr Solis [id number] at [address] on tuesday via telehealth . Pt will be called  between 10-12.30 am.  [address]  [phone]  Fax [phone]  Please do not hesitate to recall ID if any questions arise either through [employer] or through [employer]

## 2023-02-23 NOTE — HISTORY OF PRESENT ILLNESS
[de-identified] : Patient is seen today  for unscheduled visit at the request of Dr MURPHY for newly diagnosed pancreatic cancer, 82 year old female former smoker with history of diabetes mellitus . She  presented with lower abdominal pain and constipation associated with weakness and weight loss for few months . \par In October 2022 Colonoscopy revealed a serrated adenoma in the rectum and a tubular adenoma . No malignancy was noted. She was apparently treated for suspected diverticulitis , CT abd/ pelvis on 1/16/23 revealed findings suspicious for pancreatic tumor 3.6 cm encasing splenic artery and abutting hepatic artery .and right pelvic pericolonic collection 4 cm containing gas , contiguous with sigmoid colon and with mild to moderate right hydronephrosis .\par EUS with biopsy showed adenocarcinoma , CA19.9 : 12,795\par PET scan : pancreatic mass with SUV : 7.6 \par                  right pericolonic soft tissue density with SUV : 15 \par                  left basillar lung nodular density 2.7 cm , SUV : 3.o and 7 mm left basillar pleural based nodule .\par She has received at least 2 courses of Po antibiotics , most recent with Augmentin \par System review : She complains of constipation , lower abdominal pain , constant , no fever or chills  ,occasional painful urination . she lost 10 lbs , she denies nausea or vomiting , itching . \par Social History : former smoker , lives alone , was capable of all ADLs until recently , she has good support network .

## 2023-02-23 NOTE — REASON FOR VISIT
[Initial Consultation] : an initial consultation [Family Member] : family member [FreeTextEntry2] : pancreatic cancer

## 2023-02-23 NOTE — CONSULT NOTE ADULT - SUBJECTIVE AND OBJECTIVE BOX
Hematology Consult Note    HPI:  82 year old female with PMHx of HTN, DM, HLD, recent dx of pancreatic adenocarcinoma (23) presents with vague intermittent lower abdominal pain. Patient was diagnosed with diverticulitis recently and has been on antibiotics for the last week. Denies fevers/chills or n/v. Last BM was yesterday (small). Patient reports decreased PO intake over the last couple months 2/2 decrease appetite. Patient currently following Dr. De Santiago and Dr. Mead.  Outpatient PET scan (23) shows 2 left basilar nodular opacities; Rt pelvic pericolonic soft tissue mass; severe uptake in sigmoid colon). Last c-scope was 10/2022 - patient reports polypectomy and suspicious sigmoid lesion. Denies CP, SOB, dysuria, hematochezia, weakness/numbness.  (2023 14:04)      Allergies    No Known Allergies    Intolerances        MEDICATIONS  (STANDING):  atorvastatin 20 milliGRAM(s) Oral at bedtime  cefepime   IVPB      cefepime   IVPB 1000 milliGRAM(s) IV Intermittent every 8 hours  dextrose 5% + sodium chloride 0.45%. 1000 milliLiter(s) (40 mL/Hr) IV Continuous <Continuous>  dextrose 5%. 1000 milliLiter(s) (50 mL/Hr) IV Continuous <Continuous>  dextrose 5%. 1000 milliLiter(s) (100 mL/Hr) IV Continuous <Continuous>  dextrose 50% Injectable 25 Gram(s) IV Push once  dextrose 50% Injectable 12.5 Gram(s) IV Push once  dextrose 50% Injectable 25 Gram(s) IV Push once  enoxaparin Injectable 40 milliGRAM(s) SubCutaneous every 24 hours  glucagon  Injectable 1 milliGRAM(s) IntraMuscular once  hydrochlorothiazide 12.5 milliGRAM(s) Oral daily  insulin lispro (ADMELOG) corrective regimen sliding scale   SubCutaneous three times a day before meals  metroNIDAZOLE  IVPB      metroNIDAZOLE  IVPB 500 milliGRAM(s) IV Intermittent every 8 hours  pantoprazole    Tablet 40 milliGRAM(s) Oral before breakfast    MEDICATIONS  (PRN):  acetaminophen     Tablet .. 650 milliGRAM(s) Oral every 6 hours PRN Temp greater or equal to 38C (100.4F), Mild Pain (1 - 3)  dextrose Oral Gel 15 Gram(s) Oral once PRN Blood Glucose LESS THAN 70 milliGRAM(s)/deciliter      PAST MEDICAL & SURGICAL HISTORY:  HTN (hypertension)      Diabetes      Hypercholesteremia      History of           FAMILY HISTORY:      SOCIAL HISTORY: No EtOH, no tobacco    REVIEW OF SYSTEMS:    CONSTITUTIONAL: No weakness, fevers or chills  EYES/ENT: No visual changes;  No vertigo or throat pain   NECK: No pain or stiffness  RESPIRATORY: No cough, wheezing, hemoptysis; No shortness of breath  CARDIOVASCULAR: No chest pain or palpitations  GASTROINTESTINAL: No abdominal or epigastric pain. No nausea, vomiting, or hematemesis; No diarrhea or constipation. No melena or hematochezia.  GENITOURINARY: No dysuria, frequency or hematuria  NEUROLOGICAL: No numbness or weakness  SKIN: No itching, burning, rashes, or lesions   All other review of systems is negative unless indicated above.        T(F): 97.9 (23 @ 23:58), Max: 99 (23 @ 16:07)  HR: 91 (23 @ 23:58)  BP: 134/63 (23 @ 23:58)  RR: 18 (23 @ 23:58)  SpO2: 99% (23 @ 16:07)  Wt(kg): --    GENERAL: NAD, well-developed  HEAD:  Atraumatic, Normocephalic  EYES: EOMI, PERRLA, conjunctiva and sclera clear  NECK: Supple, No JVD  CHEST/LUNG: Clear to auscultation bilaterally; No wheeze  HEART: Regular rate and rhythm; No murmurs, rubs, or gallops  ABDOMEN: Soft, Nontender, Nondistended; Bowel sounds present  EXTREMITIES:  2+ Peripheral Pulses, No clubbing, cyanosis, or edema  NEUROLOGY: non-focal  SKIN: No rashes or lesions                          10.3   13.23 )-----------( 340      ( 2023 00:38 )             32.0           139  |  100  |  10  ----------------------------<  128<H>  4.0   |  26  |  0.7    Ca    8.6      2023 00:38  Phos  3.9       Mg     2.0         TPro  6.4  /  Alb  3.7  /  TBili  0.5  /  DBili  0.2  /  AST  12  /  ALT  7   /  AlkPhos  111        Magnesium, Serum: 2.0 mg/dL ( @ 00:38)  Phosphorus Level, Serum: 3.9 mg/dL ( @ 00:38)    Radiology    < from: CT Abdomen and Pelvis w/ IV Cont (23 @ 13:00) >  IMPRESSION:    1. Since 2023, no significant change in moderate to severe   mural thickening involving a short segment of sigmoid colon, with   pericolonic fat stranding and overall unchanged 4.1 cm heterogeneous   right pericolonic mixed density structure, possibly reflecting phlegmon,   however underlying neoplastic process is a possibility; slightly   increased moderate right-sided hydronephroureter with associated delayed   right nephrogram, extending to the level of the right pelvic inflammatory   process.    2. Remainder of findings are overall unchanged.    --- End of Report ---        < end of copied text >

## 2023-02-23 NOTE — PROGRESS NOTE ADULT - ASSESSMENT
82 year old female with PMHx of HTN, DM, HLD, recent dx of pancreatic adenocarcinoma (2/13/23) presents with vague intermittent lower abdominal pain, evaluated for diverticulitis.     # complicated Diverticulitis with collection: r/o underlying neoplastic process  # constipation  - Currently on cefepime + flagyl per ID  - IR: no intervention at this time  - 4.1 cm hyperdensity possibly reflecting phlegmon, but cannot r/o neoplastic process with moderate to severe mural thickening in short segment in sigmoid colon  - IR evaluated the patient and not amenable for drainage  - elevated CEA (24.4)  - recent colonoscopy in 10/2022 (no report available    Plan:  - clear liquid diet for now  - continue IV Abx  - supportive care by primary team  - IR recs appreciated  - avoid constipation - recommend miralax TID for constipation  - GI and DVT PPX, bowel regimen  - following up with Dr. Vaca to get colonoscopy records 82 year old female with PMHx of HTN, DM, HLD, recent dx of pancreatic adenocarcinoma (2/13/23) presents with vague intermittent lower abdominal pain, evaluated for diverticulitis.     # complicated Diverticulitis with collection: r/o underlying neoplastic process  # constipation  - Currently on cefepime + flagyl per ID  - IR: no intervention at this time  - 4.1 cm hyperdensity possibly reflecting phlegmon, but cannot r/o neoplastic process with moderate to severe mural thickening in short segment in sigmoid colon  - IR evaluated the patient and not amenable for drainage  - elevated CEA (24.4)  - recent colonoscopy in 10/2022 (no report available    Plan:  - clear liquid diet for now  - continue IV Abx  - supportive care by primary team  - IR recs appreciated  - avoid constipation - recommend miralax TID for constipation  - GI and DVT PPX, bowel regimen  - following up with Dr. Vaca to get colonoscopy records  - will plan for colonoscopy in AM, clear liquid diet today, NPO after mid night  - golytely and dulcolax for prep  82 year old female with PMHx of HTN, DM, HLD, recent dx of pancreatic adenocarcinoma (2/13/23) presents with vague intermittent lower abdominal pain, evaluated for diverticulitis.     # complicated Diverticulitis with collection: r/o underlying neoplastic process  # constipation  - Currently on cefepime + flagyl per ID  - IR: no intervention at this time  - 4.1 cm hyperdensity possibly reflecting phlegmon, but cannot r/o neoplastic process with moderate to severe mural thickening in short segment in sigmoid colon  - IR evaluated the patient and not amenable for drainage  - elevated CEA (24.4)    recent colonoscopy in 9/2022 with Dr. Vaca:   -sessile polyp in hepatic flexture ( fragments of tubular adenoma, no evidence of high grade dysplasia or malignancy)     -sessile polyp in rectum (changes consistent with transitional serrated adenoma, no evidence of high grade dysplasia or malignancy)  - Both removed.   - Diverticulosis, hemorrhoids.   - Prep: FAIR      Plan:  - clear liquid diet for now  - continue IV Abx  - supportive care by primary team  - IR recs appreciated  - avoid constipation - recommend miralax TID for constipation  - GI and DVT PPX, bowel regimen  - will plan for colonoscopy in AM (2/24/23), clear liquid diet today, NPO after mid night  - golytely and dulcolax for prep  82 year old female with PMHx of HTN, DM, HLD, recent dx of pancreatic adenocarcinoma (2/13/23) presents with vague intermittent lower abdominal pain, evaluated for diverticulitis.     # complicated Diverticulitis with collection: r/o underlying neoplastic process  # constipation  - Currently on cefepime + flagyl per ID  - IR: no intervention at this time  - 4.1 cm hyperdensity possibly reflecting phlegmon, but cannot r/o neoplastic process with moderate to severe mural thickening in short segment in sigmoid colon  - IR evaluated the patient and not amenable for drainage  - elevated CEA (24.4)    Ideally, would wait 4 to 6 weeks prior to performing colonoscopy in patients with diverticulitis due to slightly increased risk of perforation.   However, given patient's persistent mural thickening and inability to rule out a malignant process on imaging, will perform inpatient colonoscopy with minimal insufflation.    recent colonoscopy in 9/2022 with Dr. Vaca:   -sessile polyp in hepatic flexture ( fragments of tubular adenoma, no evidence of high grade dysplasia or malignancy)     -sessile polyp in rectum (changes consistent with transitional serrated adenoma, no evidence of high grade dysplasia or malignancy)  - Both removed.   - Diverticulosis, hemorrhoids.   - Prep: FAIR    Plan:  - clear liquid diet for now  - continue IV Abx  - supportive care by primary team  - IR recs appreciated  - avoid constipation - recommend miralax TID for constipation  - GI and DVT PPX, bowel regimen  - will plan for colonoscopy in AM (2/24/23), clear liquid diet today, NPO after mid night  - golytely and dulcolax for prep   - further recs to follow the procedure

## 2023-02-23 NOTE — CONSULT NOTE ADULT - ATTENDING COMMENTS
Agree with above A/p
I edited the note.   Time-based billing (NON-critical care).   80 minutes spent on total encounter; more than 50% of the visit was spent counseling and / or coordinating care by the attending physician.  The necessity of the time spent during the encounter on this date of service was due to: Coordination of care.

## 2023-02-23 NOTE — PHYSICAL EXAM
[Normal] : normal spine exam without palpable tenderness, no kyphosis or scoliosis [de-identified] : pale in no acute distress.  [de-identified] : frequent ectopies.  [de-identified] : mild lower abdominal tenderness , no rebound .

## 2023-02-24 NOTE — PRE-OP CHECKLIST - IDENTIFICATION BAND VERIFIED
DATE OF PROCEDURE:  02/03/2017      PREOPERATIVE DIAGNOSIS:  Left knee medial meniscus tear.      POSTOPERATIVE DIAGNOSES:     1.  Left knee medial meniscus tear.   2.  Plica.      PROCEDURE:     1.  Left knee arthroscopy with 30% partial medial meniscectomy.     2.  Plica excision.      SURGEON:  Mandeep Jeffers MD      ASSISTANT:  ALEJANDRO Willams      INDICATION:  Steve Vo is a 36-year-old male with left knee pain.  MRI scan shows a complex posterior medial meniscus tear.  He presents now for arthroscopy and debridement.      SURGICAL FINDINGS:  The patellofemoral joint showed normal tracking, no chondromalacia changes.  There was a small medial plica present rubbing on the medial femur.  Medial joint showed a complex tear of the medial meniscus with a flap tear centrally based and a cleavage tear more posteriorly.  There were no chondromalacia changes.  Lateral joint showed no chondromalacia changes and no meniscus tear.  Cruciate ligaments were intact.      DESCRIPTION OF PROCEDURE:  After a smooth general endotracheal anesthesia, the patient's left leg was exsanguinated and tourniquet inflated to 300 mmHg.  The leg was then prepped and draped in sterile fashion.  Pause was performed for patient verification.  Arthroscopy was performed through 3 portals with the above findings.  We initially removed the plica with motorized shaver to help with flow in the knee of fluid.  We then performed a partial medial meniscectomy with basket forceps and shaver.  We removed about 30% of the meniscal tissue, primarily from the posterior 1/3-1/2.  We tapered to an intact central and anterior meniscus.  At the conclusion, the knee was thoroughly irrigated and drained of fluid.  Portals were injected with 0.25% Marcaine and closed with Steri-Strips.  Sterile dressings were applied and the patient was taken to the recovery room in stable condition.         MANDEEP JEFFERS MD             D: 02/03/2017 12:15   T:  2017 17:03   MT: [id number]      Name:     MARIELLE ALVARADO   MRN:      [id number]        Account:        [id number]   :      1980           Procedure Date: 2017      Document: [id number]     done

## 2023-02-24 NOTE — DIETITIAN INITIAL EVALUATION ADULT - NS FNS DIET ORDER
Pt admitted on NPO diet; advanced to clear liquids on 2/22 & continues to receive clear liquid diet at this time.

## 2023-02-24 NOTE — DIETITIAN INITIAL EVALUATION ADULT - ADD RECOMMEND
Recommendation:  (1) Order MVI q24hrs  (2) When medically feasible to advance diet to solids, provide low residue/fiber restricted diet. RD to monitor tolerance to diet advance and adjust diet order as needed. Would also provide Glucerna twice daily (180 kcal, 10 g protein per serving) & Prosource Gelatein 20 sugar free twice daily (80 kcal, 20 g protein per serving) to help meet estimated energy needs.  (3) If poor appetite & po intake observed following diet advance, would add an appetite stimulant.

## 2023-02-24 NOTE — DIETITIAN INITIAL EVALUATION ADULT - REASON FOR ADMISSION
Diverticulitis of intestine without perforation or abscess without bleeding    Malignant neoplasm of pancreas

## 2023-02-24 NOTE — PROGRESS NOTE ADULT - SUBJECTIVE AND OBJECTIVE BOX
GENERAL SURGERY PROGRESS NOTE    Patient: DELORIS NORRIS , 82y ([birth year])Female   MRN: [id number]  Location: [address]  Visit: 23 Inpatient  Date: 23 @ 03:52    Events of past 24 hours:  Patient seen and examined at bedside  Patient getting prep'd for colonoscopy tomorrow, patient has finished little over half of preop and has not has BM   Patient had nausea and vomited x1   Patient to be given enema x2  Potassium repleted and magnesium repleted    PAST MEDICAL & SURGICAL HISTORY:  HTN (hypertension)      Diabetes      Hypercholesteremia      History of           Vitals:   T(F): 97.4 (23 @ 23:13), Max: 98.1 (23 @ 16:09)  HR: 71 (23 @ 23:13)  BP: 119/62 (23 @ 23:13)  RR: 18 (23 @ 23:13)  SpO2: 97% (23 @ 08:00)      Diet, NPO after Midnight:      NPO Start Date: 2023,   NPO Start Time: 23:59  Diet, Clear Liquid      Fluids: lactated ringers.: Solution, 1000 milliLiter(s) infuse at 100 mL/Hr  Special Instructions: Please begin after midnight, thank you!      I & O's:    23 @ 07:01  -  23 @ 07:00  --------------------------------------------------------  IN:    IV PiggyBack: 50 mL    IV PiggyBack: 100 mL    IV PiggyBack: 50 mL    Lactated Ringers: 1000 mL    Oral Fluid: 120 mL  Total IN: 1320 mL    OUT:  Total OUT: 0 mL    Total NET: 1320 mL      PHYSICAL EXAM:  General: NAD  Cardiac: RRR  Respiratory: unlabored breathing at rest  Abdomen: Soft, non-distended, non-tender, no rebound, no guarding.  Neuro: Sensation grossly intact and equal throughout, no focal deficits  Skin: Warm/dry, normal color, no jaundice      MEDICATIONS  (STANDING):  atorvastatin 20 milliGRAM(s) Oral at bedtime  cefepime   IVPB 1000 milliGRAM(s) IV Intermittent every 8 hours  cefepime   IVPB      dextrose 5% + sodium chloride 0.45%. 1000 milliLiter(s) (40 mL/Hr) IV Continuous <Continuous>  dextrose 5%. 1000 milliLiter(s) (50 mL/Hr) IV Continuous <Continuous>  dextrose 5%. 1000 milliLiter(s) (100 mL/Hr) IV Continuous <Continuous>  dextrose 50% Injectable 25 Gram(s) IV Push once  dextrose 50% Injectable 12.5 Gram(s) IV Push once  dextrose 50% Injectable 25 Gram(s) IV Push once  enoxaparin Injectable 40 milliGRAM(s) SubCutaneous every 24 hours  glucagon  Injectable 1 milliGRAM(s) IntraMuscular once  hydrochlorothiazide 12.5 milliGRAM(s) Oral daily  insulin lispro (ADMELOG) corrective regimen sliding scale   SubCutaneous three times a day before meals  lactated ringers. 1000 milliLiter(s) (100 mL/Hr) IV Continuous <Continuous>  metroNIDAZOLE  IVPB      metroNIDAZOLE  IVPB 500 milliGRAM(s) IV Intermittent every 8 hours  pantoprazole    Tablet 40 milliGRAM(s) Oral before breakfast  potassium chloride  20 mEq/100 mL IVPB 20 milliEquivalent(s) IV Intermittent every 2 hours  saline laxative (FLEET) Rectal Enema 1 Enema Rectal once    MEDICATIONS  (PRN):  acetaminophen     Tablet .. 650 milliGRAM(s) Oral every 6 hours PRN Temp greater or equal to 38C (100.4F), Mild Pain (1 - 3)  dextrose Oral Gel 15 Gram(s) Oral once PRN Blood Glucose LESS THAN 70 milliGRAM(s)/deciliter  ondansetron Injectable 4 milliGRAM(s) IV Push every 6 hours PRN Nausea and/or Vomiting      DVT PROPHYLAXIS: enoxaparin Injectable 40 milliGRAM(s) SubCutaneous every 24 hours    GI PROPHYLAXIS: pantoprazole    Tablet 40 milliGRAM(s) Oral before breakfast    ANTICOAGULATION:   ANTIBIOTICS:  cefepime   IVPB 1000 milliGRAM(s)  cefepime   IVPB    metroNIDAZOLE  IVPB    metroNIDAZOLE  IVPB 500 milliGRAM(s)            LAB/STUDIES:  Labs:  CAPILLARY BLOOD GLUCOSE      POCT Blood Glucose.: 146 mg/dL (2023 20:45)  POCT Blood Glucose.: 128 mg/dL (2023 16:16)  POCT Blood Glucose.: 139 mg/dL (2023 11:38)  POCT Blood Glucose.: 127 mg/dL (2023 06:44)                          11.3   16.44 )-----------( 476      ( 2023 21:29 )             34.6       Auto Neutrophil %: 74.6 % (23 @ 21:29)  Auto Immature Granulocyte %: 0.5 % (23 @ 21:29)        138  |  98  |  8<L>  ----------------------------<  151<H>  3.3<L>   |  23  |  0.6<L>      Calcium, Total Serum: 8.6 mg/dL (23 @ 21:29)      LFTs:         Coags:     17.90  ----< 1.55    ( 2023 21:29 )     33.2            Serum Pro-Brain Natriuretic Peptide: 780 pg/mL (23 @ 12:44)      Urinalysis Basic - ( 2023 05:15 )    Color: Yellow / Appearance: Clear / SG: >1.050 / pH: x  Gluc: x / Ketone: Trace  / Bili: Negative / Urobili: <2 mg/dL   Blood: x / Protein: 30 mg/dL / Nitrite: Negative   Leuk Esterase: Negative / RBC: 5 /HPF / WBC 4 /HPF   Sq Epi: x / Non Sq Epi: 3 /HPF / Bacteria: Negative        Culture - Urine (collected 2023 05:15)  Source: Clean Catch Clean Catch (Midstream)  Final Report (2023 21:11):    <10,000 CFU/mL Normal Urogenital Kath

## 2023-02-24 NOTE — DIETITIAN INITIAL EVALUATION ADULT - NSFNSGIASSESSMENTFT_GEN_A_CORE
pt c/o nausea throughout this admit; episode of emesis today. Last BM 2/24; loose BMs. On colonoscopy prep at this time. c/o abd discomfort.

## 2023-02-24 NOTE — DIETITIAN INITIAL EVALUATION ADULT - PERTINENT LABORATORY DATA
02-24    139  |  100  |  9<L>  ----------------------------<  125<H>  3.7   |  25  |  0.5<L>    Ca    8.4      24 Feb 2023 07:37  Phos  3.4     02-23  Mg     1.7     02-23    POCT Blood Glucose.: 120 mg/dL (02-24-23 @ 11:11)  A1C with Estimated Average Glucose Result: 7.3 % (02-22-23 @ 08:28)

## 2023-02-24 NOTE — DIETITIAN INITIAL EVALUATION ADULT - ORAL INTAKE PTA/DIET HISTORY
Reports poor appetite & po intake over past 5 months in setting of poor appetite & increased abd discomfort. Reportedly has been following a regular diet with no dietary restrictions reported. No supplements reported. NKFA. No food preferences reported. No dietary restrictions related to culture/Mu-ism. UBW reported to be 59.1 kg. Suspects unintentional wt loss d/t poor appetite. Current wt noted to be 59 kg; no significant wt change observed at this time. <75% energy intake >3 months.

## 2023-02-24 NOTE — DIETITIAN NUTRITION RISK NOTIFICATION - TREATMENT: THE FOLLOWING DIET HAS BEEN RECOMMENDED
Diet, NPO after Midnight:      NPO Start Date: 23-Feb-2023,   NPO Start Time: 23:59 (02-23-23 @ 13:06) [Active]  Diet, Clear Liquid (02-22-23 @ 11:14) [Active]

## 2023-02-24 NOTE — PROGRESS NOTE ADULT - ASSESSMENT
82yF w/ PMHx of HTN, DM, HLD, h/o  (50yrs ago), recent dx of pancreatic adenocarcinoma (23) presents with vague intermittent lower abdominal pain. Patient was diagnosed with diverticulitis recently and has been on antibiotics for the last week. Denies fevers/chills or n/v. Last BM was yesterday (small). Patient reports decreased PO intake over the last couple months 2/2 decrease appetite. Patient currently following Dr. De Santiago and Dr. Mead.  Outpatient PET scan (23) shows 2 left basilar nodular opacities; Rt pelvic pericolonic soft tissue mass; severe uptake in sigmoid colon). Last c-scope was 10/2022 - patient reports polypectomy and suspicious sigmoid lesion.. Physical exam findings, imaging, and labs as documented above.     PLAN:  #Sigmoid Diverticulitis, possible mass  -Colonoscopy in AM, f/u prep and BM   - Monitor I/O   - IV abx (Cefepime / Flagyl) continue for now, plan for Ertapenem on discharge  -Patient to follow up with Heme Onc outpatient  -Patient potentially to have IR procedure as outpatient   - Pain control: APAP    #HTN  #Atorvastatin   - HCTZ 12.5mg   - Atorvastatin 20mg    #DM   - ISS   - Monitor FS    #Other  Diet: CLD  GI ppx: IV protonix 40  DVT ppx: lovenox 40 q24  Dispo: pending    [facility]

## 2023-02-24 NOTE — DIETITIAN INITIAL EVALUATION ADULT - NUTRITIONGOAL OUTCOME2
Diet advanced to solids as soon as medically feasible; pt to demonstrate tolerance to diet advance, with at least 75% po intake observed over next 3 days. Pt at high nutrition risk. RD to follow-up in 3 days.    Monitor: Skin, labs, BM, wt, nutrition focused physical findings, body composition, diet order, GI.

## 2023-02-24 NOTE — DIETITIAN INITIAL EVALUATION ADULT - OTHER INFO
Pertinent Medical Information: Pt presents with vague intermittent lower abdominal pain. Noted pt was diagnosed with diverticulitis recently and has been on antibiotics for the last week. Per H&P, pt reports decreased PO intake over the last couple months 2/2 decrease appetite. Sigmoid Diverticulitis, possible mass noted.    Pt getting prep'd for colonoscopy tomorrow.    PMH includes HTN, DM, HLD, recent dx of pancreatic adenocarcinoma (2/13/23).

## 2023-02-24 NOTE — DIETITIAN INITIAL EVALUATION ADULT - SIGNS/SYMPTOMS
pt NPO/clear liquids x3 days this admit <75% energy intake >3 months & severe muscle wasting to clavicles

## 2023-02-24 NOTE — DIETITIAN INITIAL EVALUATION ADULT - PERTINENT MEDS FT
MEDICATIONS  (STANDING):  atorvastatin 20 milliGRAM(s) Oral at bedtime  cefepime   IVPB 1000 milliGRAM(s) IV Intermittent every 8 hours  cefepime   IVPB      dextrose 5% + sodium chloride 0.45%. 1000 milliLiter(s) (40 mL/Hr) IV Continuous <Continuous>  dextrose 5%. 1000 milliLiter(s) (50 mL/Hr) IV Continuous <Continuous>  dextrose 5%. 1000 milliLiter(s) (100 mL/Hr) IV Continuous <Continuous>  dextrose 50% Injectable 25 Gram(s) IV Push once  dextrose 50% Injectable 12.5 Gram(s) IV Push once  dextrose 50% Injectable 25 Gram(s) IV Push once  enoxaparin Injectable 40 milliGRAM(s) SubCutaneous every 24 hours  glucagon  Injectable 1 milliGRAM(s) IntraMuscular once  hydrochlorothiazide 12.5 milliGRAM(s) Oral daily  insulin lispro (ADMELOG) corrective regimen sliding scale   SubCutaneous three times a day before meals  lactated ringers. 1000 milliLiter(s) (100 mL/Hr) IV Continuous <Continuous>  metroNIDAZOLE  IVPB      metroNIDAZOLE  IVPB 500 milliGRAM(s) IV Intermittent every 8 hours  pantoprazole    Tablet 40 milliGRAM(s) Oral before breakfast    MEDICATIONS  (PRN):  acetaminophen     Tablet .. 650 milliGRAM(s) Oral every 6 hours PRN Temp greater or equal to 38C (100.4F), Mild Pain (1 - 3)  dextrose Oral Gel 15 Gram(s) Oral once PRN Blood Glucose LESS THAN 70 milliGRAM(s)/deciliter  ondansetron Injectable 4 milliGRAM(s) IV Push every 6 hours PRN Nausea and/or Vomiting

## 2023-02-24 NOTE — DIETITIAN INITIAL EVALUATION ADULT - ETIOLOGY
poor appetite & po intake over past 5 months in setting of poor appetite & increased abd discomfort intermittent lower abdominal pain pending colonoscopy

## 2023-02-25 NOTE — PROGRESS NOTE ADULT - ASSESSMENT
A/P: 82yF w/ PMH of HTN, DM, HLD, h/o  (50yrs ago), recent dx of pancreatic adenocarcinoma (23) presents with vague intermittent lower abdominal pain. Patient was diagnosed with diverticulitis recently and has been on antibiotics for the last week. Denies fevers/chills or n/v. Last BM was yesterday (small). Patient reports decreased PO intake over the last couple months 2/2 decrease appetite. Patient currently following Dr. De Santiago and Dr. Mead.  Outpatient PET scan (23) shows 2 left basilar nodular opacities; Rt pelvic pericolonic soft tissue mass; severe uptake in sigmoid colon). Last c-scope was 10/2022 - patient reports polypectomy and suspicious sigmoid lesion.    PLAN:  #Sigmoid Diverticulitis, possible mass  -Colonoscopy failed yesterday due to poor prep. slow prep with Golytely over weekend and reattempt Monday.   - Monitor I/O   - IV abx (Cefepime / Flagyl) continue for now, plan for Ertapenem on discharge  -Patient to follow up with Heme Onc outpatient  -Patient potentially to have IR procedure as outpatient   - Pain control: APAP    #HTN  #Atorvastatin   - HCTZ 12.5mg   - Atorvastatin 20mg    #DM   - ISS   - Monitor FS    #Other  Diet: CLD  GI ppx: IV protonix 40  DVT ppx: lovenox 40 q24  Dispo: pending    [facility]

## 2023-02-25 NOTE — PROGRESS NOTE ADULT - SUBJECTIVE AND OBJECTIVE BOX
GENERAL SURGERY PROGRESS NOTE     DELORIS NORRIS  82y  Female  Hospital day :4d    OVERNIGHT EVENTS: none    s/p attempted colonoscopy yesterday however poor prep therefore procedure aborted. 2 day Golytely prep over the weekend and NPO after midnight tomorrow for reattempt on Monday.     T(F): 97.8 (02-25-23 @ 07:24), Max: 98.9 (02-24-23 @ 15:54)  HR: 77 (02-25-23 @ 07:24) (69 - 81)  BP: 144/67 (02-25-23 @ 07:24) (94/51 - 144/67)  ABP: --  ABP(mean): --  RR: 18 (02-25-23 @ 07:24) (17 - 20)  SpO2: 96% (02-24-23 @ 17:30) (95% - 97%)    DIET/FLUIDS: dextrose 5% + sodium chloride 0.45%. 1000 milliLiter(s) IV Continuous <Continuous>  dextrose 5%. 1000 milliLiter(s) IV Continuous <Continuous>  dextrose 5%. 1000 milliLiter(s) IV Continuous <Continuous>     GI proph:  pantoprazole    Tablet 40 milliGRAM(s) Oral before breakfast    AC/ proph: enoxaparin Injectable 40 milliGRAM(s) SubCutaneous every 24 hours    ABx: cefepime   IVPB 1000 milliGRAM(s) IV Intermittent every 8 hours  cefepime   IVPB      metroNIDAZOLE  IVPB      metroNIDAZOLE  IVPB 500 milliGRAM(s) IV Intermittent every 8 hours    PHYSICAL EXAM:  GENERAL: NAD, well-appearing  CHEST/LUNG: Clear to auscultation bilaterally  HEART: Regular rate and rhythm  ABDOMEN: Soft, Nontender, Nondistended;   EXTREMITIES:  No clubbing, cyanosis, or edema    LABS    CAPILLARY BLOOD GLUCOSE  POCT Blood Glucose.: 152 mg/dL (25 Feb 2023 07:16)  POCT Blood Glucose.: 134 mg/dL (24 Feb 2023 21:13)  POCT Blood Glucose.: 120 mg/dL (24 Feb 2023 11:11)                        10.2   15.17 )-----------( 347      ( 24 Feb 2023 21:35 )             31.3       Auto Neutrophil %: 76.6 % (02-24-23 @ 21:35)  Auto Immature Granulocyte %: 0.4 % (02-24-23 @ 21:35)    02-24    139  |  99  |  8<L>  ----------------------------<  130<H>  3.8   |  26  |  0.6<L>    Calcium, Total Serum: 8.2 mg/dL (02-24-23 @ 21:35)    Coags:     17.90  ----< 1.55    ( 23 Feb 2023 21:29 )     33.2      Serum Pro-Brain Natriuretic Peptide: 780 pg/mL (02-21-23 @ 12:44)    RADIOLOGY & ADDITIONAL TESTS: nothing new

## 2023-02-26 NOTE — PROGRESS NOTE ADULT - SUBJECTIVE AND OBJECTIVE BOX
SURGERY PROGRESS NOTE     Patient: DELORIS NORRIS , 82y ([birth year])Female   MRN: [id number]  Location: [address]  Visit: 23 Inpatient  Date: 23 @ 01:54       Events of past 24 hours:  Patient seen resting comfortably in bed. No acute events overnight. Hemodynamically stable. Patient unable to tolerate golytely bowel prep. Per GI, can try miralax bowel prep x2 days with plan of colonoscopy on Tuesday. Patient had bowel movement and passed flatus today    PAST MEDICAL & SURGICAL HISTORY:  HTN (hypertension)      Diabetes      Hypercholesteremia      History of            Vitals:   T(F): 97.5 (23 @ 00:19), Max: 98.6 (23 @ 15:50)  HR: 74 (23 @ 00:19)  BP: 160/70 (23 @ 00:19)  RR: 18 (23 @ 00:19)  SpO2: 98% (23 @ 00:19)      Diet, Clear Liquid      Fluids:     I & O's:    23 @ 07:01  -  23 @ 07:00  --------------------------------------------------------  IN:    Oral Fluid: 361 mL  Total IN: 361 mL    OUT:  Total OUT: 0 mL    Total NET: 361 mL           PHYSICAL EXAM:   General: NAD, AAOx3, calm and cooperative  Cardiac: RRR S1, S2  Respiratory: CTAB, normal respiratory effort  Abdomen: Soft, non-distended, non-tender, no rebound, no guarding. Minimally tender LLQ  Vascular: Pulses 2+ throughout, extremities well perfused  Skin: Warm/dry, normal color, no jaundice    MEDICATIONS  (STANDING):  atorvastatin 20 milliGRAM(s) Oral at bedtime  cefepime   IVPB 1000 milliGRAM(s) IV Intermittent every 8 hours  cefepime   IVPB      dextrose 5% + sodium chloride 0.45%. 1000 milliLiter(s) (40 mL/Hr) IV Continuous <Continuous>  dextrose 5%. 1000 milliLiter(s) (50 mL/Hr) IV Continuous <Continuous>  dextrose 5%. 1000 milliLiter(s) (100 mL/Hr) IV Continuous <Continuous>  dextrose 50% Injectable 25 Gram(s) IV Push once  dextrose 50% Injectable 12.5 Gram(s) IV Push once  dextrose 50% Injectable 25 Gram(s) IV Push once  enoxaparin Injectable 40 milliGRAM(s) SubCutaneous every 24 hours  glucagon  Injectable 1 milliGRAM(s) IntraMuscular once  hydrochlorothiazide 12.5 milliGRAM(s) Oral daily  insulin lispro (ADMELOG) corrective regimen sliding scale   SubCutaneous three times a day before meals  metroNIDAZOLE  IVPB      metroNIDAZOLE  IVPB 500 milliGRAM(s) IV Intermittent every 8 hours  pantoprazole    Tablet 40 milliGRAM(s) Oral before breakfast    MEDICATIONS  (PRN):  acetaminophen     Tablet .. 650 milliGRAM(s) Oral every 6 hours PRN Temp greater or equal to 38C (100.4F), Mild Pain (1 - 3)  dextrose Oral Gel 15 Gram(s) Oral once PRN Blood Glucose LESS THAN 70 milliGRAM(s)/deciliter  ondansetron Injectable 4 milliGRAM(s) IV Push every 6 hours PRN Nausea and/or Vomiting      DVT PROPHYLAXIS: enoxaparin Injectable 40 milliGRAM(s) SubCutaneous every 24 hours    GI PROPHYLAXIS: pantoprazole    Tablet 40 milliGRAM(s) Oral before breakfast    ANTICOAGULATION:   ANTIBIOTICS:  cefepime   IVPB 1000 milliGRAM(s)  cefepime   IVPB    metroNIDAZOLE  IVPB    metroNIDAZOLE  IVPB 500 milliGRAM(s)            LAB/STUDIES:  Labs:  CAPILLARY BLOOD GLUCOSE      POCT Blood Glucose.: 109 mg/dL (2023 21:26)  POCT Blood Glucose.: 167 mg/dL (2023 17:12)  POCT Blood Glucose.: 130 mg/dL (2023 11:08)  POCT Blood Glucose.: 152 mg/dL (2023 07:16)                          10.2   15.17 )-----------( 347      ( 2023 21:35 )             31.3         02-    139  |  99  |  8<L>  ----------------------------<  130<H>  3.8   |  26  |  0.6<L>          LFTs:         Coags:        Serum Pro-Brain Natriuretic Peptide: 780 pg/mL (23 @ 12:44)

## 2023-02-26 NOTE — PROGRESS NOTE ADULT - ASSESSMENT
A/P: 82yF w/ PMH of HTN, DM, HLD, h/o  (50yrs ago), recent dx of pancreatic adenocarcinoma (23) presents with vague intermittent lower abdominal pain. Patient was diagnosed with diverticulitis recently and has been on antibiotics for the last week. Denies fevers/chills or n/v. Last BM was yesterday (small). Patient reports decreased PO intake over the last couple months 2/2 decrease appetite. Patient currently following Dr. De Santiago and Dr. Mead.  Outpatient PET scan (23) shows 2 left basilar nodular opacities; Rt pelvic pericolonic soft tissue mass; severe uptake in sigmoid colon). Last c-scope was 10/2022 - patient reports polypectomy and suspicious sigmoid lesion.    PLAN:  #Sigmoid Diverticulitis, possible mass  -Colonoscopy failed yesterday due to poor prep.   - miralax bowel prep x2 days with plan of colonoscopy on Tuesday   - Monitor I/O   - IV abx (Cefepime / Flagyl) continue for now, plan for Ertapenem on discharge  -Patient to follow up with Heme Onc outpatient  -Patient potentially to have IR procedure as outpatient   - Pain control: APAP    #HTN  #Atorvastatin   - HCTZ 12.5mg   - Atorvastatin 20mg    #DM   - ISS   - Monitor FS    #Other  Diet: CLD  GI ppx: IV protonix 40  DVT ppx: lovenox 40 q24  Dispo: pending    [facility]

## 2023-02-27 NOTE — PROGRESS NOTE ADULT - ASSESSMENT
82yF w/ PMH of HTN, DM, HLD, h/o  (50yrs ago), recent dx of pancreatic adenocarcinoma (23) presents with vague intermittent lower abdominal pain. Patient was diagnosed with diverticulitis recently and has been on antibiotics for the last week. Denies fevers/chills or n/v. Last BM was yesterday (small). Patient reports decreased PO intake over the last couple months 2/2 decrease appetite. Patient currently following Dr. D eSantiago and Dr. Mead.  Outpatient PET scan (23) shows 2 left basilar nodular opacities; Rt pelvic pericolonic soft tissue mass; severe uptake in sigmoid colon). Last c-scope was 10/2022 - patient reports polypectomy and suspicious sigmoid lesion.    PLAN:  #Sigmoid Diverticulitis, possible mass  - miralax bowel prep x2 days with plan of colonoscopy on  or    - Monitor I/O   - IV abx (Cefepime / Flagyl) continue for now, plan for Ertapenem on discharge  -Patient to follow up with Heme Onc outpatient  -Patient potentially to have IR procedure as outpatient   - Pain control: APAP    #HTN  #Atorvastatin   - HCTZ 12.5mg   - Atorvastatin 20mg    #DM   - ISS   - Monitor FS    #Other  Diet: NPO/IVG  GI ppx: IV protonix 40  DVT ppx: lovenox 40 q24  Dispo: pending    [facility]  Spectra [phone]

## 2023-02-27 NOTE — PROGRESS NOTE ADULT - ATTENDING COMMENTS
Patient seen and examined during the GI advanced endoscopy rounds with GI PA and Fellow, case discussed and reviewed during rounds and with primary team, assessment and plan above. Would hold off on in patient scope with outpatient Colonoscopy in around 2-3 weeks
as above. Miralax bowel prep and possible scope tomorrow vs Tuesday. follow up GI recs.
cont iv abx, waiting for colonoscopy
as above. Pending repeat c-scope. She was unable to tolerate golytlely. will reach out to GI is mag citrate is okay for an alternative.
waiting for sigmoidoscopy, discussed at GI tumor board and consensus to attempt iv abx course for 2 weeks and possible surgical intervention if failed, need midline catheter
continue iv abx, declined colonoscopy which is risky for her currently inflammatory process, possible DC to NH with long abx course x 2 weeks and f/u as o/p
I edited the note.   Time-based billing (NON-critical care).   35 minutes spent on total encounter; more than 50% of the visit was spent counseling and / or coordinating care by the attending physician.  The necessity of the time spent during the encounter on this date of service was due to: Coordination of care.

## 2023-02-27 NOTE — PROGRESS NOTE ADULT - SUBJECTIVE AND OBJECTIVE BOX
Gastroenterology progress note:     Patient is a 82y old  Female who presents with a chief complaint of Diverticulitis of intestine without perforation or abscess without bleeding    Malignant neoplasm of pancreas     (2023 17:25)       Admitted on: 23    We are following the patient for: complicatd diverticulitis       Interval History:    No acute events overnight.   - Diet - tolerating clears  - last BM - small soft bm 1 day ago  - Abdominal pain - 4/10 LLQ pain      PAST MEDICAL & SURGICAL HISTORY:  HTN (hypertension)      Diabetes      Hypercholesteremia      History of           MEDICATIONS  (STANDING):  atorvastatin 20 milliGRAM(s) Oral at bedtime  cefepime   IVPB      cefepime   IVPB 1000 milliGRAM(s) IV Intermittent every 8 hours  dextrose 5% + sodium chloride 0.45%. 1000 milliLiter(s) (100 mL/Hr) IV Continuous <Continuous>  dextrose 5%. 1000 milliLiter(s) (100 mL/Hr) IV Continuous <Continuous>  dextrose 5%. 1000 milliLiter(s) (50 mL/Hr) IV Continuous <Continuous>  dextrose 50% Injectable 25 Gram(s) IV Push once  dextrose 50% Injectable 12.5 Gram(s) IV Push once  dextrose 50% Injectable 25 Gram(s) IV Push once  enoxaparin Injectable 40 milliGRAM(s) SubCutaneous every 24 hours  glucagon  Injectable 1 milliGRAM(s) IntraMuscular once  hydrochlorothiazide 12.5 milliGRAM(s) Oral daily  insulin lispro (ADMELOG) corrective regimen sliding scale   SubCutaneous three times a day before meals  metroNIDAZOLE  IVPB 500 milliGRAM(s) IV Intermittent every 8 hours  metroNIDAZOLE  IVPB      pantoprazole    Tablet 40 milliGRAM(s) Oral before breakfast    MEDICATIONS  (PRN):  acetaminophen     Tablet .. 650 milliGRAM(s) Oral every 6 hours PRN Temp greater or equal to 38C (100.4F), Mild Pain (1 - 3)  dextrose Oral Gel 15 Gram(s) Oral once PRN Blood Glucose LESS THAN 70 milliGRAM(s)/deciliter  ondansetron Injectable 4 milliGRAM(s) IV Push every 6 hours PRN Nausea and/or Vomiting      Allergies  No Known Allergies      Review of Systems:   Cardiovascular:  No Chest Pain, No Palpitations  Respiratory:  No Cough, No Dyspnea  Gastrointestinal:  As described in HPI  Skin:  No Skin Lesions, No Jaundice  Neuro:  No Syncope, No Dizziness    Physical Examination:  T(C): 35.9 (23 @ 08:09), Max: 36.9 (23 @ 15:45)  HR: 76 (23 @ 08:09) (70 - 76)  BP: 138/64 (23 @ 08:09) (138/64 - 148/68)  RR: 18 (23 @ 08:09) (18 - 18)  SpO2: 96% (23 @ 15:45) (96% - 96%)      23 @ 07:01  -  23 @ 07:00  --------------------------------------------------------  IN: 1320 mL / OUT: 0 mL / NET: 1320 mL        GENERAL: AAOx3, no acute distress.  HEAD:  Atraumatic, Normocephalic  EYES: conjunctiva and sclera clear  NECK: Supple, no JVD or thyromegaly  CHEST/LUNG: Clear to auscultation bilaterally; No wheeze, rhonchi, or rales  HEART: Regular rate and rhythm; normal S1, S2, No murmurs.  ABDOMEN: Soft, LLQ tenderness to SF palpation, nondistended; Bowel sounds present  NEUROLOGY: No asterixis or tremor.      Data:                        10.1   12.75 )-----------( 294      ( 2023 23:16 )             30.8     Hgb trend:  10.1  23 @ 23:16  10.3  23 @ 08:16  10.2  23 @ 21:35            140  |  101  |  6<L>  ----------------------------<  141<H>  3.3<L>   |  26  |  0.6<L>    Ca    8.3<L>      2023 23:16  Phos  3.0       Mg     1.7           Liver panel trend:  TBili 0.5   /   AST 12   /   ALT 7   /   AlkP 111   /   Tptn 6.4   /   Alb 3.7    /   DBili 0.2      02-      PT/INR - ( 2023 23:16 )   PT: 20.80 sec;   INR: 1.79 ratio         PTT - ( 2023 23:16 )  PTT:38.8 sec       Radiology:

## 2023-02-27 NOTE — PROGRESS NOTE ADULT - SUBJECTIVE AND OBJECTIVE BOX
GENERAL SURGERY PROGRESS NOTE    Patient: DELORIS NORRIS , 82y ([birth year])Female   MRN: [id number]  Location: [address]  Visit: 23 Inpatient  Date: 23 @ 01:04    Events of past 24 hours:  NAEON  Miralax and gatorade for bowel prep. Endorsing flatus. Last BM .  Unable to tolerate golytely.  Minimal nausea, denies emesis.  Ambulating. Voiding.    PAST MEDICAL & SURGICAL HISTORY:  HTN (hypertension)    Diabetes    Hypercholesteremia    History of       Vitals:   T(F): 98.2 (23 @ 23:28), Max: 98.5 (23 @ 15:45)  HR: 70 (23 @ 23:28)  BP: 142/68 (23 @ 23:28)  RR: 18 (23 @ 23:28)  SpO2: 96% (23 @ 15:45)      Diet, NPO      Fluids:     I & O's:    23 @ 07:01  -  23 @ 07:00  --------------------------------------------------------  IN:    dextrose 5% + sodium chloride 0.45%: 480 mL    Oral Fluid: 720 mL  Total IN: 1200 mL    OUT:  Total OUT: 0 mL    Total NET: 1200 mL    PHYSICAL EXAM:  General: NAD, calm and cooperative.  Cardiac: RRR.  Respiratory: On RA. Speaks in complete sentences. No accessory muscles of respiration in use. Bilateral chest rise.  Abdomen: Soft, non-distended, non-tender, no rebound, no guarding.   Neuro: Moves all extremities.  Vascular: Pulses 2+ throughout, extremities well perfused.  Skin: Warm/dry, normal color, no jaundice.      MEDICATIONS  (STANDING):  atorvastatin 20 milliGRAM(s) Oral at bedtime  cefepime   IVPB      cefepime   IVPB 1000 milliGRAM(s) IV Intermittent every 8 hours  dextrose 5% + sodium chloride 0.45%. 1000 milliLiter(s) (100 mL/Hr) IV Continuous <Continuous>  dextrose 5%. 1000 milliLiter(s) (50 mL/Hr) IV Continuous <Continuous>  dextrose 5%. 1000 milliLiter(s) (100 mL/Hr) IV Continuous <Continuous>  dextrose 50% Injectable 25 Gram(s) IV Push once  dextrose 50% Injectable 12.5 Gram(s) IV Push once  dextrose 50% Injectable 25 Gram(s) IV Push once  enoxaparin Injectable 40 milliGRAM(s) SubCutaneous every 24 hours  glucagon  Injectable 1 milliGRAM(s) IntraMuscular once  hydrochlorothiazide 12.5 milliGRAM(s) Oral daily  insulin lispro (ADMELOG) corrective regimen sliding scale   SubCutaneous three times a day before meals  metroNIDAZOLE  IVPB      metroNIDAZOLE  IVPB 500 milliGRAM(s) IV Intermittent every 8 hours  pantoprazole    Tablet 40 milliGRAM(s) Oral before breakfast  polyethylene glycol 3350 17 Gram(s) Oral two times a day    MEDICATIONS  (PRN):  acetaminophen     Tablet .. 650 milliGRAM(s) Oral every 6 hours PRN Temp greater or equal to 38C (100.4F), Mild Pain (1 - 3)  dextrose Oral Gel 15 Gram(s) Oral once PRN Blood Glucose LESS THAN 70 milliGRAM(s)/deciliter  ondansetron Injectable 4 milliGRAM(s) IV Push every 6 hours PRN Nausea and/or Vomiting      DVT PROPHYLAXIS: enoxaparin Injectable 40 milliGRAM(s) SubCutaneous every 24 hours    GI PROPHYLAXIS: pantoprazole    Tablet 40 milliGRAM(s) Oral before breakfast    ANTICOAGULATION:   ANTIBIOTICS:  cefepime   IVPB    cefepime   IVPB 1000 milliGRAM(s)  metroNIDAZOLE  IVPB    metroNIDAZOLE  IVPB 500 milliGRAM(s)      LAB/STUDIES:  Labs:  CAPILLARY BLOOD GLUCOSE      POCT Blood Glucose.: 131 mg/dL (2023 21:21)  POCT Blood Glucose.: 137 mg/dL (2023 16:20)  POCT Blood Glucose.: 130 mg/dL (2023 11:36)  POCT Blood Glucose.: 132 mg/dL (2023 08:15)                          10.1   12.75 )-----------( 294      ( 2023 23:16 )             30.8       Auto Neutrophil %: 71.6 % (23 @ 23:16)  Auto Immature Granulocyte %: 0.5 % (23 @ 23:16)  Auto Neutrophil %: 69.9 % (23 @ 08:16)  Auto Immature Granulocyte %: 0.5 % (23 @ 08:16)        140  |  101  |  6<L>  ----------------------------<  141<H>  3.3<L>   |  26  |  0.6<L>      Calcium, Total Serum: 8.3 mg/dL (23 @ 23:16)      LFTs:      Coags:     20.80  ----< 1.79    ( 2023 23:16 )     38.8        Serum Pro-Brain Natriuretic Peptide: 780 pg/mL (23 @ 12:44)      IMAGING:  No new imaging.

## 2023-02-27 NOTE — PROGRESS NOTE ADULT - ASSESSMENT
82 year old female with PMHx of HTN, DM, HLD, recent dx of pancreatic adenocarcinoma (2/13/23) presents with vague intermittent lower abdominal pain, evaluated for diverticulitis.     #Complicated Diverticulitis with collection: r/o underlying neoplastic process  #Chronic constipation  - CTAP 2/21: Since February 8, 2023, no significant change in moderate to severe mural thickening involving a short segment of sigmoid colon, with pericolonic fat stranding and overall unchanged 4.1 cm heterogeneous right pericolonic mixed density structure, possibly reflecting phlegmon, however underlying neoplastic process is a possibility; slightly   increased moderate right-sided hydronephroureter with associated delayed right nephrogram, extending to the level of the right pelvic inflammatory process.  - recent colonoscopy in 9/2022 with Dr. Vaca: sessile polyp in hepatic flexture ( fragments of tubular adenoma, no evidence of high grade dysplasia or malignancy). sessile polyp in rectum (changes consistent with transitional serrated adenoma, no evidence of high grade dysplasia or malignancy). Both removed. Diverticulosis, hemorrhoids.   - PET 2/16: FDG avid pancreatic body mass, SUV max 7.6 consistent with biopsy-proven malignancy. 2 left basilar nodular opacities are FDG avid, SUV max 3.0, measures up to 2.7cm, unchanged since recent chest CT, indeterminate for biologic tumor activity. Right upper lobe groundglass opacity is FDG avid on nonattenuation corrected images, suspicious for biologic tumor activity (adenocarcinoma). FDG avid right pelvic pericolonic soft tissue, SUV max 15.0 is anatomically unchanged since prior CT on 2/8/2023. Differential diagnosis includes a contained perforation versus colo-ovarian fistula. Stable chronic moderate right hydroureteronephrosis secondary to mass effect from the right pelvic soft tissue. Severe tracer uptake is seen in the sigmoid colon, associated with diverticulosis difficult to accurately evaluate. This could all be benign/inflammatory in etiology given recent negative colonoscopy reported in October 2022.  - CEA (24.4)      Plan  - complete course of antibiotics per ID  - IR: no intervention at this time  - 4.1 cm hyperdensity possibly reflecting phlegmon, but cannot r/o neoplastic process with moderate to severe mural thickening in short segment in sigmoid colon  - IR evaluated the patient and not amenable for drainage  - Full liquid diet. If pt begins to have daily bms can advance diet as tolerated  - miralax bid   - Will hold off colonoscopy for now. Will plan for elective outpatient colonoscopy in 1-2 weeks. Risks would outweigh the benefits in setting of management of active diverticulitis

## 2023-02-27 NOTE — PROGRESS NOTE ADULT - NUTRITIONAL ASSESSMENT
This patient has been assessed with a concern for Malnutrition and has been determined to have a diagnosis/diagnoses of Severe protein-calorie malnutrition.    This patient is being managed with:   Diet Clear Liquid-  Entered: Feb 22 2023 11:14AM    
This patient has been assessed with a concern for Malnutrition and has been determined to have a diagnosis/diagnoses of Severe protein-calorie malnutrition.    This patient is being managed with:   Diet NPO-  Entered: Feb 27 2023 12:59AM

## 2023-02-28 NOTE — PHYSICAL THERAPY INITIAL EVALUATION ADULT - ADDITIONAL COMMENTS
Patient lives alone in house with 6 steps to enter. Was independent in ADL's and ambulation using cane occasionally.

## 2023-02-28 NOTE — PROGRESS NOTE ADULT - PROVIDER SPECIALTY LIST ADULT
Intervent Radiology
Surgery
Gastroenterology
Gastroenterology
Surgery
Infectious Disease

## 2023-02-28 NOTE — DISCHARGE NOTE PROVIDER - HOSPITAL COURSE
2/21/23  82 year old female with PMHx of HTN, DM, HLD, recent dx of pancreatic adenocarcinoma (2/13/23) presents with vague intermittent lower abdominal pain. Patient was diagnosed with diverticulitis recently and has been on antibiotics for the last week. Denies fevers/chills or n/v. Last BM was yesterday (small). Patient reports decreased PO intake over the last couple months 2/2 decrease appetite. Patient currently following Dr. De Santiago and Dr. Mead.  Outpatient PET scan (2/16/23) shows 2 left basilar nodular opacities; Rt pelvic pericolonic soft tissue mass; severe uptake in sigmoid colon). Last c-scope was 10/2022 - patient reports polypectomy and suspicious sigmoid lesion. Denies CP, SOB, dysuria, hematochezia, weakness/numbness.     CT scan was obtained which demonstrated moderate to severe mural thickening involving a short segment of sigmoid colon, with pericolonic fat stranding and overall unchanged 4.1 cm heterogeneous right pericolonic mixed density structure, possibly reflecting phlegmon, suspicious for acute diverticulitis. Additionally, slightly increased moderate right-sided hydronephroureter with associated delayed right nephrogram, extending to the level of the right pelvic inflammatory process.    Patient was treated with IV antibiotics and worked up by GI who attempted a colonoscopy to assess the possible mass in the colon. However, patient was unable to tolerate proper bowel prep and GI encountered a large stool burden when attempting to pass the scope through. The GI team decided to defer the colonoscopy to outpatient, after resolution of her diverticulitis.    Patient progressed well on the floor and is tolerating a regular, soft diet. She is having flatus and is ambulating without difficulty. She will be discharged to SNF to receive IV abx infusion as well as continued PT treatment.      2/21/23  82 year old female with PMHx of HTN, DM, HLD, recent dx of pancreatic adenocarcinoma (2/13/23) presents with vague intermittent lower abdominal pain. Patient was diagnosed with diverticulitis recently and has been on antibiotics for the last week. Denies fevers/chills or n/v. Last BM was yesterday (small). Patient reports decreased PO intake over the last couple months 2/2 decrease appetite. Patient currently following Dr. De Santiago and Dr. Mead.  Outpatient PET scan (2/16/23) shows 2 left basilar nodular opacities; Rt pelvic pericolonic soft tissue mass; severe uptake in sigmoid colon). Last c-scope was 10/2022 - patient reports polypectomy and suspicious sigmoid lesion. Denies CP, SOB, dysuria, hematochezia, weakness/numbness.     CT scan was obtained which demonstrated moderate to severe mural thickening involving a short segment of sigmoid colon, with pericolonic fat stranding and overall unchanged 4.1 cm heterogeneous right pericolonic mixed density structure, possibly reflecting phlegmon, suspicious for acute diverticulitis. Additionally, slightly increased moderate right-sided hydronephroureter with associated delayed right nephrogram, extending to the level of the right pelvic inflammatory process.    Patient was treated with IV antibiotics and worked up by GI who attempted a colonoscopy to assess the possible mass in the colon. However, patient was unable to tolerate proper bowel prep and GI encountered a large stool burden when attempting to pass the scope through. The GI team decided to defer the colonoscopy to outpatient, after resolution of her diverticulitis.    Patient progressed well on the floor and is tolerating a regular, soft diet. She is having flatus and is ambulating without difficulty. She will be discharged to SNF to receive IV abx infusion as well as continued PT treatment. discharge > 30 minutes

## 2023-02-28 NOTE — DISCHARGE NOTE PROVIDER - NSDCFUSCHEDAPPT_GEN_ALL_CORE_FT
Vic Godoy  [facility]  GASTRO Doc Off [phone]  Scheduled Appointment: 03/15/2023

## 2023-02-28 NOTE — DISCHARGE NOTE PROVIDER - DETAILS OF MALNUTRITION DIAGNOSIS/DIAGNOSES
This patient has been assessed with a concern for Malnutrition and was treated during this hospitalization for the following Nutrition diagnosis/diagnoses:     -  02/24/2023: Severe protein-calorie malnutrition

## 2023-02-28 NOTE — PHYSICAL THERAPY INITIAL EVALUATION ADULT - ASSISTIVE DEVICE:SIT/SUPINE, REHAB EVAL
Office Hysteroscopy Procedure Note  [facility]    Pre-operative Diagnosis:  Lost iud strings  Post-operative Diagnosis:  Lost iud strings  Procedure:  Hysteroscopy with IUD removal  Surgeon:  Lisy Garcia DO   Anesthesia:  Paracervical block with 1% lidocaine    Complications: none  Condition:  stable  Technique:  The patient was prepped and draped in the normal sterile fashion for a vaginal procedure. Timeout was performed. A sterile speculum was placed in the patient's vagina with visualization of the entire cervix. Cervix was swabbed with betadine x 3. Paracervical block was administered with a total of 4 mL of 1% lidocaine at 10 and 2 oclock. The   hysteroscope was then inserted through the cervix until the IUD was seen. The iud was grasped with hysteroscopic graspers and the scope was removed. The IUD slipped out of the graspers at this first attempt. The hysteroscope was advanced one more time and the IUD was again grasped and this time removed successfully under direct visualization. The IUD was noted to be complete and intact with strings approximately 0.5 cm long. No bleeding was ntoed. The speculum was removed from the patient's vagina. The patient tolerated the procedure well.   Lisy Garcia DO bed rails

## 2023-02-28 NOTE — DISCHARGE NOTE PROVIDER - PROVIDER TOKENS
PROVIDER:[TOKEN:[[id number]],ESTABLISHEDPATIENT:[T]],PROVIDER:[TOKEN:[[id number]],ESTABLISHEDPATIENT:[T]]

## 2023-02-28 NOTE — DISCHARGE NOTE PROVIDER - NSDCMRMEDTOKEN_GEN_ALL_CORE_FT
acetaminophen 325 mg oral tablet: 2 tab(s) orally every 6 hours, As needed, Temp greater or equal to 38C (100.4F), Mild Pain (1 - 3)  enoxaparin: 40 milligram(s) subcutaneous once a day  hydroCHLOROthiazide 12.5 mg oral capsule: 1 cap(s) orally once a day  metFORMIN 500 mg oral tablet: 1 tab(s) orally 2 times a day  pantoprazole 40 mg oral delayed release tablet: 1 tab(s) orally once a day (before a meal)  polyethylene glycol 3350 oral powder for reconstitution: 17 gram(s) orally 2 times a day  rosuvastatin 5 mg oral tablet: 1 tab(s) orally once a day

## 2023-02-28 NOTE — DISCHARGE NOTE NURSING/CASE MANAGEMENT/SOCIAL WORK - PATIENT PORTAL LINK FT
You can access the FollowMyHealth Patient Portal offered by [facility] by registering at the following website: [email or web address]. By joining [email or web address]’s FollowMyHealth portal, you will also be able to view your health information using other applications (apps) compatible with our system.

## 2023-02-28 NOTE — CHART NOTE - NSCHARTNOTEFT_GEN_A_CORE
GENERAL SURGERY NOTE    Patient will not require chemotherapy during her stay at SNF. She will be receiving IV abx for treatment of acute diverticulitis. Plan will be to start chemotherapy after resolution of her diverticulitis.     Thank you  [facility]  SPECTRA [id number]
Registered Dietitian Follow-Up  Patient Profile Reviewed                           Yes [x]   No []  Nutrition History Previously Obtained        Yes [x]  No []      Pertinent Medical Interventions:  2 year old female with PMHx of HTN, DM, HLD, recent dx of pancreatic adenocarcinoma (2/13/23) presents with vague intermittent lower abdominal pain, evaluated for diverticulitis. Will hold off colonoscopy for now. Will plan for elective outpatient colonoscopy in 1-2 weeks. Risks would outweigh the benefits in setting of management of active diverticulitis.     Nutrition Interval History:   Pt tolerating clear liquid diet. pt is receiving dex 5% running @ 40 ml/hr which provides 163 kcals/day.   Nutrient Intake: Patient meeting <50% of estimated energy needs in-house x 7 days     Diet order:   Diet, Clear Liquid (02-27-23 @ 16:16) [Active]    Anthropometrics:  Height (cm): 162.6 (02-24-23 @ 17:25)  Weight (kg): 58.967 (02-24-23 @ 15:58)  BMI (kg/m2): 22.3 (02-24-23 @ 17:25)  UBW: 59.1 kg per pt report   IBW: 54.5 kg  NO Weight Change    MEDICATIONS  (STANDING):  atorvastatin 20 milliGRAM(s) Oral at bedtime  cefepime   IVPB      cefepime   IVPB 1000 milliGRAM(s) IV Intermittent every 8 hours  dextrose 5% + sodium chloride 0.45%. 1000 milliLiter(s) (100 mL/Hr) IV Continuous <Continuous>  dextrose 5%. 1000 milliLiter(s) (100 mL/Hr) IV Continuous <Continuous>  dextrose 5%. 1000 milliLiter(s) (50 mL/Hr) IV Continuous <Continuous>  dextrose 50% Injectable 25 Gram(s) IV Push once  dextrose 50% Injectable 12.5 Gram(s) IV Push once  dextrose 50% Injectable 25 Gram(s) IV Push once  enoxaparin Injectable 40 milliGRAM(s) SubCutaneous every 24 hours  glucagon  Injectable 1 milliGRAM(s) IntraMuscular once  hydrochlorothiazide 12.5 milliGRAM(s) Oral daily  insulin lispro (ADMELOG) corrective regimen sliding scale   SubCutaneous three times a day before meals  metroNIDAZOLE  IVPB 500 milliGRAM(s) IV Intermittent every 8 hours  metroNIDAZOLE  IVPB      pantoprazole    Tablet 40 milliGRAM(s) Oral before breakfast  polyethylene glycol 3350 17 Gram(s) Oral two times a day  potassium chloride  20 mEq/100 mL IVPB 20 milliEquivalent(s) IV Intermittent once    MEDICATIONS  (PRN):  acetaminophen     Tablet .. 650 milliGRAM(s) Oral every 6 hours PRN Temp greater or equal to 38C (100.4F), Mild Pain (1 - 3)  dextrose Oral Gel 15 Gram(s) Oral once PRN Blood Glucose LESS THAN 70 milliGRAM(s)/deciliter  ondansetron Injectable 4 milliGRAM(s) IV Push every 6 hours PRN Nausea and/or Vomiting    Pertinent Labs: 02-27 @ 11:19: Na 139, BUN 6<L>, Cr 0.6<L>, <H>, K+ 3.7, Phos --, Mg --, Alk Phos --, ALT/SGPT --, AST/SGOT --, HbA1c --  02-26 @ 23:16: Na 140, BUN 6<L>, Cr 0.6<L>, <H>, K+ 3.3<L>, Phos 3.0, Mg 1.7<L>, Alk Phos --, ALT/SGPT --, AST/SGOT --, HbA1c --    Finger Sticks:  POCT Blood Glucose.: 139 mg/dL (02-27 @ 16:07)  POCT Blood Glucose.: 136 mg/dL (02-27 @ 11:56)  POCT Blood Glucose.: 136 mg/dL (02-27 @ 07:32)  POCT Blood Glucose.: 131 mg/dL (02-26 @ 21:21)      Physical Findings:  - Cognition: A&Ox4  - GI function: Last BM 2/25 per flowsheet documentation; small & brown   - Tubes: none   - Oral/Mouth cavity: regular/thins  - Skin: no pressure injuries per documentation   - Edema: no edema per documentation      Nutrition Requirements: with consideration for age, weight, BMI, clinical course, malnutrition  Weight Used: actual wt 59 kg      Estimated Energy Needs    Continue []  Adjust [x] - increased  with consideration for malnutrition   ENERGY: 5095-6921 kcal/day (MSJ x 1.2-1.5 SF)  Estimated Protein Needs    Continue []  Adjust [x] - increased  with consideration for   PROTEIN: 59-89 gm/day  (1.2-1.5 gm/kg)  Estimated Fluid Needs        Continue [x]  Adjust []  FLUID: 1475 ml/day ( 25 ml/kg)    [x] Previous Nutrition Diagnosis:            [x] Ongoing          [] Resolved  #1 malnutrition   Goal/Expected Outcome: Pt to meet >/= 75% estimated needs within 3-5 days. Advance diet as soon as medically feasible.     Nutrition Intervention: Meals and Snacks, Medical Food Supplement, Vitamin Supplement, Nutrition Related Medication, Coordination of Care  Indicator/Monitoring:  Monitor diet order, energy intake, food and nutrient intake, body composition, weight    Recommendations:  - CONTINUE clear liquid diet, as ordered. When medically feasible/tolerated, RECOMMEND to advance to full liquids --> with goal of low fiber/residue diet  - RECOMMEND to consult nutrition support team   - While on clear liquid diet, recommend ensure clear oral supplement 3x/day (provides 180 kcals + 8 gm protein per serving) and gelatein plus sugar free oral supplement 3x/day (provides 80 kcals + 20 gm protein per serving)   - Once diet is able to be advanced, would recommend ensure plus high protein oral supplement 3x/day  (provides 350 kcals + 20 gm protein per serving)   - recommend daily multivitamin with minerals   - continue insulin regimen, adjust prn per team to promote euglycemia  - continue bowel regimen per team     Roberta Sykes, RD #[id number] or via TEAMS  Patient is at HIGH Risk, follow up x 3-5 days
pt tx to pacu vss  report given   no apparent complications of anesthesia

## 2023-02-28 NOTE — DISCHARGE NOTE PROVIDER - CARE PROVIDER_API CALL
Sanjuanita De Santiago)  Complex General Surgical Oncology; Surgery  [address] [facility], [facility], [address]rd [place] [address]  Phone: [phone]  Fax: [phone]  Established Patient  Follow Up Time:     Vic Godoy)  Gastroenterology; Internal Medicine  [address] [place] [address]  Phone: [phone]  Fax: [phone]  Established Patient  Follow Up Time:

## 2023-02-28 NOTE — PROGRESS NOTE ADULT - SUBJECTIVE AND OBJECTIVE BOX
GENERAL SURGERY PROGRESS NOTE    Patient: DELORIS NORRIS , 82y ([birth year])Female   MRN: [id number]  Location: [address]  Visit: 23 Inpatient  Date: 23 @ 01:23    Events of past 24 hours:  Patient seen and examined at bedside  NANCY  Patient hemodynamically stable  Potassium repleted x3 overnight  Patient has been OOB, ambulatory  Patient denies nausea or vomiting  Patient is passing flatus no BM    PAST MEDICAL & SURGICAL HISTORY:  HTN (hypertension)      Diabetes      Hypercholesteremia      History of           Vitals:   T(F): 97.4 (23 @ 00:17), Max: 97.4 (23 @ 15:40)  HR: 67 (23 @ 00:17)  BP: 145/67 (23 @ 00:17)  RR: 18 (23 @ 00:17)  SpO2: 96% (23 @ 00:17)      Diet, Clear Liquid      Fluids:     I & O's:    23 @ 07:01  -  23 @ 07:00  --------------------------------------------------------  IN:    dextrose 5% + sodium chloride 0.45%: 480 mL    Oral Fluid: 840 mL  Total IN: 1320 mL    OUT:  Total OUT: 0 mL    Total NET: 1320 mL      General: NAD, calm and cooperative.  Cardiac: RRR.  Respiratory: On RA. Speaks in complete sentences. No accessory muscles of respiration in use. Bilateral chest rise.  Abdomen: Soft, non-distended, non-tender, no rebound, no guarding.   Neuro: Moves all extremities.  Vascular: Pulses 2+ throughout, extremities well perfused.  Skin: Warm/dry, normal color, no jaundice.    MEDICATIONS  (STANDING):  atorvastatin 20 milliGRAM(s) Oral at bedtime  cefepime   IVPB 1000 milliGRAM(s) IV Intermittent every 8 hours  cefepime   IVPB      dextrose 5% + sodium chloride 0.45%. 1000 milliLiter(s) (100 mL/Hr) IV Continuous <Continuous>  dextrose 5%. 1000 milliLiter(s) (50 mL/Hr) IV Continuous <Continuous>  dextrose 5%. 1000 milliLiter(s) (100 mL/Hr) IV Continuous <Continuous>  dextrose 50% Injectable 25 Gram(s) IV Push once  dextrose 50% Injectable 12.5 Gram(s) IV Push once  dextrose 50% Injectable 25 Gram(s) IV Push once  enoxaparin Injectable 40 milliGRAM(s) SubCutaneous every 24 hours  glucagon  Injectable 1 milliGRAM(s) IntraMuscular once  hydrochlorothiazide 12.5 milliGRAM(s) Oral daily  insulin lispro (ADMELOG) corrective regimen sliding scale   SubCutaneous three times a day before meals  metroNIDAZOLE  IVPB      metroNIDAZOLE  IVPB 500 milliGRAM(s) IV Intermittent every 8 hours  pantoprazole    Tablet 40 milliGRAM(s) Oral before breakfast  polyethylene glycol 3350 17 Gram(s) Oral two times a day  potassium chloride  20 mEq/100 mL IVPB 20 milliEquivalent(s) IV Intermittent every 2 hours    MEDICATIONS  (PRN):  acetaminophen     Tablet .. 650 milliGRAM(s) Oral every 6 hours PRN Temp greater or equal to 38C (100.4F), Mild Pain (1 - 3)  dextrose Oral Gel 15 Gram(s) Oral once PRN Blood Glucose LESS THAN 70 milliGRAM(s)/deciliter  ondansetron Injectable 4 milliGRAM(s) IV Push every 6 hours PRN Nausea and/or Vomiting      DVT PROPHYLAXIS: enoxaparin Injectable 40 milliGRAM(s) SubCutaneous every 24 hours    GI PROPHYLAXIS: pantoprazole    Tablet 40 milliGRAM(s) Oral before breakfast    ANTICOAGULATION:   ANTIBIOTICS:  cefepime   IVPB 1000 milliGRAM(s)  cefepime   IVPB    metroNIDAZOLE  IVPB    metroNIDAZOLE  IVPB 500 milliGRAM(s)            LAB/STUDIES:  Labs:  CAPILLARY BLOOD GLUCOSE      POCT Blood Glucose.: 176 mg/dL (2023 21:12)  POCT Blood Glucose.: 139 mg/dL (2023 16:07)  POCT Blood Glucose.: 136 mg/dL (2023 11:56)  POCT Blood Glucose.: 136 mg/dL (2023 07:32)                          10.5   11.83 )-----------( 349      ( 2023 20:00 )             32.3       Auto Neutrophil %: 67.4 % (23 @ 20:00)  Auto Immature Granulocyte %: 0.5 % (23 @ 20:00)        139  |  101  |  5<L>  ----------------------------<  178<H>  3.3<L>   |  25  |  0.6<L>      Calcium, Total Serum: 8.3 mg/dL (23 @ 20:00)      LFTs:         Coags:     20.80  ----< 1.79    ( 2023 23:16 )     38.8            Serum Pro-Brain Natriuretic Peptide: 780 pg/mL (23 @ 12:44)

## 2023-02-28 NOTE — PROGRESS NOTE ADULT - ASSESSMENT
82yF w/ PMH of HTN, DM, HLD, h/o  (50yrs ago), recent dx of pancreatic adenocarcinoma (23) presents with vague intermittent lower abdominal pain. Patient was diagnosed with diverticulitis recently and has been on antibiotics for the last week. Denies fevers/chills or n/v. Last BM was yesterday (small). Patient reports decreased PO intake over the last couple months 2/2 decrease appetite. Patient currently following Dr. De Santiago and Dr. Mead.  Outpatient PET scan (23) shows 2 left basilar nodular opacities; Rt pelvic pericolonic soft tissue mass; severe uptake in sigmoid colon). Last c-scope was 10/2022 - patient reports polypectomy and suspicious sigmoid lesion.    PLAN:  #Sigmoid Diverticulitis, possible mass   - Monitor I/O   - IV abx (Cefepime / Flagyl) continue for now, plan for Ertapenem on discharge  -Patient to follow up with Heme Onc outpatient  -Patient potentially to have IR procedure as outpatient   - Pain control: APAP    #HTN  #Atorvastatin   - HCTZ 12.5mg   - Atorvastatin 20mg    #DM   - ISS   - Monitor FS    #Other  Diet: CLD  GI ppx: IV protonix 40  DVT ppx: lovenox 40 q24  Dispo: pending [facility]    [facility]  Spectra [phone]

## 2023-02-28 NOTE — DISCHARGE NOTE NURSING/CASE MANAGEMENT/SOCIAL WORK - NSDCPEFALRISK_GEN_ALL_CORE
For information on Fall & Injury Prevention, visit: [email or web address] OR  [email or web address] OR  https://www.cdc.gov/steadi/patient.html

## 2023-02-28 NOTE — DISCHARGE NOTE PROVIDER - NSDCCPCAREPLAN_GEN_ALL_CORE_FT
PRINCIPAL DISCHARGE DIAGNOSIS  Diagnosis: Diverticulitis  Assessment and Plan of Treatment: Treated with IV abx  Diet:    - Continue soft low fiber diet as tolerated.    - Please avoid roughage like beef for now and instead choose softer foods including chicken, fish, and pastas.  Activity:    - You may ambulate and otherwise resume normal daily activities as tolerated.   Medications:    - New medications:          *Ertapenem IV, 1g every 24 hours, will be administered through the midline   - You may resume your home medications.    - You may take Tylenol 650mg every 6 hours for pain as needed  Follow-up:    - Please call the office to schedule a follow-up appointment with Dr. De Santiago within 1-2 weeks, or follow-up as previously scheduled.   - Please follow up with Gastroenterology in 1 month  Please call the office or return to the ED with persistent fever greater than 100.4F, chest pain, shortness of breath, uncontrollable nausea/vomiting/abdominal pain, constipation, bloody bowel movements, abdominal distention, inability to tolerate oral intake.      SECONDARY DISCHARGE DIAGNOSES  Diagnosis: Pancreatic cancer  Assessment and Plan of Treatment:

## 2023-03-06 NOTE — ED ADULT TRIAGE NOTE - CHIEF COMPLAINT QUOTE
pt from [facility]. hx pancreatic ca. sent in fpor abd pain and distention. pt lethargic at baseline. pt is anox3. pt has poor appetite

## 2023-03-06 NOTE — H&P ADULT - ASSESSMENT
82yFemale with hx of newly diagnosed Pancreatic CA, with ? Mets to lung, as well as diverticulitis (possible neoplastic process of sigmoid colon), presenting with a near complete large bowel obstruction    Recommendations are as follows    -Admit to Surgery, admitting attending Dr. De Santiago  -Pain Control: Multimodal   -CVS: RRR  -Pulmonary: Incentive Spirometry q1 hr while awake  -GI/FEN: , Monitor Strict Intake/Output [id number], Replete Electrolytes PRN,   -Renal: BUN/Cr- 32/0.7    -ABX:  Zosyn  -PPX: Lovenox, PTX    Plan for laparoscopic assisted diverting colostomy on Wednesday, 3/8/2022

## 2023-03-06 NOTE — H&P ADULT - HISTORY OF PRESENT ILLNESS
2/21/23  82 year old female with PMHx of HTN, DM, HLD, recent dx of pancreatic adenocarcinoma (2/13/23) presents with  worsening lower abdominal pain. Patient was diagnosed with diverticulitis recently and has been on antibiotics (D/c'd on Ertapenem). Denies fevers/chills or n/v. Last BM was yesterday (small and watery). Patient reports decreased PO intake over the last couple months 2/2 decrease appetite. Patient currently following Dr. De Santiago and Dr. Mead.  Outpatient PET scan (2/16/23) shows 2 left basilar nodular opacities; Rt pelvic pericolonic soft tissue mass; severe uptake in sigmoid colon). Last c-scope was 10/2022 - patient reports polypectomy and suspicious sigmoid lesion. Denies CP, SOB, dysuria, hematochezia, weakness/numbness.     CT scan was obtained which demonstrated significant colonic distension, cecum up to 9cm.

## 2023-03-06 NOTE — H&P ADULT - ATTENDING COMMENTS
admitted for large bowel obstruction    will plan for colostomy after clearance, also she will need right ureteric stent

## 2023-03-06 NOTE — ED PROVIDER NOTE - OBJECTIVE STATEMENT
Patient is an 82 year old female with PMH of HTN, DM, HLD, recent diagnosis of Pancreatic Adenocarcinoma presenting for abdominal pain. Patient endorses worsening lower quadrant abdominal discomfort; was recently diagnosed with diverticulitis and has been taking an antibiotic but her symptoms have failed to improve.  Patient denies chest pain, shortness of breath, urinary symptoms, melena, or additional complaints.

## 2023-03-06 NOTE — H&P ADULT - NSHPLABSRESULTS_GEN_ALL_CORE
11.6   14.54 )-----------( 521      ( 06 Mar 2023 15:27 )             35.1     03-06    137  |  100  |  32<H>  ----------------------------<  147<H>  3.8   |  24  |  0.7    Ca    9.1      06 Mar 2023 15:27    TPro  5.8<L>  /  Alb  3.5  /  TBili  0.6  /  DBili  x   /  AST  13  /  ALT  <5  /  AlkPhos  79  03-06    < from: CT Abdomen and Pelvis w/ IV Cont (03.06.23 @ 16:06) >    IMPRESSION:  Since 2/21/2023:  1.  Multiple prior exams demonstrating inflammatory versus neoplastic   process in the sigmoid colon which was demonstrated to be FDG avid on   prior PET/CT which is now resulting in high-grade colonic and small bowel   obstruction with diffuse dilation of the entire colon (proximal to the   process in the sigmoid colon) as well as the mid to distal small bowel   loops with cecum measuring upto 10.5 cm.  2.  Chronic right hydroureteronephrosis to the level of this perisigmoid   colonic process.  3.  Essentially stable pancreatic findings.  4.  Trace bilateral pleural effusions.    < end of copied text >

## 2023-03-06 NOTE — ED PROVIDER NOTE - CLINICAL SUMMARY MEDICAL DECISION MAKING FREE TEXT BOX
Patient endorsed to me by Dr. Valencia.  82-year-old female with history of pancreatic cancer not on chemo at this time, history of recurrent diverticulitis presenting today with worsening of chronic generalized abdominal discomfort associated with nausea and vomiting.  No fevers.  Labs noted with leukocytosis to 15, patient is afebrile at this time.  Liver and kidney function within normal limits.  CT abdomen pelvis concerning for obstruction, possibly secondary to neoplastic syndrome versus recurrent diverticulitis.  Surgery consulted, patient admitted to surgical service for possible diverting colostomy.  Patient started on Zosyn at this time.  Stable at the time of admission.

## 2023-03-06 NOTE — ED PROVIDER NOTE - PHYSICAL EXAMINATION
VITAL SIGNS: I have reviewed nursing notes and confirm.  CONSTITUTIONAL: Well-appearing, non-toxic, in NAD  SKIN: Warm dry, normal skin turgor  HEAD: NCAT  EYES: No conjunctival injection, scleral anicteric  ENT: Moist mucous membranes, normal pharynx with no erythema or exudates  NECK: Supple; full ROM. Nontender. No cervical LAD  CARD: RRR, no murmurs, rubs or gallops  RESP: Clear to ausculation bilaterally.  No rales, rhonchi, or wheezing.  ABD: Soft,distended, bilateral lower quadrant-tenderness and guarding. No CVA tenderness  EXT: Full ROM, no bony tenderness, no pedal edema, no calf tenderness  NEURO: Normal motor, normal sensory. CN II-XII grossly intact. Cerebellar testing normal. Normal gait.  PSYCH: Cooperative, appropriate.

## 2023-03-06 NOTE — CONSULT NOTE ADULT - NS ATTEND AMEND GEN_ALL_CORE FT
Agree with the above plan. Patient seen and examined with the Advanced GI Team. Plan modified where needed.

## 2023-03-06 NOTE — CONSULT NOTE ADULT - ASSESSMENT
Patient is a 81 y/o female with PMHx of HTN, DM, HLD, recent dx of pancreatic adenocarcinoma (2/13/23- EUS done of 2cm body Mass- Adenocarcinoma on FNA) who presents from [facility] with severe abdominal pain. Family present during interview. Patient had recent admission in which she was treated for Acute Diverticulitis with phlegmon of 4cm. She was discharged on ABX and consideration towards performing Colonoscopy was to be made in a few weeks. Now in the ED she has sharp pain in the abdomen diffuse but mostly with some increased tenderness in Upper abdomen. She notes she had a BM and is passing gas but around 48 hours prior was not and had to have enemas. She notes now inability to eat, nausea, weakness, and abdominal distension.  Concern as tender and had recent admission with a known phlegmon. Would get stat CT of abdomen and pelvis to rule out perforation. Would get complete labs to include CBC, CMP, TnS, Coags, Amylase, Lipase. Would consult surgery as she is a patient of Dr. De Santiago.       Pancreatic Adenocarcinoma   Complicated Diverticulitis with collection: r/o underlying neoplastic process  - Known Recent  4.1 cm hyperdensity possibly reflecting phlegmon, but cannot r/o neoplastic process with moderate to severe mural thickening in short segment in sigmoid colon  - Obtain CT scan of abdomen and pelvis to rule out perforation  - Strict NPO for now  - Consult surgery- Dr De Santiago   - Complete labs CBC, CMP, Coags, TnS, Amylase, Lipase, Lactate   - Will follow after

## 2023-03-06 NOTE — ED PROVIDER NOTE - ATTENDING CONTRIBUTION TO CARE
I personally evaluated the patient. I reviewed the Resident´s or Physician Assistant´s note (as assigned above), and agree with the findings and plan except as documented in my note.    82-year-old female presents to the emergency department for worsening abdominal pain and dehydration in setting of known pancreatic cancer with outpatient management.  States she is known to multiple GI attendings locally including Dr. De Santiago and Dr. Wyatt.  Family bedside states patient is wasting, unintentional weight loss, worsening abdominal discomfort with associated inability to tolerate p.o. and constitutional symptoms of weakness and nausea with malaise.    The review of systems is otherwise unremarkable    Family called PMD service prior to arrival who saw her bedside, consult appreciated    GENERAL: female in no distress.   HEENT: EOMI non icteric temporal wasting noted  CHEST: normal work of breathing noted.   CV: pulses intact   ABD: Diffuse tenderness to palpation, soft and nondistended  EXTR: FROM   NEURO: AAO 3 no focal deficits  SKIN: normal no pallor  PSYCH: normal mood & mentation       impression: Abdominal pain    Plan: IV, labs, imaging, supportive care & reevaluation

## 2023-03-06 NOTE — H&P ADULT - NSHPPHYSICALEXAM_GEN_ALL_CORE
General: Awake, alert, Oriented to person/place/time. No acute distress  Neuro: CN II-XII grossly intact, no focal deficits  HEENT: EOMI, No scleral icterus. Trachea midline  Lung: Non-labored on room air, no wheezes/rhonchi  Cardiovascular: RRR by radial pulse, normotensive. No Jugular venous distension  Abdomen: Soft, mild tenderness, significantly distended.  Extremities: no clubbing/cyanosis/edema  Skin: warm and well perfused. Capillary refill < 2 seconds  Psych: appropriate mood and affect

## 2023-03-06 NOTE — ED ADULT NURSE NOTE - CHIEF COMPLAINT QUOTE
pt from [facility]. hx pancreatic ca. sent in fpor abd pain and distention. pt lethargic at baseline. pt is anox3. pt has poor appetite

## 2023-03-07 NOTE — PROGRESS NOTE ADULT - ASSESSMENT
Patient is a 83 y/o female with PMHx of HTN, DM, HLD, recent dx of pancreatic adenocarcinoma (2/13/23- EUS done of 2cm body Mass- Adenocarcinoma on FNA) who presents from [facility] with severe abdominal pain. Patient had CT and notable for diffuse colonic dilation and bowel obstruction. She is more comfortable today. Surgical oncology planning intervention for possible tomorrow. No overnight events.       Pancreatic Adenocarcinoma   Complicated Diverticulitis with collection: r/o underlying neoplastic process  - Known Recent  4.1 cm hyperdensity possibly reflecting phlegmon, but cannot r/o neoplastic process with moderate to severe mural thickening in short segment in sigmoid colon  - CT as above with diffuse colonic dilation  - Admitted to surgery and pending possible OR intervention 3/8   - More comfortable today  - No plan for endoscopic intervention at this time

## 2023-03-07 NOTE — CHART NOTE - NSCHARTNOTEFT_GEN_A_CORE
Patient seen and examined at bedside several times throughout the day and night with serial abdominal exams. The abdomen remains distended, minimally tender to palpation, but soft. The patient is only minimally tender to deep palpation to the LLQ. No rebound tenderness, no guarding, no rigidity. No gas or BM. Plan to continue serial abdominal exams for close monitoring.

## 2023-03-08 NOTE — BRIEF OPERATIVE NOTE - BRIEF OP NOTE DRAINS
none
RIGHT 7 x 28 cm double J stent  LEFT   6 x 24 cm double J stent  14 [place] citizen catheter

## 2023-03-08 NOTE — BRIEF OPERATIVE NOTE - NSICDXBRIEFPOSTOP_GEN_ALL_CORE_FT
POST-OP DIAGNOSIS:  Hydronephrosis, right 08-Mar-2023 12:23:04  Herminio Fry T  
POST-OP DIAGNOSIS:  Pancreatic cancer 08-Mar-2023 10:15:05  Casandra Mcconnell  Large bowel obstruction 08-Mar-2023 10:15:16  Casandra Mcconenll

## 2023-03-08 NOTE — CHART NOTE - NSCHARTNOTEFT_GEN_A_CORE
Post Operative Note  Patient: DELORIS NORRIS 82y (1940) Female   MRN: [id number]  Location: [address]  Visit: 03-06-23 Inpatient  Date: 03-08-23 @ 15:29    Procedure: Pancreatic cancer  Large bowel obstruction  Hydronephrosis, right   S/P Laparoscopic transverse loop colostomy  Cystoscopy, with bilateral ureteral stent insertion        Subjective:   Nausea: no, Vomiting: no, Ambulating: yes no, Flatus: no  Pain Assessment: Patient reports minimal abdominal pain.   No other complaints at this time.     Objective:  Vitals: T(F): 96 (03-08-23 @ 12:34), Max: 98.8 (03-08-23 @ 10:25)  HR: 89 (03-08-23 @ 12:34)  BP: 130/60 (03-08-23 @ 12:34) (113/56 - 154/69)  RR: 18 (03-08-23 @ 12:34)  SpO2: 96% (03-08-23 @ 12:34)  Vent Settings:     In:   03-07-23 @ 07:01  -  03-08-23 @ 07:00  --------------------------------------------------------  IN: 0 mL    03-08-23 @ 07:01  -  03-08-23 @ 15:29  --------------------------------------------------------  IN: 0 mL      IV Fluids:     Out:   03-07-23 @ 07:01  -  03-08-23 @ 07:00  --------------------------------------------------------  OUT: 1075 mL    03-08-23 @ 07:01  -  03-08-23 @ 15:29  --------------------------------------------------------  OUT: 1540 mL    EBL:     Voided Urine:   03-07-23 @ 07:01  -  03-08-23 @ 07:00  --------------------------------------------------------  OUT: 1075 mL    03-08-23 @ 07:01  -  03-08-23 @ 15:29  --------------------------------------------------------  OUT: 1540 mL      Smith Catheter: yes no   Drains:   MAGY:    ,   Chest Tube:      NG Tube:     Physical Examination:  General Appearance: NAD  HEENT: EOMI, sclera non-icteric.  Heart: RRR  Lungs: CTABL  Abdomen:  Soft, minimally tender, nondistended. No rigidity, guarding, or rebound tenderness.   MSK/Extremities: Warm & well-perfused. Peripheral pulses intact.  Skin: Warm, dry. No jaundice.   Incisions/Wounds: Dressings in place, clean, dry and intact, no signs of infection/active bleeding/drainage. Colostomy in place draining liquid stool.     Medications: [Standing]  acetaminophen     Tablet .. 650 milliGRAM(s) Oral every 6 hours  atorvastatin 20 milliGRAM(s) Oral at bedtime  enoxaparin Injectable 40 milliGRAM(s) SubCutaneous every 24 hours  hydrochlorothiazide 12.5 milliGRAM(s) Oral daily  HYDROmorphone  Injectable 0.5 milliGRAM(s) IV Push every 4 hours PRN  ibuprofen  Tablet. 400 milliGRAM(s) Oral every 6 hours  insulin lispro (ADMELOG) corrective regimen sliding scale   SubCutaneous three times a day before meals  oxyCODONE    IR 5 milliGRAM(s) Oral every 6 hours PRN  piperacillin/tazobactam IVPB.. 3.375 Gram(s) IV Intermittent every 8 hours    Medications: [PRN]  acetaminophen     Tablet .. 650 milliGRAM(s) Oral every 6 hours  atorvastatin 20 milliGRAM(s) Oral at bedtime  enoxaparin Injectable 40 milliGRAM(s) SubCutaneous every 24 hours  hydrochlorothiazide 12.5 milliGRAM(s) Oral daily  HYDROmorphone  Injectable 0.5 milliGRAM(s) IV Push every 4 hours PRN  ibuprofen  Tablet. 400 milliGRAM(s) Oral every 6 hours  insulin lispro (ADMELOG) corrective regimen sliding scale   SubCutaneous three times a day before meals  oxyCODONE    IR 5 milliGRAM(s) Oral every 6 hours PRN  piperacillin/tazobactam IVPB.. 3.375 Gram(s) IV Intermittent every 8 hours    DVT PROPHYLAXIS: enoxaparin Injectable 40 milliGRAM(s) SubCutaneous every 24 hours    ANTIBIOTICS:  piperacillin/tazobactam IVPB.. 3.375 Gram(s)    Labs:                        10.6   15.71 )-----------( 330      ( 07 Mar 2023 21:25 )             32.5     03-08    135  |  95<L>  |  14  ----------------------------<  132<H>  4.4   |  26  |  0.5<L>    Ca    9.0      08 Mar 2023 05:43  Phos  3.2     03-07  Mg     1.7     03-07    PT/INR - ( 07 Mar 2023 21:25 )   PT: 15.90 sec;   INR: 1.38 ratio       PTT - ( 07 Mar 2023 21:25 )  PTT:29.8 sec    ASSESSMENT:  82F with PMH of diverticulitis, and newly diagnosed metastatic pancreatic cancer, presents with near complete large bowel obstruction.     PLAN:  #LBO s/p Diverting Transverse Loop Colostomy    - Clear liquid diet   - Maintenance IVF   - Monitor post-op labs and replete as needed   - Monitor for ostomy function   - Plan to remove ostomy bar 3/13   - Maintain smith, plan to removed left ureteral stent before DC   - DVT and GI ppx   - Encourage OOB/IS    [facility]   SPECTRA [id number]

## 2023-03-08 NOTE — CONSULT NOTE ADULT - ASSESSMENT
81 yo with severe right hydronephrosis  scheduled for diverting colostomy  temporary LEFT  long term RIGHT stenting    all questions answered  films reviewed

## 2023-03-08 NOTE — PROGRESS NOTE ADULT - ASSESSMENT
82F hx of newly diagnosed Pancreatic CA, with ? Mets to lung, as well as diverticulitis (possible neoplastic process of sigmoid colon), presenting with a near complete large bowel obstruction    Plan:  -Pain Control: Multimodal   -Pulmonary: Incentive Spirometry q1 hr while awake  -GI/FEN: , Monitor Strict Intake/Output [id number], Replete Electrolytes PRN, smith inserted  -ABX:  Zosyn  -PPX: Lovenox, PTX  - OR 3/8 for laparoscopic assisted diverting colostomy    [facility]  Spectra [phone]

## 2023-03-08 NOTE — BRIEF OPERATIVE NOTE - NSICDXBRIEFPROCEDURE_GEN_ALL_CORE_FT
PROCEDURES:  Laparoscopic transverse loop colostomy 08-Mar-2023 10:14:36  Casandra Mcconnell  
PROCEDURES:  Cystoscopy, with bilateral ureteral stent insertion 08-Mar-2023 12:22:37  Herminio Fry T

## 2023-03-08 NOTE — CHART NOTE - NSCHARTNOTEFT_GEN_A_CORE
PACU ANESTHESIA ADMISSION NOTE      Procedure: Laparoscopic transverse loop colostomy      Post op diagnosis:  Pancreatic cancer        ____  Intubated  TV:______       Rate: ______      FiO2: ______    _x___  Patent Airway    __x__  Full return of protective reflexes    _x___  Full recovery from anesthesia / back to baseline     Vitals:   T:98           R:  13                BP: 112/76                 Sat:99                   P: 90      Mental Status:  ___x_ Awake   __x___ Alert   _____ Drowsy   _____ Sedated    Nausea/Vomiting:  __x__ NO  ______Yes,   See Post - Op Orders          Pain Scale (0-10):  _____    Treatment: __x__ None    ____ See Post - Op/PCA Orders    Post - Operative Fluids:   ____ Oral   _x___ See Post - Op Orders    Plan: Discharge:   ____Home       __x___Floor     _____Critical Care    _____  Other:_________________    Comments:

## 2023-03-08 NOTE — BRIEF OPERATIVE NOTE - OPERATION/FINDINGS
Diagnostic laparoscopy with findings of diffusely dilated large and small bowel. No peritoneal carcinomatosis. Transverse loop colostomy created. ~1.5L liquid stool suctioned from colon.
trabeculated bladder  obstructed right kidney

## 2023-03-08 NOTE — BRIEF OPERATIVE NOTE - NSICDXBRIEFPREOP_GEN_ALL_CORE_FT
PRE-OP DIAGNOSIS:  Pancreatic cancer 08-Mar-2023 10:14:47  Casandra Mcconnell  Large bowel obstruction 08-Mar-2023 10:14:56  Casandra Mcconnell  
PRE-OP DIAGNOSIS:  Hydronephrosis, right 08-Mar-2023 12:22:51  Herminio Fry T

## 2023-03-09 NOTE — PROVIDER CONTACT NOTE (OTHER) - REASON
pt  was not able to void -  since 9;30 am
pt  has not  voided yet - smith out since approx  9;30 am today

## 2023-03-09 NOTE — PROVIDER CONTACT NOTE (OTHER) - SITUATION
ken newsome catheterized  the pt -  160 cc's  came out at approx  7 pm
pt has been drinking liquids - pt  sat in chair today  for over 2 hours

## 2023-03-09 NOTE — DIETITIAN NUTRITION RISK NOTIFICATION - TREATMENT: THE FOLLOWING DIET HAS BEEN RECOMMENDED
Full Liquid Diet    Recommended: Ensure Max 2x/day to optimize kcal and protein intake      Patient with moderate malnutrition in the context of chronic illness as evidenced by moderate loss of muscle (clavicles) and moderate loss of subcutaneous fat (buccal)

## 2023-03-09 NOTE — DIETITIAN INITIAL EVALUATION ADULT - NSFNSGIIOFT_GEN_A_CORE
03-08-23 @ 07:01  -  03-09-23 @ 07:00  --------------------------------------------------------  OUT:    Colostomy (mL): 2050 mL  Total OUT: 2050 mL    Total NET: -2050 mL

## 2023-03-09 NOTE — DIETITIAN INITIAL EVALUATION ADULT - ORAL INTAKE PTA/DIET HISTORY
PTA, there was a period of time that she was not able to tolerate food due to taste aversions. Patient is a vegetarian and anything similar to meat or smelled like meat, she would not eat. She consumes eggs, ice cream, pudding, yogurt --- but does not drink regular milk. Patient was taking vitamin C, vitamin D and a vitamin for eyes. UBW: 135 - 140 lbs (~November 2022). NKFA, no intolerances reported.   Per previous Dietitian assessment 2/24/23 - Patient reported poor appetite and PO intake over the past 5 months due to poor appetite and increased abdominal discomfort. Weight from previous admission 52.2 kg - 114.84 lbs.     During today's assessment, patient reports she is a vegetarian and anything similar to meat or smelled like meat, she would not eat. She consumes eggs, ice cream, pudding, yogurt --- but does not drink regular milk. Patient was taking vitamin C, vitamin D and a vitamin for eyes. UBW: 135 - 140 lbs (~November 2022). NKFA, no intolerances reported.

## 2023-03-09 NOTE — DIETITIAN INITIAL EVALUATION ADULT - OTHER INFO
Patient is 81 yo female with newly diagnosed Pancreatic Cancer with ? Mets to lung, as well as diverticulitis (possible neoplastic process of sigmoid colon), presenting with a near complete large bowel obstruction  #LBO s/p Transverse Loop Colostomy   -CLD, mIVF  -monitor ostomy output   -Plan to remove ostomy bar 3/13    -patient s/p above procedure, tolerated well.  -expressing large amounts of liquid stool via ostomy

## 2023-03-09 NOTE — DIETITIAN NUTRITION RISK NOTIFICATION - UPON NUTRITIONAL ASSESSMENT BY THE REGISTERED DIETITIAN YOUR PATIENT WAS DETERMINED TO MEET CRITERIA/HAS EVIDENCE OF THE FOLLOWING DIAGNOSIS:
Today's date: 2020  Patient name: Merly Maxwell  : 1949  MRN: [id number]  Referring provider: Ac Carty MD  Dx:   Encounter Diagnosis     ICD-10-CM    1  Aftercare following right knee joint replacement surgery Z47 1     Z96 651    2  Difficulty walking R26 2    3  Acute pain of right knee M25 561      Subjective:  No new c/o pain today  Objective: See treatment log below    Assessment: Tolerated treatment well  Patient exhibited good technique with therapeutic exercises and would benefit from continued PT to increase R knee ROM/strength and endurance to improve mobility and gait  Plan: Continue per plan of care  Progress treatment as tolerated         Precautions:  R TKR SX - Replacement Repair / Replace    Daily Treatment Log  Manual     MT, ROM 15'    15'   HEP        Exercise Log    Balance Board     NT   Chair Squats        P-Bar-GT-Forward, Backward,Side-Even & Dips        BOSU-Walk     NT   Foam Pad SLR,Hip/KneeFl,Step Ups  (doubled)        Foam Beam        T/G-Squats PF 30x ea    30x ea   W/P-Hip-Abd,Add,Flex,Ext 32 5#   30x ea    32 5#   30x ea   WP-Squats 32 5#   30x ea    32 5#   30x ea   Mat-DKTC,Bridge,ComboBridge w/GreenTB 30x ea    30x ea   Mhbjkhh-PE-Dz 2x2'    2x2'   NK Table Exer 15#-30x    15# 30x ea   NK Table ROM 2x10'    NT   TM        Stepper 1-1        Bike  10'    10' L8   ME, PE 15'    15'           Modalities  Moderate protein-calorie malnutrition

## 2023-03-09 NOTE — DIETITIAN INITIAL EVALUATION ADULT - NUTRITIONGOAL OUTCOME1
As medically feasible and tolerated by patient, advance diet to low fiber/residue restriction, low fat within 2-3 days

## 2023-03-09 NOTE — DIETITIAN INITIAL EVALUATION ADULT - IDEAL BODY WEIGHT (LBS)
conducted a detailed discussion... I had a detailed discussion with the patient and/or guardian regarding the historical points, exam findings, and any diagnostic results supporting the discharge/admit diagnosis. 120

## 2023-03-09 NOTE — DIETITIAN INITIAL EVALUATION ADULT - PERTINENT MEDS FT
MEDICATIONS  (STANDING):  acetaminophen     Tablet .. 650 milliGRAM(s) Oral every 6 hours  atorvastatin 20 milliGRAM(s) Oral at bedtime  enoxaparin Injectable 40 milliGRAM(s) SubCutaneous every 24 hours  hydrochlorothiazide 12.5 milliGRAM(s) Oral daily  ibuprofen  Tablet. 400 milliGRAM(s) Oral every 6 hours  insulin lispro (ADMELOG) corrective regimen sliding scale   SubCutaneous three times a day before meals  piperacillin/tazobactam IVPB.. 3.375 Gram(s) IV Intermittent every 8 hours    MEDICATIONS  (PRN):  HYDROmorphone  Injectable 0.5 milliGRAM(s) IV Push every 4 hours PRN Severe Pain (7 - 10)  oxyCODONE    IR 5 milliGRAM(s) Oral every 6 hours PRN Severe Pain (7 - 10)

## 2023-03-09 NOTE — PROGRESS NOTE ADULT - ASSESSMENT
82F hx of newly diagnosed Pancreatic CA, with ? Mets to lung, as well as diverticulitis (possible neoplastic process of sigmoid colon), presenting with a near complete large bowel obstruction    Plan:  #LBO s/p Transverse Loop Colostomy   - CLD, mIVF   - Monitor ostomy output   - Plan to remove ostomy bar 3/13   - PT/OOB today   - Monitor I/O   - Pain Control: Multimodal    - Pulmonary: Incentive Spirometry q1 hr while awake   - ABX:  Zosyn   - PPX: Lovenox, PTX    [facility]  Spectra [phone]

## 2023-03-09 NOTE — DIETITIAN INITIAL EVALUATION ADULT - OTHER CALCULATIONS
1320 - 1541 kcal/day (MSJ x 1.2 - 1.4 SF)   Estimated Needs with consideration for Pancreatic Cancer, PCM

## 2023-03-09 NOTE — DIETITIAN INITIAL EVALUATION ADULT - PERTINENT LABORATORY DATA
03-08    135  |  98  |  14  ----------------------------<  127<H>  3.5   |  23  |  0.5<L>    Ca    7.8<L>      08 Mar 2023 23:08  Phos  3.6     03-08  Mg     1.6     03-08    POCT Blood Glucose.: 148 mg/dL (03-09-23 @ 11:18)  A1C with Estimated Average Glucose Result: 7.3 % (02-22-23 @ 08:28)

## 2023-03-09 NOTE — DIETITIAN INITIAL EVALUATION ADULT - EDUCATION DIETARY MODIFICATIONS
discussed diet with patient; different oral nutrition supplements and benefits of oral nutrition supplements/(1) partially meets; needs review/practice

## 2023-03-09 NOTE — DIETITIAN INITIAL EVALUATION ADULT - COLLABORATION WITH OTHER PROVIDERS
Interventions: meals and snacks, medical food supplements, coordination of care  Monitoring/Evaluation: diet order, energy intake, weight, labs, skin status, NFPF

## 2023-03-09 NOTE — DIETITIAN INITIAL EVALUATION ADULT - ADD RECOMMEND
1) As medically feasible and tolerated by patient, advance diet to low fiber/residue restricted  2) Recommend Ensure Max 2x/day to optimize kcal and protein intake   3) Monitor colostomy output - consider Banatrol supplement if output continues to be high    Patient is at high nutrition risk, RD to f/u in 3-5 days or PRN

## 2023-03-10 NOTE — PHYSICAL THERAPY INITIAL EVALUATION ADULT - PERTINENT HX OF CURRENT PROBLEM, REHAB EVAL
82 year old female with PMHx of HTN, DM, HLD, recent dx of pancreatic adenocarcinoma (2/13/23) presents with  worsening lower abdominal pain. Patient was diagnosed with diverticulitis recently and has been on antibiotics (D/c'd on Ertapenem). Denies fevers/chills or n/v. Last BM was yesterday (small and watery). Patient reports decreased PO intake over the last couple months 2/2 decrease appetite. Patient currently following Dr. De Santiago and Dr. Mead.  Outpatient PET scan (2/16/23) shows 2 left basilar nodular opacities; Rt pelvic pericolonic soft tissue mass; severe uptake in sigmoid colon). Last c-scope was 10/2022 - patient reports polypectomy and suspicious sigmoid lesion. Denies CP, SOB, dysuria, hematochezia, weakness/numbness.

## 2023-03-10 NOTE — CONSULT NOTE ADULT - ASSESSMENT
ASSESSMENT  82 year old female with PMHx of HTN, DM, HLD, recent dx of pancreatic adenocarcinoma (2/13/23) presents with  worsening lower abdominal pain. Patient was diagnosed with diverticulitis recently and has been on antibiotics (D/c'd on Ertapenem) found to have LBO    IMPRESSION  #LBO s/p OR 3/8, no evidence of peritonitis/abscess  #Recent diverticulitis     s/p midline x ertapenem 14 days  < from: CT Abdomen and Pelvis w/ IV Cont (03.06.23 @ 16:06) >  Since 2/21/2023:  1.  Multiple prior exams demonstrating inflammatory versus neoplastic   process in the sigmoid colon which was demonstrated to be FDG avid on   prior PET/CT which is now resulting in high-grade colonic and small bowel   obstruction with diffuse dilation of the entire colon (proximal to the   process in the sigmoid colon) as well as the mid to distal small bowel   loops with cecum measuring upto 10.5 cm.  2.  Chronic right hydroureteronephrosis to the level of this perisigmoid   colonic process.  3.  Essentially stable pancreatic findings.  4.  Trace bilateral pleural effusions.  #SIRS on admission T96.7 P>90 WBC 14  #Pancreatic ca   Creatinine, Serum: 0.5 (03-10-23 @ 12:29)    Weight (kg): 64 (03-08-23 @ 07:17)    RECOMMENDATIONS  - No CT evidence of ongoing diverticulitis s/p 14 days antibiotics   - Continue zosyn for now; Plan for 7 days post-op end 3/14, can be PO augmentin 875mg BID on D/C  - Trend WBC     If any questions, please call or send a message on [employer] Teams  Please continue to update ID with any pertinent new laboratory or radiographic findings  #[id number]

## 2023-03-10 NOTE — PHYSICAL THERAPY INITIAL EVALUATION ADULT - GENERAL OBSERVATIONS, REHAB EVAL
[phone] Pt received and left sitting in bedside chair, NAD, +smith +ostomy, pt agreeable to PT session

## 2023-03-10 NOTE — PROGRESS NOTE ADULT - ASSESSMENT
82F hx of newly diagnosed Pancreatic CA, with ? Mets to lung, as well as diverticulitis (possible neoplastic process of sigmoid colon), presenting with a near complete large bowel obstruction    Plan:  #LBO s/p Transverse Loop Colostomy   - FLD, mIVF   - failed TOV; smith reinserted   - Monitor ostomy output   - Plan to remove ostomy bar 3/13   - PT/OOB today   - Monitor I/O   - Pain Control: Multimodal    - Pulmonary: Incentive Spirometry q1 hr while awake   - ABX:  Zosyn   - PPX: Lovenox, PTX    [facility]  Spectra [id number]

## 2023-03-10 NOTE — CONSULT NOTE ADULT - ASSESSMENT
Patient Received  sitting up in bed, awake, responds appropriately to verbal command.  Loop  colostomy to LUQ  w/ Neptali  2p drainable pouching system  in place, barrier intact, no leakage noted.  Scant amount  of greenish brown out put noted in bad.       Type of ostomy:  Loop colostomy   Location: LUQ  Zheng: n/a  Size:   measuring ~4"  Mucosa: red, moist, remains edematous & slightly translucent   Peristomal skin: intact   Mucocutaneous junction: intact  Abdominal contours: round,  semi-soft  Output: scant   Midline/lap sites/ drains:     Teaching on ostomy care initiated with patient , verbally and also via demonstration. Patient  .  Patient  asking  appropriate questions & able to verbalize key ostomy points.  Patient agreeable to start practicing pouch emptying when staff at bedside to assist with ostomy care . Written information and video link  on ostomy care provided for patient as well. Patient instructed  on how to assess educational link as well, pt verbalized understanding.  Patient to be discharge with  extra supplies . Per staff patient to be discharged to Nursing home    Primary staff to continue teaching at bedside until patient is discharged    ostomy NP to f/u teaching as needed  Case discussed with primary Rn and case management           Recommendations	  Pouch change: 	  -Gently remove pouch water moistened gauze   -Cleanse stoma site with water moistened gauze, gently pat dry  - Apply cavilon skin prep to peristoma site   -Measure stoma  -Trace and cut current size on Neptali 4” CeraPlus flat barrier (#79385)  -Stretch Neptali Adapt CeraRing (#2505) onto back of barrier  -Apply barrier to patient's skin  -Attach [place] 24” drainable lock and roll pouch (#12166) onto barrier  Empty pouch when 1/3 to no more than 1/2 full of effluent/flatus. Change pouching system q 3 - 4 days and prn for leaking.              Supplies include:  Neptali  4” CeraPlus flat barrier #93262  [place] 2 3/4"” drainable pouch with  lock/roll closure #18072   [place] Adapt flat CeraRing #8805  Neptali adhesive remover #7840  Neptali Adapt stoma powder #7906  3M Cavilon no-sting barrier film #2796  m9 odor eliminator drops #8491   Patient Received  sitting up in bed, awake, responds appropriately to verbal command.  Loop  colostomy to LUQ  w/ Neptali  2p drainable pouching system  in place, barrier intact, no leakage noted.  Scant amount  of greenish brown out put noted in bag       Type of ostomy:  Loop colostomy   Location: LUQ  Zheng: n/a  Size:   measuring ~4"  Mucosa: red, moist, remains edematous & slightly translucent   Peristomal skin: intact   Mucocutaneous junction: intact  Abdominal contours: round,  semi-soft  Output: scant   Midline/lap sites/ drains:     Teaching on ostomy care initiated with patient , verbally and also via demonstration. Patient  .  Patient  asking  appropriate questions & able to verbalize key ostomy points.  Patient agreeable to start practicing pouch emptying when staff at bedside to assist with ostomy care . Written information and video link  on ostomy care provided for patient as well. Patient instructed  on how to assess educational link as well, pt verbalized understanding.  Patient to be discharge with  extra supplies . Per staff patient to be discharged to Nursing home    Primary staff to continue teaching at bedside until patient is discharged    ostomy NP to f/u teaching as needed  Case discussed with primary Rn and case management           Recommendations	  Pouch change: 	  -Gently remove pouch water moistened gauze   -Cleanse stoma site with water moistened gauze, gently pat dry  - Apply cavilon skin prep to peristoma site   -Measure stoma  -Trace and cut current size on Neptali 4” CeraPlus flat barrier (#17913)  -Stretch Neptali Adapt CeraRing (#8305) onto back of barrier  -Apply barrier to patient's skin  -Attach Neptali 4” drainable lock and roll pouch (#50268) onto barrier  Empty pouch when 1/3 to no more than 1/2 full of effluent/flatus. Change pouching system q 3 - 4 days and prn for leaking.              Supplies include:  Neptali  4” CeraPlus flat barrier #03303  Neptali 4" drainable pouch with  lock/roll closure #09619   Neptali Adapt flat CeraRing #8805  [place] adhesive remover #4144  Neptali Adapt stoma powder #5006  3M Cavilon no-sting barrier film #9121  m9 odor eliminator drops #6400

## 2023-03-10 NOTE — PHYSICAL THERAPY INITIAL EVALUATION ADULT - ADDITIONAL COMMENTS
Prior to recent illness ~3 weeks ago, pt reports being independent in ADLs and ambulation. Prior to this admission, pt resided in SNF, required assistance with ADLs and ambulation.

## 2023-03-11 NOTE — DISCHARGE NOTE PROVIDER - NSDCCPCAREPLAN_GEN_ALL_CORE_FT
PRINCIPAL DISCHARGE DIAGNOSIS  Diagnosis: SBO (small bowel obstruction)  Assessment and Plan of Treatment: Follow Up with Dr. DeS antiago on Tuesday for ostomy.  Please call office for confirmation of your appointment.  Diet: Low Fiber   Pain: You can take over the counter medications such as Tylenol, and Ibuprofen for pain control. Please adhere to the instructions on the back of the bottle. If it was discussed that you would be receiving prescription pain medication upon discharge, this prescription will be sent to your pharmacy.  Antibiotics: Augmentin in 4 days.  If you develop fevers, chills, worsening pain, increased drainage from the wound, foul smelling drainage from the wound, nausea that won't subside, vomiting, or any other symptoms of concern, please call MD for further advice, evaluation, and/or treatment.  Activity: Ambulate and get out of bed as tolerated, and with assistance if feeling weak.  Carolin Birch

## 2023-03-11 NOTE — DISCHARGE NOTE NURSING/CASE MANAGEMENT/SOCIAL WORK - PATIENT PORTAL LINK FT
You can access the FollowMyHealth Patient Portal offered by [facility] by registering at the following website: [email or web address]. By joining [facility]’s FollowMyHealth portal, you will also be able to view your health information using other applications (apps) compatible with our system.

## 2023-03-11 NOTE — DISCHARGE NOTE PROVIDER - CARE PROVIDERS DIRECT ADDRESSES
,DirectAddress_Unknown,[email or web address],DirectAddress_Unknown

## 2023-03-11 NOTE — CONSULT NOTE ADULT - ASSESSMENT
*****************INCOMPLETE NOTE*******************    82 yr old female with hx of  HTN, DM, HLD, Chronic right hydronephrosis, newly diagnosed Pancreatic CA, diverticulitis (possible neoplastic process of sigmoid colon) presenting with a near complete large bowel obstruction.     Cardiology consulted for new onset Afib     # New onset Atrial Fibrillation   # Hypokalemia   # LBO s/p Transverse Loop Colostomy (03/08/23)  # Pancreatic CA  # HTN, DM, HLD   # Chronic right hydronephrosis  - rate controlled, pt hemodynamically stable  - tele:   - CHADVASc 6  - trop <0.01  - BNP 5.4  - EKG: Afib  - f/u echo  - pain control   - replete K  - start metoprolol   - start therapeutic A/C when cleared by primary team   - c/w HCTZ and Atorvastatin   - c/w abx   - tele monitoring       *****************INCOMPLETE NOTE*******************

## 2023-03-11 NOTE — DISCHARGE NOTE PROVIDER - NSDCFUSCHEDAPPT_GEN_ALL_CORE_FT
Vic Godoy  [facility]  GASTRO Doc Off [id number]  Scheduled Appointment: 03/15/2023

## 2023-03-11 NOTE — PROGRESS NOTE ADULT - SUBJECTIVE AND OBJECTIVE BOX
SURGERY PROGRESS NOTE     Patient: DELORIS NORRIS , 82y ([birth year])Female   MRN: [id number]  Location: [address]  Visit: 23 Inpatient  Date: 03-10-23 @ 02:53       Events of past 24 hours:  Patient seen resting comfortably in bed. Hemodynamically stable. Patient passing gas and having bowel movements in ostomy. However patient failed TOV twice and required reinsertion of smith catheter due to urinary retention.    PAST MEDICAL & SURGICAL HISTORY:  HTN (hypertension)      Diabetes      Hypercholesteremia      History of            Vitals:   T(F): 97.1 (03-10-23 @ 00:45), Max: 97.6 (23 @ 17:00)  HR: 73 (03-10-23 @ 00:45)  BP: 112/56 (03-10-23 @ 00:45)  RR: 18 (03-10-23 @ 00:45)  SpO2: 96% (03-10-23 @ 00:45)      Diet, Full Liquid      Fluids:     I & O's:    23 @ 07:01  -  23 @ 07:00  --------------------------------------------------------  IN:  Total IN: 0 mL    OUT:    Colostomy (mL): 2050 mL    Indwelling Catheter - Urethral (mL): 990 mL  Total OUT: 3040 mL    Total NET: -3040 mL           PHYSICAL EXAM:   General: NAD, AAOx3, calm and cooperative  Cardiac: RRR S1, S2  Respiratory: CTAB, normal respiratory effort  Abdomen: Soft, non-distended, non-tender, no rebound, no guarding. +BS.  Vascular: Pulses 2+ throughout, extremities well perfused  Skin: Warm/dry, normal color, no jaundice  Incision/wound: healing well, dressings in place, clean, dry and intact    MEDICATIONS  (STANDING):  acetaminophen     Tablet .. 650 milliGRAM(s) Oral every 6 hours  atorvastatin 20 milliGRAM(s) Oral at bedtime  enoxaparin Injectable 40 milliGRAM(s) SubCutaneous every 24 hours  hydrochlorothiazide 12.5 milliGRAM(s) Oral daily  ibuprofen  Tablet. 400 milliGRAM(s) Oral every 6 hours  insulin lispro (ADMELOG) corrective regimen sliding scale   SubCutaneous three times a day before meals  piperacillin/tazobactam IVPB.. 3.375 Gram(s) IV Intermittent every 8 hours  potassium chloride    Tablet ER 20 milliEquivalent(s) Oral every 2 hours    MEDICATIONS  (PRN):  HYDROmorphone  Injectable 0.5 milliGRAM(s) IV Push every 4 hours PRN Severe Pain (7 - 10)  oxyCODONE    IR 5 milliGRAM(s) Oral every 6 hours PRN Severe Pain (7 - 10)      DVT PROPHYLAXIS: enoxaparin Injectable 40 milliGRAM(s) SubCutaneous every 24 hours    GI PROPHYLAXIS:   ANTICOAGULATION:   ANTIBIOTICS:  piperacillin/tazobactam IVPB.. 3.375 Gram(s)            LAB/STUDIES:  Labs:  CAPILLARY BLOOD GLUCOSE      POCT Blood Glucose.: 145 mg/dL (09 Mar 2023 20:53)  POCT Blood Glucose.: 157 mg/dL (09 Mar 2023 16:11)  POCT Blood Glucose.: 148 mg/dL (09 Mar 2023 11:18)  POCT Blood Glucose.: 123 mg/dL (09 Mar 2023 07:39)                          10.4   15.22 )-----------( 321      ( 09 Mar 2023 20:26 )             32.3       Auto Neutrophil %: 77.9 % (23 @ 20:26)  Auto Immature Granulocyte %: 0.5 % (23 @ 20:26)        137  |  99  |  14  ----------------------------<  123<H>  3.2<L>   |  24  |  0.5<L>      Calcium, Total Serum: 8.1 mg/dL (23 @ 20:26)    
Patient is a 83 y/o female with PMHx of HTN, DM, HLD, recent dx of pancreatic adenocarcinoma (23- EUS done of 2cm body Mass- Adenocarcinoma on FNA) who presents from [facility] with severe abdominal pain. Patient had CT and notable for diffuse colonic dilation and bowel obstruction. She is more comfortable today. Surgical oncology planning intervention for possible tomorrow. No overnight events.     PAST MEDICAL & SURGICAL HISTORY:  HTN (hypertension)  Diabetes  Hypercholesteremia  History of   Pancreatic Adneocarcinoma ( On FNA- )        Home Medications:  hydroCHLOROthiazide 12.5 mg oral capsule: 1 cap(s) orally once a day (2023 08:41)  metFORMIN 500 mg oral tablet: 1 tab(s) orally 2 times a day (2023 08:41)  rosuvastatin 5 mg oral tablet: 1 tab(s) orally once a day (2023 08:41)        MEDICATIONS  (STANDING):  atorvastatin 20 milliGRAM(s) Oral at bedtime  dextrose 5%. 1000 milliLiter(s) (50 mL/Hr) IV Continuous <Continuous>  dextrose 5%. 1000 milliLiter(s) (100 mL/Hr) IV Continuous <Continuous>  dextrose 50% Injectable 25 Gram(s) IV Push once  dextrose 50% Injectable 12.5 Gram(s) IV Push once  dextrose 50% Injectable 25 Gram(s) IV Push once  enoxaparin Injectable 40 milliGRAM(s) SubCutaneous every 24 hours  glucagon  Injectable 1 milliGRAM(s) IntraMuscular once  hydrochlorothiazide 12.5 milliGRAM(s) Oral daily  insulin lispro (ADMELOG) corrective regimen sliding scale   SubCutaneous three times a day before meals  lactated ringers. 1000 milliLiter(s) (100 mL/Hr) IV Continuous <Continuous>  pantoprazole    Tablet 40 milliGRAM(s) Oral before breakfast  piperacillin/tazobactam IVPB.. 3.375 Gram(s) IV Intermittent every 8 hours    MEDICATIONS  (PRN):  acetaminophen     Tablet .. 650 milliGRAM(s) Oral every 6 hours PRN Temp greater or equal to 38C (100.4F), Mild Pain (1 - 3)  dextrose Oral Gel 15 Gram(s) Oral once PRN Blood Glucose LESS THAN 70 milliGRAM(s)/deciliter      Allergies  No Known Allergies      Review of Systems:   Constitutional:  See HPI  ENT/Mouth:  No Hearing Changes,  No Difficulty Swallowing  Eyes:  No Eye Pain, No Vision Changes  Cardiovascular:  No Chest Pain, No Palpitations  Respiratory:  No Cough, No Dyspnea  Gastrointestinal:  As described in HPI  Musculoskeletal:  No Joint Swelling, No Back Pain  Skin:  No Skin Lesions, No Jaundice  Neuro:  No Syncope, No Dizziness  Heme/Lymph:  No Bruising, No Bleeding.    Vital Signs Last 24 Hrs  T(C): 35.8 (07 Mar 2023 08:00), Max: 36.7 (06 Mar 2023 23:46)  T(F): 96.4 (07 Mar 2023 08:00), Max: 98.1 (06 Mar 2023 23:46)  HR: 76 (07 Mar 2023 08:00) (54 - 101)  BP: 104/54 (07 Mar 2023 08:00) (103/57 - 117/55)  BP(mean): 79 (07 Mar 2023 05:25) (79 - 79)  RR: 17 (07 Mar 2023 08:00) (16 - 20)  SpO2: 92% (07 Mar 2023 08:00) (92% - 96%)    Parameters below as of 07 Mar 2023 08:00  Patient On (Oxygen Delivery Method): room air        GENERAL: Frail appearing,  appears uncomfortable  HEAD:  Atraumatic, Normocephalic  EYES: conjunctiva and sclera clear  NECK: Supple, no JVD or thyromegaly  CHEST/LUNG: Clear to auscultation bilaterally; No wheeze, rhonchi, or rales  HEART: Regular rate and rhythm; normal S1, S2, No murmurs.  ABDOMEN: Soft, distended, Tenderness on palpation diffusely  NEUROLOGY: No asterixis or tremor  SKIN: Intact, no jaundice    Labs:                        10.4   10.46 )-----------( 380      ( 06 Mar 2023 21:00 )             31.9     03-06    138  |  102  |  27<H>  ----------------------------<  126<H>  3.6   |  26  |  0.6<L>    Ca    8.6      06 Mar 2023 21:00  Phos  3.2     03-06  Mg     1.6     03-06    TPro  5.8<L>  /  Alb  3.5  /  TBili  0.6  /  DBili  x   /  AST  13  /  ALT  <5  /  AlkPhos  79  -    Radiology:  CT Abdomen and Pelvis w/ IV Cont 23   Since 2023:  1.  Multiple prior exams demonstrating inflammatory versus neoplastic   process in the sigmoid colon which was demonstrated to be FDG avid on   prior PET/CT which is now resulting in high-grade colonic and small bowel   obstruction with diffuse dilation of the entire colon (proximal to the   process in the sigmoid colon) as well as the mid to distal small bowel   loops with cecum measuring upto 10.5 cm.  2.  Chronic right hydroureteronephrosis to the level of this perisigmoid   colonic process.  3.  Essentially stable pancreatic findings.  4.  Trace bilateral pleural effusions.        
GENERAL SURGERY PROGRESS NOTE    Patient: DELORIS NORRIS , 82y ([birth year])Female   MRN: [id number]  Location: [address]  Visit: 23 Inpatient  Date: 23 @ 02:23    Events of past 24 hours:  NAEON  Patient seen and examined at bedside  Patient denies n/v/d  Patient reports no BM, no flatus  Patient K repleted x3, mag repleted    PAST MEDICAL & SURGICAL HISTORY:  HTN (hypertension)      Diabetes      Hypercholesteremia      History of           Vitals:   T(F): 97.6 (23 @ 00:14), Max: 97.7 (23 @ 05:25)  HR: 84 (23 @ 00:14)  BP: 113/58 (23 @ 00:14)  RR: 18 (23 @ 00:14)  SpO2: 95% (23 @ 00:14)      Diet, NPO after Midnight:      NPO Start Date: 07-Mar-2023,   NPO Start Time: 23:59  Diet, NPO:   Except Medications     Special Instructions for Nursing:  Except Medications      Fluids:     I & O's:    23 @ 07:01  -  23 @ 07:00  --------------------------------------------------------  IN:    Oral Fluid: 240 mL  Total IN: 240 mL    OUT:    Voided (mL): 900 mL  Total OUT: 900 mL    Total NET: -660 mL    Physical Exam:   Physical Exam: General: Awake, alert, Oriented to person/place/time. No acute distressdeficits  Lung: Non-labored on room air, no wheezes/rhonchi  Cardiovascular: RRR by radial pulse, normotensive. No Jugular venous distension  Abdomen: Soft, mild tenderness, mildly distended.  Extremities: no clubbing/cyanosis/edema  Psych: appropriate mood and affect      MEDICATIONS  (STANDING):  atorvastatin 20 milliGRAM(s) Oral at bedtime  dextrose 5%. 1000 milliLiter(s) (50 mL/Hr) IV Continuous <Continuous>  dextrose 5%. 1000 milliLiter(s) (100 mL/Hr) IV Continuous <Continuous>  dextrose 50% Injectable 25 Gram(s) IV Push once  dextrose 50% Injectable 12.5 Gram(s) IV Push once  dextrose 50% Injectable 25 Gram(s) IV Push once  enoxaparin Injectable 40 milliGRAM(s) SubCutaneous every 24 hours  glucagon  Injectable 1 milliGRAM(s) IntraMuscular once  hydrochlorothiazide 12.5 milliGRAM(s) Oral daily  insulin lispro (ADMELOG) corrective regimen sliding scale   SubCutaneous three times a day before meals  lactated ringers. 1000 milliLiter(s) (100 mL/Hr) IV Continuous <Continuous>  piperacillin/tazobactam IVPB.. 3.375 Gram(s) IV Intermittent every 8 hours  potassium chloride  20 mEq/100 mL IVPB 20 milliEquivalent(s) IV Intermittent every 2 hours    MEDICATIONS  (PRN):  dextrose Oral Gel 15 Gram(s) Oral once PRN Blood Glucose LESS THAN 70 milliGRAM(s)/deciliter  HYDROmorphone  Injectable 0.5 milliGRAM(s) IV Push every 4 hours PRN Severe Pain (7 - 10)      DVT PROPHYLAXIS: enoxaparin Injectable 40 milliGRAM(s) SubCutaneous every 24 hours    GI PROPHYLAXIS:   ANTICOAGULATION:   ANTIBIOTICS:  piperacillin/tazobactam IVPB.. 3.375 Gram(s)            LAB/STUDIES:  Labs:  CAPILLARY BLOOD GLUCOSE      POCT Blood Glucose.: 125 mg/dL (07 Mar 2023 16:43)  POCT Blood Glucose.: 123 mg/dL (07 Mar 2023 10:24)                          10.6   15.71 )-----------( 330      ( 07 Mar 2023 21:25 )             32.5       Auto Neutrophil %: 76.3 % (23 @ 21:25)  Auto Immature Granulocyte %: 0.4 % (23 @ 21:25)        139  |  99  |  16  ----------------------------<  113<H>  3.2<L>   |  28  |  0.5<L>      Calcium, Total Serum: 8.6 mg/dL (23 @ 21:25)      LFTs:             5.8  | 0.6  | 13       ------------------[79      ( 06 Mar 2023 15:27 )  3.5  | x    | <5          Lipase:8      Amylase:x         Blood Gas Venous - Lactate: 1.60 mmol/L (23 @ 16:29)  Lactate, Blood: 1.3 mmol/L (23 @ 15:27)      Coags:     15.90  ----< 1.38    ( 07 Mar 2023 21:25 )     29.8              
GENERAL SURGERY PROGRESS NOTE    Admission: Intestinal obstruction    Hospital Day: 4  Post operative day: 1  Pancreatic cancer  Large bowel obstruction  Hydronephrosis, right   s/p Laparoscopic transverse loop colostomy  Cystoscopy, with bilateral ureteral stent insertion    24 Hour Events:  Patient s/p above procedure, tolerated well.   Expressing large amounts of liquid stool via ostomy.   Patient has no pain a this time.   N/V(-), OOB(-)    Vitals:  T(F): 96.4 (03-09-23 @ 04:55), Max: 98.8 (03-08-23 @ 10:25)  HR: 77 (03-09-23 @ 04:55)  BP: 109/58 (03-09-23 @ 04:55)  RR: 18 (03-09-23 @ 04:55)  SpO2: 98% (03-09-23 @ 04:55)      Diet, Clear Liquid      Fluids:     I & O's:    03-08-23 @ 07:01  -  03-09-23 @ 07:00  --------------------------------------------------------  IN:  Total IN: 0 mL    OUT:    Colostomy (mL): 2050 mL    Indwelling Catheter - Urethral (mL): 990 mL  Total OUT: 3040 mL    Total NET: -3040 mL    PHYSICAL EXAM:  General: NAD  Cardiac: RRR  Respiratory: unlabored breathing at rest, clear to auscultation bilaterally  Abdomen: Soft, non-distended, non-tender, no rebound, no guarding. Ostomy in place draining liquid stool   Musculoskeletal: FROM in b/l UE and LE  Neuro: Sensation grossly intact and equal throughout, no focal deficits  Skin: Warm/dry, normal color, no jaundice  Incision/wound: Clean, dry, and intact    MEDICATIONS  (STANDING):  acetaminophen     Tablet .. 650 milliGRAM(s) Oral every 6 hours  atorvastatin 20 milliGRAM(s) Oral at bedtime  enoxaparin Injectable 40 milliGRAM(s) SubCutaneous every 24 hours  hydrochlorothiazide 12.5 milliGRAM(s) Oral daily  ibuprofen  Tablet. 400 milliGRAM(s) Oral every 6 hours  insulin lispro (ADMELOG) corrective regimen sliding scale   SubCutaneous three times a day before meals  piperacillin/tazobactam IVPB.. 3.375 Gram(s) IV Intermittent every 8 hours    MEDICATIONS  (PRN):  HYDROmorphone  Injectable 0.5 milliGRAM(s) IV Push every 4 hours PRN Severe Pain (7 - 10)  oxyCODONE    IR 5 milliGRAM(s) Oral every 6 hours PRN Severe Pain (7 - 10)    DVT PROPHYLAXIS: enoxaparin Injectable 40 milliGRAM(s) SubCutaneous every 24 hours    GI PROPHYLAXIS:   ANTICOAGULATION:   ANTIBIOTICS:  piperacillin/tazobactam IVPB.. 3.375 Gram(s)    LAB/STUDIES:  Labs:  CAPILLARY BLOOD GLUCOSE      POCT Blood Glucose.: 123 mg/dL (09 Mar 2023 07:39)  POCT Blood Glucose.: 163 mg/dL (08 Mar 2023 21:14)  POCT Blood Glucose.: 160 mg/dL (08 Mar 2023 16:49)  POCT Blood Glucose.: 137 mg/dL (08 Mar 2023 10:29)                          10.7   16.00 )-----------( 261      ( 08 Mar 2023 23:08 )             34.9       Auto Neutrophil %: 90.3 % (03-08-23 @ 23:08)  Auto Immature Granulocyte %: 0.6 % (03-08-23 @ 23:08)    03-08    135  |  98  |  14  ----------------------------<  127<H>  3.5   |  23  |  0.5<L>      Calcium, Total Serum: 7.8 mg/dL (03-08-23 @ 23:08)      LFTs:     Blood Gas Venous - Lactate: 1.60 mmol/L (03-06-23 @ 16:29)  Lactate, Blood: 1.3 mmol/L (03-06-23 @ 15:27)      Coags:     15.90  ----< 1.38    ( 07 Mar 2023 21:25 )     29.8     
GENERAL SURGERY PROGRESS NOTE    Patient: DELORIS NORRIS , 82y ([birth year])Female   MRN: [id number]  Location: [address]  Visit: 23 Inpatient  Date: 23 @ 03:32    Events of past 24 hours:  Hypokalemic to 2.7. Given Potassium.  New onset atrial fibrillation. Cardiac work-up obtained.   Patient transferred to telemetry.    PAST MEDICAL & SURGICAL HISTORY:  HTN (hypertension)    Diabetes    Hypercholesteremia    History of       Vitals:   T(F): 97.5 (03-10-23 @ 20:55), Max: 97.5 (03-10-23 @ 20:55)  HR: 85 (03-10-23 @ 20:55)  BP: 119/53 (03-10-23 @ 20:55)  RR: 20 (03-10-23 @ 20:55)  SpO2: 97% (03-10-23 @ 16:53)      Diet, Low Fiber      Fluids:     I & O's:    23 @ 07:01  -  03-10-23 @ 07:00  --------------------------------------------------------  IN:  Total IN: 0 mL    OUT:    Colostomy (mL): 480 mL    Intermittent Catheterization - Urethral (mL): 160 mL    Voided (mL): 100 mL  Total OUT: 740 mL    Total NET: -740 mL    PHYSICAL EXAM:  General: NAD, calm and cooperative.  Cardiac: RRR.  Respiratory: On RA. Speaks in complete sentences. No accessory muscles of respiration in use. Bilateral chest rise.  Abdomen: Soft, non-distended, non-tender, no rebound, no guarding. Ostomy w/ stool and flatus.  Skin: Warm/dry, normal color, no jaundice.      MEDICATIONS  (STANDING):  acetaminophen     Tablet .. 650 milliGRAM(s) Oral every 6 hours  atorvastatin 20 milliGRAM(s) Oral at bedtime  enoxaparin Injectable 40 milliGRAM(s) SubCutaneous every 24 hours  hydrochlorothiazide 12.5 milliGRAM(s) Oral daily  ibuprofen  Tablet. 400 milliGRAM(s) Oral every 6 hours  insulin lispro (ADMELOG) corrective regimen sliding scale   SubCutaneous three times a day before meals  piperacillin/tazobactam IVPB.. 3.375 Gram(s) IV Intermittent every 8 hours  potassium chloride    Tablet ER 20 milliEquivalent(s) Oral every 2 hours  potassium chloride  20 mEq/100 mL IVPB 20 milliEquivalent(s) IV Intermittent every 2 hours  tamsulosin 0.4 milliGRAM(s) Oral at bedtime    MEDICATIONS  (PRN):  HYDROmorphone  Injectable 0.5 milliGRAM(s) IV Push every 4 hours PRN Severe Pain (7 - 10)  oxyCODONE    IR 5 milliGRAM(s) Oral every 6 hours PRN Severe Pain (7 - 10)      DVT PROPHYLAXIS: enoxaparin Injectable 40 milliGRAM(s) SubCutaneous every 24 hours    GI PROPHYLAXIS:   ANTICOAGULATION:   ANTIBIOTICS:  piperacillin/tazobactam IVPB.. 3.375 Gram(s)      LAB/STUDIES:  Labs:  CAPILLARY BLOOD GLUCOSE      POCT Blood Glucose.: 118 mg/dL (10 Mar 2023 16:35)  POCT Blood Glucose.: 198 mg/dL (10 Mar 2023 11:40)  POCT Blood Glucose.: 128 mg/dL (10 Mar 2023 07:41)                          9.5    10.25 )-----------( 309      ( 10 Mar 2023 23:53 )             29.3       Auto Neutrophil %: 67.6 % (03-10-23 @ 23:53)  Auto Immature Granulocyte %: 0.6 % (03-10-23 @ 23:53)    03-10    141  |  104  |  10  ----------------------------<  126<H>  3.2<L>   |  29  |  0.5<L>      Calcium, Total Serum: 8.1 mg/dL (03-10-23 @ 23:53)      LFTs:      Coags:    CARDIAC MARKERS ( 10 Mar 2023 23:53 )  x     / <0.01 ng/mL / 32 U/L / x     / 1.0 ng/mL      IMAGING:  No  new imaging.  
GENERAL SURGERY PROGRESS NOTE    Patient: DELORIS NORRIS , 82y ([birth year])Female   MRN: [id number]  Location: [facility] ED Hold 050 A  Visit: 23 Inpatient  Date: 23 @ 01:21    Events of past 24 hours:  Pain controlled. Patient states that when she does get pain, it is crampy, 8/10 RLQ pain.  Denies nausea/vomiting.  Denies flatus. Per daughter, patient had small BM per nursing home 3/6 AM.  Denies ambulation.  Primafit on, <100cc output despite patient being on IVF for several hours.       PAST MEDICAL & SURGICAL HISTORY:  HTN (hypertension)    Diabetes    Hypercholesteremia    History of       Vitals:   T(F): 98.1 (23 @ 23:46), Max: 98.1 (23 @ 23:46)  HR: 54 (23 @ 23:46)  BP: 106/53 (23 @ 23:46)  RR: 20 (23 @ 23:46)  SpO2: 96% (23 @ 23:46)      Diet, NPO:   Except Medications     Special Instructions for Nursing:  Except Medications      Fluids: lactated ringers.: Solution, 1000 milliLiter(s) infuse at 100 mL/Hr    PHYSICAL EXAM:  General: NAD, calm and cooperative.  Cardiac: RRR.  Respiratory: On RA. Speaks in complete sentences. No accessory muscles of respiration in use. Bilateral chest rise.  Abdomen: Soft, non-distended, non-tender, no rebound, no guarding. Distended bladder palpated in suprapubic area.  Skin: Warm/dry, normal color, no jaundice.      MEDICATIONS  (STANDING):  atorvastatin 20 milliGRAM(s) Oral at bedtime  dextrose 5%. 1000 milliLiter(s) (50 mL/Hr) IV Continuous <Continuous>  dextrose 5%. 1000 milliLiter(s) (100 mL/Hr) IV Continuous <Continuous>  dextrose 50% Injectable 25 Gram(s) IV Push once  dextrose 50% Injectable 12.5 Gram(s) IV Push once  dextrose 50% Injectable 25 Gram(s) IV Push once  enoxaparin Injectable 40 milliGRAM(s) SubCutaneous every 24 hours  glucagon  Injectable 1 milliGRAM(s) IntraMuscular once  hydrochlorothiazide 12.5 milliGRAM(s) Oral daily  insulin lispro (ADMELOG) corrective regimen sliding scale   SubCutaneous three times a day before meals  lactated ringers. 1000 milliLiter(s) (100 mL/Hr) IV Continuous <Continuous>  piperacillin/tazobactam IVPB.. 3.375 Gram(s) IV Intermittent every 8 hours  potassium chloride  20 mEq/100 mL IVPB 20 milliEquivalent(s) IV Intermittent every 2 hours    MEDICATIONS  (PRN):  dextrose Oral Gel 15 Gram(s) Oral once PRN Blood Glucose LESS THAN 70 milliGRAM(s)/deciliter  HYDROmorphone  Injectable 0.5 milliGRAM(s) IV Push every 4 hours PRN Severe Pain (7 - 10)      DVT PROPHYLAXIS: enoxaparin Injectable 40 milliGRAM(s) SubCutaneous every 24 hours    GI PROPHYLAXIS:   ANTICOAGULATION:   ANTIBIOTICS:  piperacillin/tazobactam IVPB.. 3.375 Gram(s)      LAB/STUDIES:  Labs:  CAPILLARY BLOOD GLUCOSE                          10.4   10.46 )-----------( 380      ( 06 Mar 2023 21:00 )             31.9       Auto Neutrophil %: 70.2 % (23 @ 21:00)  Auto Immature Granulocyte %: 0.6 % (23 @ 21:00)  Auto Immature Granulocyte %: 0.4 % (23 @ 15:27)  Auto Neutrophil %: 81.8 % (23 @ 15:27)        138  |  102  |  27<H>  ----------------------------<  126<H>  3.6   |  26  |  0.6<L>      Calcium, Total Serum: 8.6 mg/dL (23 @ 21:00)      LFTs:             5.8  | 0.6  | 13       ------------------[79      ( 06 Mar 2023 15:27 )  3.5  | x    | <5          Lipase:8      Amylase:x         Blood Gas Venous - Lactate: 1.60 mmol/L (23 @ 16:29)  Lactate, Blood: 1.3 mmol/L (03-06-23 @ 15:27)      Coags:     15.00  ----< 1.31    ( 06 Mar 2023 15:27 )     34.3        IMAGING:  < from: CT Abdomen and Pelvis w/ IV Cont (23 @ 16:06) >  IMPRESSION:  Since 2023:  1.  Multiple prior exams demonstrating inflammatory versus neoplastic   process in the sigmoid colon which was demonstrated to be FDG avid on   prior PET/CT which is now resulting in high-grade colonic and small bowel   obstruction with diffuse dilation of the entire colon (proximal to the   process in the sigmoid colon) as well as the mid to distal small bowel   loops with cecum measuring upto 10.5 cm.  2.  Chronic right hydroureteronephrosis to the level of this perisigmoid   colonic process.  3.  Essentially stable pancreatic findings.  4.  Trace bilateral pleural effusions.    < end of copied text >

## 2023-03-11 NOTE — CONSULT NOTE ADULT - ATTENDING COMMENTS
On my review of ECG, patient with SINUS RHYTHM with PACs. Telemetry with sinus rhythm, PACs, PVCs, and NSVT. No atrial fibrillation noted. TTE with normal LVEF and G1DD.     Patient with frequent atrial and ventricular ectopy on telemetry, for which she can be started on Toprol 50 mg daily and follow-up in the outpatient setting.     Recommendations:   - Start Toprol 50 mg daily   - No need for anticoagulation  - Cardiology office will call patient to establish care with Dr. Ash Betancur, plan for event monitoring in the outpatient setting  - Cardiology consult team to sign off

## 2023-03-11 NOTE — PROGRESS NOTE ADULT - ASSESSMENT
82F hx of newly diagnosed Pancreatic CA, with ? Mets to lung, as well as diverticulitis (possible neoplastic process of sigmoid colon), presenting with a near complete large bowel obstruction    Plan:  #LBO s/p Transverse Loop Colostomy   - Low fiber diet, IVL   - failed TOV; smith reinserted   - Monitor ostomy output   - Plan to remove ostomy bar 3/13   - PT/OOB today   - Monitor I/O   - Pain Control: Multimodal    - Pulmonary: Incentive Spirometry q1 hr while awake   - ABX:  Zosyn   - PPX: Lovenox, PTX    [facility]  Spectra [id number] 82F hx of newly diagnosed Pancreatic CA, with ? Mets to lung, as well as diverticulitis (possible neoplastic process of sigmoid colon), presenting with a near complete large bowel obstruction    Plan:    #Atrial Fibrillation - New Onset   - f/u echo   - f/u cardiology c/s   - monitor telemetry record    #LBO s/p Transverse Loop Colostomy   - Low fiber diet, IVL   - failed TOV; smith reinserted   - Monitor ostomy output   - Plan to remove ostomy bar 3/13   - PT/OOB today   - Monitor I/O   - Pain Control: Multimodal    - Pulmonary: Incentive Spirometry q1 hr while awake   - ABX:  Zosyn   - PPX: Lovenox, PTX    [facility]  Spectra [phone]

## 2023-03-11 NOTE — DISCHARGE NOTE PROVIDER - HOSPITAL COURSE
82 year old female with PMHx of HTN, DM, HLD, recent dx of pancreatic adenocarcinoma (2/13/23) presents with  worsening lower abdominal pain. Patient was diagnosed with diverticulitis recently and has been on antibiotics (D/c'd on Ertapenem). Denies fevers/chills or n/v. Last BM was yesterday (small and watery). Patient reports decreased PO intake over the last couple months 2/2 decrease appetite. Patient currently following Dr. De Santiago and Dr. Mead.  Outpatient PET scan (2/16/23) shows 2 left basilar nodular opacities; Rt pelvic pericolonic soft tissue mass; severe uptake in sigmoid colon). Last c-scope was 10/2022 - patient reports polypectomy and suspicious sigmoid lesion. Denies CP, SOB, dysuria, hematochezia, weakness/numbness.  CT scan was obtained which demonstrated significant colonic distension, cecum up to 9cm. The patient was taken for a transverse loop colostomy. The patient tolerated the procedure well and was transferred to the floor in stable condition. The patient has been having ostomy function, and has worked with PT. The patient pain has been well controlled and has had no nausea or vomiting. The patients hospital course was complicated by hypokalemia that has since resolved. The EKG was initially concerning for possible Afib but after echo and Cardiology consult, has been ruled out. The patient will need to follow up in the office with Dr. De Santiago on Tuesday for ostomy bar removal and follow up with Dr. Darryl Betancur Cardiology outpatient. 82 year old female with PMHx of HTN, DM, HLD, recent dx of pancreatic adenocarcinoma (2/13/23) presents with  worsening lower abdominal pain. Patient was diagnosed with diverticulitis recently and has been on antibiotics (D/c'd on Ertapenem). Denies fevers/chills or n/v. Last BM was yesterday (small and watery). Patient reports decreased PO intake over the last couple months 2/2 decrease appetite. Patient currently following Dr. De Santiago and Dr. Mead.  Outpatient PET scan (2/16/23) shows 2 left basilar nodular opacities; Rt pelvic pericolonic soft tissue mass; severe uptake in sigmoid colon). Last c-scope was 10/2022 - patient reports polypectomy and suspicious sigmoid lesion. Denies CP, SOB, dysuria, hematochezia, weakness/numbness.  CT scan was obtained which demonstrated significant colonic distension, cecum up to 9cm. The patient was taken for a transverse loop colostomy. The patient tolerated the procedure well and was transferred to the floor in stable condition. The patient has been having ostomy function, and has worked with PT. The patient pain has been well controlled and has had no nausea or vomiting. The patients hospital course was complicated by hypokalemia that has since resolved. The EKG was initially concerning for possible Afib but after echo and Cardiology consult, has been ruled out. The patient will need to follow up in the office with Dr. De Santiago on Tuesday for ostomy bar removal and follow up with Dr. Darryl Betancur Cardiology outpatient. The patient will also follow up with Dr. Fry for smith management. Patient was notified of lung basilar nodes on CT scan from February to follow up on with PCP and Dr. De Santiago.

## 2023-03-11 NOTE — CONSULT NOTE ADULT - SUBJECTIVE AND OBJECTIVE BOX
Outpt cardiologist:    HPI:  23  82 year old female with PMHx of HTN, DM, HLD, recent dx of pancreatic adenocarcinoma (23) presents with  worsening lower abdominal pain. Patient was diagnosed with diverticulitis recently and has been on antibiotics (D/c'd on Ertapenem). Denies fevers/chills or n/v. Last BM was yesterday (small and watery). Patient reports decreased PO intake over the last couple months 2/2 decrease appetite. Patient currently following Dr. De Santiago and Dr. Mead.  Outpatient PET scan (23) shows 2 left basilar nodular opacities; Rt pelvic pericolonic soft tissue mass; severe uptake in sigmoid colon). Last c-scope was 10/2022 - patient reports polypectomy and suspicious sigmoid lesion. Denies CP, SOB, dysuria, hematochezia, weakness/numbness.     CT scan was obtained which demonstrated significant colonic distension, cecum up to 9cm.      (06 Mar 2023 17:21)      ---  cardio fellow additional notes:        PAST MEDICAL & SURGICAL HISTORY  HTN (hypertension)    Diabetes    Hypercholesteremia    History of         FAMILY HISTORY:  FAMILY HISTORY:      SOCIAL HISTORY:  Social History:      ALLERGIES:  No Known Allergies      MEDICATIONS:  acetaminophen     Tablet .. 650 milliGRAM(s) Oral every 6 hours  atorvastatin 20 milliGRAM(s) Oral at bedtime  enoxaparin Injectable 40 milliGRAM(s) SubCutaneous every 24 hours  hydrochlorothiazide 12.5 milliGRAM(s) Oral daily  ibuprofen  Tablet. 400 milliGRAM(s) Oral every 6 hours  insulin lispro (ADMELOG) corrective regimen sliding scale   SubCutaneous three times a day before meals  piperacillin/tazobactam IVPB.. 3.375 Gram(s) IV Intermittent every 8 hours  potassium chloride    Tablet ER 20 milliEquivalent(s) Oral every 2 hours  potassium chloride    Tablet ER 20 milliEquivalent(s) Oral every 2 hours  tamsulosin 0.4 milliGRAM(s) Oral at bedtime    PRN:  HYDROmorphone  Injectable 0.5 milliGRAM(s) IV Push every 4 hours PRN  oxyCODONE    IR 5 milliGRAM(s) Oral every 6 hours PRN      HOME MEDICATIONS:  Home Medications:  acetaminophen 325 mg oral tablet: 2 tab(s) orally every 6 hours, As needed, Temp greater or equal to 38C (100.4F), Mild Pain (1 - 3) (2023 12:44)  bisacodyl 5 mg oral delayed release tablet: 1 tab(s) orally every 12 hours (2023 16:39)  enoxaparin: 40 milligram(s) subcutaneous once a day (2023 12:44)  hydroCHLOROthiazide 12.5 mg oral capsule: 1 cap(s) orally once a day (2023 08:41)  metFORMIN 500 mg oral tablet: 1 tab(s) orally 2 times a day (2023 08:41)  pantoprazole 40 mg oral delayed release tablet: 1 tab(s) orally once a day (before a meal) (2023 12:44)  polyethylene glycol 3350 oral powder for reconstitution: 17 gram(s) orally 2 times a day (2023 12:44)  rosuvastatin 5 mg oral tablet: 1 tab(s) orally once a day (2023 08:41)      VITALS:   T(F): 97.3 ( @ 05:01), Max: 98.8 ( @ 10:25)  HR: 95 ( @ 05:01) (41 - 109)  BP: 124/58 ( @ 05:01) (108/54 - 154/69)  BP(mean): 79 ( @ 07:17) (79 - 79)  RR: 18 ( @ 05:01) (12 - 20)  SpO2: 97% (03-10 @ 16:53) (93% - 100%)    I&O's Summary    10 Mar 2023 07:01  -  11 Mar 2023 07:00  --------------------------------------------------------  IN: 0 mL / OUT: 1450 mL / NET: -1450 mL        REVIEW OF SYSTEMS:  CONSTITUTIONAL: No weakness, fevers or chills  HEENT: No visual changes, neck/ear pain  RESPIRATORY: No cough, sob  CARDIOVASCULAR: See HPI  GASTROINTESTINAL: No abdominal pain. No nausea, vomiting, diarrhea   GENITOURINARY: No dysuria, frequency or hematuria  NEUROLOGICAL: No new focal deficits  SKIN: No new rashes    PHYSICAL EXAM:  General: Not in distress.  Non-toxic appearing.   HEENT: EOMI  Cardio: regular, S1, S2, no murmur  Pulm: B/L BS.  No wheezing / crackles / rales  Abdomen: Soft, non-tender, non-distended. Normoactive bowel sounds  Extremities: No edema b/l le  Neuro: A&O x3. No focal deficits    LABS:                        9.5    10.25 )-----------( 309      ( 10 Mar 2023 23:53 )             29.3     03-10    141  |  104  |  10  ----------------------------<  126<H>  3.2<L>   |  29  |  0.5<L>    Ca    8.1<L>      10 Mar 2023 23:53  Phos  2.1     03-10  Mg     1.7     03-10        Creatine Kinase, Serum: 32 U/L (03-10-23 @ 23:53)  Troponin T, Serum: <0.01 ng/mL (03-10-23 @ 23:53)    CARDIAC MARKERS ( 10 Mar 2023 23:53 )  x     / <0.01 ng/mL / 32 U/L / x     / 1.0 ng/mL        Troponin trend:        COVID-19 PCR: NotDetec (10 Mar 2023 12:40)  COVID-19 PCR: NotDetec (2023 08:10)      RADIOLOGY:  Xray Kidney Ureter Bladder (23):   Right ureteral stent with proximal pigtail projecting over the renal shadow with distal pigtail in the pelvis.  Cholelithiasis.  Partially imaged left total hip arthroplasty. Degenerative changes of the spine      EC Lead ECG:   Ventricular Rate 84 BPM    Atrial Rate 74 BPM    QRS Duration 82 ms    Q-T Interval 408 ms    QTC Calculation(Bazett) 482 ms    R Axis -1 degrees    T Axis -12 degrees    Diagnosis Line Atrial fibrillation with premature ventricular or aberrantly conducted  complexes  Cannot rule out Inferior infarct , age undetermined  Abnormal ECG    Confirmed by Boby Monroe ([id number]) on 3/10/2023 6:24:48 PM (03-10 @ 14:54)  
Patient is a 82 year old female with PMHx of HTN, DM, HLD, recent dx of pancreatic adenocarcinoma (23) presents with  worsening lower abdominal pain. Patient was diagnosed with diverticulitis recently and has been on antibiotics (D/c'd on Ertapenem). Denies fevers/chills or n/v. Last BM was yesterday (small and watery). Patient reports decreased PO intake over the last couple months 2/2 decrease appetite. Patient currently following Dr. De Santiago and Dr. Mead.  Outpatient PET scan (23) shows 2 left basilar nodular opacities; Rt pelvic pericolonic soft tissue mass; severe uptake in sigmoid colon). Last c-scope was 10/2022 - patient reports polypectomy and suspicious sigmoid lesion. Denies CP, SOB, dysuria, hematochezia, weakness/numbness.     CT scan was obtained which demonstrated significant colonic distension, cecum up to 9cm.        PAST MEDICAL & SURGICAL HISTORY:  HTN (hypertension)  Diabetes  Hypercholesteremia  History of     REVIEW OF SYSTEMS:   All other systems negative, unless indicated in HPI    MEDICATIONS  (STANDING):  acetaminophen     Tablet .. 650 milliGRAM(s) Oral every 6 hours  atorvastatin 20 milliGRAM(s) Oral at bedtime  enoxaparin Injectable 40 milliGRAM(s) SubCutaneous every 24 hours  hydrochlorothiazide 12.5 milliGRAM(s) Oral daily  ibuprofen  Tablet. 400 milliGRAM(s) Oral every 6 hours  insulin lispro (ADMELOG) corrective regimen sliding scale   SubCutaneous three times a day before meals  piperacillin/tazobactam IVPB.. 3.375 Gram(s) IV Intermittent every 8 hours  potassium chloride  20 mEq/100 mL IVPB 20 milliEquivalent(s) IV Intermittent every 2 hours  tamsulosin 0.4 milliGRAM(s) Oral at bedtime    MEDICATIONS  (PRN):  HYDROmorphone  Injectable 0.5 milliGRAM(s) IV Push every 4 hours PRN Severe Pain (7 - 10)  oxyCODONE    IR 5 milliGRAM(s) Oral every 6 hours PRN Severe Pain (7 - 10)      Allergies  No Known Allergies  Intolerances    SOCIAL HISTORY:   From [facility]    FAMILY HISTORY:  No pertinent family hx noted      PHYSICAL EXAM:  Vital Signs Last 24 Hrs  T(C): 36.1 (10 Mar 2023 12:04), Max: 36.4 (09 Mar 2023 17:00)  T(F): 96.9 (10 Mar 2023 12:04), Max: 97.6 (09 Mar 2023 17:00)  HR: 82 (10 Mar 2023 12:04) (41 - 82)  BP: 126/60 (10 Mar 2023 12:04) (108/54 - 135/64)  BP(mean): --  RR: 18 (10 Mar 2023 12:) (18 - 18)  SpO2: 97% (10 Mar 2023 12:) (96% - 97%)    Parameters below as of 10 Mar 2023 04:34  Patient On (Oxygen Delivery Method): room air    General: NAD  HEENT: Denies headache, rhinorrhea, sore throat, eye pain  CV: Denies CP, palpitations  PULM: Denies wheezing, hemoptysis  GI: Colostomy in place   : Denies discharge, hematuria  MSK: Denies arthralgias, myalgias  SKIN: Denies rash, lesions  NEURO: Denies paresthesias, weakness  PSYCH: Denies depression, anxiety           LABS/ CULTURES/ RADIOLOGY:                        10.4   15.22 )-----------( 321      ( 09 Mar 2023 20:26 )             32.3       137  |  98  |  10  ----------------------------<  157      [03-10-23 @ 12:29]  2.7   |  28  |  0.5        Ca     8.5     [03-10-23 @ 12:29]      Mg     1.9     [23 @ 20:26]      Phos  2.5     [23 @ 20:26]                                
Patient is a 81 y/o female with PMHx of HTN, DM, HLD, recent dx of pancreatic adenocarcinoma (23- EUS done of 2cm body Mass- Adenocarcinoma on FNA) who presents from [facility] with severe abdominal pain. Family present during interview. Patient had recent admission in which she was treated for Acute Diverticulitis with phlegmon of 4cm. She was discharged on ABX and consideration towards performing Colonoscopy was to be made in a few weeks. Now in the ED she has sharp pain in the abdomen diffuse but mostly with some increased tenderness in Upper abdomen. She notes she had a BM and is passing gas but around 48 hours prior was not and had to have enemas. She notes now inability to eat, nausea, weakness, and abdominal distension.        PAST MEDICAL & SURGICAL HISTORY:  HTN (hypertension)  Diabetes  Hypercholesteremia  History of   Pancreatic Adneocarcinoma ( On FNA- )      FAMILY HISTORY: Possible colon cancer in father    Social History:  Tobacco: 10-15 years, 1 pack a day quit in 1970s  Alcohol: none  Drugs: none    Home Medications:  hydroCHLOROthiazide 12.5 mg oral capsule: 1 cap(s) orally once a day (2023 08:41)  metFORMIN 500 mg oral tablet: 1 tab(s) orally 2 times a day (2023 08:41)  rosuvastatin 5 mg oral tablet: 1 tab(s) orally once a day (2023 08:41)        MEDICATIONS  (STANDING):  atorvastatin 20 milliGRAM(s) Oral at bedtime  dextrose 5%. 1000 milliLiter(s) (50 mL/Hr) IV Continuous <Continuous>  dextrose 5%. 1000 milliLiter(s) (100 mL/Hr) IV Continuous <Continuous>  dextrose 50% Injectable 25 Gram(s) IV Push once  dextrose 50% Injectable 12.5 Gram(s) IV Push once  dextrose 50% Injectable 25 Gram(s) IV Push once  enoxaparin Injectable 40 milliGRAM(s) SubCutaneous every 24 hours  glucagon  Injectable 1 milliGRAM(s) IntraMuscular once  hydrochlorothiazide 12.5 milliGRAM(s) Oral daily  insulin lispro (ADMELOG) corrective regimen sliding scale   SubCutaneous three times a day before meals  lactated ringers. 1000 milliLiter(s) (100 mL/Hr) IV Continuous <Continuous>  pantoprazole    Tablet 40 milliGRAM(s) Oral before breakfast  piperacillin/tazobactam IVPB.. 3.375 Gram(s) IV Intermittent every 8 hours    MEDICATIONS  (PRN):  acetaminophen     Tablet .. 650 milliGRAM(s) Oral every 6 hours PRN Temp greater or equal to 38C (100.4F), Mild Pain (1 - 3)  dextrose Oral Gel 15 Gram(s) Oral once PRN Blood Glucose LESS THAN 70 milliGRAM(s)/deciliter      Allergies  No Known Allergies      Review of Systems:   Constitutional:  See HPI  ENT/Mouth:  No Hearing Changes,  No Difficulty Swallowing  Eyes:  No Eye Pain, No Vision Changes  Cardiovascular:  No Chest Pain, No Palpitations  Respiratory:  No Cough, No Dyspnea  Gastrointestinal:  As described in HPI  Musculoskeletal:  No Joint Swelling, No Back Pain  Skin:  No Skin Lesions, No Jaundice  Neuro:  No Syncope, No Dizziness  Heme/Lymph:  No Bruising, No Bleeding.    Vital Signs Last 24 Hrs  T(C): 35.9 (06 Mar 2023 11:30), Max: 35.9 (06 Mar 2023 11:30)  T(F): 96.7 (06 Mar 2023 11:30), Max: 96.7 (06 Mar 2023 11:30)  HR: 101 (06 Mar 2023 11:30) (101 - 101)  BP: 103/57 (06 Mar 2023 11:30) (103/57 - 103/57)  BP(mean): --  RR: 16 (06 Mar 2023 11:30) (16 - 16)  SpO2: 95% (06 Mar 2023 11:30) (95% - 95%)    Parameters below as of 06 Mar 2023 11:30  Patient On (Oxygen Delivery Method): room air      GENERAL: Frail appearing,  appears uncomfortable  HEAD:  Atraumatic, Normocephalic  EYES: conjunctiva and sclera clear  NECK: Supple, no JVD or thyromegaly  CHEST/LUNG: Clear to auscultation bilaterally; No wheeze, rhonchi, or rales  HEART: Regular rate and rhythm; normal S1, S2, No murmurs.  ABDOMEN: Soft, distended, Tenderness on palpation diffusely  NEUROLOGY: No asterixis or tremor  SKIN: Intact, no jaundice      
DELORIS NORRIS  82y, Female  Allergy: No Known Allergies      CHIEF COMPLAINT:   Intestinal obstruction     (09 Mar 2023 11:31)      LOS  4d    HPI  HPI:  23  82 year old female with PMHx of HTN, DM, HLD, recent dx of pancreatic adenocarcinoma (23) presents with  worsening lower abdominal pain. Patient was diagnosed with diverticulitis recently and has been on antibiotics (D/c'd on Ertapenem). Denies fevers/chills or n/v. Last BM was yesterday (small and watery). Patient reports decreased PO intake over the last couple months 2/2 decrease appetite. Patient currently following Dr. De Santiago and Dr. Mead.  Outpatient PET scan (23) shows 2 left basilar nodular opacities; Rt pelvic pericolonic soft tissue mass; severe uptake in sigmoid colon). Last c-scope was 10/2022 - patient reports polypectomy and suspicious sigmoid lesion. Denies CP, SOB, dysuria, hematochezia, weakness/numbness.     CT scan was obtained which demonstrated significant colonic distension, cecum up to 9cm.      (06 Mar 2023 17:21)      INFECTIOUS DISEASE HISTORY:  ID consulted for leukocytosis post-op  s/p LBO OR    Seen by ID for diverticulitis s/p midline x ertapenem 2 weeks       Currently ordered for:  piperacillin/tazobactam IVPB.. 3.375 Gram(s) IV Intermittent every 8 hours      PMH  PAST MEDICAL & SURGICAL HISTORY:  HTN (hypertension)      Diabetes      Hypercholesteremia      History of           FAMILY HISTORY  non-contributory     SOCIAL HISTORY  Social History:        ROS  General: Denies rigors, nightsweats  HEENT: Denies headache, rhinorrhea, sore throat, eye pain  CV: Denies CP, palpitations  PULM: Denies wheezing, hemoptysis  GI: as noted above   : Denies discharge, hematuria  MSK: Denies arthralgias, myalgias  SKIN: Denies rash, lesions  NEURO: Denies paresthesias, weakness  PSYCH: Denies depression, anxiety     VITALS:  T(F): 96.9, Max: 97.6 (23 @ 17:00)  HR: 82  BP: 126/60  RR: 18Vital Signs Last 24 Hrs  T(C): 36.1 (10 Mar 2023 12:04), Max: 36.4 (09 Mar 2023 17:00)  T(F): 96.9 (10 Mar 2023 12:04), Max: 97.6 (09 Mar 2023 17:00)  HR: 82 (10 Mar 2023 12:04) (41 - 82)  BP: 126/60 (10 Mar 2023 12:04) (108/54 - 135/64)  BP(mean): --  RR: 18 (10 Mar 2023 12:) (18 - 18)  SpO2: 97% (10 Mar 2023 12:04) (96% - 97%)    Parameters below as of 10 Mar 2023 04:34  Patient On (Oxygen Delivery Method): room air        PHYSICAL EXAM:  Gen: NAD, resting in bed  HEENT: Normocephalic, atraumatic  Neck: supple, no lymphadenopathy  CV: Regular rate & regular rhythm  Lungs: decreased BS at bases, no fremitus  Abdomen: Soft, BS present colostomy, edematous stoma, + stool  Ext: Warm, well perfused  Neuro: non focal, awake  Skin: no rash, no erythema  Lines: no phlebitis     TESTS & MEASUREMENTS:                        10.4   15.22 )-----------( 321      ( 09 Mar 2023 20:26 )             32.3     03-10    137  |  98  |  10  ----------------------------<  157<H>  2.7<LL>   |  28  |  0.5<L>    Ca    8.5      10 Mar 2023 12:29  Phos  2.5       Mg     1.9     03-09              Culture - Urine (collected 23 @ 05:15)  Source: Clean Catch Clean Catch (Midstream)  Final Report (23 @ 21:11):    <10,000 CFU/mL Normal Urogenital Kath        Blood Gas Venous - Lactate: 1.60 mmol/L (23 @ 16:29)  Lactate, Blood: 1.3 mmol/L (23 @ 15:27)      INFECTIOUS DISEASES TESTING  COVID-19 PCR: NotDetec (03-10-23 @ 12:40)  COVID-19 PCR: NotDetec (23 @ 08:10)      INFLAMMATORY MARKERS      RADIOLOGY & ADDITIONAL TESTS:  I have personally reviewed the last Chest xray  CXR      CT      CARDIOLOGY TESTING  12 Lead ECG:   Ventricular Rate 83 BPM    Atrial Rate 83 BPM    P-R Interval 132 ms    QRS Duration 82 ms    Q-T Interval 334 ms    QTC Calculation(Bazett) 392 ms    P Axis 23 degrees    R Axis 7 degrees    T Axis 61 degrees    Diagnosis Line Normal sinus rhythm  Nonspecific ST T changes  Abnormal ECG    Confirmed by SERENA WALTON MD ([id number]) on 3/8/2023 6:57:08 AM (23 @ 23:38)  12 Lead ECG:   Ventricular Rate 94 BPM    Atrial Rate 94 BPM    P-R Interval 132 ms    QRS Duration 76 ms    Q-T Interval 342 ms    QTC Calculation(Bazett) 427 ms    P Axis 52 degrees    R Axis 25 degrees    T Axis 7 degrees    Diagnosis Line Sinus rhythm with frequent Premature ventricular complexes  Nonspecific ST and T wave abnormality  Abnormal ECG    Confirmed by SERENA WALTON MD ([id number]) on 3/7/2023 6:58:43 AM (23 @ 14:28)      MEDICATIONS  acetaminophen     Tablet .. 650 Oral every 6 hours  atorvastatin 20 Oral at bedtime  enoxaparin Injectable 40 SubCutaneous every 24 hours  hydrochlorothiazide 12.5 Oral daily  ibuprofen  Tablet. 400 Oral every 6 hours  insulin lispro (ADMELOG) corrective regimen sliding scale  SubCutaneous three times a day before meals  piperacillin/tazobactam IVPB.. 3.375 IV Intermittent every 8 hours  potassium chloride  20 mEq/100 mL IVPB 20 IV Intermittent every 2 hours  tamsulosin 0.4 Oral at bedtime        ANTIBIOTICS:  piperacillin/tazobactam IVPB.. 3.375 Gram(s) IV Intermittent every 8 hours      ALLERGIES:  No Known Allergies        
81 yo with large bowel obstruction  noted to have severe right hydronephrosis    consultation for elective stenting - right side - long term  left side temporary    discussed the surgery in detail with the patient  all questions answered    CT scan reviewed

## 2023-03-11 NOTE — DISCHARGE NOTE PROVIDER - PROVIDER TOKENS
PROVIDER:[TOKEN:[[id number]]],PROVIDER:[TOKEN:[[id number]]],PROVIDER:[TOKEN:[[id number]]]

## 2023-03-11 NOTE — DISCHARGE NOTE PROVIDER - CARE PROVIDER_API CALL
Sanjuanita De Santiago)  Complex General Surgical Oncology; Surgery  [address] [facility] [address] [facility], [facility], [address] [place] [address]  Phone: [phone]  Fax: [phone]  Follow Up Time:     Alize Fry)  Urology  [address] [address], [address]  [place] [address]  Phone: [phone]  Fax: [phone]  Follow Up Time:     Ash Betancur)  Cardiovascular Disease; Internal Medicine  [address] [place] [address]  Phone: [phone]  Fax: [phone]  Follow Up Time:

## 2023-03-11 NOTE — CONSULT NOTE ADULT - CONSULT REASON
Right Hydronephrosis  for ureteral stenting
ostomy teaching
Afib with RVR
Abdominal Pain
antibiotic recommendations

## 2023-03-11 NOTE — DISCHARGE NOTE PROVIDER - NSDCMRMEDTOKEN_GEN_ALL_CORE_FT
acetaminophen 325 mg oral tablet: 2 tab(s) orally every 6 hours, As needed, Temp greater or equal to 38C (100.4F), Mild Pain (1 - 3)  bisacodyl 5 mg oral delayed release tablet: 1 tab(s) orally every 12 hours  hydroCHLOROthiazide 12.5 mg oral capsule: 1 cap(s) orally once a day  metFORMIN 500 mg oral tablet: 1 tab(s) orally 2 times a day  pantoprazole 40 mg oral delayed release tablet: 1 tab(s) orally once a day (before a meal)  polyethylene glycol 3350 oral powder for reconstitution: 17 gram(s) orally 2 times a day  rosuvastatin 5 mg oral tablet: 1 tab(s) orally once a day   acetaminophen 325 mg oral tablet: 2 tab(s) orally every 6 hours, As needed, Temp greater or equal to 38C (100.4F), Mild Pain (1 - 3)  amoxicillin-clavulanate 875 mg-125 mg oral tablet: 1 tab(s) orally 2 times a day   bisacodyl 5 mg oral delayed release tablet: 1 tab(s) orally every 12 hours  enoxaparin: 40 milliliter(s) subcutaneous once a day  hydroCHLOROthiazide 12.5 mg oral capsule: 1 cap(s) orally once a day  metFORMIN 500 mg oral tablet: 1 tab(s) orally 2 times a day  pantoprazole 40 mg oral delayed release tablet: 1 tab(s) orally once a day (before a meal)  polyethylene glycol 3350 oral powder for reconstitution: 17 gram(s) orally 2 times a day  rosuvastatin 5 mg oral tablet: 1 tab(s) orally once a day

## 2023-03-16 NOTE — ED ADULT TRIAGE NOTE - CHIEF COMPLAINT QUOTE
sent  tammy guerrero nh for hypokalemia 2.7, pt c/o generalized weakness since having colostomy sx 1 week ago.

## 2023-03-16 NOTE — ED PROVIDER NOTE - OBJECTIVE STATEMENT
PMHx of HTN, DM, HLD, recent dx of pancreatic adenocarcinoma (2/13/23) s/p transverse loop colostomy with Dr. De Santiago and bilateral ureteral stents with Dr. Fry, recently discharged on 3/11 complicated by hypokalemia presenting for eval of abnormal labs. Patient was sent in from Dr. De Santiago's office complaining of Weakness.  Lab results from nursing revealed patient to have a potassium of 2.7.  Patient was sent to the emergency department for further evaluation.  Patient denies chest pain shortness of breath, abdominal pain, nausea or vomiting.  Patient endorses good ostomy and urine output, but states she feels generalized weakness.

## 2023-03-16 NOTE — ED PROVIDER NOTE - PATIENT PORTAL LINK FT
You can access the FollowMyHealth Patient Portal offered by [facility] by registering at the following website: [email or web address]. By joining [employer]’s FollowMyHealth portal, you will also be able to view your health information using other applications (apps) compatible with our system.

## 2023-03-16 NOTE — ED PROVIDER NOTE - PROGRESS NOTE DETAILS
Patient presented for abnormal potassium, potassium noted to be normal. Patient presented for abnormal potassium, potassium noted to be normal. Discussed case with surgery team, Dr. De Santiago recommends magnesium and phosphorus, fluid bolus, and if labs are normal can discharge with PMD and surgery follow-up Magnesium noted to be 1.7, 1 g of mag IV piggyback ordered.

## 2023-03-16 NOTE — ED PROVIDER NOTE - PHYSICAL EXAMINATION
Vital Signs: I have reviewed the initial vital signs.  Constitutional: chronically ill appearing, no acute distress  HEENT: Airway patent, moist MM, EOMI,   CV: regular rate, regular rhythm, well-perfused extremities,   Lungs: Clear to ascultation bilaterally, no increased work of breathing.  ABD: Non-tender, Non-distended, ostomy noted with stool and gas  MSK: Neck supple, nontender, normal range of motion, poor effort in strength testing  INTEG: Skin warm, dry, no rash.  NEURO: A&Ox3, moving all extremities, normal speech  PSYCH: Calm, cooperative, normal affect and interaction.

## 2023-03-16 NOTE — ED PROVIDER NOTE - INPATIENT RECORD SUMMARY
HTN, DM, HLD, recent dx of pancreatic adenocarcinoma (2/13/23) s/p transverse loop colostomy with Dr. De Santiago and bilateral ureteral stents with Dr. Fry,

## 2023-03-16 NOTE — ED ADULT NURSE NOTE - NSIMPLEMENTINTERV_GEN_ALL_ED
Implemented All Fall Risk Interventions:  [place] to call system. Call bell, personal items and telephone within reach. Instruct patient to call for assistance. Room bathroom lighting operational. Non-slip footwear when patient is off stretcher. Physically safe environment: no spills, clutter or unnecessary equipment. Stretcher in lowest position, wheels locked, appropriate side rails in place. Provide visual cue, wrist band, yellow gown, etc. Monitor gait and stability. Monitor for mental status changes and reorient to person, place, and time. Review medications for side effects contributing to fall risk. Reinforce activity limits and safety measures with patient and family.

## 2023-03-16 NOTE — ED PROVIDER NOTE - CARE PLAN
Principal Discharge DX:	Hypokalemia  Secondary Diagnosis:	S/P colostomy   1 Principal Discharge DX:	Hypomagnesemia  Secondary Diagnosis:	S/P colostomy  Secondary Diagnosis:	Weakness

## 2023-03-16 NOTE — ED PROVIDER NOTE - ATTENDING CONTRIBUTION TO CARE
Vital Signs Last 24 Hrs  T(C): 37.1 (28 Aug 2019 15:51), Max: 37.9 (28 Aug 2019 15:22)  T(F): 98.7 (28 Aug 2019 15:51), Max: 100.3 (28 Aug 2019 15:22)  HR: 99 (28 Aug 2019 15:51) (99 - 111)  BP: 111/69 (28 Aug 2019 15:51) (111/69 - 122/67)  BP(mean): --  RR: 20 (28 Aug 2019 15:51) (20 - 22)  SpO2: 95% (28 Aug 2019 15:51) (95% - 96%)    Appearance: awake, AOx3, chills	  HEENT:   Normal oral mucosa, PERRL, EOMI	  Lymphatic: No lymphadenopathy , no edema  Cardiovascular: Normal S1 S2, No JVD, No murmurs , Peripheral pulses palpable 2+ bilaterally  Respiratory: Lungs clear to auscultation, normal effort 	  Gastrointestinal:  Soft, Non-tender, + BS	  Skin: No rashes, No ecchymoses, No cyanosis, warm to touch  Musculoskeletal: Normal range of motion, normal strength  Psychiatry:  Mood & affect appropriate  Ext: No edema 82-year-old female presents to the ED for evaluation of abnormal labs.  Brought in from surgeons office.  Currently reports feeling weak.  Physical exam with generalized weakness.  Ostomy with stool output noted.  We obtained labs.  Noted to have mild hypomagnesemia.  Magnesium repleted.  Otherwise after extensive conversation with the patient she reports she has been eating less food due to lack of taste.  Encouraged increased p.o. intake       .  Case signed out pending magnesium repletion see mdm for attending note

## 2023-03-16 NOTE — ED PROVIDER NOTE - PRO INTERPRETER NEED 2
Bedside and Verbal shift change report given to AL (oncoming nurse) by Etha Habermann (offgoing nurse). Report included the following information SBAR, Kardex, ED Summary, Intake/Output, MAR, Recent Results, Med Rec Status, Cardiac Rhythm Sinus Rhythm, Alarm Parameters  and Quality Measures. 4886: Dialysis nurse at bedside for scheduled Pt dialysis. 5248: Critical Vancomycin 22.6 reported; Pharmacist & Leslye Quiroga NP notified. 2439: Call back from pharmacist to hold all vancomycin until clinical pharmacist rectification. English

## 2023-03-16 NOTE — ED PROVIDER NOTE - OUTSIDE FACILITY/AGENCY DETAILS FREE TEXT FOR MDM ADDL HISTORY OBTAINED FROM QUESTION
NH records - K 2.7  Office note from Dr. De Santiago's office - Patient complaining of generalized weakness

## 2023-03-16 NOTE — ED PROVIDER NOTE - NSFOLLOWUPINSTRUCTIONS_ED_ALL_ED_FT
FOLLOW UP WITH DR. MRUPHY PER YOUR PREVIOUS APPOINTMENT. RETURN TO THE ED IF YOUR SYMPTOMS WORSEN.    YOUR POTASSIUM WAS FOUND TO BE NORMAL. YOUR MAGNESIUM WAS NOTED TO BE LOW.     Weakness    Weakness is a lack of strength. It may be felt all over the body (generalized) or in one specific part of the body (focal). Some causes of weakness can be serious. You may need further medical evaluation, especially if you are elderly or you have a history of immunosuppression (such as chemotherapy or HIV), kidney disease, heart disease, or diabetes.     CAUSES  Weakness can be caused by many different things, including:    Infection.  Physical exhaustion.  Internal bleeding or other blood loss that results in a lack of red blood cells (anemia).  Dehydration. This cause is more common in elderly people.  Side effects or electrolyte abnormalities from medicines, such as pain medicines or sedatives.  Emotional distress, anxiety, or depression.  Circulation problems, especially severe peripheral arterial disease.  Heart disease, such as rapid atrial fibrillation, bradycardia, or heart failure.  Nervous system disorders, such as Guillain-Barré syndrome, multiple sclerosis, or stroke.    DIAGNOSIS  To find the cause of your weakness, your caregiver will take your history and perform a physical exam. Lab tests or X-rays may also be ordered, if needed.    TREATMENT  Treatment of weakness depends on the cause of your symptoms and can vary greatly.    HOME CARE INSTRUCTIONS  Rest as needed.  Eat a well-balanced diet.  Try to get some exercise every day.  Only take over-the-counter or prescription medicines as directed by your caregiver.    SEEK MEDICAL CARE IF:  Your weakness seems to be getting worse or spreads to other parts of your body.  You develop new aches or pains.    SEEK IMMEDIATE MEDICAL CARE IF:  You cannot perform your normal daily activities, such as getting dressed and feeding yourself.  You cannot walk up and down stairs, or you feel exhausted when you do so.  You have shortness of breath or chest pain.  You have difficulty moving parts of your body.   You have weakness in only one area of the body or on only one side of the body.  You have a fever.  You have trouble speaking or swallowing.  You cannot control your bladder or bowel movements.  You have black or bloody vomit or stools.    MAKE SURE YOU:  Understand these instructions.  Will watch your condition.  Will get help right away if you are not doing well or get worse.    ADDITIONAL NOTES AND INSTRUCTIONS    Please follow up with your Primary MD in 24-48 hr.  Seek immediate medical care for any new/worsening signs or symptoms.

## 2023-03-16 NOTE — ED PROVIDER NOTE - CLINICAL SUMMARY MEDICAL DECISION MAKING FREE TEXT BOX
82-year-old female presents to the ED for evaluation of abnormal labs.  Brought in from surgeons office.  Currently reports feeling weak.  Physical exam with generalized weakness.  Ostomy with stool output noted.  We obtained labs.  Noted to have mild hypomagnesemia.  Magnesium repleted.  Otherwise after extensive conversation with the patient she reports she has been eating less food due to lack of taste.  Encouraged increased p.o. intake. Magnesium was repleted.  Discharged home.

## 2023-03-20 PROBLEM — K57.30 DIVERTICULOSIS, SIGMOID: Status: ACTIVE | Noted: 2023-01-01

## 2023-03-21 NOTE — CONSULT LETTER
[Dear  ___] : Dear  [unfilled], [Consult Letter:] : I had the pleasure of evaluating your patient, [unfilled]. [Please see my note below.] : Please see my note below. [Sincerely,] : Sincerely, [Consult Closing:] : Thank you very much for allowing me to participate in the care of this patient.  If you have any questions, please do not hesitate to contact me. [FreeTextEntry3] : Sanjuanita De Santiago MD

## 2023-03-21 NOTE — HISTORY OF PRESENT ILLNESS
[de-identified] : Patient referred by Dr. Agrawal for evaluation of pancreatic mass. Patient reports 10 lb weight loss over the last month.History goes back to the fall when patient had difficulty moving her bowels. PAtient was found to have a positive cologuard. Referred to Gi DR. Agrawal for a colonoscopy. Colonoscopy revealed a serrated adenoma in the rectum and a tubular adenoma october 2022. No malignancy was noted. PAtient report not feeling well since late last fall when she was diagnosed with influenza.  January 2023 patient had a ct scan for continued complaints of abdominal pain/ rule out diverticulitis. \par CT abd/ pelvis 1/16/23 revealed findings suspicious for pancreatic tumor and right pelvic pericolonic mass \par \par 2/8/2023: CT chest showed left lung base lower nodule 4 mm,pleural-based mass adjacent measuring pleural-based mass adjacent measuring 2.7 cm\par 2/13/2023 endoscopic ultrasound and pancreatic body mass core needle biopsy\par \par 2/14/2023: She reported no new symptoms\par \par 2/21/2023: PET showed \par FDG avid pancreatic body mass, SUV max 7.6 consistent with biopsy-proven malignancy.\par \par 2 left basilar nodular opacities are FDG avid, SUV max 3.0, measures up to 2.7 cm, unchanged since recent chest CT, indeterminate for biologic tumor activity.\par \par Right upper lobe groundglass opacity is FDG avid on nonattenuation corrected images, suspicious for biologic tumor activity (adenocarcinoma).\par \par FDG avid right pelvic pericolonic soft tissue, SUV max 15.0 is anatomically unchanged since prior CT on 2/8/2023. Differential diagnosis includes a contained perforation versus colo-ovarian fistula.\par \par Stable chronic moderate right hydroureteronephrosis secondary to mass effect from the right pelvic soft tissue.\par \par 3/16/2023: POD 6 S/P left transverse loop colostomy and right ureteric stent, came from NH, severe fatigue\par \par

## 2023-03-21 NOTE — ASSESSMENT
[FreeTextEntry1] : Patient referred by Dr. Agrawal for evaluation of pancreatic mass. Patient reports 10 lb weight loss over the last month.History goes back to the fall when patient had difficulty moving her bowels. PAtient was found to have a positive cologuard. Referred to Gi DR. Agrawal for a colonoscopy. Colonoscopy revealed a serrated adenoma in the rectum and a tubular adenoma october 2022. No malignancy was noted. PAtient report not feeling well since late last fall when she was diagnosed with influenza.  January 2023 patient had a ct scan for continued complaints of abdominal pain/ rule out diverticulitis. \par CT abd/ pelvis 1/16/23 revealed findings suspicious for pancreatic tumor and right pelvic pericolonic mass \par \par \par 1/17/2023: discussed possible DD inflammatory vs neoplastic including diverticulitis/ovarian/colonic/pancreatic neoplasm\par \par sending for CT and tumor markers (ordered already by Dr. Vaca, will also send for EUS for her panc mass\par \par 2/13/2023 endoscopic ultrasound and pancreatic body mass core needle biopsy\par \par 2/14/2023: She reported no new symptoms, waiting for final path of EUS\par ordered PET to eval her binta lesions\par \par 2/21/2023: PET showed \par FDG avid pancreatic body mass, SUV max 7.6 consistent with biopsy-proven malignancy.\par \par 2 left basilar nodular opacities are FDG avid, SUV max 3.0, measures up to 2.7 cm, unchanged since recent chest CT, indeterminate for biologic tumor activity.\par \par Right upper lobe groundglass opacity is FDG avid on nonattenuation corrected images, suspicious for biologic tumor activity (adenocarcinoma).\par \par FDG avid right pelvic pericolonic soft tissue, SUV max 15.0 is anatomically unchanged since prior CT on 2/8/2023. Differential diagnosis includes a contained perforation versus colo-ovarian fistula.\par \par Stable chronic moderate right hydroureteronephrosis secondary to mass effect from the right pelvic soft tissue.\par \par her EUS bx showed adenocarcinoma\par \par will send her to dr Mead for systemic therapy, will defer decision of lung bx to dr Mead, her sigmoid process could be met vs inflammatory process , being >3 months of possible last diverticulitis would agree to proceed with chemo, she was instructed if she developed sepsis/peritonitis picture to go to ED and call for me for possible izzy for Elpidio \par \par 3/16/2023: POD 6 S/P left transverse loop colostomy and right ureteric stent, came from NH, severe fatigue, need to go to ED for further eval for electrolyte disturbance, her colostomy working, need aggressive PT

## 2023-03-24 NOTE — REASON FOR VISIT
[Follow-Up Visit] : a follow-up [Family Member] : family member [FreeTextEntry2] : pancreatic cancer

## 2023-03-24 NOTE — PHYSICAL EXAM
[Normal] : normal spine exam without palpable tenderness, no kyphosis or scoliosis [de-identified] : chronically ill , emaciated , pale in no acute distress.  [de-identified] : frequent ectopies.  [de-identified] : soft with functioning colostomy .

## 2023-03-24 NOTE — ASSESSMENT
[FreeTextEntry1] : 82 year old female with newly diagnosed pancreatic cancer . 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 \par Plan : continue rehab, not candidate for chemo at this time.\par          follow up in 2 weeks ( virtual visit )  , discussed with patient and son .

## 2023-03-24 NOTE — HISTORY OF PRESENT ILLNESS
[de-identified] : Patient is seen today  for unscheduled visit at the request of Dr MURPHY for newly diagnosed pancreatic cancer, 82 year old female former smoker with history of diabetes mellitus . She  presented with lower abdominal pain and constipation associated with weakness and weight loss for few months . \par In October 2022 Colonoscopy revealed a serrated adenoma in the rectum and a tubular adenoma . No malignancy was noted. She was apparently treated for suspected diverticulitis , CT abd/ pelvis on 1/16/23 revealed findings suspicious for pancreatic tumor 3.6 cm encasing splenic artery and abutting hepatic artery .and right pelvic pericolonic collection 4 cm containing gas , contiguous with sigmoid colon and with mild to moderate right hydronephrosis .\par EUS with biopsy showed adenocarcinoma , CA19.9 : 12,795\par PET scan : pancreatic mass with SUV : 7.6 \par                  right pericolonic soft tissue density with SUV : 15 \par                  left basillar lung nodular density 2.7 cm , SUV : 3.o and 7 mm left basillar pleural based nodule .\par She has received at least 2 courses of Po antibiotics , most recent with Augmentin \par System review : She complains of constipation , lower abdominal pain , constant , no fever or chills  ,occasional painful urination . she lost 10 lbs , she denies nausea or vomiting , itching . \par Social History : former smoker , lives alone , was capable of all ADLs until recently , she has good support network . [de-identified] : 3/22/2023 Patient returns for follow  up after transverse loop colostomy , bilateral ureteral stent placement for obstructing pericolonic mass , she is currently in rehab , she complains of weakness, decreased appetite , she is essentially chair or bedbound most of the waking hours , She has lost 15 lbs in one month . \par She has also smith catheter .She denies fever , chills or pain . No SOB at rest . Recent blood work shows mild anemia and leukocytosis , lytes are normal .

## 2023-04-06 PROBLEM — Z09 STATUS POST COLOSTOMY, FOLLOW-UP EXAM: Status: ACTIVE | Noted: 2023-01-01

## 2023-04-06 NOTE — REASON FOR VISIT
[Follow-Up Visit] : a follow-up visit for [Pancreatic Cancer] : pancreatic cancer [Family Member] : family member [FreeTextEntry1] : 3/8/23large bowel obstruction , post dx lap and transverse loop colostomy,  [FreeTextEntry2] : large bowel obstruction

## 2023-04-06 NOTE — ASSESSMENT
[FreeTextEntry1] : Patient referred by Dr. Agrawal for evaluation of pancreatic mass. Patient reports 10 lb weight loss over the last month.History goes back to the fall when patient had difficulty moving her bowels. PAtient was found to have a positive cologuard. Referred to Gi DR. Agrawal for a colonoscopy. Colonoscopy revealed a serrated adenoma in the rectum and a tubular adenoma october 2022. No malignancy was noted. PAtient report not feeling well since late last fall when she was diagnosed with influenza.  January 2023 patient had a ct scan for continued complaints of abdominal pain/ rule out diverticulitis. \par CT abd/ pelvis 1/16/23 revealed findings suspicious for pancreatic tumor and right pelvic pericolonic mass \par \par \par 1/17/2023: discussed possible DD inflammatory vs neoplastic including diverticulitis/ovarian/colonic/pancreatic neoplasm\par \par sending for CT and tumor markers (ordered already by Dr. Vaca, will also send for EUS for her panc mass\par \par 2/13/2023 endoscopic ultrasound and pancreatic body mass core needle biopsy\par \par 2/14/2023: She reported no new symptoms, waiting for final path of EUS\par ordered PET to eval her binta lesions\par \par 2/21/2023: PET showed \par FDG avid pancreatic body mass, SUV max 7.6 consistent with biopsy-proven malignancy.\par \par 2 left basilar nodular opacities are FDG avid, SUV max 3.0, measures up to 2.7 cm, unchanged since recent chest CT, indeterminate for biologic tumor activity.\par \par Right upper lobe groundglass opacity is FDG avid on nonattenuation corrected images, suspicious for biologic tumor activity (adenocarcinoma).\par \par FDG avid right pelvic pericolonic soft tissue, SUV max 15.0 is anatomically unchanged since prior CT on 2/8/2023. Differential diagnosis includes a contained perforation versus colo-ovarian fistula.\par \par Stable chronic moderate right hydroureteronephrosis secondary to mass effect from the right pelvic soft tissue.\par \par her EUS bx showed adenocarcinoma\par \par will send her to dr Mead for systemic therapy, will defer decision of lung bx to dr Mead, her sigmoid process could be met vs inflammatory process , being >3 months of possible last diverticulitis would agree to proceed with chemo, she was instructed if she developed sepsis/peritonitis picture to go to ED and call for me for possible izzy for Elpidio \par \par 3/16/2023: POD 6 S/P left transverse loop colostomy and right ureteric stent, came from NH, severe fatigue, need to go to ED for further eval for electrolyte disturbance, her colostomy working, need aggressive PT\par \par 4/6/23  follow up today for pancreatic cancer and large bowel obstruction requiring a transverse loop colostomy. Patient seen and examine with Dr. De Santiago. Stoma pink, stool noted in bag. Patient stats that she is slowly improving. Remains in rehab facility. Not yet independently ambulatory.Plan continue rehab. Follow up with medical oncology.

## 2023-04-06 NOTE — PHYSICAL EXAM
[Normal] : supple, no neck mass and thyroid not enlarged [Normal Neck Lymph Nodes] : normal neck lymph nodes  [Normal Supraclavicular Lymph Nodes] : normal supraclavicular lymph nodes [Normal Groin Lymph Nodes] : normal groin lymph nodes [Normal Axillary Lymph Nodes] : normal axillary lymph nodes [Normal] : oriented to person, place and time, with appropriate affect [Normal Breath Sounds] : Normal breath sounds [Normal Heart Sounds] : normal heart sounds [No Rash or Lesion] : No rash or lesion [Alert] : alert [Oriented to Person] : oriented to person [Oriented to Place] : oriented to place [Oriented to Time] : oriented to time [Calm] : calm [de-identified] : sitting in chair [de-identified] : soft non tender, non distended, ostomy in place stool noted in ostomy bag

## 2023-04-06 NOTE — HISTORY OF PRESENT ILLNESS
[de-identified] : Patient referred by Dr. Agrawal for evaluation of pancreatic mass. Patient reports 10 lb weight loss over the last month.History goes back to the fall when patient had difficulty moving her bowels. PAtient was found to have a positive cologuard. Referred to Gi DR. Agrawal for a colonoscopy. Colonoscopy revealed a serrated adenoma in the rectum and a tubular adenoma october 2022. No malignancy was noted. PAtient report not feeling well since late last fall when she was diagnosed with influenza.  January 2023 patient had a ct scan for continued complaints of abdominal pain/ rule out diverticulitis. \par CT abd/ pelvis 1/16/23 revealed findings suspicious for pancreatic tumor and right pelvic pericolonic mass \par \par 2/8/2023: CT chest showed left lung base lower nodule 4 mm,pleural-based mass adjacent measuring pleural-based mass adjacent measuring 2.7 cm\par 2/13/2023 endoscopic ultrasound and pancreatic body mass core needle biopsy\par \par 2/14/2023: She reported no new symptoms\par \par 2/21/2023: PET showed \par FDG avid pancreatic body mass, SUV max 7.6 consistent with biopsy-proven malignancy.\par \par 2 left basilar nodular opacities are FDG avid, SUV max 3.0, measures up to 2.7 cm, unchanged since recent chest CT, indeterminate for biologic tumor activity.\par \par Right upper lobe groundglass opacity is FDG avid on nonattenuation corrected images, suspicious for biologic tumor activity (adenocarcinoma).\par \par FDG avid right pelvic pericolonic soft tissue, SUV max 15.0 is anatomically unchanged since prior CT on 2/8/2023. Differential diagnosis includes a contained perforation versus colo-ovarian fistula.\par \par Stable chronic moderate right hydroureteronephrosis secondary to mass effect from the right pelvic soft tissue.\par \par 3/16/2023: POD 6 S/P left transverse loop colostomy and right ureteric stent, came from NH, severe fatigue\par \par 4/6/23 follow up from recent hospitalization and surgery

## 2023-04-11 NOTE — ASSESSMENT
[FreeTextEntry1] : 81 yo with right hydroureteronephrosis\par discussed need to exchange stent in June\par \par - cystoscopy stent exchange in June\par - all questions answered [Hydronephrosis (591\N13.30)] : Salter-Brock type I

## 2023-04-11 NOTE — HISTORY OF PRESENT ILLNESS
[FreeTextEntry1] : 82 year old female former smoker with history of diabetes mellitus. She presented with lower abdominal pain and constipation associated with weakness and weight loss for few months. \par In October 2022 Colonoscopy revealed a serrated adenoma in the rectum and a tubular adenoma. No malignancy was noted. She was apparently treated for suspected diverticulitis , CT abd/ pelvis on 1/16/23 revealed findings suspicious for pancreatic tumor 3.6 cm encasing splenic artery and abutting hepatic artery.and right pelvic pericolonic collection 4 cm containing gas , contiguous with sigmoid colon and with mild to moderate right hydronephrosis.\par \par she underwent cystoscopy right ureteral stent placement at the time of exploratory laparotomy 3/8/2023\par she is here to discuss management of the stent\par \par  [None] : no symptoms

## 2023-04-11 NOTE — PHYSICAL EXAM
[Oriented To Time, Place, And Person] : oriented to person, place, and time [FreeTextEntry1] : wheelchair dependent

## 2023-04-11 NOTE — REVIEW OF SYSTEMS
[Feeling Poorly] : feeling poorly [Feeling Tired] : feeling tired [Recent Weight Loss (___ Lbs)] : recent [unfilled] ~Ulb weight loss [see HPI] : see HPI [Negative] : Endocrine

## 2023-04-11 NOTE — LETTER BODY
[Dear  ___] : Dear  [unfilled], [Consult Letter:] : I had the pleasure of evaluating your patient, [unfilled]. [Please see my note below.] : Please see my note below. [Sincerely,] : Sincerely, [FreeTextEntry3] : Alize Fry MD, FACS\par

## 2023-04-20 NOTE — ED PROVIDER NOTE - OBJECTIVE STATEMENT
82 y female with PMH of HTN, HLD,  urethral obstruction s/p urostomy tube placement Pancreatic adenocarcinoma status post transverse loop colostomy presents to Ed with decreased output from colostomy.  patient noted to have decreased output for the last 24 hours.  patients daughter at bedside states that for the past 1.5 days the patient has not felt like eating.  patient seen by dr. chaves her GI surgeon who recommended patient present to ed to rule out SBO.  in addition patient had smith placed by dr. ragland urologist after urostomy tube placement.  at 8 am this morning patient went to dr. ragland office and had smith remove and was told to go back to the office by 3:30 pm if patient had not voided.  patient has yet to void since smith was removed.  patient Denies HA, dizziness, weakness, fever, cough, CP, SOB, palpitations, Abd pain, Leg swelling, dysuria, hematuria, dark or bloody stool.

## 2023-04-20 NOTE — ED ADULT NURSE REASSESSMENT NOTE - NS ED NURSE REASSESS COMMENT FT1
Patient's colostomy bag began leaking around edges.  ANTIONETTE West and I replaced it with a new bag and seal.

## 2023-04-20 NOTE — CHART NOTE - NSCHARTNOTEFT_GEN_A_CORE
83 yo F with PMH of HTN, DM, HLD, recent dx of pancreatic adenocarcinoma (2/13/23) s/p transverse loop colostomy with Dr. De Santiago and bilateral ureteral stents for landmarks with Dr. Fry presents to ED for rule out SBO. Patient was recently discharged from Rehab with smith placement. Patient was seen in the office by Dr. Fry for TOV, catheter was removed at 8am. Patient was informed to return to office if patient has not voided by 3pm. Patient was also seen by Dr. De Santiago today who was referred to comed to ED for further evaluation and rule out SBO. Per ED, patient has not voided since arrival to ED, bedside bladder scan by ED noted with 260cc of urine. Per resident, patient has urge to void however, unable to void.                           11.9   15.43 )-----------( 305      ( 20 Apr 2023 13:50 )             35.7   04-20    136  |  99  |  27<H>  ----------------------------<  145<H>  4.6   |  20  |  0.9    Ca    10.1      20 Apr 2023 13:50    TPro  7.1  /  Alb  4.2  /  TBili  0.7  /  DBili  x   /  AST  25  /  ALT  11  /  AlkPhos  85  04-20      Plan:   - Consider extended TOV, if PVR >350 then please replace smith   - Start flomax if not contraindicated  - Continue to monitor CBC and BMP  - D/c smith when no longer clinically indicated-   - Patient to f/u OP with Dr. Fry for further urologic management  - Continue ED w/u   - Will discuss with attending 83 yo F with PMH of HTN, DM, HLD, recent dx of pancreatic adenocarcinoma (2/13/23) s/p transverse loop colostomy with Dr. De Santiago and bilateral ureteral stents for landmarks with Dr. Fry presents to ED for rule out SBO. Patient was recently discharged from Rehab with smith placement. Patient was seen in the office by Dr. Fry for TOV, catheter was removed at 8am. Patient was informed to return to office if patient has not voided by 3pm. Patient was also seen by Dr. De Santiago today who was referred to comed to ED for further evaluation and rule out SBO. Per ED, patient has not voided since arrival to ED, bedside bladder scan by ED noted with 260cc of urine. Per resident, patient has urge to void however, unable to void.                           11.9   15.43 )-----------( 305      ( 20 Apr 2023 13:50 )             35.7   04-20    136  |  99  |  27<H>  ----------------------------<  145<H>  4.6   |  20  |  0.9    Ca    10.1      20 Apr 2023 13:50    TPro  7.1  /  Alb  4.2  /  TBili  0.7  /  DBili  x   /  AST  25  /  ALT  11  /  AlkPhos  85  04-20      Plan:   - Consider extended TOV, if PVR >350 then please replace smith   - Start flomax if not contraindicated  - Continue to monitor CBC and BMP  - Patient to f/u OP with Dr. Fry for further urologic management  - Continue work-up for SBO by ED    - Will discuss with attending 83 yo F with PMH of HTN, DM, HLD, recent dx of pancreatic adenocarcinoma (2/13/23) s/p transverse loop colostomy with Dr. De Santiago and right  ureteral stents for right hydronephrosis with Dr. Fry presents to ED for rule out SBO. Patient was recently discharged from Rehab with smith placement. Patient was seen in the office by Dr. Fry for TOV, catheter was removed at 8am. Patient was informed to return to office if patient has not voided by 3pm. Patient was also seen by Dr. De Santiago today who was referred to comed to ED for further evaluation and rule out SBO. Per ED, patient has not voided since arrival to ED, bedside bladder scan by ED noted with 260cc of urine. Per resident, patient has urge to void however, unable to void.                           11.9   15.43 )-----------( 305      ( 20 Apr 2023 13:50 )             35.7   04-20    136  |  99  |  27<H>  ----------------------------<  145<H>  4.6   |  20  |  0.9    Ca    10.1      20 Apr 2023 13:50    TPro  7.1  /  Alb  4.2  /  TBili  0.7  /  DBili  x   /  AST  25  /  ALT  11  /  AlkPhos  85  04-20      Plan:   - Consider extended TOV, if PVR >350 then please replace smith catheter   - Start flomax if not contraindicated  - Continue to monitor CBC and BMP  - Patient to f/u OP with Dr. Fry for further urologic management  - Continue work-up for SBO by ED    - Recall prn   - Will discuss with attending 83 yo F with PMH of HTN, DM, HLD, recent dx of pancreatic adenocarcinoma (2/13/23) s/p transverse loop colostomy with Dr. De Santiago and right  ureteral stents for right hydronephrosis with Dr. Fry presents to ED for rule out SBO. Patient was recently discharged from Rehab with smith placement. Patient was seen in the office by Dr. Fry for TOV, catheter was removed at 8am. Patient was informed to return to office if patient has not voided by 3pm. Patient was also seen by Dr. De Santiago today who was referred to comed to ED for further evaluation and rule out SBO. Per ED, patient has not voided since arrival to ED, bedside bladder scan by ED noted with 260cc of urine. Per resident, patient has urge to void however, unable to void.                           11.9   15.43 )-----------( 305      ( 20 Apr 2023 13:50 )             35.7   04-20    136  |  99  |  27<H>  ----------------------------<  145<H>  4.6   |  20  |  0.9    Ca    10.1      20 Apr 2023 13:50    TPro  7.1  /  Alb  4.2  /  TBili  0.7  /  DBili  x   /  AST  25  /  ALT  11  /  AlkPhos  85  04-20      Plan:   - Consider extended TOV, if PVR >350 then please replace smith catheter   - Start flomax if not contraindicated  - Continue to monitor CBC and BMP  - Obtain UA and UCx   - Patient to f/u OP with Dr. Fry for further urologic management  - Continue work-up for SBO by ED    - Recall prn   - Will discuss with attending 81 yo F with PMH of HTN, DM, HLD, recent dx of pancreatic adenocarcinoma (2/13/23) s/p transverse loop colostomy with Dr. De Santiago and right  ureteral stents for right hydronephrosis with Dr. Fry presents to ED for rule out SBO. Patient was recently discharged from Rehab with smith placement. Patient was seen in the office by Dr. Fry for TOV, catheter was removed at 8am. Patient was informed to return to office if patient has not voided by 3pm. Patient was also seen by Dr. De Santiago today who was referred to comed to ED for further evaluation and rule out SBO. Per ED, patient has not voided since arrival to ED, bedside bladder scan by ED noted with 260cc of urine. Per resident, patient has urge to void however, unable to void.                           11.9   15.43 )-----------( 305      ( 20 Apr 2023 13:50 )             35.7   04-20    136  |  99  |  27<H>  ----------------------------<  145<H>  4.6   |  20  |  0.9    Ca    10.1      20 Apr 2023 13:50    TPro  7.1  /  Alb  4.2  /  TBili  0.7  /  DBili  x   /  AST  25  /  ALT  11  /  AlkPhos  85  04-20      Plan:   - Consider extended TOV, if PVR >350 then please replace smith catheter   - Start flomax if not contraindicated  - Continue to monitor CBC and BMP  - Obtain UA and UCx   - Patient to f/u OP with Dr. Fry for further urologic management  - Continue work-up for SBO by ED  - Discussed with ED team     - Recall prn   - Will discuss with attending

## 2023-04-20 NOTE — ED ADULT TRIAGE NOTE - CHIEF COMPLAINT QUOTE
pt co vomiting MD wants to r/o obstruction has a colostomy from previous obstruction. had smith removed this AM and has not peed yet hx of pancreatic ca

## 2023-04-20 NOTE — ED ADULT NURSE NOTE - OBJECTIVE STATEMENT
Patient recently had colostomy and smith catheter in place.  Nausea and abdominal pain with no output in colostomy bag.  Smith catheter was removed the other day - no urine output since then.

## 2023-04-20 NOTE — ED PROVIDER NOTE - PHYSICAL EXAMINATION
PHYSICAL EXAM: I have reviewed current vital signs.  GENERAL: NAD, well-nourished; well-developed.  HEAD:  Normocephalic, atraumatic.  EYES: EOMI, PERRL, conjunctiva and sclera clear.  ENT: MMM, no erythema/exudates.  NECK: Supple, no JVD.  CHEST/LUNG: Clear to auscultation bilaterally; no wheezes, rales, or rhonchi.  HEART: Regular rate and rhythm, normal S1 and S2; no murmurs, rubs, or gallops.  ABDOMEN: Soft, nontender, nondistended.  ostomy in place with no surrounding erythema edema or drainage.  ostomy draining dark brown liquid with food particles.  EXTREMITIES:  2+ peripheral pulses; no clubbing, cyanosis, or edema.  PSYCH: Cooperative, appropriate, normal mood and affect.  NEUROLOGY: A&O x 3. Motor 5/5. Sensory intact. No focal neurological deficits. CN II - XII intact. (-) dysmetria, facial droop, pronator drift.  SKIN: Warm and dry.

## 2023-04-20 NOTE — ED PROVIDER NOTE - CONSIDERATION OF ADMISSION OBSERVATION
patient will be admitted for abdominal pain with CT findings concerning for early appendicitis Consideration of Admission/Observation

## 2023-04-20 NOTE — ED PROVIDER NOTE - ATTENDING CONTRIBUTION TO CARE
82-year-old female with past medical history of hypertension, diabetes, hyperlipidemia, pancreatic adenocarcinoma status post transverse loop colostomy presents to the emergency department for concern for bowel obstruction.  According to family and patient has not had stool in her colostomy since yesterday and normally she changes the bag twice a day.  She has had decreased p.o. intake.    Pt went to her urologist this morning to have her smith removed. Since then she has not urinated.     Pt then went to her surgeon today who told her to come to the ED to ru obstruction.     Const: NAD  Eyes: PERRL, no conjunctival injection  HENT:  Neck supple without meningismus   CV: RRR, Warm, well-perfused extremities  RESP: CTA B/L, no tachypnea   GI: soft, non-tender, non-distended, colostomy bag in place   MSK: No gross deformities appreciated  Skin: Warm, dry. No rashes  Neuro: Alert, CNs II-XII grossly intact. Sensation and motor function of extremities grossly intact.  Psych: Appropriate mood and affect.    labs, CT

## 2023-04-20 NOTE — ED PROVIDER NOTE - CLINICAL SUMMARY MEDICAL DECISION MAKING FREE TEXT BOX
82 female here for multiple complaints, advised to come to ED for SBO workup + / - smith issues. Had screening labs imaging medications and reevaluation, plan is for inpatient admission for continued management. Found to have CT findings concerning for appendicitis although not confirmed, patient has RLQ pain and tenderness, plan is for admission for ABX and continued management.

## 2023-04-20 NOTE — ED PROVIDER NOTE - PROGRESS NOTE DETAILS
Dr. Coronado: sign out to Dr. Valencia   pt pending CT, urology, surgery and reassessment paz- discussed with Dr. De Santiago no concern for SBO at this time.  Discussed with Urology patient has no voided since smith removal.  270 cc in bladder.  after repeat discussion with urology will place smith catheter.  findings on CT concerning for early appendicitis.  patient has mild tenderness in RLQ.  patient will be admitted for IV abx and repeat abd exams.

## 2023-04-20 NOTE — ED PROVIDER NOTE - NS ED ROS FT
Constitutional:  No fevers or chills.  Eyes:  No visual changes, eye pain, or discharge.  ENT:  No hearing changes. No sore throat.  Neck:  No neck pain or stiffness.  Cardiac:  No CP or edema.  Resp:  No cough or SOB. No hemoptysis.   GI:  No nausea, vomiting, diarrhea, or abdominal pain.  (+) decreased PO intake  :  No dysuria, frequency, or hematuria.  MSK:  No myalgias or joint pain/swelling.  Neuro:  No headache, dizziness, or weakness.  Skin:  No skin rash.

## 2023-04-21 NOTE — CONSULT NOTE ADULT - ASSESSMENT
ASSESSMENT:  82 year old female with PMHx of HTN, DM, HLD, recent dx of pancreatic adenocarcinoma (2/13/23) s/p transverse loop colostomy for colonic obstruction presents to the ED with decreased ostomy output.    PLAN:  -No acute surgical intervention  -Very low suspicion for acute appendicitis  -Not a candidate for appendectomy, if strongly suspected may consider antibiotics  -Continue miralax BID  -Urology consult appreciated  -Monitor ostomy output  -Diet as tolerated  -Recall as needed      Above plan discussed with Attending Surgeon Dr. De Santiago, patient, patient family, and Primary team  04-21-23 @ 08:14    CONSULT SPECTRA: [id number]

## 2023-04-21 NOTE — CONSULT NOTE ADULT - SUBJECTIVE AND OBJECTIVE BOX
GENERAL SURGERY CONSULT NOTE    Patient: DELORIS NORRIS , 82y ([birth year])Female   MRN: [id number]  Location: [facility] ED Hold 002 A  Visit: 23 Inpatient  Date: 23 @ 08:14    HPI:    Last admission discharge 3/11/23  82 year old female with PMHx of HTN, DM, HLD, recent dx of pancreatic adenocarcinoma (23) presents with  worsening lower abdominal pain. Patient was diagnosed with diverticulitis recently and has been on antibiotics (D/c'd on Ertapenem). Denies fevers/chills or n/v. Last BM was yesterday (small and watery). Patient reports decreased PO intake over the last couple months 2/2 decrease appetite. Patient currently following Dr. De Santiago and Dr. Mead.  Outpatient PET scan (23) shows 2 left basilar nodular opacities; Rt pelvic pericolonic soft tissue mass; severe uptake in sigmoid colon). Last c-scope was 10/2022 - patient reports polypectomy and suspicious sigmoid lesion. Denies CP, SOB, dysuria, hematochezia, weakness/numbness.  CT scan was obtained which demonstrated significant colonic distension, cecum up to 9cm. The patient was taken for a transverse loop colostomy.    Patient comes to ED  after being sent in by Dr. De Santiago's office for decreased colostomy output over past 24 hours. Patient states she usually puts out 1/2- 3/4 of ostomy bag per day of semi-formed brown stool. She also notes that she had not urinated in last 24 hours, but after urinating she noticed increased output from ostomy. Denies nausea/vomiting, fevers/chills, dysuria, dark/tarry/bloody stools, abdominal pain, sob, chest pain. Patient has not yet started chemotherapy with Dr. Gay. CTAP revealing contrast reaching the colostomy, mild fat stranding around tip of appendix.    PAST MEDICAL & SURGICAL HISTORY:  HTN (hypertension)      Diabetes      Hypercholesteremia      History of           Home Medications:  bisacodyl 5 mg oral delayed release tablet: 1 tab(s) orally every 12 hours (2023 16:39)  hydroCHLOROthiazide 12.5 mg oral capsule: 1 cap(s) orally once a day (2023 08:41)  metFORMIN 500 mg oral tablet: 1 tab(s) orally 2 times a day (2023 08:41)  pantoprazole 40 mg oral delayed release tablet: 1 tab(s) orally once a day (before a meal) (2023 12:44)  polyethylene glycol 3350 oral powder for reconstitution: 17 gram(s) orally 2 times a day (2023 12:44)  rosuvastatin 5 mg oral tablet: 1 tab(s) orally once a day (2023 08:41)        VITALS:  T(F): 96.8 (23 @ 07:56), Max: 98.5 (23 @ 23:40)  HR: 85 (23 @ 07:56) (80 - 97)  BP: 101/52 (23 @ 07:56) (90/55 - 114/56)  RR: 16 (23 @ 07:56) (14 - 16)  SpO2: 96% (23 @ 07:56) (96% - 98%)    PHYSICAL EXAM:  General: NAD, AAOx3, calm and cooperative  HEENT: NCAT, BRIANA, EOMI, Trachea ML, Neck supple  Cardiac: RRR S1, S2, no Murmurs, rubs or gallops  Respiratory: CTAB, normal respiratory effort, breath sounds equal BL, no wheeze, rhonchi or crackles  Abdomen: Soft, non-distended, non-tender, ostomy with semi-formed stool, mucosa pink and viable  ext: warm and well-perfused    MEDICATIONS  (STANDING):  atorvastatin 20 milliGRAM(s) Oral at bedtime  dextrose 5%. 1000 milliLiter(s) (50 mL/Hr) IV Continuous <Continuous>  dextrose 5%. 1000 milliLiter(s) (100 mL/Hr) IV Continuous <Continuous>  dextrose 50% Injectable 25 Gram(s) IV Push once  dextrose 50% Injectable 12.5 Gram(s) IV Push once  dextrose 50% Injectable 25 Gram(s) IV Push once  enoxaparin Injectable 40 milliGRAM(s) SubCutaneous every 24 hours  glucagon  Injectable 1 milliGRAM(s) IntraMuscular once  insulin lispro (ADMELOG) corrective regimen sliding scale   SubCutaneous three times a day before meals  pantoprazole    Tablet 40 milliGRAM(s) Oral before breakfast  piperacillin/tazobactam IVPB.. 3.375 Gram(s) IV Intermittent every 8 hours  sodium chloride 0.9%. 1000 milliLiter(s) (75 mL/Hr) IV Continuous <Continuous>  tamsulosin 0.4 milliGRAM(s) Oral at bedtime    MEDICATIONS  (PRN):  dextrose Oral Gel 15 Gram(s) Oral once PRN Blood Glucose LESS THAN 70 milliGRAM(s)/deciliter      LAB/STUDIES:                        11.9   15.43 )-----------( 305      ( 2023 13:50 )             35.7     04-20    136  |  99  |  27<H>  ----------------------------<  145<H>  4.6   |  20  |  0.9    Ca    10.1      2023 13:50    TPro  7.1  /  Alb  4.2  /  TBili  0.7  /  DBili  x   /  AST  25  /  ALT  11  /  AlkPhos  85  04-20    PT/INR - ( 2023 13:50 )   PT: 13.20 sec;   INR: 1.15 ratio         PTT - ( 2023 13:50 )  PTT:28.7 sec  LIVER FUNCTIONS - ( 2023 13:50 )  Alb: 4.2 g/dL / Pro: 7.1 g/dL / ALK PHOS: 85 U/L / ALT: 11 U/L / AST: 25 U/L / GGT: x           Urinalysis Basic - ( 2023 01:20 )    Color: Orange / Appearance: Turbid / S.035 / pH: x  Gluc: x / Ketone: Trace  / Bili: Negative / Urobili: <2 mg/dL   Blood: x / Protein: 300 mg/dL / Nitrite: Positive   Leuk Esterase: Large / RBC: 241 /HPF / WBC >720 /HPF   Sq Epi: x / Non Sq Epi: x / Bacteria: Many                  < from: CT Abdomen and Pelvis w/ Oral Cont and w/ IV Cont (23 @ 17:14) >  IMPRESSION:  No evidence of small bowel obstruction. Oral contrast reaches the   colostomy.    Nonspecific wall enhancement of the right ureter, new.    Nonspecific mild dilatation and stranding surrounding the distal   appendix. Oral contrast is not seen within the appendix    --- End of Report---    < end of copied text >

## 2023-04-21 NOTE — CHART NOTE - NSCHARTNOTEFT_GEN_A_CORE
NUTRITION SUPPORT TEAM  -  CONSULT NOTE     82-year-old female with past medical history of hypertension, diabetes, hyperlipidemia, pancreatic adenocarcinoma status post transverse loop colostomy, right hydronephrosis s/p ureteral stent removed presents to the emergency department for concern for bowel obstruction. According to family and patient has not had stool in her colostomy since yesterday and normally she changes the bag twice a day. She has had decreased p.o. intake as she was not eating well. Pt went to her urologist this morning to have her smith removed. Since then she has not urinated. Denies any other complaints    In ED, her vitals  are stable  Labs show WBC 15K, Cr 0.9 (bl 0.6). GFR 65 (from 91)  CT A/P shows no evidence of SBO and nonspecific dilation and stranding around appendix  seen by urology in ED  Admitted to medicine   (2023 00:19)      NUTRITION SUPPORT NOTE:          REVIEW OF SYSTEMS:  Negative except as noted above.       PAST MEDICAL/SURGICAL HISTORY:   HTN (hypertension)  Diabetes  Hypercholesteremia  History of     ALLERGIES:  No Known Allergies      VITALS:  T(F): 96.8 ( @ 07:56), Max: 98.5 ( @ 23:40)  HR: 85 ( @ 07:56) (85 - 96)  BP: 101/52 (- @ 07:56) (90/55 - 101/52)  RR: 16 (- @ 07:56) (16 - 16)  SpO2: 96% ( @ 07:56) (96% - 97%)    HEIGHT/WEIGHT/BMI:   Height (cm): 162.6 (-20), 162.6 (-), 162.6 (-), 162.6 (-)  Weight (kg): 49.9 (-20), 53.1 (-), 64 (-), 58.967 (-)  BMI (kg/m2): 18.9 (-20), 20.1 (-16), 24.2 (-), 24.2 (-)    PHYSICAL EXAM:   GENERAL: NAD, well-groomed, well-developed  HEENT: Moist mucous membranes, Good dentition, No lesions  ABDOMEN: Soft, Nontender, Nondistended  EXTREMITIES:  No clubbing, cyanosis, or edema  SKIN: warm and well perfused; No obvious rashes or lesions  IV ACCESS: peripheral   ENTERAL ACCESS:     I/Os:     STANDING MEDICATIONS:   atorvastatin 20 milliGRAM(s) Oral at bedtime  dextrose 5%. 1000 milliLiter(s) IV Continuous <Continuous>  dextrose 5%. 1000 milliLiter(s) IV Continuous <Continuous>  dextrose 50% Injectable 25 Gram(s) IV Push once  dextrose 50% Injectable 12.5 Gram(s) IV Push once  dextrose 50% Injectable 25 Gram(s) IV Push once  dextrose Oral Gel 15 Gram(s) Oral once PRN  enoxaparin Injectable 40 milliGRAM(s) SubCutaneous every 24 hours  glucagon  Injectable 1 milliGRAM(s) IntraMuscular once  insulin lispro (ADMELOG) corrective regimen sliding scale   SubCutaneous three times a day before meals  pantoprazole    Tablet 40 milliGRAM(s) Oral before breakfast  piperacillin/tazobactam IVPB.. 3.375 Gram(s) IV Intermittent every 8 hours  sodium chloride 0.9%. 1000 milliLiter(s) IV Continuous <Continuous>  tamsulosin 0.4 milliGRAM(s) Oral at bedtime      LABS:                         11.9   15.43 )-----------( 305      ( 2023 13:50 )             35.7     136  |  99  |  27<H>  ----------------------------<  145<H>          (23 @ 13:50)  4.6   |  20  |  0.9    Ca    10.1          (23 @ 13:50)    TPro  7.1  /  Alb  4.2  /  TBili  0.7  /  DBili  x   /  AST  25  /  ALT  11  /  AlkPhos  85       23 @ 13:50    A1c: 7.3 % (23 @ 08:28)    Blood Glucose (Past 24 hours):  146 mg/dL ( @ 08:03)      DIET:   Diet, DASH/TLC:   Sodium & Cholesterol Restricted  Consistent Carbohydrate {Evening Snack} (23 @ 01:37) [Active]      RADIOLOGY:   < from: CT Abdomen and Pelvis w/ Oral Cont and w/ IV Cont (23 @ 17:14) >    ACC: [id number] EXAM:  CT ABDOMEN AND PELVIS OC IC   ORDERED BY: HEIDY FITZPATRICKMARKELL     PROCEDURE DATE:  2023      INTERPRETATION:  CLINICAL STATEMENT: Abdominal pain.    TECHNIQUE: Contiguous axial CT images were obtained of the abdomen and   pelvis following administration of intravenous contrast.  Oral contrast   was administered. Reformatted images in the coronal and sagittal planes   were acquired.    COMPARISON CT: 3/6/2023    FINDINGS:    LOWER CHEST: Bibasilar opacities/trace pleural effusions.    HEPATOBILIARY: Cholelithiasis. Nonspecific focal wall enhancement of the   CBD without significant change. Hepatic steatosis. Coarse calcification   along the right hepatic dome.    SPLEEN: The splenic vein is not identified and is likely thrombosed.    PANCREAS: No significant change    ADRENAL GLANDS: Unremarkable.    KIDNEYS: Right renal cyst. Right hydroureteronephrosis without   significant change. Interval placement of a right nephroureteral   catheter. Nonspecific wall enhancement of the right ureter. No left   hydronephrosis.    ABDOMINOPELVIC NODES: No enlarged abdominal or pelvic lymph nodes.    PELVIC ORGANS: Fibroid uterus    PERITONEUM/MESENTERY/BOWEL: No bowel obstruction, ascites or free   intraperitoneal air. Again seen is thickening of the sigmoid colon with   adjacent soft tissue density.  Oral contrast reaches the colon. Left   lower quadrant colostomy. Fluid-filled rectum. Nonspecific wall   thickening of the rectosigmoid. There is nonspecific mild dilatation and   stranding surrounding the distal appendix. Oral contrast was not seen   within the appendix, 4/259    BONES/SOFT TISSUES: Degenerative changes of the spine. Left total hip   arthroplasty.    IMPRESSION:  No evidence of small bowel obstruction. Oral contrast reaches the   colostomy.    Nonspecific wall enhancement of the right ureter, new.    Nonspecific mild dilatation and stranding surrounding the distal   appendix. Oral contrast is not seen within the appendix    --- End of Report---  < end of copied text >      ASSESSMENT            PLAN   - please add TG level to this am's bloodwork NUTRITION SUPPORT TEAM  -  CONSULT NOTE     82-year-old female with past medical history of hypertension, diabetes, hyperlipidemia, pancreatic adenocarcinoma status post transverse loop colostomy, right hydronephrosis s/p ureteral stent removed presents to the emergency department for concern for bowel obstruction. According to family and patient has not had stool in her colostomy since yesterday and normally she changes the bag twice a day. She has had decreased p.o. intake as she was not eating well. Pt went to her urologist this morning to have her smith removed. Since then she has not urinated. Denies any other complaints    In ED, her vitals  are stable  Labs show WBC 15K, Cr 0.9 (bl 0.6). GFR 65 (from 91)  CT A/P shows no evidence of SBO and nonspecific dilation and stranding around appendix  seen by urology in ED  Admitted to medicine   (2023 00:19)    NUTRITION SUPPORT NOTE:    pt initially thought to be obstructed, with decreased stoma output x 2 days  Today her abd is softer, not distended, + stool in stoma. No nausea or vomiting. + po breakfast taken, although just a small amount  CT neg for obstruction, ? of appendicitis  pt seen by surgery - low suspicion for AP, no surgical intervention planned    REVIEW OF SYSTEMS:  Negative except as noted above.     PAST MEDICAL/SURGICAL HISTORY:   HTN (hypertension)  Diabetes  Hypercholesteremia  History of     ALLERGIES:  No Known Allergies      VITALS:  T(F): 96.8 ( @ 07:56), Max: 98.5 ( @ 23:40)  HR: 85 ( @ 07:56) (85 - 96)  BP: 101/52 ( @ 07:56) (90/55 - 101/52)  RR: 16 ( @ 07:56) (16 - 16)  SpO2: 96% ( @ 07:56) (96% - 97%)    HEIGHT/WEIGHT/BMI:   Height (cm): 162.6 (-20), 162.6 (-16), 162.6 (-), 162.6 (-)  Weight (kg): 49.9 (-20), 53.1 (-16), 64 (-08), 58.967 (-)  BMI (kg/m2): 18.9 (-), 20.1 (-16), 24.2 (-), 24.2 ()    PHYSICAL EXAM:   GENERAL: NAD, well-groomed, very thin, skin turgor fair, anicteric sclerae  HEENT: Moist mucous membranes,  ABDOMEN: Soft, Nontender, Nondistended, stoma with some brown soft stool  EXTREMITIES:  No clubbing, cyanosis, or edema  SKIN: warm and well perfused; + evident loss of LBM in all extremities, pt thin but says her weight has been stable recently  IV ACCESS: peripheral     STANDING MEDICATIONS:   atorvastatin 20 milliGRAM(s) Oral at bedtime  enoxaparin Injectable 40 milliGRAM(s) SubCutaneous every 24 hours  insulin lispro (ADMELOG) corrective regimen sliding scale   SubCutaneous three times a day before meals  pantoprazole    Tablet 40 milliGRAM(s) Oral before breakfast  piperacillin/tazobactam IVPB.. 3.375 Gram(s) IV Intermittent every 8 hours  sodium chloride 0.9%. 1000 milliLiter(s) IV Continuous <Continuous>  tamsulosin 0.4 milliGRAM(s) Oral at bedtime      LABS:                         11.9   15.43 )-----------( 305      ( 2023 13:50 )             35.7     136  |  99  |  27<H>  ----------------------------<  145<H>          (23 @ 13:50)  4.6   |  20  |  0.9    Ca    10.1          (23 @ 13:50)    TPro  7.1  /  Alb  4.2  /  TBili  0.7  /  DBili  x   /  AST  25  /  ALT  11  /  AlkPhos  85       23 @ 13:50    A1c: 7.3 % (23 @ 08:28)    Blood Glucose (Past 24 hours):  146 mg/dL ( @ 08:03)      DIET:   Diet, DASH/TLC:   Sodium & Cholesterol Restricted  Consistent Carbohydrate {Evening Snack} (23 @ 01:37) [Active]      RADIOLOGY:   < from: CT Abdomen and Pelvis w/ Oral Cont and w/ IV Cont (23 @ 17:14) >  ACC: [id number] EXAM:  CT ABDOMEN AND PELVIS OC IC   ORDERED BY: HEIDY FITZPATRICKMARKELL     PROCEDURE DATE:  2023    INTERPRETATION:  CLINICAL STATEMENT: Abdominal pain.  TECHNIQUE: Contiguous axial CT images were obtained of the abdomen and   pelvis following administration of intravenous contrast.  Oral contrast   was administered. Reformatted images in the coronal and sagittal planes   were acquired.  COMPARISON CT: 3/6/2023  FINDINGS:  LOWER CHEST: Bibasilar opacities/trace pleural effusions.  HEPATOBILIARY: Cholelithiasis. Nonspecific focal wall enhancement of the   CBD without significant change. Hepatic steatosis. Coarse calcification   along the right hepatic dome.  SPLEEN: The splenic vein is not identified and is likely thrombosed.  PANCREAS: No significant change  ADRENAL GLANDS: Unremarkable.  KIDNEYS: Right renal cyst. Right hydroureteronephrosis without   significant change. Interval placement of a right nephroureteral   catheter. Nonspecific wall enhancement of the right ureter. No left   hydronephrosis.  ABDOMINOPELVIC NODES: No enlarged abdominal or pelvic lymph nodes.  PELVIC ORGANS: Fibroid uterus    PERITONEUM/MESENTERY/BOWEL: No bowel obstruction, ascites or free   intraperitoneal air. Again seen is thickening of the sigmoid colon with   adjacent soft tissue density.  Oral contrast reaches the colon. Left   lower quadrant colostomy. Fluid-filled rectum. Nonspecific wall   thickening of the rectosigmoid. There is nonspecific mild dilatation and   stranding surrounding the distal appendix. Oral contrast was not seen   within the appendix, 4/259    BONES/SOFT TISSUES: Degenerative changes of the spine. Left total hip   arthroplasty.  IMPRESSION:  No evidence of small bowel obstruction. Oral contrast reaches the   colostomy.  Nonspecific wall enhancement of the right ureter, new.  Nonspecific mild dilatation and stranding surrounding the distal   appendix. Oral contrast is not seen within the appendix  --- End of Report---  < end of copied text >      ASSESSMENT  - pancreatic adenoca s/p surgery and transverse colostomy  - DM  - hydronephrosis s/p stent, removed  - UTI  - + weight loss, severe malnutrition    PLAN:  - pt not obstructed, now with stool in ostomy  - urology noted  - d/w hospitalist - no SNS needed at this time  - suggest adding Glucerna Shakes while here, and obtaining Boost [facility] for supplementation as an outpt

## 2023-04-21 NOTE — H&P ADULT - NSHPLABSRESULTS_GEN_ALL_CORE
(04-20 @ 13:50)                      11.9  15.43 )-----------( 305                 35.7    Neutrophils = 13.57 (87.9%)  Lymphocytes = 0.78 (5.1%)  Eosinophils = 0.16 (1.0%)  Basophils = 0.04 (0.3%)  Monocytes = 0.82 (5.3%)  Bands = --%    04-20    136  |  99  |  27<H>  ----------------------------<  145<H>  4.6   |  20  |  0.9    Ca    10.1      20 Apr 2023 13:50    TPro  7.1  /  Alb  4.2  /  TBili  0.7  /  DBili  x   /  AST  25  /  ALT  11  /  AlkPhos  85  04-20    ( 20 Apr 2023 13:50 )   PT: 13.20 sec;   INR: 1.15 ratio;       PTT:28.7 sec      RVP:          Tox:         < from: CT Abdomen and Pelvis w/ Oral Cont and w/ IV Cont (04.20.23 @ 17:14) >    IMPRESSION:  No evidence of small bowel obstruction. Oral contrast reaches the   colostomy.    Nonspecific wall enhancement of the right ureter, new.    Nonspecific mild dilatation and stranding surrounding the distal   appendix. Oral contrast is not seen within the appendix    --- End of Report---    < end of copied text >

## 2023-04-21 NOTE — CONSULT NOTE ADULT - ATTENDING COMMENTS
patient is well known to me, pancreatic cancer with right inflammatory/neoplastic process right pelvis causing right ureter and sigmoid obstruction s/p transverse colostomy and right ureteric stent    admitted for possible UTI, CT showed no bowel obstruction as contrasted traveled unto stoma bag    she needs to be eval with med onc when more stable to start chemo

## 2023-04-21 NOTE — CONSULT NOTE ADULT - TIME BILLING
discussed plan of care with patient, family, surgical and medical teams, reviewed her imaging and counselled her about potential treatment algorithm

## 2023-04-21 NOTE — H&P ADULT - ATTENDING COMMENTS
82-year-old female with past medical history of hypertension, diabetes, hyperlipidemia, pancreatic adenocarcinoma status post transverse loop colostomy, right hydronephrosis s/p ureteral stent removed presents to the emergency department for concern for bowel obstruction. According to family and patient has not had stool in her colostomy since yesterday and normally she changes the bag twice a day. She has had decreased p.o. intake as she was not eating well. Pt went to her urologist this morning to have her smith removed. Since then she has not urinated. Denies any other complaints. Colostomy bag changed in ED with appropriated output there after.    CT A/P: No evidence of small bowel obstruction. Oral contrast reaches the   colostomy. Nonspecific wall enhancement of the right ureter, new.  Nonspecific mild dilatation and stranding surrounding the distal   appendix. Oral contrast is not seen within the appendix.    Agree  with assessment  except for changes below.     IMPRESSION   Sepsis  Present on  admission UTI / Appendicitis   UTI  Positive  UA   Hx  Right Hydronephrosis s/p Ureteral Stent Removed  Baseline creatinine 0.6:  Consider Renal US,  Avoid Nephrotoxic agents, Monitor BUN/creatinine,  Reduced Urinary Output    Recent  Smith removal   - WBC 15K with left shift  - F/u  UCx  - c/w abx for now   - c/w IVF NS @75cc/hr  - c/w flomax  - Frequent Bladder Scan   - OP f/u with Urology Dr. Fry on discharge      Suspected   Appendicitis  - WBC 15K with left shift  - CT A/P shows no evidence of SBO and nonspecific dilation and stranding around appendix  - c/w zosyn iv 3.375g q8hr  - f/u with surgery team in AM    Hx HTN - Hold  Hydrochlorothiazide     Hx  - c/w Lipitor     Hx  DM   Hold oral meds;  check fs;  Start insulin  regimen if  serum Glucose >180, Insulin  protocol and adjust insulin  Lantus/Lispro,  Hold for Hypoglycemia Goal  Gaol 140-180 82-year-old female with past medical history of hypertension, diabetes, hyperlipidemia, pancreatic adenocarcinoma status post transverse loop colostomy, right hydronephrosis s/p ureteral stent removed presents to the emergency department for concern for bowel obstruction. According to family and patient has not had stool in her colostomy since yesterday and normally she changes the bag twice a day. She has had decreased p.o. intake as she was not eating well. Pt went to her urologist this morning to have her smith removed. Since then she has not urinated. Denies any other complaints. Colostomy bag changed in ED with appropriated output there after.    CT A/P: No evidence of small bowel obstruction. Oral contrast reaches the   colostomy. Nonspecific wall enhancement of the right ureter, new.  Nonspecific mild dilatation and stranding surrounding the distal   appendix. Oral contrast is not seen within the appendix.      VITAL SIGNS: AFebrile, vital signs stable  CONSTITUTIONAL: Well-developed; well-nourished; in no acute distress. cachetic   SKIN: Skin exam is warm and dry, no acute rash.  HEAD: Normocephalic; atraumatic.  EYES: Pupils  reactive to light, Extraocular movements intact; conjunctiva and sclera clear.  ENT: No nasal discharge; airway clear. Moist mucus membranes.  NECK: Supple; non tender. No rigidity  CARD: Regular rate and rhythm. Normal S1, S2; no murmurs, gallops, or rubs.  RESP: CT  auscultation bilaterally. No wheezes, rales or rhonchi.  ABD: Abdomen soft; non-tender; non-distended, Ostomy bag with Fecal matter   EXT: Normal ROM. No clubbing, cyanosis or edema.   NEURO: Alert and oriented x 3. No focal deficits.  PSYCH: cooperative, appropriate.      Agree  with assessment  except for changes below.     IMPRESSION   Sepsis  Present on  admission UTI / Appendicitis   UTI  Positive  UA   Hx  Right Hydronephrosis s/p Ureteral Stent Removed  Baseline creatinine 0.6:  Consider Renal US,  Avoid Nephrotoxic agents, Monitor BUN/creatinine,  Monitor  Urinary Output    Recent  Smith removal   Replace smith if failed TOV  - WBC 15K with left shift  - F/u  UCx  - c/w abx for now   - c/w IVF NS @75cc/hr  - c/w flomax  - Frequent Bladder Scan   - OP f/u with Urology Dr. Fry on discharge    Poor PO Intake  Dietitian consult   Calorie Count         Suspected   Appendicitis  - WBC 15K with left shift  - CT A/P shows no evidence of SBO and nonspecific dilation and stranding around appendix  - c/w zosyn iv 3.375g q8hr  - f/u with surgery team in AM    Hx HTN - Hold  Hydrochlorothiazide     Hx HLD - c/w Lipitor     Hx  DM   Hold oral meds;  check fs;  Start insulin  regimen if  serum Glucose >180, Insulin  protocol and adjust insulin  Lantus/Lispro,  Hold for Hypoglycemia Goal  Gaol 140-180      Seen on 04/20/23

## 2023-04-21 NOTE — H&P ADULT - ASSESSMENT
82-year-old female with past medical history of hypertension, diabetes, hyperlipidemia, pancreatic adenocarcinoma status post transverse loop colostomy, right hydronephrosis s/p ureteral stent removed presents to the emergency department for concern for bowel obstruction. According to family and patient has not had stool in her colostomy since yesterday and normally she changes the bag twice a day. She has had decreased p.o. intake as she was not eating well. Pt went to her urologist this morning to have her smith removed. Since then she has not urinated. Denies any other complaints  Colostomy bag changed in ED with appropriated output thereafter    #IRMA with reduced urinary output - 2/2 decreased PO intake and/or urinary retention  - Cr 0.9 (bl 0.6). GFR 65 (from 91)  - seen by urology in ED: if pt does not void urine, recommended to insert smith if bladder scan shows >350cc retention (currently on primafit and pt is able to void)  - F/u urine lytes, UA, UCx  - Monitor Cr closely  - c/w IVF NS @75cc/hr  - c/w flomax  - OP f/u with Urology Dr. Fry on discharge    #Appendicitis?  - WBC 15K with left shift  - CT A/P shows no evidence of SBO and nonspecific dilation and stranding around appendix  - c/w zosyn iv 3.375g q8hr  - f/u with surgery team in AM    #Hypertension  - c/w HCTZ 12.5mg daily    #HLD  - c/w lipitor 20mg daily    #DM  - takes metformin 500mg BID  - c/w sliding scale insulin here    #Pancreatic adenocarcinoma status post transverse loop colostomy    DVT px - lovenox  GI px - protonix  Diet - DASH+CC  Activity - AAT  Dispo - floor  *MED REC - confirmed with pt

## 2023-04-21 NOTE — H&P ADULT - NSHPPHYSICALEXAM_GEN_ALL_CORE
Const: NAD  Eyes: PERRL, no conjunctival injection  HENT:  Neck supple without meningismus   CV: RRR, Warm, well-perfused extremities  RESP: CTA B/L, no tachypnea   GI: soft, non-tender, non-distended, colostomy bag in place   MSK: No gross deformities appreciated  Skin: Warm, dry. No rashes  Neuro: Alert, CNs II-XII grossly intact. Sensation and motor function of extremities grossly intact.  Psych: Appropriate mood and affect.

## 2023-04-21 NOTE — H&P ADULT - HISTORY OF PRESENT ILLNESS
82-year-old female with past medical history of hypertension, diabetes, hyperlipidemia, pancreatic adenocarcinoma status post transverse loop colostomy presents to the emergency department for concern for bowel obstruction. According to family and patient has not had stool in her colostomy since yesterday and normally she changes the bag twice a day. She has had decreased p.o. intake as she was not eating well.  Pt went to her urologist this morning to have her smith removed. Since then she has not urinated.     Pt then went to her surgeon today who told her to come to the ED to ru obstruction. 82-year-old female with past medical history of hypertension, diabetes, hyperlipidemia, pancreatic adenocarcinoma status post transverse loop colostomy, right hydronephrosis s/p ureteral stent removed presents to the emergency department for concern for bowel obstruction. According to family and patient has not had stool in her colostomy since yesterday and normally she changes the bag twice a day. She has had decreased p.o. intake as she was not eating well. Pt went to her urologist this morning to have her smith removed. Since then she has not urinated. Denies any other complaints    In ED, her vitals  are stable  Labs show WBC 15K, Cr 0.9 (bl 0.6). GFR 65 (from 91)  CT A/P shows no evidence of SBO and nonspecific dilation and stranding around appendix  seen by urology in ED  Admitted to medicine

## 2023-04-22 NOTE — DISCHARGE NOTE PROVIDER - PROVIDER TOKENS
PROVIDER:[TOKEN:[[id number]],FOLLOWUP:[2 weeks]],PROVIDER:[TOKEN:[[id number]],FOLLOWUP:[2 weeks]]

## 2023-04-22 NOTE — DISCHARGE NOTE PROVIDER - NSDCCPCAREPLAN_GEN_ALL_CORE_FT
PRINCIPAL DISCHARGE DIAGNOSIS  Diagnosis: Acute UTI  Assessment and Plan of Treatment:   You were noted either on arrival or during this hospitalization to have a Urinary Tract Infection. You may have already been treated and completed the antibiotics, please refer to the list of medications present on your discharge paperwork. If you notice that there are antibiotics listed, these may be to treat your infection, be sure to complete taking the full course, whether you have symptoms or not, as prescribed.  While taking antibiotics, you may benefit from taking a probiotic such as florastore to help to try and prevent an infectious type of diarrhea known as C Diff. If you notice that you begin having severe watery diarrhea, more than 4-5 episodes a day, please see your Primary Care Doctor or come to the ER to have your stool tested for this infection.   It is not necessary to repeat a urine test to see if the infection is gone, it is assumed that after treatment it should have resolved. However, if you continue to have symptoms, please see your Primary Care doctor or return to the ER.        SECONDARY DISCHARGE DIAGNOSES  Diagnosis: Abdominal pain  Assessment and Plan of Treatment:

## 2023-04-22 NOTE — DISCHARGE NOTE PROVIDER - NSDCHHBASESERVICE_GEN_ALL_CORE
Initiate Treatment: ciclopirox 8 % topical solution, Apply to affected nails once daily, remove on 7th day, repeat x 48 weeks Initiate Treatment: Daily antihistamine\\nMedrol (Josh) 4 mg tablets in a dose pack, Take as directed. Render In Strict Bullet Format?: No Detail Level: Simple Plan: Will re-evaluate in 6 weeks Samples Given: Eucrisa , apply a thin layer twice daily Continue Regimen: hydrocortisone 2.5 % topical cream, apply twice daily mixed with moisturizer x1 week, stop x1 week. Repeat cycle as needed. Nursing

## 2023-04-22 NOTE — DISCHARGE NOTE PROVIDER - NSDCMRMEDTOKEN_GEN_ALL_CORE_FT
bisacodyl 5 mg oral delayed release tablet: 1 tab(s) orally every 12 hours  hydroCHLOROthiazide 12.5 mg oral capsule: 1 cap(s) orally once a day  metFORMIN 500 mg oral tablet: 1 tab(s) orally 2 times a day  pantoprazole 40 mg oral delayed release tablet: 1 tab(s) orally once a day (before a meal)  polyethylene glycol 3350 oral powder for reconstitution: 17 gram(s) orally 2 times a day  rosuvastatin 5 mg oral tablet: 1 tab(s) orally once a day  tamsulosin 0.4 mg oral capsule: 1 cap(s) orally once a day (at bedtime)

## 2023-04-22 NOTE — DISCHARGE NOTE PROVIDER - HOSPITAL COURSE
82-year-old female with past medical history of hypertension, diabetes, hyperlipidemia, pancreatic adenocarcinoma status post transverse loop colostomy, right hydronephrosis s/p ureteral stent removed presents to the emergency department for concern for bowel obstruction. According to family and patient has not had stool in her colostomy since yesterday and normally she changes the bag twice a day. She has had decreased p.o. intake as she was not eating well. Pt went to her urologist this morning to have her smith removed. Since then she has not urinated. Denies any other complaints  Colostomy bag changed in ED with appropriated output thereafter    Pt was evaluated and treated for the following conditions    #UTI  #Urinary retention: resolved  UA: large LE    many bact     wbc 720  prev UCx susceptible to levofloxacin  - levofloxacin x 2d  -  onboard  - c/w flomax  - OP f/u with Urology Dr. Fry on discharge    #Appendicitis?  less likley  wbc enl now  - CT A/P shows no evidence of SBO and nonspecific dilation and stranding around appendix  - No intervention as per surgery    #Hypertension  - c/w HCTZ 12.5mg daily    #HLD  - c/w lipitor 20mg daily    #DM  - takes metformin 500mg BID  - c/w sliding scale insulin here    #Pancreatic adenocarcinoma status post transverse loop colostomy   82-year-old female with past medical history of hypertension, diabetes, hyperlipidemia, pancreatic adenocarcinoma status post transverse loop colostomy, right hydronephrosis s/p ureteral stent removed presents to the emergency department for concern for bowel obstruction. According to family and patient has not had stool in her colostomy since yesterday and normally she changes the bag twice a day. She has had decreased p.o. intake as she was not eating well. Pt went to her urologist this morning to have her smith removed. Since then she has not urinated. Denies any other complaints  Colostomy bag changed in ED with appropriated output thereafter    Pt was evaluated and treated for the following conditions    #UTI  #Urinary retention: resolved  UA: large LE    many bact     wbc 720  prev UCx susceptible to levofloxacin  - completed 3 days of antibiotics   - fu UCx-pseudomonas - sensitivities are still pending   -  onboard  - c/w flomax  - OP f/u with Urology Dr. Fry on discharge    #Appendicitis?  less likley  wbc enl now  - CT A/P shows no evidence of SBO and nonspecific dilation and stranding around appendix  - No intervention as per surgery    #Hypertension  - c/w HCTZ 12.5mg daily    #HLD  - c/w lipitor 20mg daily    #DM  - takes metformin 500mg BID  - c/w sliding scale insulin here    #Pancreatic adenocarcinoma status post transverse loop colostomy   82-year-old female with past medical history of hypertension, diabetes, hyperlipidemia, pancreatic adenocarcinoma status post transverse loop colostomy, right hydronephrosis s/p ureteral stent removed presents to the emergency department for concern for bowel obstruction. According to family and patient has not had stool in her colostomy since yesterday and normally she changes the bag twice a day. She has had decreased p.o. intake as she was not eating well. Pt went to her urologist this morning to have her smith removed. Since then she has not urinated. Denies any other complaints  Colostomy bag changed in ED with appropriated output thereafter    Pt was evaluated and treated for the following conditions    #UTI  #Urinary retention: resolved  UA: large LE    many bact     wbc 720  prev UCx susceptible to levofloxacin  - completed 3 days of antibiotics   -Grew pseudomonas in urine that was covered by given antibiotics   -  onboard  - c/w flomax  - OP f/u with Urology Dr. Fry on discharge    #Appendicitis?  less likley  wbc enl now  - CT A/P shows no evidence of SBO and nonspecific dilation and stranding around appendix  - No intervention as per surgery    #Hypertension  - c/w HCTZ 12.5mg daily    #HLD  - c/w lipitor 20mg daily    #DM  - takes metformin 500mg BID  - c/w sliding scale insulin here    #Pancreatic adenocarcinoma status post transverse loop colostomy    Discharged home with home care

## 2023-04-22 NOTE — DISCHARGE NOTE PROVIDER - CARE PROVIDER_API CALL
Alize Fry)  Urology  [address] [place] [address], [address]  [place] [address]  Phone: [phone]  Fax: [phone]  Follow Up Time: 2 weeks    Renea Marshall  INTERNAL MEDICINE  [address] [place] [id number]  Phone: [phone]  Fax: [phone]  Follow Up Time: 2 weeks

## 2023-04-22 NOTE — PHYSICAL THERAPY INITIAL EVALUATION ADULT - PERTINENT HX OF CURRENT PROBLEM, REHAB EVAL
82-year-old female with past medical history of hypertension, diabetes, hyperlipidemia, pancreatic adenocarcinoma status post transverse loop colostomy, right hydronephrosis s/p ureteral stent removed presents to the emergency department for concern for bowel obstruction. According to family and patient has not had stool in her colostomy since yesterday and normally she changes the bag twice a day. She has had decreased p.o. intake as she was not eating well. Pt went to her urologist this morning to have her smith removed. Since then she has not urinated. Denies any other complaints

## 2023-04-22 NOTE — DISCHARGE NOTE PROVIDER - NSDCFUSCHEDAPPT_GEN_ALL_CORE_FT
Mike Mead  [facility] PreAdmits  Scheduled Appointment: 04/25/2023    [facility] [address] [place]   Scheduled Appointment: 04/25/2023

## 2023-04-22 NOTE — DISCHARGE NOTE PROVIDER - ATTENDING DISCHARGE PHYSICAL EXAMINATION:
Vital Signs Last 24 Hrs  T(F): 96.9 (25 Apr 2023 08:17), Max: 96.9 (25 Apr 2023 00:36)  HR: 108 (25 Apr 2023 08:17) (85 - 108)  BP: 133/69 (25 Apr 2023 08:17) (109/53 - 133/69)  RR: 18 (25 Apr 2023 08:17) (18 - 18)  SpO2: 99% (25 Apr 2023 08:17) (99% - 99%)    PHYSICAL EXAM:  GENERAL: NAD, well-groomed, well-developed  HEAD:  Atraumatic, Normocephalic  EYES: EOMI, conjunctiva and sclera clear  ENMT: Moist mucous membranes, Good dentition, no thrush  NECK: Supple, No JVD  CHEST/LUNG: Clear to auscultation bilaterally, good air entry, non-labored breathing  HEART: RRR; S1/S2, No murmur  ABDOMEN: Soft, Nontender, Nondistended; ostomy in place   VASCULAR: Normal pulses, Normal capillary refill  EXTREMITIES: No calf tenderness, No cyanosis, No edema  LYMPH: Normal; No lymphadenopathy noted  SKIN: Warm, Intact  PSYCH: Normal mood, Normal affect  NERVOUS SYSTEM:  A/O x3, Good concentration; CN 2-12 intact, No focal deficits

## 2023-04-22 NOTE — PHYSICAL THERAPY INITIAL EVALUATION ADULT - PLANNED THERAPY INTERVENTIONS, PT EVAL
[facility] Surgery  Progress Note    Subjective:     History of Present Illness:  49 yo female with sudden onset of RUQ pain last night. No similar history in the past. She did not know she had cholelithiasis. US shows cholelithiasis without evidence of cholecystitis. She had severe pain and N/V after eating a pork chop last night. Labs are WNL.     Post-Op Info:  Procedure(s) (LRB):  CHOLECYSTECTOMY, LAPAROSCOPIC (N/A)   1 Day Post-Op     Interval History: S/P lap bethel for gangrenous cholecystitis. Transaminases elevated but bili normal.    Medications:  Continuous Infusions:   sodium chloride 0.9% 125 mL/hr at 09/04/18 0555     Scheduled Meds:   enoxaparin  40 mg Subcutaneous Daily    FLUoxetine  40 mg Oral Daily    nicotine  1 patch Transdermal Daily    piperacillin-tazobactam (ZOSYN) IVPB  3.375 g Intravenous Q8H    senna-docusate 8.6-50 mg  1 tablet Oral BID     PRN Meds:dextrose 50%, dextrose 50%, glucagon (human recombinant), glucose, glucose, HYDROcodone-acetaminophen, HYDROmorphone, magnesium oxide, magnesium oxide, ondansetron, potassium chloride 10%, potassium chloride 10%, promethazine (PHENERGAN) IVPB, ramelteon, sodium chloride 0.9%     Review of patient's allergies indicates:  No Known Allergies  Objective:     Vital Signs (Most Recent):  Temp: 99.3 °F (37.4 °C) (09/04/18 1629)  Pulse: 64 (09/04/18 1629)  Resp: 20 (09/04/18 1629)  BP: (!) 119/57 (09/04/18 1629)  SpO2: 95 % (09/04/18 1629) Vital Signs (24h Range):  Temp:  [97.7 °F (36.5 °C)-99.3 °F (37.4 °C)] 99.3 °F (37.4 °C)  Pulse:  [58-73] 64  Resp:  [17-20] 20  SpO2:  [90 %-95 %] 95 %  BP: ()/(54-64) 119/57     Weight: 106 kg (233 lb 11 oz)  Body mass index is 40.11 kg/m².    Intake/Output - Last 3 Shifts       09/02 0700 - 09/03 0659 09/03 0700 - 09/04 0659 09/04 0700 - 09/05 0659    P.O. 0 120     I.V. (mL/kg) 841.7 (7.9) 1850 (17.5)     IV Piggyback  50     Total Intake(mL/kg) 841.7 (7.9) 2020 (19.1)      Urine (mL/kg/hr)  0 (0)     Blood  2     Total Output  2     Net +841.7 +2018            Urine Occurrence 1 x 2 x           Physical Exam   Constitutional: She is oriented to person, place, and time. No distress.   HENT:   Head: Normocephalic and atraumatic.   Eyes: No scleral icterus.   Cardiovascular: Normal rate and regular rhythm.   Pulmonary/Chest: Effort normal and breath sounds normal. No respiratory distress. She has no wheezes. She has no rales.   Abdominal: Soft. Bowel sounds are normal. She exhibits no distension. There is tenderness (Minimal tenderness but much better.).   Neurological: She is alert and oriented to person, place, and time.   Psychiatric: She has a normal mood and affect. Her behavior is normal.       Significant Labs:  CBC:   Recent Labs   Lab  09/04/18 0421   WBC  12.60   RBC  4.16   HGB  12.2   HCT  37.5   PLT  309   MCV  90   MCH  29.3   MCHC  32.6     CMP:   Recent Labs   Lab  09/04/18 0421   GLU  110   CALCIUM  8.5*   ALBUMIN  3.1*   PROT  6.2   NA  135*   K  4.2   CO2  22*   CL  105   BUN  9   CREATININE  0.7   ALKPHOS  159*   ALT  529*   AST  349*   BILITOT  0.6         Assessment/Plan:     * Calculus of gallbladder with acute cholecystitis without obstruction    1. S/P lap cholecystectomy.  2. Elevated trasnaminases with normal bilirubin.  3. Recheck labs in AM. If stable, should be OK for DC.  4. Continue antibiotics. Home on oral antibiotics.            Wilmer Degroot MD  General Surgery  [facility]   balance training/bed mobility training/gait training/transfer training

## 2023-04-22 NOTE — PHYSICAL THERAPY INITIAL EVALUATION ADULT - ADDITIONAL COMMENTS
Patient lives alone in house with 5 steps to enter. as per patient, family regularly visits her.  Was independent in ADL's and ambulation using RW or cane depending on how she feels

## 2023-04-23 PROBLEM — K56.609 LARGE BOWEL OBSTRUCTION: Status: ACTIVE | Noted: 2023-01-01

## 2023-04-23 PROBLEM — Z93.3 STATUS POST COLOSTOMY: Status: ACTIVE | Noted: 2023-01-01

## 2023-04-23 NOTE — ASSESSMENT
[FreeTextEntry1] : Patient referred by Dr. Agrawal for evaluation of pancreatic mass. Patient reports 10 lb weight loss over the last month.History goes back to the fall when patient had difficulty moving her bowels. PAtient was found to have a positive cologuard. Referred to Gi DR. Agrawal for a colonoscopy. Colonoscopy revealed a serrated adenoma in the rectum and a tubular adenoma october 2022. No malignancy was noted. PAtient report not feeling well since late last fall when she was diagnosed with influenza.  January 2023 patient had a ct scan for continued complaints of abdominal pain/ rule out diverticulitis. \par CT abd/ pelvis 1/16/23 revealed findings suspicious for pancreatic tumor and right pelvic pericolonic mass \par \par \par 1/17/2023: discussed possible DD inflammatory vs neoplastic including diverticulitis/ovarian/colonic/pancreatic neoplasm\par \par sending for CT and tumor markers (ordered already by Dr. Vaca, will also send for EUS for her panc mass\par \par 2/13/2023 endoscopic ultrasound and pancreatic body mass core needle biopsy\par \par 2/14/2023: She reported no new symptoms, waiting for final path of EUS\par ordered PET to eval her binta lesions\par \par 2/21/2023: PET showed \par FDG avid pancreatic body mass, SUV max 7.6 consistent with biopsy-proven malignancy.\par \par 2 left basilar nodular opacities are FDG avid, SUV max 3.0, measures up to 2.7 cm, unchanged since recent chest CT, indeterminate for biologic tumor activity.\par \par Right upper lobe groundglass opacity is FDG avid on nonattenuation corrected images, suspicious for biologic tumor activity (adenocarcinoma).\par \par FDG avid right pelvic pericolonic soft tissue, SUV max 15.0 is anatomically unchanged since prior CT on 2/8/2023. Differential diagnosis includes a contained perforation versus colo-ovarian fistula.\par \par Stable chronic moderate right hydroureteronephrosis secondary to mass effect from the right pelvic soft tissue.\par \par her EUS bx showed adenocarcinoma\par \par will send her to dr Mead for systemic therapy, will defer decision of lung bx to dr Mead, her sigmoid process could be met vs inflammatory process , being >3 months of possible last diverticulitis would agree to proceed with chemo, she was instructed if she developed sepsis/peritonitis picture to go to ED and call for me for possible izzy for Elpidio \par \par 3/16/2023: POD 6 S/P left transverse loop colostomy and right ureteric stent, came from NH, severe fatigue, need to go to ED for further eval for electrolyte disturbance, her colostomy working, need aggressive PT\par \par 4/6/23  follow up today for pancreatic cancer and large bowel obstruction requiring a transverse loop colostomy. Patient seen and examine with Dr. De Santiago. Stoma pink, stool noted in bag. Patient stats that she is slowly improving. Remains in rehab facility. Not yet independently ambulatory.Plan continue rehab. Follow up with medical oncology.\par \par 4/20/23 Presents today from urology office complains of feeling poorly and not having having any stool in ostomy bag for 24 hours. Also complains of fatigue and poor appetite. PAtient sen and examined with Dr. De Santiago. Concern for bowel obstruction. PAtient referred to emergency room for further evaluation.

## 2023-04-23 NOTE — DISCHARGE NOTE NURSING/CASE MANAGEMENT/SOCIAL WORK - NURSING SECTION COMPLETE
Call transferred from the Call Center.     Pt called inquiring if she should be seen for \"on and off chest pressure\".   Stated her chest will all of a sudden feel very heavy.   Reported mid chest, between her breasts.   Stated it comes on very suddenly and it will last for about 20 minutes.   Patient stated the pressure seems to improve with rest.    Pt reported another episode approximately one week ago. Stated she ignored it but now it has seemed to return.  Said she was not doing anything strenuous when pressure begins.     Patient denied any shortness of breath, dizziness, arm or shoulder pain, numbness or tingling, nausea, or jaw pain.   Patient did report an episode of emesis this morning right before chest pressure began.   She denied any cardiac history at this time. She denied any recent illness. She did have cataract surgery 2 weeks ago.    Pt is concerned and would like to be evaluated soon.   Patient is agreeable at this time to be further evaluated in the ER due to complaints of chest pressure with vomiting.   She was encouraged to f/u with PMD as needed.   Pt agreeable and verbalized understanding.      Patient/Caregiver provided printed discharge information.

## 2023-04-23 NOTE — ASSESSMENT
[FreeTextEntry1] : 81 yo with right hydronephrosis - s/p stent placement\par s/p colostomy - metastatic pancreatic cancer\par \par - catheter removed\par - sent to ER for assessment\par - Dr. De Santiago notified [Hydronephrosis (591\N13.30)] : Salter-Brock type I

## 2023-04-23 NOTE — HISTORY OF PRESENT ILLNESS
[de-identified] : Patient referred by Dr. Agrawal for evaluation of pancreatic mass. Patient reports 10 lb weight loss over the last month.History goes back to the fall when patient had difficulty moving her bowels. PAtient was found to have a positive cologuard. Referred to Gi DR. Agrawal for a colonoscopy. Colonoscopy revealed a serrated adenoma in the rectum and a tubular adenoma october 2022. No malignancy was noted. PAtient report not feeling well since late last fall when she was diagnosed with influenza.  January 2023 patient had a ct scan for continued complaints of abdominal pain/ rule out diverticulitis. \par CT abd/ pelvis 1/16/23 revealed findings suspicious for pancreatic tumor and right pelvic pericolonic mass \par \par 2/8/2023: CT chest showed left lung base lower nodule 4 mm,pleural-based mass adjacent measuring pleural-based mass adjacent measuring 2.7 cm\par 2/13/2023 endoscopic ultrasound and pancreatic body mass core needle biopsy\par \par 2/14/2023: She reported no new symptoms\par \par 2/21/2023: PET showed \par FDG avid pancreatic body mass, SUV max 7.6 consistent with biopsy-proven malignancy.\par \par 2 left basilar nodular opacities are FDG avid, SUV max 3.0, measures up to 2.7 cm, unchanged since recent chest CT, indeterminate for biologic tumor activity.\par \par Right upper lobe groundglass opacity is FDG avid on nonattenuation corrected images, suspicious for biologic tumor activity (adenocarcinoma).\par \par FDG avid right pelvic pericolonic soft tissue, SUV max 15.0 is anatomically unchanged since prior CT on 2/8/2023. Differential diagnosis includes a contained perforation versus colo-ovarian fistula.\par \par Stable chronic moderate right hydroureteronephrosis secondary to mass effect from the right pelvic soft tissue.\par \par 3/16/2023: POD 6 S/P left transverse loop colostomy and right ureteric stent, came from NH, severe fatigue\par \par 4/6/23 follow up from recent hospitalization and surgery\par \par 4/20/23 unscheduled visit for complaint of feeling poorly

## 2023-04-23 NOTE — DISCHARGE NOTE NURSING/CASE MANAGEMENT/SOCIAL WORK - NSTRANSFERBELONGINGSDISPO_GEN_A_NUR
with patient Partial Purse String (Simple) Text: Given the location of the defect and the characteristics of the surrounding skin a simple purse string closure was deemed most appropriate.  Undermining was performed circumfirentially around the surgical defect.  A purse string suture was then placed and tightened. Wound tension only allowed a partial closure of the circular defect.

## 2023-04-23 NOTE — HISTORY OF PRESENT ILLNESS
[FreeTextEntry1] : 82 year old female former smoker with history of diabetes mellitus. She presented with lower abdominal pain and constipation associated with weakness and weight loss for few months. \par In October 2022 Colonoscopy revealed a serrated adenoma in the rectum and a tubular adenoma. \par \par No malignancy was noted. She was apparently treated for suspected diverticulitis , CT abd/ pelvis on 1/16/23 revealed findings suspicious for pancreatic tumor 3.6 cm encasing splenic artery and abutting hepatic artery.and right pelvic pericolonic collection 4 cm containing gas , contiguous with sigmoid colon and with mild to moderate right hydronephrosis.\par \par she underwent cystoscopy right ureteral stent placement at the time of exploratory laparotomy 3/8/2023\par she is here to remove her catheter - she failed a voiding trial in the hospital\par \par at present as per her daughter - she has not had gas in her colostomy and is distended\par \par \par

## 2023-04-23 NOTE — DISCHARGE NOTE NURSING/CASE MANAGEMENT/SOCIAL WORK - PATIENT PORTAL LINK FT
You can access the FollowMyHealth Patient Portal offered by [facility] by registering at the following website: [email or web address]. By joining [employer]’s FollowMyHealth portal, you will also be able to view your health information using other applications (apps) compatible with our system.

## 2023-04-23 NOTE — REASON FOR VISIT
[FreeTextEntry1] : 3/8/23large bowel obstruction , post dx lap and transverse loop colostomy,  [Follow-Up Visit] : a follow-up visit for [Pancreatic Cancer] : pancreatic cancer [Family Member] : family member [FreeTextEntry2] : large bowel obstruction

## 2023-04-23 NOTE — PHYSICAL EXAM
[Normal Breath Sounds] : Normal breath sounds [Normal Heart Sounds] : normal heart sounds [Abdominal Masses] : No abdominal masses [Abdomen Tenderness] : ~T ~M No abdominal tenderness [No Rash or Lesion] : No rash or lesion [Alert] : alert [Oriented to Person] : oriented to person [Oriented to Place] : oriented to place [Oriented to Time] : oriented to time [Calm] : calm [de-identified] : sitting in wheelchair, appears fatigued [de-identified] : soft non tender, non distended, ostomy in place no stool noted in ostomy bag [Normal] : supple, no neck mass and thyroid not enlarged [Normal Neck Lymph Nodes] : normal neck lymph nodes  [Normal Supraclavicular Lymph Nodes] : normal supraclavicular lymph nodes [Normal Groin Lymph Nodes] : normal groin lymph nodes [Normal Axillary Lymph Nodes] : normal axillary lymph nodes [Normal] : oriented to person, place and time, with appropriate affect [de-identified] : ostomy in place

## 2023-04-24 NOTE — PROGRESS NOTE ADULT - ASSESSMENT
82-year-old female with past medical history of hypertension, diabetes, hyperlipidemia, pancreatic adenocarcinoma status post transverse loop colostomy, right hydronephrosis s/p ureteral stent removed presents to the emergency department for concern for bowel obstruction. According to family and patient has not had stool in her colostomy since yesterday and normally she changes the bag twice a day. She has had decreased p.o. intake as she was not eating well. Pt went to her urologist this morning to have her smith removed. Since then she has not urinated. Denies any other complaints  Colostomy bag changed in ED with appropriated output thereafter    #UTI  #Urinary retention: resolved  UA: large LE    many bact     wbc 720  prev UCx susceptible to levofloxacin  - completed 3 days of antibiotics   - fu UCx-pseudomonas - pan sensitive - completed antibiotics   -  onboard  - c/w flomax  - OP f/u with Urology Dr. Fry on discharge    #Appendicitis- not found  less likley  wbc enl now  - CT A/P shows no evidence of SBO and nonspecific dilation and stranding around appendix  - No intervention as per surgery    #Hypertension  - c/w HCTZ 12.5mg daily    #Constipation  -on senna, mirlax, lactulose    #HLD  - c/w lipitor 20mg daily    #DM  - takes metformin 500mg BID  - c/w sliding scale insulin here    #Pancreatic adenocarcinoma status post transverse loop colostomy    DVT px - lovenox  GI px - protonix  Diet - DASH+CC  Activity - AAT  Dispo - floor  pending - bowels moving, AM kub   
82-year-old female with past medical history of hypertension, diabetes, hyperlipidemia, pancreatic adenocarcinoma status post transverse loop colostomy, right hydronephrosis s/p ureteral stent removed presents to the emergency department for concern for bowel obstruction. According to family and patient has not had stool in her colostomy since yesterday and normally she changes the bag twice a day. She has had decreased p.o. intake as she was not eating well. Pt went to her urologist this morning to have her smith removed. Since then she has not urinated. Denies any other complaints  Colostomy bag changed in ED with appropriated output thereafter    #UTI  #Urinary retention: resolved  UA: large LE    many bact     wbc 720  prev UCx susceptible to levofloxacin  - completed 3 days of antibiotics   - fu UCx-pseudomonas - sensitivities are still pending   -  onboard  - c/w flomax  - OP f/u with Urology Dr. Fry on discharge    #Appendicitis?  less likley  wbc enl now  - CT A/P shows no evidence of SBO and nonspecific dilation and stranding around appendix  - No intervention as per surgery    #Hypertension  - c/w HCTZ 12.5mg daily    #HLD  - c/w lipitor 20mg daily    #DM  - takes metformin 500mg BID  - c/w sliding scale insulin here    #Pancreatic adenocarcinoma status post transverse loop colostomy    DVT px - lovenox  GI px - protonix  Diet - DASH+CC  Activity - AAT  Dispo - floor  pending - urine sensitivities, home care arrangements   
82-year-old female with past medical history of hypertension, diabetes, hyperlipidemia, pancreatic adenocarcinoma status post transverse loop colostomy, right hydronephrosis s/p ureteral stent removed presents to the emergency department for concern for bowel obstruction. According to family and patient has not had stool in her colostomy since yesterday and normally she changes the bag twice a day. She has had decreased p.o. intake as she was not eating well. Pt went to her urologist this morning to have her smith removed. Since then she has not urinated. Denies any other complaints  Colostomy bag changed in ED with appropriated output thereafter    #UTI  #Urinary retention: resolved  UA: large LE    many bact     wbc 720  prev UCx susceptible to levofloxacin  - levofloxacin x 2d  - fu UCx  -  onboard  - c/w flomax  - OP f/u with Urology Dr. Fry on discharge    #Appendicitis?  less likley  wbc enl now  - CT A/P shows no evidence of SBO and nonspecific dilation and stranding around appendix  - No intervention as per surgery    #Hypertension  - c/w HCTZ 12.5mg daily    #HLD  - c/w lipitor 20mg daily    #DM  - takes metformin 500mg BID  - c/w sliding scale insulin here    #Pancreatic adenocarcinoma status post transverse loop colostomy    DVT px - lovenox  GI px - protonix  Diet - DASH+CC  Activity - AAT  Dispo - floor  
a/p:  #Sepsis  Present on  admission 2/2 UTI   #H/o Right Hydronephrosis s/p Ureteral Stent Removed  #chronic smith recently removed as outpatient--urinary retention initially but has now resolved   -continue primafit---monitor urine output  -monitor wbc and fever curve  -+UA--f/u urine cx  -continue iv zosyn  - outpaetint f/u (Dr. Fry)  -bladder scan if any urinary retention---straight cath prn (try to avoid reinserting smith catheter)  -continue ivf  -flomax     #h/o Pancreatic adenocarcinoma s/p resection with loop colostomy in place  #suspected SBO  #severe protein calorie malnutrition  -ostomy output improving---continue to monitor  -no signs of sbo  -surgery following     continue current manamgnet of chronic medical issues    DVT/GI ppx  guarded prognosis    FULL CODE    patient was at home prior to admission (lives alone but does have family who visits)- she will likely need either NH oplacement or home with [facility] (services to be initiated upon dc if goes home)  PT eval for dispo plan    Total time spent to complete patient's bedside assessment, review medical chart, discuss medical plan of care with covering medical team was more than 50 minutes  with >50% of time spendt face to face with patient, discussion with patient/family and/or coordination of care    
82-year-old female with past medical history of hypertension, diabetes, hyperlipidemia, pancreatic adenocarcinoma status post transverse loop colostomy, right hydronephrosis s/p ureteral stent removed presents to the emergency department for concern for bowel obstruction. According to family and patient has not had stool in her colostomy since yesterday and normally she changes the bag twice a day. She has had decreased p.o. intake as she was not eating well. Pt went to her urologist this morning to have her smith removed. Since then she has not urinated. Denies any other complaints  Colostomy bag changed in ED with appropriated output thereafter    #UTI  #Urinary retention: resolved  UA: large LE    many bact     wbc 720  prev UCx susceptible to levofloxacin  - levofloxacin x 2d  - fu UCx  -  onboard  - c/w flomax  - OP f/u with Urology Dr. Fry on discharge    #Appendicitis?  less likley  wbc enl now  - CT A/P shows no evidence of SBO and nonspecific dilation and stranding around appendix  - No intervention as per surgery    #Hypertension  - c/w HCTZ 12.5mg daily    #HLD  - c/w lipitor 20mg daily    #DM  - takes metformin 500mg BID  - c/w sliding scale insulin here    #Pancreatic adenocarcinoma status post transverse loop colostomy    DVT px - lovenox  GI px - protonix  Diet - DASH+CC  Activity - AAT  Dispo - floor

## 2023-04-25 NOTE — PROGRESS NOTE ADULT - PROVIDER SPECIALTY LIST ADULT
Hospitalist
Internal Medicine
Internal Medicine
Hospitalist
Hospitalist
Internal Medicine
Hospitalist

## 2023-04-25 NOTE — CHART NOTE - NSCHARTNOTEFT_GEN_A_CORE
Patient was discharged this morning 4/25. This afternoon patient had urine culture result with a second organism, VRE. Patient was treated with 3 days of Levaquin which covered the initial pseudomonas organism. Patient has chronic Ray and VRE is likely colonization as patient was asymptomatic, afebrile, and had normal WBC count. No need to treat.

## 2023-04-25 NOTE — PROGRESS NOTE ADULT - SUBJECTIVE AND OBJECTIVE BOX
DELORIS NORRIS 82y Female  MRN#: [id number]   Hospital Day: 4d    SUBJECTIVE  Patient is a 82y old Female who presents with a chief complaint of decreased ostomy (2023 16:33)  Currently admitted to medicine with the primary diagnosis of Acute UTI      INTERVAL HPI AND OVERNIGHT EVENTS:  Patient was examined and seen at bedside. This morning she is resting comfortably in bed and reports no issues or overnight events.    REVIEW OF SYMPTOMS:  Denies all.     OBJECTIVE  PAST MEDICAL & SURGICAL HISTORY  HTN (hypertension)    Diabetes    Hypercholesteremia    History of       ALLERGIES:  No Known Allergies    MEDICATIONS:  STANDING MEDICATIONS  atorvastatin 20 milliGRAM(s) Oral at bedtime  dextrose 5%. 1000 milliLiter(s) IV Continuous <Continuous>  dextrose 5%. 1000 milliLiter(s) IV Continuous <Continuous>  dextrose 50% Injectable 25 Gram(s) IV Push once  dextrose 50% Injectable 12.5 Gram(s) IV Push once  dextrose 50% Injectable 25 Gram(s) IV Push once  enoxaparin Injectable 40 milliGRAM(s) SubCutaneous every 24 hours  glucagon  Injectable 1 milliGRAM(s) IntraMuscular once  insulin lispro (ADMELOG) corrective regimen sliding scale   SubCutaneous three times a day before meals  lactated ringers. 1000 milliLiter(s) IV Continuous <Continuous>  pantoprazole    Tablet 40 milliGRAM(s) Oral before breakfast  polyethylene glycol 3350 17 Gram(s) Oral daily  senna 2 Tablet(s) Oral at bedtime  tamsulosin 0.4 milliGRAM(s) Oral at bedtime    PRN MEDICATIONS  acetaminophen     Tablet .. 650 milliGRAM(s) Oral every 6 hours PRN  dextrose Oral Gel 15 Gram(s) Oral once PRN      VITAL SIGNS: Last 24 Hours  T(C): 36.2 (2023 08:00), Max: 36.9 (2023 15:32)  T(F): 97.1 (2023 08:00), Max: 98.4 (2023 15:32)  HR: 88 (2023 08:00) (88 - 92)  BP: 114/56 (2023 08:00) (104/54 - 114/56)  BP(mean): --  RR: 19 (2023 08:00) (18 - 19)  SpO2: --    LABS:                        10.6   7.93  )-----------( 227      ( 2023 06:59 )             31.9     04-23    134<L>  |  99  |  11  ----------------------------<  137<H>  4.0   |  22  |  0.5<L>    Ca    8.8      2023 06:59  Mg     1.6     -23    TPro  5.4<L>  /  Alb  3.3<L>  /  TBili  0.7  /  DBili  x   /  AST  13  /  ALT  7   /  AlkPhos  86  0423      Culture - Blood (collected 2023 11:59)  Source: .Blood None  Preliminary Report (2023 23:01):    No growth to date.    Culture - Urine (collected 2023 10:13)  Source: Clean Catch Clean Catch (Midstream)  Preliminary Report (2023 22:56):    50,000 - 99,000 CFU/mL Pseudomonas aeruginosa    50,000 - 99,000 CFU/mL Enterococcus faecium      PHYSICAL EXAM:  CONSTITUTIONAL: No acute distress, well-developed, well-groomed, AAOx3  HEAD: Atraumatic, normocephalic  EYES: EOM intact, PERRLA, conjunctiva and sclera clear  ENT: Supple, no masses, no thyromegaly, no bruits, no JVD; moist mucous membranes  PULMONARY: Clear to auscultation bilaterally; no wheezes, rales, or rhonchi  CARDIOVASCULAR: Regular rate and rhythm; no murmurs, rubs, or gallops  GASTROINTESTINAL: Soft, non-tender, non-distended; bowel sounds present  MUSCULOSKELETAL: 2+ peripheral pulses; no clubbing, no cyanosis, no edema  NEUROLOGY: non-focal  SKIN: No rashes or lesions; warm and dry    ASSESSMENT & PLAN:  82-year-old female with past medical history of hypertension, diabetes, hyperlipidemia, pancreatic adenocarcinoma status post transverse loop colostomy, right hydronephrosis s/p ureteral stent removed presents to the emergency department for concern for bowel obstruction. According to family and patient has not had stool in her colostomy since yesterday and normally she changes the bag twice a day. She has had decreased p.o. intake as she was not eating well. Pt went to her urologist this morning to have her smith removed. Since then she has not urinated. Denies any other complaints  Colostomy bag changed in ED with appropriated output thereafter    #UTI  #Urinary retention: resolved  - UA: large LE    many bact     wbc 720  - prev UCx susceptible to levofloxacin  - levofloxacin x 2d  - fu UCx -> Pending sensitivities   -  onboard    Plan:   - c/w flomax  - OP f/u with Urology Dr. Fry on discharge    #Appendicitis?  less likley  wbc enl now  - CT A/P shows no evidence of SBO and nonspecific dilation and stranding around appendix  - No intervention as per surgery    Plan:  - Monitor     #Hypertension  - c/w HCTZ 12.5mg daily    #HLD  - c/w lipitor 20mg daily    #DM  - takes metformin 500mg BID  - c/w sliding scale insulin here    #Pancreatic adenocarcinoma status post transverse loop colostomy      #Misc  - DVT Prophylaxis: Lovenox   - GI Prophylaxis: Protonix  - Diet: DASH  - Activity: As tolerated   - IV Fluids: LR   - Code Status: Full    Dispo: DC today after colostomy bag training 
Patient is a 82y old  Female who presents with a chief complaint of decreased ostomy (2023 16:33)      Patient seen and examined at bedside.    ALLERGIES:  No Known Allergies    MEDICATIONS:  acetaminophen     Tablet .. 650 milliGRAM(s) Oral every 6 hours PRN  atorvastatin 20 milliGRAM(s) Oral at bedtime  dextrose 5%. 1000 milliLiter(s) IV Continuous <Continuous>  dextrose 5%. 1000 milliLiter(s) IV Continuous <Continuous>  dextrose 50% Injectable 25 Gram(s) IV Push once  dextrose 50% Injectable 12.5 Gram(s) IV Push once  dextrose 50% Injectable 25 Gram(s) IV Push once  dextrose Oral Gel 15 Gram(s) Oral once PRN  enoxaparin Injectable 40 milliGRAM(s) SubCutaneous every 24 hours  glucagon  Injectable 1 milliGRAM(s) IntraMuscular once  insulin lispro (ADMELOG) corrective regimen sliding scale   SubCutaneous three times a day before meals  lactated ringers. 1000 milliLiter(s) IV Continuous <Continuous>  pantoprazole    Tablet 40 milliGRAM(s) Oral before breakfast  sodium chloride 0.9%. 1000 milliLiter(s) IV Continuous <Continuous>  tamsulosin 0.4 milliGRAM(s) Oral at bedtime    Vital Signs Last 24 Hrs  T(F): 98.4 (2023 15:32), Max: 98.4 (2023 01:12)  HR: 92 (2023 15:32) (89 - 92)  BP: 104/54 (2023 15:32) (104/54 - 112/55)  RR: 18 (2023 15:32) (18 - 18)  SpO2: --  I&O's Summary    2023 07:01  -  2023 07:00  --------------------------------------------------------  IN: 0 mL / OUT: 650 mL / NET: -650 mL        PHYSICAL EXAM:  General: NAD, A/O x 3  ENT: MMM  Neck: Supple, No JVD  Lungs: Clear to auscultation bilaterally  Cardio: RRR, S1/S2, No murmurs  Abdomen: Soft, Nontender, Nondistended; Bowel sounds present  Extremities: No cyanosis, No edema    LABS:                        10.6   7.93  )-----------( 227      ( 2023 06:59 )             31.9         134  |  99  |  11  ----------------------------<  137  4.0   |  22  |  0.5    Ca    8.8      2023 06:59  Mg     1.6         TPro  5.4  /  Alb  3.3  /  TBili  0.7  /  DBili  x   /  AST  13  /  ALT  7   /  AlkPhos  86                              POCT Blood Glucose.: 126 mg/dL (2023 12:20)  POCT Blood Glucose.: 151 mg/dL (2023 08:01)  POCT Blood Glucose.: 146 mg/dL (2023 21:05)      Urinalysis Basic - ( 2023 10:13 )    Color: Yellow / Appearance: Turbid / S.045 / pH: x  Gluc: x / Ketone: Trace  / Bili: Negative / Urobili: <2 mg/dL   Blood: x / Protein: 100 mg/dL / Nitrite: Positive   Leuk Esterase: Large / RBC: 116 /HPF / WBC >720 /HPF   Sq Epi: x / Non Sq Epi: x / Bacteria: Few        Culture - Blood (collected 2023 11:59)  Source: .Blood None  Preliminary Report (2023 23:01):    No growth to date.    Culture - Urine (collected 2023 10:13)  Source: Clean Catch Clean Catch (Midstream)  Preliminary Report (2023 14:56):    50,000 - 99,000 CFU/mL Pseudomonas aeruginosa    Culture - Urine (collected 2023 01:20)  Source: Clean Catch Clean Catch (Midstream)  Preliminary Report (2023 12:39):    >100,000 CFU/ml Pseudomonas aeruginosa      COVID-19 PCR: NotDetec (23 @ 10:59)      RADIOLOGY & ADDITIONAL TESTS:    Care Discussed with Consultants/Other Providers: 
Patient is a 82y old  Female who presents with a chief complaint of decreased ostomy (23 Apr 2023 16:33)      Patient seen and examined at bedside.    ALLERGIES:  No Known Allergies    MEDICATIONS:  acetaminophen     Tablet .. 650 milliGRAM(s) Oral every 6 hours PRN  atorvastatin 20 milliGRAM(s) Oral at bedtime  dextrose 5%. 1000 milliLiter(s) IV Continuous <Continuous>  dextrose 5%. 1000 milliLiter(s) IV Continuous <Continuous>  dextrose 50% Injectable 25 Gram(s) IV Push once  dextrose 50% Injectable 12.5 Gram(s) IV Push once  dextrose 50% Injectable 25 Gram(s) IV Push once  dextrose Oral Gel 15 Gram(s) Oral once PRN  enoxaparin Injectable 40 milliGRAM(s) SubCutaneous every 24 hours  glucagon  Injectable 1 milliGRAM(s) IntraMuscular once  insulin lispro (ADMELOG) corrective regimen sliding scale   SubCutaneous three times a day before meals  lactated ringers. 1000 milliLiter(s) IV Continuous <Continuous>  lactulose Syrup 30 Gram(s) Oral every 6 hours  lactulose Syrup 30 Gram(s) Oral once  pantoprazole    Tablet 40 milliGRAM(s) Oral before breakfast  polyethylene glycol 3350 17 Gram(s) Oral daily  senna 2 Tablet(s) Oral at bedtime  tamsulosin 0.4 milliGRAM(s) Oral at bedtime    Vital Signs Last 24 Hrs  T(F): 96.6 (24 Apr 2023 16:02), Max: 98.2 (24 Apr 2023 00:02)  HR: 87 (24 Apr 2023 16:02) (87 - 92)  BP: 109/53 (24 Apr 2023 16:02) (106/60 - 114/56)  RR: 18 (24 Apr 2023 16:02) (18 - 19)  SpO2: --  I&O's Summary    23 Apr 2023 07:01  -  24 Apr 2023 07:00  --------------------------------------------------------  IN: 0 mL / OUT: 1100 mL / NET: -1100 mL        PHYSICAL EXAM:  General: NAD, A/O x 3  ENT: MMM  Neck: Supple, No JVD  Lungs: Clear to auscultation bilaterally  Cardio: RRR, S1/S2, No murmurs  Abdomen: Soft, Nontender, Nondistended; poor colostomy output   Extremities: No cyanosis, No edema    LABS:                        10.8   7.50  )-----------( 287      ( 24 Apr 2023 08:36 )             33.9     04-24    139  |  103  |  8   ----------------------------<  131  3.9   |  24  |  0.5    Ca    9.3      24 Apr 2023 08:36  Mg     2.0     04-24    TPro  5.8  /  Alb  3.4  /  TBili  0.5  /  DBili  x   /  AST  10  /  ALT  6   /  AlkPhos  91  04-24                            POCT Blood Glucose.: 121 mg/dL (24 Apr 2023 16:49)  POCT Blood Glucose.: 171 mg/dL (24 Apr 2023 11:16)  POCT Blood Glucose.: 142 mg/dL (24 Apr 2023 07:42)  POCT Blood Glucose.: 139 mg/dL (23 Apr 2023 22:04)          Culture - Blood (collected 21 Apr 2023 11:59)  Source: .Blood None  Preliminary Report (22 Apr 2023 23:01):    No growth to date.    Culture - Urine (collected 21 Apr 2023 10:13)  Source: Clean Catch Clean Catch (Midstream)  Preliminary Report (23 Apr 2023 22:56):    50,000 - 99,000 CFU/mL Pseudomonas aeruginosa    50,000 - 99,000 CFU/mL Enterococcus faecium  Organism: Pseudomonas aeruginosa (24 Apr 2023 11:01)  Organism: Pseudomonas aeruginosa (24 Apr 2023 11:01)      Method Type: MARK      -  Amikacin: S <=16      -  Aztreonam: S <=4      -  Cefepime: S <=2      -  Ceftazidime: S 4      -  Ciprofloxacin: S <=0.25      -  Gentamicin: S 4      -  Imipenem: S <=1      -  Levofloxacin: S <=0.5      -  Meropenem: S <=1      -  Piperacillin/Tazobactam: S <=8      -  Tobramycin: S <=2    Culture - Urine (collected 21 Apr 2023 01:20)  Source: Clean Catch Clean Catch (Midstream)  Preliminary Report (23 Apr 2023 12:39):    >100,000 CFU/ml Pseudomonas aeruginosa  Organism: Pseudomonas aeruginosa (24 Apr 2023 14:02)  Organism: Pseudomonas aeruginosa (24 Apr 2023 14:02)      Method Type: MARK      -  Amikacin: S <=16      -  Aztreonam: S <=4      -  Cefepime: S <=2      -  Ceftazidime: S 4      -  Ciprofloxacin: S <=0.25      -  Gentamicin: S 4      -  Imipenem: S 2      -  Levofloxacin: S <=0.5      -  Meropenem: S <=1      -  Piperacillin/Tazobactam: S <=8      -  Tobramycin: S <=2      COVID-19 PCR: NotDetec (04-23-23 @ 10:59)      RADIOLOGY & ADDITIONAL TESTS:  < from: Xray Kidney Ureter Bladder (04.23.23 @ 11:04) >  IMPRESSION:    Nonobstructive bowel gas pattern.    < end of copied text >    Care Discussed with Consultants/Other Providers: 
DELORIS NORRIS 82y Female  MRN#: [id number]   Hospital Day: 2d    SUBJECTIVE  Patient is a 82y old Female who presents with a chief complaint of decreased ostomy output (2023 19:10)  Currently admitted to medicine with the primary diagnosis of Urinary retention      INTERVAL HPI AND OVERNIGHT EVENTS:  Patient was examined and seen at bedside. This morning she is resting comfortably in bed and reports no issues or overnight events.    OBJECTIVE  PAST MEDICAL & SURGICAL HISTORY  HTN (hypertension)    Diabetes    Hypercholesteremia    History of       ALLERGIES:  No Known Allergies    MEDICATIONS:  STANDING MEDICATIONS  atorvastatin 20 milliGRAM(s) Oral at bedtime  dextrose 5%. 1000 milliLiter(s) IV Continuous <Continuous>  dextrose 5%. 1000 milliLiter(s) IV Continuous <Continuous>  dextrose 50% Injectable 25 Gram(s) IV Push once  dextrose 50% Injectable 12.5 Gram(s) IV Push once  dextrose 50% Injectable 25 Gram(s) IV Push once  enoxaparin Injectable 40 milliGRAM(s) SubCutaneous every 24 hours  glucagon  Injectable 1 milliGRAM(s) IntraMuscular once  insulin lispro (ADMELOG) corrective regimen sliding scale   SubCutaneous three times a day before meals  lactated ringers. 1000 milliLiter(s) IV Continuous <Continuous>  levoFLOXacin IVPB      pantoprazole    Tablet 40 milliGRAM(s) Oral before breakfast  sodium chloride 0.9%. 1000 milliLiter(s) IV Continuous <Continuous>  tamsulosin 0.4 milliGRAM(s) Oral at bedtime    PRN MEDICATIONS  dextrose Oral Gel 15 Gram(s) Oral once PRN      VITAL SIGNS: Last 24 Hours  T(C): 36.1 (2023 08:59), Max: 36.8 (2023 16:19)  T(F): 97 (2023 08:59), Max: 98.3 (2023 18:55)  HR: 81 (2023 08:59) (78 - 82)  BP: 89/53 (2023 08:59) (89/53 - 106/55)  BP(mean): --  RR: 18 (2023 08:59) (18 - 18)  SpO2: 97% (2023 16:19) (97% - 97%)    LABS:                        10.7   9.68  )-----------( 244      ( 2023 06:32 )             32.8     04-22    137  |  101  |  16  ----------------------------<  162<H>  4.2   |  23  |  0.7    Ca    9.0      2023 06:32  Mg     2.0     -    TPro  5.7<L>  /  Alb  3.5  /  TBili  0.8  /  DBili  x   /  AST  13  /  ALT  7   /  AlkPhos  89  04-22    PT/INR - ( 2023 13:50 )   PT: 13.20 sec;   INR: 1.15 ratio         PTT - ( 2023 13:50 )  PTT:28.7 sec  Urinalysis Basic - ( 2023 10:13 )    Color: Yellow / Appearance: Turbid / S.045 / pH: x  Gluc: x / Ketone: Trace  / Bili: Negative / Urobili: <2 mg/dL   Blood: x / Protein: 100 mg/dL / Nitrite: Positive   Leuk Esterase: Large / RBC: 116 /HPF / WBC >720 /HPF   Sq Epi: x / Non Sq Epi: x / Bacteria: Few                RADIOLOGY:    ROS+ve for paraumbilical pain    PHYSICAL EXAM:  Gen: NAD  HEENT: PERRL, EOMI, mouth clr, nose clr  Neck: no nodes, no JVD, thyroid nl  lungs: clr  hrt: s1 s2 rrr no murmur  abd: tender on palpation in L paraumbilical region  ext: no edema, no c/c  neuro: aa ox3, cn intact, can move all 4 ext  
DELORIS NORRIS 82y Female  MRN#: [id number]   Hospital Day: 5d    SUBJECTIVE  Patient is a 82y old Female who presents with a chief complaint of decreased ostomy (2023 16:33)  Currently admitted to medicine with the primary diagnosis of Acute UTI      INTERVAL HPI AND OVERNIGHT EVENTS:  Patient was examined and seen at bedside. This morning she is resting comfortably in bed and reports no issues or overnight events.    REVIEW OF SYMPTOMS:  Denies all.     OBJECTIVE  PAST MEDICAL & SURGICAL HISTORY  HTN (hypertension)    Diabetes    Hypercholesteremia    History of       ALLERGIES:  No Known Allergies    MEDICATIONS:  STANDING MEDICATIONS  atorvastatin 20 milliGRAM(s) Oral at bedtime  dextrose 5%. 1000 milliLiter(s) IV Continuous <Continuous>  dextrose 5%. 1000 milliLiter(s) IV Continuous <Continuous>  dextrose 50% Injectable 25 Gram(s) IV Push once  dextrose 50% Injectable 12.5 Gram(s) IV Push once  dextrose 50% Injectable 25 Gram(s) IV Push once  enoxaparin Injectable 40 milliGRAM(s) SubCutaneous every 24 hours  glucagon  Injectable 1 milliGRAM(s) IntraMuscular once  insulin lispro (ADMELOG) corrective regimen sliding scale   SubCutaneous three times a day before meals  lactated ringers. 1000 milliLiter(s) IV Continuous <Continuous>  pantoprazole    Tablet 40 milliGRAM(s) Oral before breakfast  polyethylene glycol 3350 17 Gram(s) Oral daily  senna 2 Tablet(s) Oral at bedtime  tamsulosin 0.4 milliGRAM(s) Oral at bedtime    PRN MEDICATIONS  acetaminophen     Tablet .. 650 milliGRAM(s) Oral every 6 hours PRN  dextrose Oral Gel 15 Gram(s) Oral once PRN      VITAL SIGNS: Last 24 Hours  T(C): 36.2 (2023 08:00), Max: 36.9 (2023 15:32)  T(F): 97.1 (2023 08:00), Max: 98.4 (2023 15:32)  HR: 88 (2023 08:00) (88 - 92)  BP: 114/56 (2023 08:00) (104/54 - 114/56)  BP(mean): --  RR: 19 (2023 08:00) (18 - 19)  SpO2: --    LABS:                        10.6   7.93  )-----------( 227      ( 2023 06:59 )             31.9     04-23    134<L>  |  99  |  11  ----------------------------<  137<H>  4.0   |  22  |  0.5<L>    Ca    8.8      2023 06:59  Mg     1.6     -23    TPro  5.4<L>  /  Alb  3.3<L>  /  TBili  0.7  /  DBili  x   /  AST  13  /  ALT  7   /  AlkPhos  86  0423      Culture - Blood (collected 2023 11:59)  Source: .Blood None  Preliminary Report (2023 23:01):    No growth to date.    Culture - Urine (collected 2023 10:13)  Source: Clean Catch Clean Catch (Midstream)  Preliminary Report (2023 22:56):    50,000 - 99,000 CFU/mL Pseudomonas aeruginosa    50,000 - 99,000 CFU/mL Enterococcus faecium      PHYSICAL EXAM:  CONSTITUTIONAL: No acute distress, well-developed, well-groomed, AAOx3  HEAD: Atraumatic, normocephalic  EYES: EOM intact, PERRLA, conjunctiva and sclera clear  ENT: Supple, no masses, no thyromegaly, no bruits, no JVD; moist mucous membranes  PULMONARY: Clear to auscultation bilaterally; no wheezes, rales, or rhonchi  CARDIOVASCULAR: Regular rate and rhythm; no murmurs, rubs, or gallops  GASTROINTESTINAL: Soft, non-tender, non-distended; bowel sounds present  MUSCULOSKELETAL: 2+ peripheral pulses; no clubbing, no cyanosis, no edema  NEUROLOGY: non-focal  SKIN: No rashes or lesions; warm and dry    ASSESSMENT & PLAN:  82-year-old female with past medical history of hypertension, diabetes, hyperlipidemia, pancreatic adenocarcinoma status post transverse loop colostomy, right hydronephrosis s/p ureteral stent removed presents to the emergency department for concern for bowel obstruction. According to family and patient has not had stool in her colostomy since yesterday and normally she changes the bag twice a day. She has had decreased p.o. intake as she was not eating well. Pt went to her urologist this morning to have her smith removed. Since then she has not urinated. Denies any other complaints  Colostomy bag changed in ED with appropriated output thereafter    #UTI  #Urinary retention: resolved  - UA: large LE    many bact     wbc 720  - prev UCx susceptible to levofloxacin  - levofloxacin x 2d  - fu UCx -> Pending sensitivities   -  onboard    Plan:   - c/w flomax  - OP f/u with Urology Dr. Fry on discharge    #Decreased Ostomy output  - Patient having dc output   - CT A/P shows no evidence of SBO and nonspecific dilation and stranding around appendix  - No intervention as per surgery    Plan:  - Gave multiple doses of lactulose yesterday, now having continuous output  - Okay to DC     #Hypertension  - c/w HCTZ 12.5mg daily    #HLD  - c/w lipitor 20mg daily    #DM  - takes metformin 500mg BID  - c/w sliding scale insulin here    #Pancreatic adenocarcinoma   - status post transverse loop colostomy      #Misc  - DVT Prophylaxis: Lovenox   - GI Prophylaxis: Protonix  - Diet: DASH  - Activity: As tolerated   - IV Fluids: LR   - Code Status: Full    Dispo: DC today 
  Patient is a 82y old  Female who presents with a chief complaint of   HPI:  82-year-old female with past medical history of hypertension, diabetes, hyperlipidemia, pancreatic adenocarcinoma status post transverse loop colostomy, right hydronephrosis s/p ureteral stent removed presents to the emergency department for concern for bowel obstruction. According to family and patient has not had stool in her colostomy since yesterday and normally she changes the bag twice a day. She has had decreased p.o. intake as she was not eating well. Pt went to her urologist this morning to have her smith removed. Since then she has not urinated. Denies any other complaints    In ED, her vitals  are stable  Labs show WBC 15K, Cr 0.9 (bl 0.6). GFR 65 (from 91)  CT A/P shows no evidence of SBO and nonspecific dilation and stranding around appendix  seen by urology in ED  Admitted to medicine   (2023 00:19)    PAST MEDICAL & SURGICAL HISTORY:  HTN (hypertension)      Diabetes      Hypercholesteremia      History of         Vital Signs Last 24 Hrs  T(C): 36.8 (2023 18:55), Max: 36.9 (2023 23:40)  T(F): 98.3 (2023 18:55), Max: 98.5 (2023 23:40)  HR: 82 (2023 18:55) (82 - 96)  BP: 100/50 (2023 18:55) (90/55 - 106/55)  BP(mean): 73 (2023 23:40) (73 - 73)  RR: 18 (2023 18:55) (16 - 18)  SpO2: 97% (2023 16:19) (96% - 97%)    Parameters below as of 2023 16:19  Patient On (Oxygen Delivery Method): room air                            10.2   10.69 )-----------( 260      ( 2023 07:51 )             30.8     04-21    134<L>  |  98  |  22<H>  ----------------------------<  150<H>  3.4<L>   |  23  |  0.7    Ca    9.5      2023 07:51  Mg     1.7         TPro  6.0  /  Alb  3.7  /  TBili  0.8  /  DBili  x   /  AST  12  /  ALT  7   /  AlkPhos  76          Urinalysis Basic - ( 2023 10:13 )    Color: Yellow / Appearance: Turbid / S.045 / pH: x  Gluc: x / Ketone: Trace  / Bili: Negative / Urobili: <2 mg/dL   Blood: x / Protein: 100 mg/dL / Nitrite: Positive   Leuk Esterase: Large / RBC: 116 /HPF / WBC >720 /HPF   Sq Epi: x / Non Sq Epi: x / Bacteria: Few          MEDICATIONS  (STANDING):  atorvastatin 20 milliGRAM(s) Oral at bedtime  dextrose 5%. 1000 milliLiter(s) (50 mL/Hr) IV Continuous <Continuous>  dextrose 5%. 1000 milliLiter(s) (100 mL/Hr) IV Continuous <Continuous>  dextrose 50% Injectable 25 Gram(s) IV Push once  dextrose 50% Injectable 12.5 Gram(s) IV Push once  dextrose 50% Injectable 25 Gram(s) IV Push once  enoxaparin Injectable 40 milliGRAM(s) SubCutaneous every 24 hours  glucagon  Injectable 1 milliGRAM(s) IntraMuscular once  insulin lispro (ADMELOG) corrective regimen sliding scale   SubCutaneous three times a day before meals  pantoprazole    Tablet 40 milliGRAM(s) Oral before breakfast  piperacillin/tazobactam IVPB.. 3.375 Gram(s) IV Intermittent every 8 hours  sodium chloride 0.9%. 1000 milliLiter(s) (75 mL/Hr) IV Continuous <Continuous>  tamsulosin 0.4 milliGRAM(s) Oral at bedtime  
Patient is a 82y old  Female who presents with a chief complaint of decreased ostomy output (2023 19:10)      Patient seen and examined at bedside.    ALLERGIES:  No Known Allergies    MEDICATIONS:  acetaminophen     Tablet .. 650 milliGRAM(s) Oral every 6 hours PRN  atorvastatin 20 milliGRAM(s) Oral at bedtime  dextrose 5%. 1000 milliLiter(s) IV Continuous <Continuous>  dextrose 5%. 1000 milliLiter(s) IV Continuous <Continuous>  dextrose 50% Injectable 25 Gram(s) IV Push once  dextrose 50% Injectable 12.5 Gram(s) IV Push once  dextrose 50% Injectable 25 Gram(s) IV Push once  dextrose Oral Gel 15 Gram(s) Oral once PRN  enoxaparin Injectable 40 milliGRAM(s) SubCutaneous every 24 hours  glucagon  Injectable 1 milliGRAM(s) IntraMuscular once  insulin lispro (ADMELOG) corrective regimen sliding scale   SubCutaneous three times a day before meals  lactated ringers. 1000 milliLiter(s) IV Continuous <Continuous>  levoFLOXacin IVPB      levoFLOXacin IVPB 500 milliGRAM(s) IV Intermittent every 24 hours  pantoprazole    Tablet 40 milliGRAM(s) Oral before breakfast  sodium chloride 0.9%. 1000 milliLiter(s) IV Continuous <Continuous>  tamsulosin 0.4 milliGRAM(s) Oral at bedtime    Vital Signs Last 24 Hrs  T(F): 98.4 (2023 15:32), Max: 98.4 (2023 01:12)  HR: 92 (2023 15:32) (89 - 92)  BP: 104/54 (2023 15:32) (104/54 - 112/55)  RR: 18 (2023 15:32) (18 - 18)  SpO2: --  I&O's Summary    2023 07:01  -  2023 07:00  --------------------------------------------------------  IN: 0 mL / OUT: 650 mL / NET: -650 mL        PHYSICAL EXAM:  General: NAD, A/O x 3  ENT: MMM  Neck: Supple, No JVD  Lungs: Clear to auscultation bilaterally  Cardio: RRR, S1/S2, No murmurs  Abdomen: Soft, Nontender, Nondistended; ostomy with good output   Extremities: No cyanosis, No edema    LABS:                        10.6   7.93  )-----------( 227      ( 2023 06:59 )             31.9         134  |  99  |  11  ----------------------------<  137  4.0   |  22  |  0.5    Ca    8.8      2023 06:59  Mg     1.6         TPro  5.4  /  Alb  3.3  /  TBili  0.7  /  DBili  x   /  AST  13  /  ALT  7   /  AlkPhos  86                              POCT Blood Glucose.: 126 mg/dL (2023 12:20)  POCT Blood Glucose.: 151 mg/dL (2023 08:01)  POCT Blood Glucose.: 146 mg/dL (2023 21:05)  POCT Blood Glucose.: 118 mg/dL (2023 16:34)      Urinalysis Basic - ( 2023 10:13 )    Color: Yellow / Appearance: Turbid / S.045 / pH: x  Gluc: x / Ketone: Trace  / Bili: Negative / Urobili: <2 mg/dL   Blood: x / Protein: 100 mg/dL / Nitrite: Positive   Leuk Esterase: Large / RBC: 116 /HPF / WBC >720 /HPF   Sq Epi: x / Non Sq Epi: x / Bacteria: Few        Culture - Blood (collected 2023 11:59)  Source: .Blood None  Preliminary Report (2023 23:01):    No growth to date.    Culture - Urine (collected 2023 10:13)  Source: Clean Catch Clean Catch (Midstream)  Preliminary Report (2023 14:56):    50,000 - 99,000 CFU/mL Pseudomonas aeruginosa    Culture - Urine (collected 2023 01:20)  Source: Clean Catch Clean Catch (Midstream)  Preliminary Report (2023 12:39):    >100,000 CFU/ml Pseudomonas aeruginosa      COVID-19 PCR: NotDetec (23 @ 10:59)      RADIOLOGY & ADDITIONAL TESTS:  < from: CT Abdomen and Pelvis w/ Oral Cont and w/ IV Cont (23 @ 17:14) >    IMPRESSION:  No evidence of small bowel obstruction. Oral contrast reaches the   colostomy.    Nonspecific wall enhancement of the right ureter, new.    Nonspecific mild dilatation and stranding surrounding the distal   appendix. Oral contrast is not seen within the appendix    < end of copied text >    Care Discussed with Consultants/Other Providers:

## 2023-05-01 NOTE — HISTORY OF PRESENT ILLNESS
[de-identified] : Patient is seen today  for unscheduled visit at the request of Dr MURPHY for newly diagnosed pancreatic cancer, 82 year old female former smoker with history of diabetes mellitus . She  presented with lower abdominal pain and constipation associated with weakness and weight loss for few months . \par In October 2022 Colonoscopy revealed a serrated adenoma in the rectum and a tubular adenoma . No malignancy was noted. She was apparently treated for suspected diverticulitis , CT abd/ pelvis on 1/16/23 revealed findings suspicious for pancreatic tumor 3.6 cm encasing splenic artery and abutting hepatic artery .and right pelvic pericolonic collection 4 cm containing gas , contiguous with sigmoid colon and with mild to moderate right hydronephrosis .\par EUS with biopsy showed adenocarcinoma , CA19.9 : 12,795\par PET scan : pancreatic mass with SUV : 7.6 \par                  right pericolonic soft tissue density with SUV : 15 \par                  left basillar lung nodular density 2.7 cm , SUV : 3.o and 7 mm left basillar pleural based nodule .\par She has received at least 2 courses of Po antibiotics , most recent with Augmentin \par System review : She complains of constipation , lower abdominal pain , constant , no fever or chills  ,occasional painful urination . she lost 10 lbs , she denies nausea or vomiting , itching . \par Social History : former smoker , lives alone , was capable of all ADLs until recently , she has good support network . [de-identified] : 3/22/2023 Patient returns for follow  up after transverse loop colostomy , bilateral ureteral stent placement for obstructing pericolonic mass , she is currently in rehab , she complains of weakness, decreased appetite , she is essentially chair or bedbound most of the waking hours , She has lost 15 lbs in one month . \par She has also smith catheter .She denies fever , chills or pain . No SOB at rest . Recent blood work shows mild anemia and leukocytosis , lytes are normal . \par \par 4/28/2023 Today's encounter was conducted by phone at the family's request . Since her last visit  , she was readmitted briefly last week for suspected bowel obstruction ( decreased output ) , relieved with laxatives . After discharge , she has 24 hours home care , she spends > 50 % of waking hours on the couch , she uses a walker , requires assistance with most of ADLs including personal hygiene. Her po intake is still poor despite THC , there is no pain or fever.

## 2023-05-01 NOTE — ASSESSMENT
[FreeTextEntry1] : 82 year old female with newly diagnosed pancreatic cancer . \par Pancreatic cancer with suspected lung metastasis, Pelvic mass likely metastatic . \par S/P colostomy and ureteral stents for obstructing pericolonic mass.\par ECOG :3  , poor functional status . \par Plan : continue home PT , medical marijuana for appetite enhancement .\par          follow up in 2 weeks ( in person ? ) \par          All questions and family concerns addressed.

## 2023-05-05 NOTE — CDI QUERY NOTE - NSCDIOTHERTXTBX_GEN_ALL_CORE_HH
Query:                                                                                   Based on your clinical judgment and consideration of the below clinical indicators, please clarify if UTI can be further specified as:  • Urinary tract infection is likely associated with chronic Smith catheter.  • Urinary tract infection is unrelated to chronic Smith catheter.	  • Other (please specify).  • Unable to further specify UTI.                                          ===========================================    Documentation:  ** 4/20 Chart Note:      Patient was seen in the office by Dr. Fry for TOV, catheter was removed at 8am. Patient was informed to return to office if patient has not voided by 3pm.      Plan:        - Consider extended TOV, if PVR >350 then please replace smith catheter        - Obtain UA and UCx     ** 4/21 PN Attending:      HPI: presents to the emergency department for concern for bowel obstruction. ......... . Pt went to her urologist this morning to have her smith removed. Since then she has not urinated.     Assessment:       #Sepsis  Present on  admission 2/2 UTI       #chronic smith recently removed as outpatient--urinary retention initially but has now resolved       -+UA--f/u urine cx    Lab:  ** Urine Microscopic-Add On (NC) (04.21.23 @ 01:20)        Bacteria: Many        White Blood Cell - Urine: >720 /HPF        Red Blood Cell - Urine: 241 /HPF  ** Urinalysis (04.21.23 @ 10:13)       pH Urine: 6.5       Blood, Urine: Large       Color: Yellow       Urine Appearance: Turbid       Nitrite: Positive       Leukocyte Esterase Concentration: Large  ** Culture - Urine (04.21.23 @ 01:20)       Culture Results:         >100,000 CFU/ml Pseudomonas aeruginosa        >100,000 CFU/ml Enterococcus faecium (vancomycin resistant)     Orders:  - 4/20: Zosyn 3.375 gm IV one. Indication: infection  - 4/21-22: Zosyn 3.375 gm IV / 8 Hrs. Indication: intra-abdominal infection  - 4/22-23: Levaquin 500 mg IV / 24 Hrs. Indication: UTI

## 2023-05-16 NOTE — CHART NOTE - NSCHARTNOTEFT_GEN_A_CORE
CDI addendum:  admitted with UTI present on admission 2/2 chronic smith (which was removed day prior to admission with subsequent urinary retention after smith removed that resolved during admission)

## 2023-05-25 NOTE — H&P PST ADULT - HEIGHT IN FEET
Patient's insurance will not cover Buspirone without patient first having a documented trial and failure of at least 2 of the following medications:    Hydroxyzine, escitalopram, venlafaxine, duloxetine, lorazepam    Please advise. When changing to a preferred medication send a new rx to patient's pharmacy and update this encounter, thank you.     5

## 2023-05-25 NOTE — H&P PST ADULT - NSICDXPASTMEDICALHX_GEN_ALL_CORE_FT
PAST MEDICAL HISTORY:  Colostomy status     Diabetes     HTN (hypertension)     Hydronephrosis     Hypercholesteremia     Pancreatic cancer

## 2023-05-25 NOTE — H&P PST ADULT - REASON FOR ADMISSION
82 /o female presents to PAST in preparation for cystoscopy, right retrograde ureteropyelogram and stent exchange in [facility] under [place] with Dr. Fry on 6/14/23

## 2023-05-25 NOTE — H&P PST ADULT - HISTORY OF PRESENT ILLNESS
82 /o female presents to PAST in preparation for cystoscopy, right retrograde ureteropyelogram and stent exchange in [facility] under [place] with Dr. Fry on 23    Pt with recent dx of pancreatic cancer, with pancreatic mass that was noted on ct scan. Pt had a colon blockage, that required an ostomy at that time. Pt had urinary retention and had a urinary stent placed. PT now for above procedure for further dx and tx.  Pt with recent UTI was placed on cipro which she is currently on for a pressures ulcer on sacral area. Pt has completed 1 week of cipro already.  PATIENT CURRENTLY DENIES CHEST PAIN  SHORTNESS OF BREATH  PALPITATIONS,  DYSURIA, OR UPPER RESPIRATORY INFECTION IN PAST 2 WEEKS  EXERCISE  TOLERANCE: pt walks with walker short distances of 50 ft.   pt denies any covid s/s, or tested positive in the past  pt advised self quarantine till day of procedure  Patient verbalized understanding of instructions and was given the opportunity to ask questions and have them answered.  As per patient, this is their complete medical and surgical history, including medications both prescribed or over the counter.  written and verbal instructions with teach back on chlorhexidine shampoo provided,  pt verbalized understanding with returned demonstration    Anesthesia Alert  NO--Difficult Airway  NO--History of neck surgery or radiation  NO--Limited ROM of neck  NO--History of Malignant hyperthermia  NO--Personal or family history of Pseudocholinesterase deficiency.  NO--Prior Anesthesia Complication  NO--Latex Allergy  NO--Loose teeth  NO--History of Rheumatoid Arthritis  NO--REJI  NO--Bleeding risk  NO--Other_____  Mallampati airway: Class II  [place]- hearing aids b/l    Hydronephrosis    Encounter for other preprocedural examination    Diverticulitis of intestine, part unspecified, without perforation or abscess without bleeding    Diverticulosis of large intestine without perforation or abscess with bleeding    Diverticulosis of large intestine without perforation or abscess without bleeding    Encounter for other general examination    Encounter for screening mammogram for malignant neoplasm of breast    Essential (primary) hypertension    Long term (current) use of oral hypoglycemic drugs    Malignant neoplasm of body of pancreas    Malignant neoplasm of pancreas, unspecified    Other specified diseases of pancreas    Pure hypercholesterolemia, unspecified    Type 2 diabetes mellitus without complications    Unspecified chronic gastritis without bleeding    History of Tobacco Use    Sex Assigned At Birth    Unsp intestnl obst, unsp as to partial versus complete obst    HTN (hypertension)    Diabetes    Hypercholesteremia    History of     [address]29    SysAdmin_VstLnk     82 /o female presents to PAST in preparation for cystoscopy, right retrograde ureteropyelogram and stent exchange in [facility] under [place] with Dr. Fry on 23    Pt with recent dx of pancreatic cancer, with pancreatic mass that was noted on ct scan. Pt had a colon blockage, that required an ostomy at that time. Pt had urinary retention and had a urinary stent placed. PT now for above procedure for further dx and tx.  Pt with recent UTI was placed on cipro which she is currently on for a pressures ulcer on sacral area. Pt has completed 1 week of cipro already.  PATIENT CURRENTLY DENIES CHEST PAIN  SHORTNESS OF BREATH  PALPITATIONS,  DYSURIA, OR UPPER RESPIRATORY INFECTION IN PAST 2 WEEKS  EXERCISE  TOLERANCE: pt walks with walker short distances of 50 ft.   pt denies any covid s/s, or tested positive in the past  pt advised self quarantine till day of procedure  Patient verbalized understanding of instructions and was given the opportunity to ask questions and have them answered.  As per patient, this is their complete medical and surgical history, including medications both prescribed or over the counter.  written and verbal instructions with teach back on chlorhexidine shampoo provided,  pt verbalized understanding with returned demonstration    Anesthesia Alert  NO--Difficult Airway  NO--History of neck surgery or radiation  NO--Limited ROM of neck  NO--History of Malignant hyperthermia  NO--Personal or family history of Pseudocholinesterase deficiency.  NO--Prior Anesthesia Complication  NO--Latex Allergy  NO--Loose teeth  NO--History of Rheumatoid Arthritis  NO--REJI  NO--Bleeding risk  NO--Other_____  Mallampati airway: Class II  [place]- hearing aids b/l    Hydronephrosis    Encounter for other preprocedural examination    Diverticulitis of intestine, part unspecified, without perforation or abscess without bleeding    Diverticulosis of large intestine without perforation or abscess with bleeding    Diverticulosis of large intestine without perforation or abscess without bleeding    Encounter for other general examination    Encounter for screening mammogram for malignant neoplasm of breast    Essential (primary) hypertension    Long term (current) use of oral hypoglycemic drugs    Malignant neoplasm of body of pancreas    Malignant neoplasm of pancreas, unspecified    Other specified diseases of pancreas    Pure hypercholesterolemia, unspecified    Type 2 diabetes mellitus without complications    Unspecified chronic gastritis without bleeding    History of Tobacco Use    Sex Assigned At Birth    Unsp intestnl obst, unsp as to partial versus complete obst    HTN (hypertension)    Diabetes    Hypercholesteremia    History of     30851    SysAdmin_VstLnk

## 2023-05-25 NOTE — H&P PST ADULT - NSICDXPASTSURGICALHX_GEN_ALL_CORE_FT
PAST SURGICAL HISTORY:  H/O carpal tunnel repair     History of      History of left hip replacement

## 2023-06-01 PROBLEM — C25.1 MALIGNANT NEOPLASM OF BODY OF PANCREAS: Status: ACTIVE | Noted: 2023-01-01

## 2023-06-02 PROBLEM — C25.9 MALIGNANT NEOPLASM OF PANCREAS, UNSPECIFIED: Chronic | Status: ACTIVE | Noted: 2023-01-01

## 2023-06-02 PROBLEM — N13.30 UNSPECIFIED HYDRONEPHROSIS: Chronic | Status: ACTIVE | Noted: 2023-01-01

## 2023-06-03 NOTE — HISTORY OF PRESENT ILLNESS
[de-identified] : Patient is seen today  for unscheduled visit at the request of Dr MURPHY for newly diagnosed pancreatic cancer, 82 year old female former smoker with history of diabetes mellitus . She  presented with lower abdominal pain and constipation associated with weakness and weight loss for few months . \par In October 2022 Colonoscopy revealed a serrated adenoma in the rectum and a tubular adenoma . No malignancy was noted. She was apparently treated for suspected diverticulitis , CT abd/ pelvis on 1/16/23 revealed findings suspicious for pancreatic tumor 3.6 cm encasing splenic artery and abutting hepatic artery .and right pelvic pericolonic collection 4 cm containing gas , contiguous with sigmoid colon and with mild to moderate right hydronephrosis .\par EUS with biopsy showed adenocarcinoma , CA19.9 : 12,795\par PET scan : pancreatic mass with SUV : 7.6 \par                  right pericolonic soft tissue density with SUV : 15 \par                  left basillar lung nodular density 2.7 cm , SUV : 3.o and 7 mm left basillar pleural based nodule .\par She has received at least 2 courses of Po antibiotics , most recent with Augmentin \par System review : She complains of constipation , lower abdominal pain , constant , no fever or chills  ,occasional painful urination . she lost 10 lbs , she denies nausea or vomiting , itching . \par Social History : former smoker , lives alone , was capable of all ADLs until recently , she has good support network . [de-identified] : 3/22/2023 Patient returns for follow  up after transverse loop colostomy , bilateral ureteral stent placement for obstructing pericolonic mass , she is currently in rehab , she complains of weakness, decreased appetite , she is essentially chair or bedbound most of the waking hours , She has lost 15 lbs in one month . \par She has also smith catheter .She denies fever , chills or pain . No SOB at rest . Recent blood work shows mild anemia and leukocytosis , lytes are normal . \par \par 4/28/2023 Today's encounter was conducted by phone at the family's request . Since her last visit  , she was readmitted briefly last week for suspected bowel obstruction ( decreased output ) , relieved with laxatives . After discharge , she has 24 hours home care , she spends > 50 % of waking hours on the couch , she uses a walker , requires assistance with most of ADLs including personal hygiene. Her po intake is still poor despite THC , there is no pain or fever. \par \par 6/1/2023 Patient returns for follow up , as per her son , she is slightly more active and with more energy . She can ambulate with walker however she continues to require assistance with most of her ADLs, she is unable to regain weight , on a positive note , there is no fever or abdominal pain .

## 2023-06-03 NOTE — PHYSICAL EXAM
[Normal] : normal spine exam without palpable tenderness, no kyphosis or scoliosis [de-identified] : chronically ill , emaciated , pale in no acute distress.  [de-identified] : frequent ectopies.  [de-identified] : soft with functioning colostomy . multiple palpable nodules around colostomy site.

## 2023-06-03 NOTE — ASSESSMENT
[FreeTextEntry1] : 82 year old female with newly diagnosed pancreatic cancer . \par Pancreatic cancer with suspected lung metastasis, Pelvic mass likely metastatic . \par S/P colostomy and ureteral stents for obstructing pericolonic mass.\par ECOG :3  , poor functional status . \par \par Patient and son are requesting a trial of single agent chemotherapy ( Gemcitabine ) with dose reduction .\par will need to repeat CT scan . recent blood work reviewed. \par \par Plan : medical marijuana for appetite enhancement given ,.\par          Home PT \par          For ureteral stent exchange . \par          tissue for NGS . \par          All questions and family concerns addressed.

## 2023-06-05 PROBLEM — N39.0 UTI (URINARY TRACT INFECTION), BACTERIAL: Status: ACTIVE | Noted: 2023-01-01

## 2023-06-08 PROBLEM — N13.30 HYDRONEPHROSIS: Status: ACTIVE | Noted: 2023-01-01

## 2023-06-08 PROBLEM — R33.9 URINARY RETENTION: Status: ACTIVE | Noted: 2023-01-01

## 2023-06-08 PROBLEM — Z93.3 COLOSTOMY STATUS: Chronic | Status: ACTIVE | Noted: 2023-01-01

## 2023-06-08 PROBLEM — N39.0 URINARY TRACT INFECTION: Status: ACTIVE | Noted: 2023-01-01

## 2023-06-08 NOTE — ED PROVIDER NOTE - PHYSICAL EXAMINATION
CONSTITUTIONAL: Frail, thin; in no acute distress, nontoxic appearing  SKIN: skin exam is warm and dry, stage 1 pressure ulcer in lumbar region, skin surrounding anus is erythematous  HEAD: Normocephalic; atraumatic.  EYES: PERRL, 3 mm bilateral, no nystagmus, EOM intact; conjunctiva and sclera clear.  ENT: MMM,  RESP: No increased work of breathing   ABD: soft; non-distended; non-tender. Ostomy bag in place  GI: Clear mucosal non odorous drainage from rectum  EXT: No cyanosis or edema.   NEURO: awake, alert, following commands, oriented, grossly unremarkable. No Focal deficits. GCS 15.   PSYCH: Cooperative, appropriate.

## 2023-06-08 NOTE — ED ADULT NURSE NOTE - OBJECTIVE STATEMENT
82 year old female, presenting to ED c/o anal leakage. Pt s/p ostomy bag, reports of clear fluid coming out of rectum. Denies n/v/d/fevers/chills, trauma or AC use. Pt A&Ox4, ambulatory w/ 1p. Family w/ pt.     Fall precautions implemented, BA in place, red socks utilized.

## 2023-06-08 NOTE — ASSESSMENT
[FreeTextEntry1] : 83 yo with right hydronephrosis - s/p stent placement\par s/p colostomy - metastatic pancreatic cancer\par \par - urine straight catheter for culture\par - awaiting culture results\par - will schedule the patient for stent exchange in September\par \par no  contraindication for chemotherapy  [Hydronephrosis (591\N13.30)] : Salter-Brock type I [Urinary Tract Infection (599.0\N39.0)] : qualitative ~C was positive

## 2023-06-08 NOTE — ED PROVIDER NOTE - CLINICAL SUMMARY MEDICAL DECISION MAKING FREE TEXT BOX
83 yo woman w/ pancreatic CA w/ recent obstruction s/p diverting colostomy in March here w/ 4-5 weeks of small amount of fluid leakage from rectum. STates that every day feels urge to use bathroom and small amount of clear fluid comes out. no blood. no pain. no abdo pain, n/v, fevers or other complaints at this time  spoke with physician this morning and instructed to go to ED    Well appearing  non-toxic  thin  rrr s1s2 wnl  cta b/l  nt/nd + BS  + stage 1 decub lower back (known to pt and family)_  + erythema around gluteal folds w/o ttp, warmth (contact derm appearing)  rectal exam w/o blood or feces    83 yo woman w/ small amount of fluid from rectum s/p ostomy. Reassuring exam and this is normal post-ostomy. Reassurance and f/u w/ surgereon and PCP

## 2023-06-08 NOTE — ED PROVIDER NOTE - OBJECTIVE STATEMENT
The patient is an 81 yo F `with a history of pancreatic adenocarcinoma status post transverse loop colostomy (03/08/23), hypertension, diabetes, hyperlipidemia, hypertension, diabetes, hyperlipidemia,  right hydronephrosis s/p ureteral stent, pressure ulcer to lumbar region, recently diagnosed with pneumonia who presents because of a month long history of clear mucosal drainage from her rectum. She was referred by her PCP, Dr. Vasques. The patient states that the mucosal drainage is clear, she has not noticed any stool contents or blood. She states that she has been having no issues with the ostomy bag. The patient denies any recent chills, fever, vomiting, lightheadedness, dizziness, or pain to her rectal region.

## 2023-06-08 NOTE — ED PROVIDER NOTE - NS ED ROS FT
Constitutional:  no fevers, no chills,  Respiratory:  Mild cough  GI:  No nausea, vomiting, abdominal pain.  Neuro:  No headache, no dizziness, no change in mental status  Skin:  Pressure ulcer on back  Except as documented in the HPI,  all other systems are negative

## 2023-06-08 NOTE — ED ADULT NURSE NOTE - NSFALLHARMRISKINTERV_ED_ALL_ED

## 2023-06-08 NOTE — ED PROVIDER NOTE - PATIENT PORTAL LINK FT
You can access the FollowMyHealth Patient Portal offered by [facility] by registering at the following website: [email or web address]. By joining [facility]’s FollowMyHealth portal, you will also be able to view your health information using other applications (apps) compatible with our system.

## 2023-06-08 NOTE — ED PROVIDER NOTE - CARE PROVIDER_API CALL
Ayaka Vasques  Family Medicine  [address] [place] [address] [id number]  [place] [address]  Phone: [phone]  Fax: [phone]  Established Patient  Follow Up Time: Routine    Mike Mead  Medical Oncology  [address] [place] [id number]  Phone: [phone]  Fax: [phone]  Established Patient  Follow Up Time: Routine

## 2023-06-08 NOTE — ED PROVIDER NOTE - PROVIDER TOKENS
PROVIDER:[TOKEN:[[id number]],FOLLOWUP:[Routine],ESTABLISHEDPATIENT:[T]],PROVIDER:[TOKEN:[[id number]],FOLLOWUP:[Routine],ESTABLISHEDPATIENT:[T]]

## 2023-06-08 NOTE — HISTORY OF PRESENT ILLNESS
[FreeTextEntry1] : 82 year old female former smoker with history of diabetes mellitus. She presented with lower abdominal pain and constipation associated with weakness and weight loss for few months. \par In October 2022 Colonoscopy revealed a serrated adenoma in the rectum and a tubular adenoma. \par \par No malignancy was noted. She was apparently treated for suspected diverticulitis , CT abd/ pelvis on 1/16/23 revealed findings suspicious for pancreatic tumor 3.6 cm encasing splenic artery and abutting hepatic artery.and right pelvic pericolonic collection 4 cm containing gas , contiguous with sigmoid colon and with mild to moderate right hydronephrosis.\par \par she underwent cystoscopy right ureteral stent placement at the time of exploratory laparotomy 3/8/2023\par \par she was to have a ureteral stent exchange but her urine had grown VRE\par \par her surgery was cancelled and the plan was to confirm the diagnosis and treat in anticipation of her starting chenotherapy \par \par \par  [None] : no symptoms

## 2023-06-08 NOTE — ED PROVIDER NOTE - NSFOLLOWUPINSTRUCTIONS_ED_ALL_ED_FT
You presented to the emergency department because of a month long history of clear mucosal drainage from your rectum. You were examined while in the emergency department, and we left a message for Dr. Vasques at her office. Small amounts of clear mucosal drainage after a colostomy is normal, and a byproduct of the glands in the distal end of the GI tract continuing to secret lubricating mucous despite the absence of stool. Please return to the emergency department if the drainage becomes malodorous, or you begin having pain around the area. You are advised to apply an ointment such as Zinc Oxide to prevent skin breakdown of the area around your anus as it may become more sensitive and inflamed due to contact with fluids.

## 2023-06-16 NOTE — PHYSICAL THERAPY INITIAL EVALUATION ADULT - THERAPY FREQUENCY, PT EVAL
Quality 431: Preventive Care And Screening: Unhealthy Alcohol Use - Screening: Patient not identified as an unhealthy alcohol user when screened for unhealthy alcohol use using a systematic screening method
Quality 226: Preventive Care And Screening: Tobacco Use: Screening And Cessation Intervention: Patient screened for tobacco use and is an ex/non-smoker
Detail Level: Detailed
3-5x/week

## 2023-07-05 NOTE — ED PROVIDER NOTE - NS ED ATTENDING STATEMENT MOD
This was a shared visit with the TITO. I reviewed and verified the documentation and independently performed the documented:

## 2023-07-05 NOTE — ED PROVIDER NOTE - CARE PLAN
1 Principal Discharge DX:	VRE infection (vancomycin resistant Enterococcus)  Secondary Diagnosis:	Urinary tract infection

## 2023-07-05 NOTE — ED PROVIDER NOTE - CLINICAL SUMMARY MEDICAL DECISION MAKING FREE TEXT BOX
82-year-old female with past medical history of hypertension, hyperlipidemia, pancreatic cancer with current unknown status/possible metastasis based on previous CT scan imaging presenting for evaluation of urinary tract infection present for approximately 1 month as well as generalized weakness, failure to thrive. exam with mild bilateral CVA tenderness. frail appearing patient. needs assistance to sit up in bed. labs reviewed. imaging with new pleural effusion, possible ascending UTI. will give first dose of linezolid as VRE on culture. admitted for further management.

## 2023-07-05 NOTE — ED PROVIDER NOTE - PROGRESS NOTE DETAILS
KA - Pt refusing catheterization requesting to continue to hold off for urine sample. KA - Discussed importance of urine sample with patient.  Patient consents to catheterization for urine sample. Patient still unable to provide urine sample through controlled urination Or despite Prima fit placement.

## 2023-07-05 NOTE — ED ADULT NURSE NOTE - OBJECTIVE STATEMENT
Patient AOx3, ambulatory at baseline, c/o B/L flank pain, hx of UTI, was told unable to treat with PO antibiotics, needs to f/u with ID, however, appointment date is not until couple weeks later.

## 2023-07-05 NOTE — ED ADULT NURSE NOTE - NSFALLRISKINTERV_ED_ALL_ED
Assistance OOB with selected safe patient handling equipment if applicable/Assistance with ambulation/Communicate fall risk and risk factors to all staff, patient, and family/Monitor gait and stability/Provide visual cue: yellow wristband, yellow gown, etc/Reinforce activity limits and safety measures with patient and family/Call bell, personal items and telephone in reach/Instruct patient to call for assistance before getting out of bed/chair/stretcher/Non-slip footwear applied when patient is off stretcher/[place] to call system/Physically safe environment - no spills, clutter or unnecessary equipment/Purposeful Proactive Rounding/Room/bathroom lighting operational, light cord in reach

## 2023-07-05 NOTE — ED PROVIDER NOTE - OBJECTIVE STATEMENT
Patient is an 82-year-old female with past medical history of hypertension, hyperlipidemia, pancreatic cancer with current unknown status/possible metastasis based on previous CT scan imaging presenting for evaluation of urinary tract infection present for approximately 1 month.  Patient states she now has ascending bilateral back pain and worsening weakness.  Patient's daughter present for additional history and collateral information.  Patient follows with ID Dr. Salgado who states they are pending visit for PICC line placement for IV antibiotics secondary to oral antibiotic resistant UTI infection based on culture.  They spoke with their PCP and they were advised to come to the ED due to wait for ID line placement which is approximately 1 week.  Patient is not currently on chemotherapy or radiation for cancer she denies any fever, cough, sore throat, nausea, vomiting, abdominal pain, diarrhea/constipation, chest pain, shortness of breath.    [facility] Onc Dr. Michele (no active treatment)   Surgeon Dr. De Santiago (colostomy)  ID Dr. Salgado   PCP Dr. Fregoso

## 2023-07-05 NOTE — ED ADULT NURSE REASSESSMENT NOTE - NS ED NURSE REASSESS COMMENT FT1
Patient's ostomy bag noted to be leaking. Stool is dark and brown. No blood noted. Ostomy care performed.

## 2023-07-05 NOTE — ED PROVIDER NOTE - PHYSICAL EXAMINATION
As Follows:  CONST: Lying in stretcher.   EYES: PERRL, EOMI, Sclera and conjunctiva clear.   ENT: No nasal discharge. Oropharynx normal appearing, no erythema or exudates. Uvula midline  CARD: No murmurs, rubs, or gallops; Normal rate and rhythm  RESP: BS Equal B/L, No wheezes, rhonchi or rales. No distress or accessory breathing  GI: Soft, non-tender, non-distended. No CVA tenderness.  SKIN: Thoracic spine stage 1 pressure ulcer; Sacral stage 1 pressure ulcer, Otherwise warm, dry, no acute rashes. MMM  NEURO: A&Ox3, No focal deficits.

## 2023-07-06 NOTE — CONSULT NOTE ADULT - SUBJECTIVE AND OBJECTIVE BOX
Patient is a 82y old  Female who presents with a chief complaint of     HPI:  82-year-old female with past medical history of hypertension, diabetes, hyperlipidemia, pancreatic adenocarcinoma status post transverse loop colostomy, right hydronephrosis s/p ureteral stent presenting for a UTI VRE of 1 month duration failed OP treatment and as per the chart was supposed to have a PICC line but procedure delayed presents for IV antibiotics treatment. Last urine culture in  showed VRE sensitive only to zyvox. Patient is a poor historian reported that suprapubic abdominal discomfort started 1 month ago and the patient was unsuccessfully treated with different antibiotics. Patient still complains of suprapubic discomfort and burning, and lower back pain, Patient reports mild epigastric discomfort today . Patient denies any SOB, CP, NV, diarrhea, constipation fever, respiratory symptoms.     Patient was done through the OP MedRec, Patient is not aware of the medications she take , please confirm MedRec in AM with [facility], [phone].      Labs noticeable for Hb 10.2 at baseline,   UA grossly positive for RBC and WBC   CTA chest Abdomen pelvis showed:    * Decreased mild right hydronephrosis, post double-J ureteral stent placement, with persistent increased enhancement of ureter, suspicious for urinary tract infection.  * Unchanged ill-defined pancreatic mass.  *  Increased moderate-to-large left pleural effusion with overlying compressive atelectasis. New small right pleural effusion. Unchanged right lung pulmonary nodules, previously characterized on 2023 PET/CT.    Previous CTAP showed   * Soft tissue mass adjacent to the sigmoid colon measuring approximately 3.1 x 2.5 cm.  * Right double-J ureteral stent with stable moderate hydroureter.  * Grossly stable ill-defined mass at the pancreatic head, consistent with known pancreatic neoplasm.    VS insignificant     Vital Signs Last 24 Hrs  T(C): 36.2 (2023 23:43), Max: 36.6 (2023 17:19)  T(F): 97.1 (2023 23:43), Max: 97.9 (2023 17:19)  HR: 78 (2023 23:43) (78 - 103)  BP: 118/74 (2023 23:43) (101/72 - 118/74)  RR: 18 (2023 23:43) (18 - 18)  SpO2: 100% (2023 23:43) (98% - 100%)  Patient On (Oxygen Delivery Method): room air             (2023 02:54)      Occupational Hx:    Social Hx:    PAST MEDICAL & SURGICAL HISTORY:  HTN (hypertension)      Diabetes      Hypercholesteremia      Pancreatic cancer      Hydronephrosis      Colostomy status      History of       History of left hip replacement      H/O carpal tunnel repair          FAMILY HISTORY:  FH: bladder cancer (Sibling)    .  No cardiovascular or pulmonary family history     REVIEW OF SYSTEMS:    All ROS are negative except per HPI     Allergies    No Known Allergies    Intolerances          PHYSICAL EXAM  Vital Signs Last 24 Hrs  T(C): 35.8 (2023 04:20), Max: 36.6 (2023 17:19)  T(F): 96.4 (2023 04:20), Max: 97.9 (2023 17:19)  HR: 78 (2023 04:20) (78 - 103)  BP: 136/63 (2023 04:20) (101/72 - 136/63)  BP(mean): --  RR: 18 (2023 04:20) (18 - 18)  SpO2: 95% (2023 04:20) (95% - 100%)    Parameters below as of 2023 04:20  Patient On (Oxygen Delivery Method): room air        CONSTITUTIONAL:  Well nourished.  NAD    ENT:   Airway patent,   No thrush    EYES:   Clear bilaterally,   pupils equal,   round and reactive to light.    CARDIAC:   Normal rate,   regular rhythm.    no edema    RESPIRATORY:   No wheezing  Normal chest expansion  Not tachypneic,  No use of accessory muscles  reduced BS on LLL    GASTROINTESTINAL:  Abdomen soft,   non-tender,   no guarding,   no erythema around colostomy     MUSCULOSKELETAL:   range of motion is not limited,  no clubbing, cyanosis    NEUROLOGICAL:   Alert and oriented   no motor deficits.    SKIN:   Skin normal color for race,   No evidence of rash.        LABS:                          10.2   7.93  )-----------( 158      ( 2023 19:29 )             32.0                                               07-05    135  |  100  |  30<H>  ----------------------------<  155<H>  3.8   |  20  |  0.6<L>    Ca    9.0      2023 19:29    TPro  5.8<L>  /  Alb  3.5  /  TBili  0.5  /  DBili  x   /  AST  18  /  ALT  8   /  AlkPhos  79  07-05                                             Urinalysis Basic - ( 2023 23:02 )    Color: Red / Appearance: Turbid / S.025 / pH: x  Gluc: x / Ketone: Trace  / Bili: Negative / Urobili: <2 mg/dL   Blood: x / Protein: 300 mg/dL / Nitrite: Negative   Leuk Esterase: Moderate / RBC: >720 /HPF / WBC >100 /HPF   Sq Epi: x / Non Sq Epi: x / Bacteria: Many        CARDIAC MARKERS ( 2023 19:29 )  x     / <0.01 ng/mL / x     / x     / x                                                LIVER FUNCTIONS - ( 2023 19:29 )  Alb: 3.5 g/dL / Pro: 5.8 g/dL / ALK PHOS: 79 U/L / ALT: 8 U/L / AST: 18 U/L / GGT: x                                                                                                MEDICATIONS  (STANDING):  atorvastatin 20 milliGRAM(s) Oral at bedtime  dextrose 5%. 1000 milliLiter(s) (50 mL/Hr) IV Continuous <Continuous>  dextrose 5%. 1000 milliLiter(s) (100 mL/Hr) IV Continuous <Continuous>  dextrose 50% Injectable 25 Gram(s) IV Push once  dextrose 50% Injectable 12.5 Gram(s) IV Push once  dextrose 50% Injectable 25 Gram(s) IV Push once  glucagon  Injectable 1 milliGRAM(s) IntraMuscular once  heparin   Injectable 5000 Unit(s) SubCutaneous every 12 hours  insulin lispro (ADMELOG) corrective regimen sliding scale   SubCutaneous three times a day before meals  lactated ringers. 1000 milliLiter(s) (75 mL/Hr) IV Continuous <Continuous>  lactated ringers. 1000 milliLiter(s) (75 mL/Hr) IV Continuous <Continuous>  linezolid  IVPB 600 milliGRAM(s) IV Intermittent every 12 hours  meropenem  IVPB 1000 milliGRAM(s) IV Intermittent every 8 hours  meropenem  IVPB      pantoprazole    Tablet 40 milliGRAM(s) Oral before breakfast  tamsulosin 0.4 milliGRAM(s) Oral at bedtime    MEDICATIONS  (PRN):  dextrose Oral Gel 15 Gram(s) Oral once PRN Blood Glucose LESS THAN 70 milliGRAM(s)/deciliter      X-Rays reviewed:    CXR interpreted by me:

## 2023-07-06 NOTE — CONSULT NOTE ADULT - ASSESSMENT
82-year-old patient, known to have hypertension, diabetes, hyperlipidemia,  metastatic pancreatic adenocarcinoma status post transverse loop colostomy and right hydronephrosis s/p ureteral stent, presented for IV Abx for a UTI VRE of 1 month duration.   She failed OP treatment and as per the chart was supposed to have a PICC line but procedure delayed.     We are consulted for history of metastatic pancreatic adenocarcinoma.     #Pancreatic cancer with suspected lung metastasis, Pelvic mass likely metastatic S/P colostomy and ureteral stents for obstructing pericolonic mass  - EUS with biopsy showed adenocarcinoma , CA19.9 : 12,795 CEA: 24  - PET scan 2/2023: pancreatic mass with SUV : 7.6 , right pericolonic soft tissue density with SUV : 15 , left basillar lung nodular density 2.7 cm , SUV : 3.o and 7 mm left basilar pleural based nodule.  - CT chest/a/P: Since 6/17/2023: Decreased mild right hydronephrosis, post double-J ureteral stent placement, with persistent increased enhancement of ureter, suspicious for urinary tract infection. Unchanged ill-defined pancreatic mass. Increased moderate-to-large left pleural effusion with overlying compressive atelectasis. New small right pleural effusion. Unchanged   right lung pulmonary nodules, previously characterized on 2/16/2023 PET/CT. Unchanged previously described soft tissue mass adjacent to the sigmoid colon.  -        82-year-old patient, known to have hypertension, diabetes, hyperlipidemia,  metastatic pancreatic adenocarcinoma status post transverse loop colostomy and right hydronephrosis s/p ureteral stent, presented for IV Abx for a UTI VRE of 1 month duration.   She failed OP treatment and as per the chart was supposed to have a PICC line but procedure delayed.     We are consulted for history of metastatic pancreatic adenocarcinoma.     #Pancreatic cancer with suspected lung metastasis, Pelvic mass likely metastatic S/P colostomy and ureteral stents for obstructing pericolonic mass  - EUS with biopsy showed adenocarcinoma , CA19.9 : 12,795 CEA: 24  - PET scan 2/2023: pancreatic mass with SUV : 7.6 , right pericolonic soft tissue density with SUV : 15 , left basillar lung nodular density 2.7 cm , SUV : 3.o and 7 mm left basilar pleural based nodule.  - CT chest/a/P: Since 6/17/2023: Decreased mild right hydronephrosis, post double-J ureteral stent placement, with persistent increased enhancement of ureter, suspicious for urinary tract infection. Unchanged ill-defined pancreatic mass. Increased moderate-to-large left pleural effusion with overlying compressive atelectasis. New small right pleural effusion. Unchanged   right lung pulmonary nodules, previously characterized on 2/16/2023 PET/CT. Unchanged previously described soft tissue mass adjacent to the sigmoid colon.  - s/p Thora; follow up results  - Will discuss with Dr. Atkins and Dr. De Santiago to start chemotherapy tomorrow        82-year-old patient, known to have hypertension, diabetes, hyperlipidemia,  metastatic pancreatic adenocarcinoma status post transverse loop colostomy and right hydronephrosis s/p ureteral stent, presented for IV Abx for a UTI VRE of 1 month duration.   She failed OP treatment and as per the chart was supposed to have a PICC line but procedure delayed.     We are consulted for history of metastatic pancreatic adenocarcinoma.     #Pancreatic cancer with suspected lung metastasis, Pelvic mass likely metastatic S/P colostomy and ureteral stents for obstructing pericolonic mass  - EUS with biopsy showed adenocarcinoma , CA19.9 : 12,795 CEA: 24  - PET scan 2/2023: pancreatic mass with SUV : 7.6 , right pericolonic soft tissue density with SUV : 15 , left basilar lung nodular density 2.7 cm , SUV : 3.o and 7 mm left basilar pleural based nodule.  - CT chest/a/P: Since 6/17/2023: Decreased mild right hydronephrosis, post double-J ureteral stent placement, with persistent increased enhancement of ureter, suspicious for urinary tract infection. Unchanged ill-defined pancreatic mass. Increased moderate-to-large left pleural effusion with overlying compressive atelectasis. New small right pleural effusion. Unchanged   right lung pulmonary nodules, previously characterized on 2/16/2023 PET/CT. Unchanged previously described soft tissue mass adjacent to the sigmoid colon.  - s/p Thoracentesis; follow up results.  - Will discuss with Dr. Mead (her primary oncologist) and Dr. De Santiago (her surgeon) to start chemotherapy tomorrow.

## 2023-07-06 NOTE — H&P ADULT - ATTENDING COMMENTS
Patient seen and examined at bedside independently of the residents. I read the resident's note and agree with the plan with the additions and corrections as noted below. My note supersedes the resident's note.     REVIEW OF SYSTEMS:  Negative except in HPI.     PMH:  HTN, HLD, DM II, Pancreatic adenocarcinoma s/p transverse loop colostomy, Right hydronephrosis s/p ureteral stent     FHx: Reviewed. No fhx of asthma/copd, No fhx of liver and pulmonary disease. No fhx of hematological disorder.     Physical Exam:  GEN: No acute distress. Awake, Alert and oriented x 3.   Head: Atraumatic, Normocephalic.   Eye: PEERLA. No sclera icterus. EOMI.   ENT: Normal oropharynx, no thyromegaly, no mass, no lymphadenopathy.   LUNGS: Clear to auscultation bilaterally. No wheeze/rales/crackles.   HEART: Normal. S1/S2 present. RRR. No murmur/gallops.   ABD: Soft, non-tender, non-distended. Bowel sounds present.   EXT: No pitting edema. No erythema. No tenderness.  Integumentary: No rash, No sore, No petechia.   NEURO: CN III-XII intact. Strength: 5/5 b/l ULE. Sensory intact b/l ULE.     Vital Signs Last 24 Hrs  T(C): 35.8 (2023 04:20), Max: 36.6 (2023 17:19)  T(F): 96.4 (2023 04:20), Max: 97.9 (2023 17:19)  HR: 78 (2023 04:20) (78 - 103)  BP: 136/63 (2023 04:20) (101/72 - 136/63)  BP(mean): --  RR: 18 (2023 04:20) (18 - 18)  SpO2: 95% (2023 04:20) (95% - 100%)    Parameters below as of 2023 04:20  Patient On (Oxygen Delivery Method): room air      Please see the above notes for Labs and radiology.     Assessment and Plan:     83 yo F with hx of HTN, HLD, DM II, Pancreatic adenocarcinoma s/p transverse loop colostomy, Right hydronephrosis s/p ureteral stent presents to ED for UTI.     Complicated UTI/Ascending UTI  - CT abdomen shows decreased mild right hydronephrosis, post double-J ureteral stent placement, with persistent increased enhancement of ureter, suspicious for urinary tract infection.  - c/w Zyvox and meropenem for now (previous UCx shows VRE and ESBL. Klebsiella)  - f/u UCx   - ID consult.     Left pleural effusion   - CT shows Increased moderate-to-large left pleural effusion with overlying compressive atelectasis. New small right pleural effusion. Unchanged right lung pulmonary nodules.  - will get Pulmonary consult for thoracentesis.     Pancreatic adenocarcinoma/ Colonic mass   - CT abdomen shows Unchanged ill-defined pancreatic mass. Unchanged previously described soft tissue mass adjacent to the sigmoid colon.  - follow up oncology     Chronic anemia  - Hb stable ~ 10.2 which is at baseline.   - monitor CBC and transfuse if Hb < 7.     HTN/HLD - c/w home med  DM II - monitor FS AC HS. Insulin regimen.     DVT ppx: Heparin SC  GI ppx:  PPI   Diet: DASH/CC diet   Activity: as tolerated.     Date seen by the attendin2023  Total time spent: 75 minutes.

## 2023-07-06 NOTE — CONSULT NOTE ADULT - ASSESSMENT
82-year-old female with past medical history of hypertension, diabetes, hyperlipidemia, pancreatic adenocarcinoma status post transverse loop colostomy, right hydronephrosis s/p ureteral stent presenting for a UTI VRE of 1 month duration failed OP treatment.  CT chest showed large left sided pleural effusion, suspected to be malignant. Pulm consulted for diagnostic thora.     Impressions  # Large left sided pleural effusion, septations seen on bedside US, suspected malignancy s/p thora  - trace effusion noted on CT A&P 04/2023  # pancreatic adenocarcinoma s/p transverse loop colostomy    Plan  - s/p thora, pt tolerated procedure well, will f/u CXR  - pleural fluid labs sent including cytology, f/u results   - PT/OT, OOBTC 82-year-old female with past medical history of hypertension, diabetes, hyperlipidemia, pancreatic adenocarcinoma status post transverse loop colostomy, right hydronephrosis s/p ureteral stent presenting for a UTI VRE of 1 month duration failed OP treatment.  CT chest showed large left sided pleural effusion, suspected to be malignant. Pulm consulted for diagnostic thora.     Impressions  # Large left sided pleural effusion, septations seen on bedside US, suspected malignancy s/p thora  - trace effusion noted on CT A&P 04/2023  # pancreatic adenocarcinoma s/p transverse loop colostomy  #UTI on abx     Plan    CT noted with large left effusion   US done with septations noted   Bedside US done with 1.1 L removed   Cytology sent on the fluid - rule out malignancy   On room air   DVT ppx  Abx per ID   Will follow  82-year-old female with past medical history of hypertension, diabetes, hyperlipidemia, pancreatic adenocarcinoma status post transverse loop colostomy, right hydronephrosis s/p ureteral stent presenting for a UTI VRE of 1 month duration failed OP treatment.  CT chest showed large left sided pleural effusion, suspected to be malignant. Pulm consulted for diagnostic thora.     Impressions  # Large left sided pleural effusion, septations seen on bedside US, suspected malignancy s/p thora  - trace effusion noted on CT A&P 04/2023  # pancreatic adenocarcinoma s/p transverse loop colostomy  #UTI on abx     Plan    CT noted with large left effusion   US done with septations noted   Bedside US done with 1.1 L removed   Cytology sent on the fluid - rule out malignancy   On room air   DVT ppx  Abx per ID   Recommend palliative care eval and goals of care   Will follow

## 2023-07-06 NOTE — CONSULT NOTE ADULT - ASSESSMENT
ASSESSMENT  82-year-old female with past medical history of hypertension, diabetes, hyperlipidemia, pancreatic adenocarcinoma status post transverse loop colostomy, right hydronephrosis s/p ureteral stent presenting for a UTI VRE of 1 month duration failed OP treatment and as per the chart was supposed to have a PICC line but procedure delayed presents for IV antibiotics treatment.     IMPRESSION      7/5 UA Blood: x / Protein: 300 mg/dL / Nitrite: Negative   Leuk Esterase: Moderate / RBC: >720 /HPF / WBC >100 /HPF   Sq Epi: x / Non Sq Epi: x / Bacteria: Many    5/25 UCX   >100,000 CFU/ml Enterococcus faecium (vancomycin resistant) Ampicillin: R >8    <10,000 CFU/ml Normal Urogenital cabrera present    4/2023 UCX x2  50,000 - 99,000 CFU/mL Pseudomonas aeruginosa S    50,000 - 99,000 CFU/mL Enterococcus faecium (vancomycin resistant)  < from: CT Abdomen and Pelvis w/ IV Cont (07.05.23 @ 22:25) >  Since 6/17/2023:  1. Decreased mild right hydronephrosis, post double-J ureteral stent placement, with persistent increased enhancement of ureter, suspicious for urinary tract infection.  2. Unchanged ill-defined pancreatic mass.  3. Increased moderate-to-large left pleural effusion with overlying compressive atelectasis. New small right pleural effusion. Unchanged   right lung pulmonary nodules, previously characterized on 2/16/2023 PET/CT.  4. No evidence of pulmonary embolus.  5. Unchanged previously described soft tissue mass adjacent to the sigmoid colon.  #Sepsis ruled out on admission   #Pancreatic ca    RECOMMENDATIONS ASSESSMENT  82-year-old female with past medical history of hypertension, diabetes, hyperlipidemia, pancreatic adenocarcinoma status post transverse loop colostomy, right hydronephrosis s/p ureteral stent presenting for a UTI VRE of 1 month duration failed OP treatment and as per the chart was supposed to have a PICC line but procedure delayed presents for IV antibiotics treatment.     IMPRESSION  #Asymptomatic- denies symptoms of cystitis or ascending infection    7/5 UA Blood: x / Protein: 300 mg/dL / Nitrite: Negative   Leuk Esterase: Moderate / RBC: >720 /HPF / WBC >100 /HPF   Sq Epi: x / Non Sq Epi: x / Bacteria: Many    5/25 UCX   >100,000 CFU/ml Enterococcus faecium (vancomycin resistant) Ampicillin: R >8    <10,000 CFU/ml Normal Urogenital cabrera present    4/2023 UCX x2  50,000 - 99,000 CFU/mL Pseudomonas aeruginosa S    50,000 - 99,000 CFU/mL Enterococcus faecium (vancomycin resistant)  < from: CT Abdomen and Pelvis w/ IV Cont (07.05.23 @ 22:25) >  Since 6/17/2023:  1. Decreased mild right hydronephrosis, post double-J ureteral stent placement, with persistent increased enhancement of ureter, suspicious for urinary tract infection.  2. Unchanged ill-defined pancreatic mass.  3. Increased moderate-to-large left pleural effusion with overlying compressive atelectasis. New small right pleural effusion. Unchanged   right lung pulmonary nodules, previously characterized on 2/16/2023 PET/CT.  4. No evidence of pulmonary embolus.  5. Unchanged previously described soft tissue mass adjacent to the sigmoid colon.  #Sepsis ruled out on admission   #Pancreatic ca    RECOMMENDATIONS  - Monitor off antibiotics, asymptomatic  - Please recall ID [id number]. Please inform ID of any patient clinical change or any new pertinent laboratory or radiographic data     If any questions, please call or send a message on [employer] Teams  Please continue to update ID with any pertinent new laboratory or radiographic findings  Spectra [id number]

## 2023-07-06 NOTE — PATIENT PROFILE ADULT - FALL HARM RISK - HARM RISK INTERVENTIONS

## 2023-07-06 NOTE — H&P ADULT - NSHPLABSRESULTS_GEN_ALL_CORE
LABS:                        10.2   7.93  )-----------( 158      ( 05 Jul 2023 19:29 )             32.0     07-05    135  |  100  |  30<H>  ----------------------------<  155<H>  3.8   |  20  |  0.6<L>    Ca    9.0      05 Jul 2023 19:29    TPro  5.8<L>  /  Alb  3.5  /  TBili  0.5  /  DBili  x   /  AST  18  /  ALT  8   /  AlkPhos  79  07-05

## 2023-07-06 NOTE — H&P ADULT - HISTORY OF PRESENT ILLNESS
82-year-old female with past medical history of hypertension, diabetes, hyperlipidemia, pancreatic adenocarcinoma status post transverse loop colostomy, right hydronephrosis s/p ureteral stent presenting for a UTI VRE of 1 month duration failed OP treatment and was supposed to have a PICC line but procedure delayed presents for IV antibiotics treatment. Last urine culture in 05/25 showed VRE sensitive only to zyvox.     Labs noticeable for Hb 10.2 at baseline,   UA grossly positive for RBC and WBC   CTA chest Abdomen pelvis showed:    * Decreased mild right hydronephrosis, post double-J ureteral stent placement, with persistent increased enhancement of ureter, suspicious for urinary tract infection.  * Unchanged ill-defined pancreatic mass.  *  Increased moderate-to-large left pleural effusion with overlying compressive atelectasis. New small right pleural effusion. Unchanged right lung pulmonary nodules, previously characterized on 2/16/2023 PET/CT.    Previous CTAP showed   * Soft tissue mass adjacent to the sigmoid colon measuring approximately 3.1 x 2.5 cm.  * Right double-J ureteral stent with stable moderate hydroureter.  * Grossly stable ill-defined mass at the pancreatic head, consistent with known pancreatic neoplasm.    VS insignificant     Vital Signs Last 24 Hrs  T(C): 36.2 (05 Jul 2023 23:43), Max: 36.6 (05 Jul 2023 17:19)  T(F): 97.1 (05 Jul 2023 23:43), Max: 97.9 (05 Jul 2023 17:19)  HR: 78 (05 Jul 2023 23:43) (78 - 103)  BP: 118/74 (05 Jul 2023 23:43) (101/72 - 118/74)  RR: 18 (05 Jul 2023 23:43) (18 - 18)  SpO2: 100% (05 Jul 2023 23:43) (98% - 100%)  Patient On (Oxygen Delivery Method): room air             82-year-old female with past medical history of hypertension, diabetes, hyperlipidemia, pancreatic adenocarcinoma status post transverse loop colostomy, right hydronephrosis s/p ureteral stent presenting for a UTI VRE of 1 month duration failed OP treatment and as per the chart was supposed to have a PICC line but procedure delayed presents for IV antibiotics treatment. Last urine culture in 05/25 showed VRE sensitive only to zyvox. Patient is a poor historian reported that suprapubic abdominal discomfort started 1 month ago and the patient was unsuccessfully treated with different antibiotics. Patient still complains of suprapubic discomfort and burning, and lower back pain, Patient reports mild epigastric discomfort today . Patient denies any SOB, CP, NV, diarrhea, constipation fever, respiratory symptoms.     Patient was done through the OP Med[facility], Patient is not aware of the medications she take , please confirm MedRec in AM with [facility], [phone].      Labs noticeable for Hb 10.2 at baseline,   UA grossly positive for RBC and WBC   CTA chest Abdomen pelvis showed:    * Decreased mild right hydronephrosis, post double-J ureteral stent placement, with persistent increased enhancement of ureter, suspicious for urinary tract infection.  * Unchanged ill-defined pancreatic mass.  *  Increased moderate-to-large left pleural effusion with overlying compressive atelectasis. New small right pleural effusion. Unchanged right lung pulmonary nodules, previously characterized on 2/16/2023 PET/CT.    Previous CTAP showed   * Soft tissue mass adjacent to the sigmoid colon measuring approximately 3.1 x 2.5 cm.  * Right double-J ureteral stent with stable moderate hydroureter.  * Grossly stable ill-defined mass at the pancreatic head, consistent with known pancreatic neoplasm.    VS insignificant     Vital Signs Last 24 Hrs  T(C): 36.2 (05 Jul 2023 23:43), Max: 36.6 (05 Jul 2023 17:19)  T(F): 97.1 (05 Jul 2023 23:43), Max: 97.9 (05 Jul 2023 17:19)  HR: 78 (05 Jul 2023 23:43) (78 - 103)  BP: 118/74 (05 Jul 2023 23:43) (101/72 - 118/74)  RR: 18 (05 Jul 2023 23:43) (18 - 18)  SpO2: 100% (05 Jul 2023 23:43) (98% - 100%)  Patient On (Oxygen Delivery Method): room air

## 2023-07-06 NOTE — MEDICAL STUDENT PROGRESS NOTE(EDUCATION) - TRANSITIONS OF CARE/DISPOSITION: (SDOH, ANTICIPATED DC NEEDS)
Patient is to be monitored as she discontinues antibiotics for UTI as well as to make sure she has no complications following thoracocentesis. Awaiting results from the culture as well as consults from oncology. Plan to consult palliative care once malignancy is determined.

## 2023-07-06 NOTE — MEDICAL STUDENT PROGRESS NOTE(EDUCATION) - PLAN 1
She received a thoracocentesis  Await culture results  Monitor for any new effusion, monitor bandage and treat any pain post procedure

## 2023-07-06 NOTE — MEDICAL STUDENT PROGRESS NOTE(EDUCATION) - PLAN 3
Consult oncology regarding management and whether or not it is malignant  determine if metastasis to lungs

## 2023-07-06 NOTE — CONSULT NOTE ADULT - ATTENDING COMMENTS
Impression and plan above have been updated and are my own.
Patient also seen by myself. I agree with the medical resident's note above. Situation discussed with her and the patient.  All questions answered.
Luis Fernando Cunha (DO)  Cardiology; Internal Medicine  [address] [place] [address], [address]  [place] [address]  Phone: [phone]  Fax: [phone]  Follow Up Time:

## 2023-07-06 NOTE — CONSULT NOTE ADULT - SUBJECTIVE AND OBJECTIVE BOX
MYRNADELORIS  82y, Female  Allergy: No Known Allergies      CHIEF COMPLAINT:       LOS  1d    HPI  HPI:  82-year-old female with past medical history of hypertension, diabetes, hyperlipidemia, pancreatic adenocarcinoma status post transverse loop colostomy, right hydronephrosis s/p ureteral stent presenting for a UTI VRE of 1 month duration failed OP treatment and as per the chart was supposed to have a PICC line but procedure delayed presents for IV antibiotics treatment. Last urine culture in  showed VRE sensitive only to zyvox. Patient is a poor historian reported that suprapubic abdominal discomfort started 1 month ago and the patient was unsuccessfully treated with different antibiotics. Patient still complains of suprapubic discomfort and burning, and lower back pain, Patient reports mild epigastric discomfort today . Patient denies any SOB, CP, NV, diarrhea, constipation fever, respiratory symptoms.     Patient was done through the OP MedRec, Patient is not aware of the medications she take , please confirm MedRec in AM with [facility], [phone].      Labs noticeable for Hb 10.2 at baseline,   UA grossly positive for RBC and WBC   CTA chest Abdomen pelvis showed:    * Decreased mild right hydronephrosis, post double-J ureteral stent placement, with persistent increased enhancement of ureter, suspicious for urinary tract infection.  * Unchanged ill-defined pancreatic mass.  *  Increased moderate-to-large left pleural effusion with overlying compressive atelectasis. New small right pleural effusion. Unchanged right lung pulmonary nodules, previously characterized on 2023 PET/CT.    Previous CTAP showed   * Soft tissue mass adjacent to the sigmoid colon measuring approximately 3.1 x 2.5 cm.  * Right double-J ureteral stent with stable moderate hydroureter.  * Grossly stable ill-defined mass at the pancreatic head, consistent with known pancreatic neoplasm.    VS insignificant     Vital Signs Last 24 Hrs  T(C): 36.2 (2023 23:43), Max: 36.6 (2023 17:19)  T(F): 97.1 (2023 23:43), Max: 97.9 (2023 17:19)  HR: 78 (2023 23:43) (78 - 103)  BP: 118/74 (2023 23:43) (101/72 - 118/74)  RR: 18 (2023 23:43) (18 - 18)  SpO2: 100% (2023 23:43) (98% - 100%)  Patient On (Oxygen Delivery Method): room air             (2023 02:54)      INFECTIOUS DISEASE HISTORY:  ID consulted for UTI     Currently ordered for:  linezolid  IVPB 600 milliGRAM(s) IV Intermittent every 12 hours  meropenem  IVPB 1000 milliGRAM(s) IV Intermittent every 8 hours  meropenem  IVPB          PMH  PAST MEDICAL & SURGICAL HISTORY:  HTN (hypertension)      Diabetes      Hypercholesteremia      Pancreatic cancer      Hydronephrosis      Colostomy status      History of       History of left hip replacement      H/O carpal tunnel repair          FAMILY HISTORY  FH: bladder cancer (Sibling)        SOCIAL HISTORY  Social History:        ROS  ***    VITALS:  T(F): 96.4, Max: 97.9 (23 @ 17:19)  HR: 78  BP: 136/63  RR: 18Vital Signs Last 24 Hrs  T(C): 35.8 (2023 04:20), Max: 36.6 (2023 17:19)  T(F): 96.4 (2023 04:20), Max: 97.9 (2023 17:19)  HR: 78 (2023 04:20) (78 - 103)  BP: 136/63 (2023 04:20) (101/72 - 136/63)  BP(mean): --  RR: 18 (2023 04:20) (18 - 18)  SpO2: 95% (2023 04:20) (95% - 100%)    Parameters below as of 2023 04:20  Patient On (Oxygen Delivery Method): room air        PHYSICAL EXAM:  ***    TESTS & MEASUREMENTS:                        10.2   7.93  )-----------( 158      ( 2023 19:29 )             32.0     07-    135  |  100  |  30<H>  ----------------------------<  155<H>  3.8   |  20  |  0.6<L>    Ca    9.0      2023 19:29    TPro  5.8<L>  /  Alb  3.5  /  TBili  0.5  /  DBili  x   /  AST  18  /  ALT  8   /  AlkPhos  79  07-05      LIVER FUNCTIONS - ( 2023 19:29 )  Alb: 3.5 g/dL / Pro: 5.8 g/dL / ALK PHOS: 79 U/L / ALT: 8 U/L / AST: 18 U/L / GGT: x           Urinalysis Basic - ( 2023 23:02 )    Color: Red / Appearance: Turbid / S.025 / pH: x  Gluc: x / Ketone: Trace  / Bili: Negative / Urobili: <2 mg/dL   Blood: x / Protein: 300 mg/dL / Nitrite: Negative   Leuk Esterase: Moderate / RBC: >720 /HPF / WBC >100 /HPF   Sq Epi: x / Non Sq Epi: x / Bacteria: Many        Culture - Urine (collected 23 @ 15:18)  Source: Clean Catch Clean Catch (Midstream)  Final Report (23 @ 18:18):    >100,000 CFU/ml Enterococcus faecium (vancomycin resistant)    <10,000 CFU/ml Normal Urogenital cabrera present  Organism: Enterococcus faecium (vancomycin resistant) (23 @ 18:18)  Organism: Enterococcus faecium (vancomycin resistant) (23 @ 18:18)      Method Type: MARK      -  Ampicillin: R >8 Predicts results to ampicillin/sulbactam, amoxacillin-clavulanate and  piperacillin-tazobactam.      -  Ciprofloxacin: R >2      -  Daptomycin: SDD 2 The breakpoint for SDD (susceptible dose dependent)is based on a dosage regimen of 8-12 mg/kg administered every 24 h in adults and is intended for serious infections due to E. faecium. Consultation with an infectious diseases specialist is recommended.      -  Levofloxacin: R >4      -  Linezolid: S 2      -  Nitrofurantoin: I 64 Should not be used to treat pyelonephritis.      -  Tetracycline: R >8      -  Vancomycin: R >16    Culture - Blood (collected 23 @ 11:59)  Source: .Blood None  Final Report (23 @ 23:00):    No Growth Final    Culture - Urine (collected 23 @ 10:13)  Source: Clean Catch Clean Catch (Midstream)  Final Report (23 @ 14:58):    50,000 - 99,000 CFU/mL Pseudomonas aeruginosa    50,000 - 99,000 CFU/mL Enterococcus faecium (vancomycin resistant)  Organism: Pseudomonas aeruginosa  Enterococcus faecium (vancomycin resistant) (23 @ 14:58)  Organism: Enterococcus faecium (vancomycin resistant) (23 @ 14:58)      Method Type: MARK      -  Ampicillin: R >8 Predicts results to ampicillin/sulbactam, amoxacillin-clavulanate and  piperacillin-tazobactam.      -  Ciprofloxacin: R >2      -  Daptomycin: SDD 2 The breakpoint for SDD (susceptible dose dependent)is based on a dosage regimen of 8-12 mg/kg administered every 24 h in adults and is intended for serious infections due to E. faecium. Consultation with an infectious diseases specialist is recommended.      -  Levofloxacin: R >4      -  Linezolid: S 1      -  Nitrofurantoin: R >64 Should not be used to treat pyelonephritis.      -  Tetracycline: R >8      -  Vancomycin: R >16  Organism: Pseudomonas aeruginosa (23 @ 14:58)      Method Type: MARK      -  Amikacin: S <=16      -  Aztreonam: S <=4      -  Cefepime: S <=2      -  Ceftazidime: S 4      -  Ciprofloxacin: S <=0.25      -  Gentamicin: S 4      -  Imipenem: S <=1      -  Levofloxacin: S <=0.5      -  Meropenem: S <=1      -  Piperacillin/Tazobactam: S <=8      -  Tobramycin: S <=2    Culture - Urine (collected 23 @ 01:20)  Source: Clean Catch Clean Catch (Midstream)  Final Report (23 @ 20:11):    >100,000 CFU/ml Pseudomonas aeruginosa    >100,000 CFU/ml Enterococcus faecium (vancomycin resistant)  Organism: Pseudomonas aeruginosa  Enterococcus faecium (vancomycin resistant) (23 @ 20:11)  Organism: Enterococcus faecium (vancomycin resistant) (23 @ 20:11)      Method Type: MARK      -  Ampicillin: R >8 Predicts results to ampicillin/sulbactam, amoxacillin-clavulanate and  piperacillin-tazobactam.      -  Ciprofloxacin: R >2      -  Daptomycin: SDD 2 The breakpoint for SDD (susceptible dose dependent)is based on a dosage regimen of 8-12 mg/kg administered every 24 h in adults and is intended for serious infections due to E. faecium. Consultation with an infectious diseases specialist is recommended.      -  Levofloxacin: R >4      -  Linezolid: S 2      -  Nitrofurantoin: R >64 Should not be used to treat pyelonephritis.      -  Tetracycline: R >8      -  Vancomycin: R >16  Organism: Pseudomonas aeruginosa (23 @ 20:11)      Method Type: MARK      -  Amikacin: S <=16      -  Aztreonam: S <=4      -  Cefepime: S <=2      -  Ceftazidime: S 4      -  Ciprofloxacin: S <=0.25      -  Gentamicin: S 4      -  Imipenem: S 2      -  Levofloxacin: S <=0.5      -  Meropenem: S <=1      -  Piperacillin/Tazobactam: S <=8      -  Tobramycin: S <=2            INFECTIOUS DISEASES TESTING      INFLAMMATORY MARKERS      RADIOLOGY & ADDITIONAL TESTS:  I have personally reviewed the radiographic imaging    CARDIOLOGY TESTING  EKG reviewed if performed    MEDICATIONS  atorvastatin 20 Oral at bedtime  dextrose 5%. 1000 IV Continuous <Continuous>  dextrose 5%. 1000 IV Continuous <Continuous>  dextrose 50% Injectable 25 IV Push once  dextrose 50% Injectable 25 IV Push once  dextrose 50% Injectable 12.5 IV Push once  glucagon  Injectable 1 IntraMuscular once  heparin   Injectable 5000 SubCutaneous every 12 hours  insulin lispro (ADMELOG) corrective regimen sliding scale  SubCutaneous three times a day before meals  lactated ringers. 1000 IV Continuous <Continuous>  linezolid  IVPB 600 IV Intermittent every 12 hours  meropenem  IVPB     meropenem  IVPB 1000 IV Intermittent every 8 hours  pantoprazole    Tablet 40 Oral before breakfast  tamsulosin 0.4 Oral at bedtime        ANTIBIOTICS:  linezolid  IVPB 600 milliGRAM(s) IV Intermittent every 12 hours  meropenem  IVPB      meropenem  IVPB 1000 milliGRAM(s) IV Intermittent every 8 hours      ALLERGIES:  No Known Allergies   MRYNADELORIS  82y, Female  Allergy: No Known Allergies      CHIEF COMPLAINT:       LOS  1d    HPI  HPI:  82-year-old female with past medical history of hypertension, diabetes, hyperlipidemia, pancreatic adenocarcinoma status post transverse loop colostomy, right hydronephrosis s/p ureteral stent presenting for a UTI VRE of 1 month duration failed OP treatment and as per the chart was supposed to have a PICC line but procedure delayed presents for IV antibiotics treatment. Last urine culture in  showed VRE sensitive only to zyvox. Patient is a poor historian reported that suprapubic abdominal discomfort started 1 month ago and the patient was unsuccessfully treated with different antibiotics. Patient still complains of suprapubic discomfort and burning, and lower back pain, Patient reports mild epigastric discomfort today . Patient denies any SOB, CP, NV, diarrhea, constipation fever, respiratory symptoms.     Patient was done through the OP MedRec, Patient is not aware of the medications she take , please confirm MedRec in AM with [facility], [phone].      Labs noticeable for Hb 10.2 at baseline,   UA grossly positive for RBC and WBC   CTA chest Abdomen pelvis showed:    * Decreased mild right hydronephrosis, post double-J ureteral stent placement, with persistent increased enhancement of ureter, suspicious for urinary tract infection.  * Unchanged ill-defined pancreatic mass.  *  Increased moderate-to-large left pleural effusion with overlying compressive atelectasis. New small right pleural effusion. Unchanged right lung pulmonary nodules, previously characterized on 2023 PET/CT.    Previous CTAP showed   * Soft tissue mass adjacent to the sigmoid colon measuring approximately 3.1 x 2.5 cm.  * Right double-J ureteral stent with stable moderate hydroureter.  * Grossly stable ill-defined mass at the pancreatic head, consistent with known pancreatic neoplasm.    VS insignificant     Vital Signs Last 24 Hrs  T(C): 36.2 (2023 23:43), Max: 36.6 (2023 17:19)  T(F): 97.1 (2023 23:43), Max: 97.9 (2023 17:19)  HR: 78 (2023 23:43) (78 - 103)  BP: 118/74 (2023 23:43) (101/72 - 118/74)  RR: 18 (2023 23:43) (18 - 18)  SpO2: 100% (2023 23:43) (98% - 100%)  Patient On (Oxygen Delivery Method): room air             (2023 02:54)      INFECTIOUS DISEASE HISTORY:  ID consulted for UTI   Pt denies any urinary symptoms     Currently ordered for:  linezolid  IVPB 600 milliGRAM(s) IV Intermittent every 12 hours  meropenem  IVPB 1000 milliGRAM(s) IV Intermittent every 8 hours  meropenem  IVPB          PMH  PAST MEDICAL & SURGICAL HISTORY:  HTN (hypertension)      Diabetes      Hypercholesteremia      Pancreatic cancer      Hydronephrosis      Colostomy status      History of       History of left hip replacement      H/O carpal tunnel repair          FAMILY HISTORY  FH: bladder cancer (Sibling)        SOCIAL HISTORY  Social History:        ROS  General: Denies rigors, nightsweats  HEENT: Denies headache, rhinorrhea, sore throat, eye pain  CV: Denies CP, palpitations  PULM: Denies wheezing, hemoptysis  GI: Denies hematemesis, hematochezia, melena  : Denies discharge, hematuria  MSK: Denies arthralgias, myalgias  SKIN: Denies rash, lesions  NEURO: Denies paresthesias, weakness  PSYCH: Denies depression, anxiety     VITALS:  T(F): 96.4, Max: 97.9 (23 @ 17:19)  HR: 78  BP: 136/63  RR: 18Vital Signs Last 24 Hrs  T(C): 35.8 (2023 04:20), Max: 36.6 (2023 17:19)  T(F): 96.4 (2023 04:20), Max: 97.9 (2023 17:19)  HR: 78 (2023 04:20) (78 - 103)  BP: 136/63 (2023 04:20) (101/72 - 136/63)  BP(mean): --  RR: 18 (2023 04:20) (18 - 18)  SpO2: 95% (2023 04:20) (95% - 100%)    Parameters below as of 2023 04:20  Patient On (Oxygen Delivery Method): room air        PHYSICAL EXAM:  Gen: NAD, resting in bed  HEENT: Normocephalic, atraumatic  Neck: supple, no lymphadenopathy  CV: Regular rate & regular rhythm  Lungs: decreased BS at bases, no fremitus  Abdomen: Soft, BS present no suprapubic tenderness, no CVAT   Ext: Warm, well perfused  Neuro: non focal, awake  Skin: no rash, no erythema  Lines: no phlebitis     TESTS & MEASUREMENTS:                        10.2   7.93  )-----------( 158      ( 2023 19:29 )             32.0     07-    135  |  100  |  30<H>  ----------------------------<  155<H>  3.8   |  20  |  0.6<L>    Ca    9.0      2023 19:29    TPro  5.8<L>  /  Alb  3.5  /  TBili  0.5  /  DBili  x   /  AST  18  /  ALT  8   /  AlkPhos  79  07-      LIVER FUNCTIONS - ( 2023 19:29 )  Alb: 3.5 g/dL / Pro: 5.8 g/dL / ALK PHOS: 79 U/L / ALT: 8 U/L / AST: 18 U/L / GGT: x           Urinalysis Basic - ( 2023 23:02 )    Color: Red / Appearance: Turbid / S.025 / pH: x  Gluc: x / Ketone: Trace  / Bili: Negative / Urobili: <2 mg/dL   Blood: x / Protein: 300 mg/dL / Nitrite: Negative   Leuk Esterase: Moderate / RBC: >720 /HPF / WBC >100 /HPF   Sq Epi: x / Non Sq Epi: x / Bacteria: Many        Culture - Urine (collected 23 @ 15:18)  Source: Clean Catch Clean Catch (Midstream)  Final Report (23 @ 18:18):    >100,000 CFU/ml Enterococcus faecium (vancomycin resistant)    <10,000 CFU/ml Normal Urogenital cabrera present  Organism: Enterococcus faecium (vancomycin resistant) (23 @ 18:18)  Organism: Enterococcus faecium (vancomycin resistant) (23 @ 18:18)      Method Type: MARK      -  Ampicillin: R >8 Predicts results to ampicillin/sulbactam, amoxacillin-clavulanate and  piperacillin-tazobactam.      -  Ciprofloxacin: R >2      -  Daptomycin: SDD 2 The breakpoint for SDD (susceptible dose dependent)is based on a dosage regimen of 8-12 mg/kg administered every 24 h in adults and is intended for serious infections due to E. faecium. Consultation with an infectious diseases specialist is recommended.      -  Levofloxacin: R >4      -  Linezolid: S 2      -  Nitrofurantoin: I 64 Should not be used to treat pyelonephritis.      -  Tetracycline: R >8      -  Vancomycin: R >16    Culture - Blood (collected 23 @ 11:59)  Source: .Blood None  Final Report (23 @ 23:00):    No Growth Final    Culture - Urine (collected 23 @ 10:13)  Source: Clean Catch Clean Catch (Midstream)  Final Report (23 @ 14:58):    50,000 - 99,000 CFU/mL Pseudomonas aeruginosa    50,000 - 99,000 CFU/mL Enterococcus faecium (vancomycin resistant)  Organism: Pseudomonas aeruginosa  Enterococcus faecium (vancomycin resistant) (23 @ 14:58)  Organism: Enterococcus faecium (vancomycin resistant) (23 @ 14:58)      Method Type: MARK      -  Ampicillin: R >8 Predicts results to ampicillin/sulbactam, amoxacillin-clavulanate and  piperacillin-tazobactam.      -  Ciprofloxacin: R >2      -  Daptomycin: SDD 2 The breakpoint for SDD (susceptible dose dependent)is based on a dosage regimen of 8-12 mg/kg administered every 24 h in adults and is intended for serious infections due to E. faecium. Consultation with an infectious diseases specialist is recommended.      -  Levofloxacin: R >4      -  Linezolid: S 1      -  Nitrofurantoin: R >64 Should not be used to treat pyelonephritis.      -  Tetracycline: R >8      -  Vancomycin: R >16  Organism: Pseudomonas aeruginosa (23 @ 14:58)      Method Type: MARK      -  Amikacin: S <=16      -  Aztreonam: S <=4      -  Cefepime: S <=2      -  Ceftazidime: S 4      -  Ciprofloxacin: S <=0.25      -  Gentamicin: S 4      -  Imipenem: S <=1      -  Levofloxacin: S <=0.5      -  Meropenem: S <=1      -  Piperacillin/Tazobactam: S <=8      -  Tobramycin: S <=2    Culture - Urine (collected 23 @ 01:20)  Source: Clean Catch Clean Catch (Midstream)  Final Report (23 @ 20:11):    >100,000 CFU/ml Pseudomonas aeruginosa    >100,000 CFU/ml Enterococcus faecium (vancomycin resistant)  Organism: Pseudomonas aeruginosa  Enterococcus faecium (vancomycin resistant) (23 @ 20:11)  Organism: Enterococcus faecium (vancomycin resistant) (23 @ 20:11)      Method Type: MARK      -  Ampicillin: R >8 Predicts results to ampicillin/sulbactam, amoxacillin-clavulanate and  piperacillin-tazobactam.      -  Ciprofloxacin: R >2      -  Daptomycin: SDD 2 The breakpoint for SDD (susceptible dose dependent)is based on a dosage regimen of 8-12 mg/kg administered every 24 h in adults and is intended for serious infections due to E. faecium. Consultation with an infectious diseases specialist is recommended.      -  Levofloxacin: R >4      -  Linezolid: S 2      -  Nitrofurantoin: R >64 Should not be used to treat pyelonephritis.      -  Tetracycline: R >8      -  Vancomycin: R >16  Organism: Pseudomonas aeruginosa (23 @ 20:11)      Method Type: MARK      -  Amikacin: S <=16      -  Aztreonam: S <=4      -  Cefepime: S <=2      -  Ceftazidime: S 4      -  Ciprofloxacin: S <=0.25      -  Gentamicin: S 4      -  Imipenem: S 2      -  Levofloxacin: S <=0.5      -  Meropenem: S <=1      -  Piperacillin/Tazobactam: S <=8      -  Tobramycin: S <=2            INFECTIOUS DISEASES TESTING      INFLAMMATORY MARKERS      RADIOLOGY & ADDITIONAL TESTS:  I have personally reviewed the radiographic imaging    CARDIOLOGY TESTING  EKG reviewed if performed    MEDICATIONS  atorvastatin 20 Oral at bedtime  dextrose 5%. 1000 IV Continuous <Continuous>  dextrose 5%. 1000 IV Continuous <Continuous>  dextrose 50% Injectable 25 IV Push once  dextrose 50% Injectable 25 IV Push once  dextrose 50% Injectable 12.5 IV Push once  glucagon  Injectable 1 IntraMuscular once  heparin   Injectable 5000 SubCutaneous every 12 hours  insulin lispro (ADMELOG) corrective regimen sliding scale  SubCutaneous three times a day before meals  lactated ringers. 1000 IV Continuous <Continuous>  linezolid  IVPB 600 IV Intermittent every 12 hours  meropenem  IVPB     meropenem  IVPB 1000 IV Intermittent every 8 hours  pantoprazole    Tablet 40 Oral before breakfast  tamsulosin 0.4 Oral at bedtime        ANTIBIOTICS:  linezolid  IVPB 600 milliGRAM(s) IV Intermittent every 12 hours  meropenem  IVPB      meropenem  IVPB 1000 milliGRAM(s) IV Intermittent every 8 hours      ALLERGIES:  No Known Allergies

## 2023-07-06 NOTE — PROGRESS NOTE ADULT - ASSESSMENT
82-year-old female with past medical history of hypertension, diabetes, hyperlipidemia, pancreatic adenocarcinoma status post transverse loop colostomy, right hydronephrosis s/p ureteral stent presenting for a UTI VRE of 1 month duration failed OP treatment and as per the chart was supposed to have a PICC line but procedure delayed presents for IV antibiotics treatment. Last urine culture in 05/25 showed VRE sensitive only to zyvox. Patient is a poor historian reported that suprapubic abdominal discomfort started 1 month ago and the patient was unsuccessfully treated with different antibiotics. Patient still complains of suprapubic discomfort and burning, and lower back pain, Patient reports mild epigastric discomfort today . Patient denies any SOB, CP, NV, diarrhea, constipation fever, respiratory symptoms.     Patient was done through the OP MedRec, Patient is not aware of the medications she take , please confirm MedRec in AM with [facility], [phone].    # VRE UTI failed OP therapy  # possible CAUTI    - UA grossly positive for RBC and WBC   - CTA chest Abdomen pelvis showed::  Decreased mild right hydronephrosis, post double-J ureteral stent placement, with persistent increased enhancement of ureter, suspicious for urinary tract infection.  Previous CTAP showed: Right double-J ureteral stent with stable moderate hydroureter.  - 05/25   Urine Cx showed VRE only sensitive to zyvox   - FU Blood and urine culture   -UA positive  -Per ID, seems colonized w VRE but asymptomatic, monitor off abx    # Pancreatic mass with mets   # Mets possibly to the lungs with large LL pleural effusion   - CTA chest Abdomen pelvis showed:  * Unchanged ill-defined pancreatic mass.  *  Increased moderate-to-large left pleural effusion with overlying compressive atelectasis. New small right pleural effusion. Unchanged right lung pulmonary nodules, previously characterized on 2/16/2023 PET/CT.  - Previous CTAP showed   * Soft tissue mass adjacent to the sigmoid colon measuring approximately 3.1 x 2.5 cm.  * Right double-J ureteral stent with stable moderate hydroureter.  * Grossly stable ill-defined mass at the pancreatic head, consistent with known pancreatic neoplasm  - L pleural effusion tapped by pulm, f/u cytology to r/o malignancy  - FU Hem onc - they will discuss starting chemo tomorrow       # Anemia :   -  Labs noticeable for Hb 10.2 at baseline,    - FU iron studies b12 folate     # diabetes:   -last   - ISS       # DVT proph: heparin   # Gi proph: protonix   # Dispo: inpatient floor   # Diet: DASH, TLC carb consist     82-year-old female with past medical history of hypertension, diabetes, hyperlipidemia, pancreatic adenocarcinoma status post transverse loop colostomy, right hydronephrosis s/p ureteral stent presenting for a UTI VRE of 1 month duration failed OP treatment and as per the chart was supposed to have a PICC line but procedure delayed presents for IV antibiotics treatment. Last urine culture in 05/25 showed VRE sensitive only to zyvox. Patient is a poor historian reported that suprapubic abdominal discomfort started 1 month ago and the patient was unsuccessfully treated with different antibiotics. Patient still complains of suprapubic discomfort and burning, and lower back pain, Patient reports mild epigastric discomfort today . Patient denies any SOB, CP, NV, diarrhea, constipation fever, respiratory symptoms.     Patient was done through the OP MedRec, Patient is not aware of the medications she take , please confirm MedRec in AM with [facility], [phone].    # VRE UTI failed OP therapy  # possible CAUTI    - UA grossly positive for RBC and WBC   - CTA chest Abdomen pelvis showed::  Decreased mild right hydronephrosis, post double-J ureteral stent placement, with persistent increased enhancement of ureter, suspicious for urinary tract infection.  Previous CTAP showed: Right double-J ureteral stent with stable moderate hydroureter.  - 05/25   Urine Cx showed VRE only sensitive to zyvox   - FU Blood and urine culture   -UA positive  -Per ID, seems colonized w VRE but asymptomatic, monitor off abx  - f/u uro consult    # Pancreatic mass with mets   # Mets possibly to the lungs with large LL pleural effusion   - CTA chest Abdomen pelvis showed:  * Unchanged ill-defined pancreatic mass.  *  Increased moderate-to-large left pleural effusion with overlying compressive atelectasis. New small right pleural effusion. Unchanged right lung pulmonary nodules, previously characterized on 2/16/2023 PET/CT.  - Previous CTAP showed   * Soft tissue mass adjacent to the sigmoid colon measuring approximately 3.1 x 2.5 cm.  * Right double-J ureteral stent with stable moderate hydroureter.  * Grossly stable ill-defined mass at the pancreatic head, consistent with known pancreatic neoplasm  - L pleural effusion tapped by pulm, f/u cytology to r/o malignancy  - FU Hem onc - they will discuss starting chemo tomorrow       # Anemia :   -  Labs noticeable for Hb 10.2 at baseline,    - FU iron studies b12 folate     # diabetes:   -last   - ISS       # DVT proph: heparin   # Gi proph: protonix   # Dispo: inpatient floor   # Diet: DASH, TLC carb consist

## 2023-07-06 NOTE — H&P ADULT - NSHPPHYSICALEXAM_GEN_ALL_CORE
General: AOx4  Lungs: GBAE, no crackles or wheezing   Heart: RRR, no murmur   Abdomen: SOft, non-tender, non-distended   Extemities: + PP, no pulses General: AOx4  Lungs: GBAE, no crackles or wheezing   Heart: RRR, no murmur   Abdomen: Soft, non-tender, non-distended   Extremities: + PP, no pulses

## 2023-07-06 NOTE — CONSULT NOTE ADULT - SUBJECTIVE AND OBJECTIVE BOX
LENGTH OF HOSPITAL STAY: 1d    CHIEF COMPLAINT:   Patient is a 82y old  Female who presents with a chief complaint of     HISTORY OF PRESENTING ILLNESS:    HPI:  82-year-old female with past medical history of hypertension, diabetes, hyperlipidemia, pancreatic adenocarcinoma status post transverse loop colostomy, right hydronephrosis s/p ureteral stent presenting for a UTI VRE of 1 month duration failed OP treatment and as per the chart was supposed to have a PICC line but procedure delayed presents for IV antibiotics treatment. Last urine culture in  showed VRE sensitive only to zyvox. Patient is a poor historian reported that suprapubic abdominal discomfort started 1 month ago and the patient was unsuccessfully treated with different antibiotics. Patient still complains of suprapubic discomfort and burning, and lower back pain, Patient reports mild epigastric discomfort today . Patient denies any SOB, CP, NV, diarrhea, constipation fever, respiratory symptoms.     Patient was done through the OP MedRec, Patient is not aware of the medications she take , please confirm MedRec in AM [facility], [phone].      Labs noticeable for Hb 10.2 at baseline,   UA grossly positive for RBC and WBC   CTA chest Abdomen pelvis showed:    * Decreased mild right hydronephrosis, post double-J ureteral stent placement, with persistent increased enhancement of ureter, suspicious for urinary tract infection.  * Unchanged ill-defined pancreatic mass.  *  Increased moderate-to-large left pleural effusion with overlying compressive atelectasis. New small right pleural effusion. Unchanged right lung pulmonary nodules, previously characterized on 2023 PET/CT.    Previous CTAP showed   * Soft tissue mass adjacent to the sigmoid colon measuring approximately 3.1 x 2.5 cm.  * Right double-J ureteral stent with stable moderate hydroureter.  * Grossly stable ill-defined mass at the pancreatic head, consistent with known pancreatic neoplasm.    VS insignificant     Vital Signs Last 24 Hrs  T(C): 36.2 (2023 23:43), Max: 36.6 (2023 17:19)  T(F): 97.1 (2023 23:43), Max: 97.9 (2023 17:19)  HR: 78 (2023 23:43) (78 - 103)  BP: 118/74 (2023 23:43) (101/72 - 118/74)  RR: 18 (2023 23:43) (18 - 18)  SpO2: 100% (2023 23:43) (98% - 100%)  Patient On (Oxygen Delivery Method): room air             (2023 02:54)    PAST MEDICAL & SURGICAL HISTORY  PAST MEDICAL & SURGICAL HISTORY:  HTN (hypertension)      Diabetes      Hypercholesteremia      Pancreatic cancer      Hydronephrosis      Colostomy status      History of       History of left hip replacement      H/O carpal tunnel repair        SOCIAL HISTORY:    ALLERGIES:  No Known Allergies    MEDICATIONS:  STANDING MEDICATIONS  atorvastatin 20 milliGRAM(s) Oral at bedtime  dextrose 5%. 1000 milliLiter(s) IV Continuous <Continuous>  dextrose 5%. 1000 milliLiter(s) IV Continuous <Continuous>  dextrose 50% Injectable 25 Gram(s) IV Push once  dextrose 50% Injectable 12.5 Gram(s) IV Push once  dextrose 50% Injectable 25 Gram(s) IV Push once  glucagon  Injectable 1 milliGRAM(s) IntraMuscular once  heparin   Injectable 5000 Unit(s) SubCutaneous every 12 hours  insulin lispro (ADMELOG) corrective regimen sliding scale   SubCutaneous three times a day before meals  lactated ringers. 1000 milliLiter(s) IV Continuous <Continuous>  lactated ringers. 1000 milliLiter(s) IV Continuous <Continuous>  linezolid  IVPB 600 milliGRAM(s) IV Intermittent every 12 hours  meropenem  IVPB      meropenem  IVPB 1000 milliGRAM(s) IV Intermittent every 8 hours  pantoprazole    Tablet 40 milliGRAM(s) Oral before breakfast  tamsulosin 0.4 milliGRAM(s) Oral at bedtime    PRN MEDICATIONS  dextrose Oral Gel 15 Gram(s) Oral once PRN    VITALS:   T(F): 96.4  HR: 78  BP: 136/63  RR: 18  SpO2: 95%    LABS:                        10.2   7.93  )-----------( 158      ( 2023 19:29 )             32.0     07-05    135  |  100  |  30<H>  ----------------------------<  155<H>  3.8   |  20  |  0.6<L>    Ca    9.0      2023 19:29    TPro  5.8<L>  /  Alb  3.5  /  TBili  0.5  /  DBili  x   /  AST  18  /  ALT  8   /  AlkPhos  79  -      Urinalysis Basic - ( 2023 23:02 )    Color: Red / Appearance: Turbid / S.025 / pH: x  Gluc: x / Ketone: Trace  / Bili: Negative / Urobili: <2 mg/dL   Blood: x / Protein: 300 mg/dL / Nitrite: Negative   Leuk Esterase: Moderate / RBC: >720 /HPF / WBC >100 /HPF   Sq Epi: x / Non Sq Epi: x / Bacteria: Many        Troponin T, Serum: <0.01 ng/mL (23 @ 19:29)      CARDIAC MARKERS ( 2023 19:29 )  x     / <0.01 ng/mL / x     / x     / x               LENGTH OF HOSPITAL STAY: 1d    CHIEF COMPLAINT:   Patient is a 82y old  Female who presents with a chief complaint of     HISTORY OF PRESENTING ILLNESS:    HPI:  82-year-old female with past medical history of hypertension, diabetes, hyperlipidemia, pancreatic adenocarcinoma status post transverse loop colostomy, right hydronephrosis s/p ureteral stent presenting for a UTI VRE of 1 month duration failed OP treatment and as per the chart was supposed to have a PICC line but procedure delayed presents for IV antibiotics treatment. Last urine culture in  showed VRE sensitive only to Zyvox. Patient is a poor historian reported that suprapubic abdominal discomfort started 1 month ago and the patient was unsuccessfully treated with different antibiotics. Patient still complains of suprapubic discomfort and burning, and lower back pain, Patient reports mild epigastric discomfort today . Patient denies any SOB, CP, NV, diarrhea, constipation fever, respiratory symptoms.     Patient was done through the OP MedRec, Patient is not aware of the medications she takes , please, confirm MedRec in AM with [facility], [phone].      Labs noticeable for Hb 10.2 at baseline,   UA grossly positive for RBC and WBC   CTA chest Abdomen pelvis showed:    * Decreased mild right hydronephrosis, post double-J ureteral stent placement, with persistent increased enhancement of ureter, suspicious for urinary tract infection.  * Unchanged ill-defined pancreatic mass.  *  Increased moderate-to-large left pleural effusion with overlying compressive atelectasis. New small right pleural effusion. Unchanged right lung pulmonary nodules, previously characterized on 2023 PET/CT.    Previous CTAP showed   * Soft tissue mass adjacent to the sigmoid colon measuring approximately 3.1 x 2.5 cm.  * Right double-J ureteral stent with stable moderate hydroureter.  * Grossly stable ill-defined mass at the pancreatic head, consistent with known pancreatic neoplasm.    VS insignificant     Vital Signs Last 24 Hrs  T(C): 36.2 (2023 23:43), Max: 36.6 (2023 17:19)  T(F): 97.1 (2023 23:43), Max: 97.9 (2023 17:19)  HR: 78 (2023 23:43) (78 - 103)  BP: 118/74 (2023 23:43) (101/72 - 118/74)  RR: 18 (2023 23:43) (18 - 18)  SpO2: 100% (2023 23:43) (98% - 100%)  Patient On (Oxygen Delivery Method): room air             (2023 02:54)    PAST MEDICAL & SURGICAL HISTORY  PAST MEDICAL & SURGICAL HISTORY:  HTN (hypertension)      Diabetes      Hypercholesteremia      Pancreatic cancer      Hydronephrosis      Colostomy status      History of       History of left hip replacement      H/O carpal tunnel repair        SOCIAL HISTORY:    ALLERGIES:  No Known Allergies    MEDICATIONS:  STANDING MEDICATIONS  atorvastatin 20 milliGRAM(s) Oral at bedtime  dextrose 5%. 1000 milliLiter(s) IV Continuous <Continuous>  dextrose 5%. 1000 milliLiter(s) IV Continuous <Continuous>  dextrose 50% Injectable 25 Gram(s) IV Push once  dextrose 50% Injectable 12.5 Gram(s) IV Push once  dextrose 50% Injectable 25 Gram(s) IV Push once  glucagon  Injectable 1 milliGRAM(s) IntraMuscular once  heparin   Injectable 5000 Unit(s) SubCutaneous every 12 hours  insulin lispro (ADMELOG) corrective regimen sliding scale   SubCutaneous three times a day before meals  lactated ringers. 1000 milliLiter(s) IV Continuous <Continuous>  lactated ringers. 1000 milliLiter(s) IV Continuous <Continuous>  linezolid  IVPB 600 milliGRAM(s) IV Intermittent every 12 hours  meropenem  IVPB      meropenem  IVPB 1000 milliGRAM(s) IV Intermittent every 8 hours  pantoprazole    Tablet 40 milliGRAM(s) Oral before breakfast  tamsulosin 0.4 milliGRAM(s) Oral at bedtime    PRN MEDICATIONS  dextrose Oral Gel 15 Gram(s) Oral once PRN    VITALS:   T(F): 96.4  HR: 78  BP: 136/63  RR: 18  SpO2: 95%    LABS:                        10.2   7.93  )-----------( 158      ( 2023 19:29 )             32.0     07-05    135  |  100  |  30<H>  ----------------------------<  155<H>  3.8   |  20  |  0.6<L>    Ca    9.0      2023 19:29    TPro  5.8<L>  /  Alb  3.5  /  TBili  0.5  /  DBili  x   /  AST  18  /  ALT  8   /  AlkPhos  79  -      Urinalysis Basic - ( 2023 23:02 )    Color: Red / Appearance: Turbid / S.025 / pH: x  Gluc: x / Ketone: Trace  / Bili: Negative / Urobili: <2 mg/dL   Blood: x / Protein: 300 mg/dL / Nitrite: Negative   Leuk Esterase: Moderate / RBC: >720 /HPF / WBC >100 /HPF   Sq Epi: x / Non Sq Epi: x / Bacteria: Many        Troponin T, Serum: <0.01 ng/mL (23 @ 19:29)      CARDIAC MARKERS ( 2023 19:29 )  x     / <0.01 ng/mL / x     / x     / x

## 2023-07-06 NOTE — PROGRESS NOTE ADULT - SUBJECTIVE AND OBJECTIVE BOX
24H events:    Patient is a 82y old Female who presents with a chief complaint of   Primary diagnosis of VRE infection (vancomycin resistant Enterococcus)          Today is hospital day 1d. This morning patient was seen and examined at bedside, resting comfortably in bed.    No acute or major events overnight. received smith in ED.    Code Status:    Family communication:  Contact date:  Name of person contacted:  Relationship to patient:  Communication details:  What matters most:    PAST MEDICAL & SURGICAL HISTORY  HTN (hypertension)    Diabetes    Hypercholesteremia    Pancreatic cancer    Hydronephrosis    Colostomy status    History of     History of left hip replacement    H/O carpal tunnel repair      SOCIAL HISTORY:  Social History:      ALLERGIES:  No Known Allergies    MEDICATIONS:  STANDING MEDICATIONS  atorvastatin 20 milliGRAM(s) Oral at bedtime  dextrose 5%. 1000 milliLiter(s) IV Continuous <Continuous>  dextrose 5%. 1000 milliLiter(s) IV Continuous <Continuous>  dextrose 50% Injectable 25 Gram(s) IV Push once  dextrose 50% Injectable 12.5 Gram(s) IV Push once  dextrose 50% Injectable 25 Gram(s) IV Push once  glucagon  Injectable 1 milliGRAM(s) IntraMuscular once  heparin   Injectable 5000 Unit(s) SubCutaneous every 12 hours  insulin lispro (ADMELOG) corrective regimen sliding scale   SubCutaneous three times a day before meals  lactated ringers. 1000 milliLiter(s) IV Continuous <Continuous>  lactated ringers. 1000 milliLiter(s) IV Continuous <Continuous>  linezolid  IVPB 600 milliGRAM(s) IV Intermittent every 12 hours  pantoprazole    Tablet 40 milliGRAM(s) Oral before breakfast  tamsulosin 0.4 milliGRAM(s) Oral at bedtime    PRN MEDICATIONS  dextrose Oral Gel 15 Gram(s) Oral once PRN    VITALS:   T(F): 95.6  HR: 83  BP: 102/53  RR: 18  SpO2: 95%    PHYSICAL EXAM:  GENERAL:   ( x ) NAD, lying in bed comfortably     (  ) obtunded     (  ) lethargic     (  ) somnolent    HEAD:   (x  ) Atraumatic     (  ) hematoma     (  ) laceration (specify location:       )     NECK:  (  ) Supple     (  ) neck stiffness     (  ) nuchal rigidity     (  )  no JVD     (  ) JVD present ( -- cm)    HEART:  Rate -->     ( x ) normal rate     (  ) bradycardic     (  ) tachycardic  Rhythm -->     (x  ) regular     (  ) regularly irregular     (  ) irregularly irregular  Murmurs -->     ( x ) normal s1s2     (  ) systolic murmur     (  ) diastolic murmur     (  ) continuous murmur      (  ) S3 present     (  ) S4 present    LUNGS:   (  )Unlabored respirations     (  ) tachypnea  (  ) B/L air entry     (  ) decreased breath sounds in:  (location     )    (  ) no adventitious sound     (  ) crackles     (  ) wheezing      (  ) rhonchi      (specify location:       )  (  ) chest wall tenderness (specify location:       )    ABDOMEN:   (  ) Soft     (  ) tense   |   (  ) nondistended     (  ) distended   |   (  ) +BS     (  ) hypoactive bowel sounds     (  ) hyperactive bowel sounds  (  ) nontender     (  ) RUQ tenderness     (  ) RLQ tenderness     (  ) LLQ tenderness     (  ) epigastric tenderness     (  ) diffuse tenderness  (  ) Splenomegaly      (  ) Hepatomegaly      (  ) Jaundice     (  ) ecchymosis     EXTREMITIES: 2+ peripheral pulses bilaterally. No clubbing, cyanosis, or edema  (  ) Normal     (  ) Rash     (  ) ecchymosis     (  ) varicose veins      (  ) pitting edema     (  ) non-pitting edema   (  ) ulceration     (  ) gangrene:     (location:     )    NERVOUS SYSTEM:    (  ) A&Ox3     (  ) confused     (  ) lethargic  CN II-XII:     (  ) Intact     (  ) deficits found     (Specify:     )   Upper extremities:     (  ) no sensorimotor deficits     (  ) weakness     (  ) loss of proprioception/vibration     (  ) loss of touch/temperature (specify:    )  Lower extremities:     (  ) no sensorimotor deficits     (  ) weakness     (  ) loss of proprioception/vibration     (  ) loss of touch/temperature (specify:    )    SKIN:   (  ) No rashes or lesions     (  ) maculopapular rash     (  ) pustules     (  ) vesicles     (  ) ulcer     (  ) ecchymosis     (specify location:     )        (  ) Indwelling Smith Catheter:   Date insterted:    Reason (  ) Critical illness     (  ) urinary retention    (  ) Accurate Ins/Outs Monitoring     (  ) CMO patient    (  ) Central Line:   Date inserted:  Location: (  ) Right IJ     (  ) Left IJ     (  ) Right Fem     (  ) Left Fem    (  ) SPC        (  ) pigtail       (  ) PEG tube       (  ) colostomy       (  ) jejunostomy  (  ) U-Dall    LABS:                        10.2   7.93  )-----------( 158      ( 2023 19:29 )             32.0     07-05    135  |  100  |  30<H>  ----------------------------<  155<H>  3.8   |  20  |  0.6<L>    Ca    9.0      2023 19:29    TPro  5.8<L>  /  Alb  3.5  /  TBili  0.5  /  DBili  x   /  AST  18  /  ALT  8   /  AlkPhos  79  -      Urinalysis Basic - ( 2023 23:02 )    Color: Red / Appearance: Turbid / S.025 / pH: x  Gluc: x / Ketone: Trace  / Bili: Negative / Urobili: <2 mg/dL   Blood: x / Protein: 300 mg/dL / Nitrite: Negative   Leuk Esterase: Moderate / RBC: >720 /HPF / WBC >100 /HPF   Sq Epi: x / Non Sq Epi: x / Bacteria: Many        Troponin T, Serum: <0.01 ng/mL (23 @ 19:29)      CARDIAC MARKERS ( 2023 19:29 )  x     / <0.01 ng/mL / x     / x     / x          RADIOLOGY:           24H events:    Patient is a 82y old Female who presents with a chief complaint of   Primary diagnosis of VRE infection (vancomycin resistant Enterococcus)          Today is hospital day 1d. This morning patient was seen and examined at bedside, resting comfortably in bed.    No acute or major events overnight. received smith in ED.    Code Status:    Family communication:  Contact date:  Name of person contacted:  Relationship to patient:  Communication details:  What matters most:    PAST MEDICAL & SURGICAL HISTORY  HTN (hypertension)    Diabetes    Hypercholesteremia    Pancreatic cancer    Hydronephrosis    Colostomy status    History of     History of left hip replacement    H/O carpal tunnel repair      SOCIAL HISTORY:  Social History:      ALLERGIES:  No Known Allergies    MEDICATIONS:  STANDING MEDICATIONS  atorvastatin 20 milliGRAM(s) Oral at bedtime  dextrose 5%. 1000 milliLiter(s) IV Continuous <Continuous>  dextrose 5%. 1000 milliLiter(s) IV Continuous <Continuous>  dextrose 50% Injectable 25 Gram(s) IV Push once  dextrose 50% Injectable 12.5 Gram(s) IV Push once  dextrose 50% Injectable 25 Gram(s) IV Push once  glucagon  Injectable 1 milliGRAM(s) IntraMuscular once  heparin   Injectable 5000 Unit(s) SubCutaneous every 12 hours  insulin lispro (ADMELOG) corrective regimen sliding scale   SubCutaneous three times a day before meals  lactated ringers. 1000 milliLiter(s) IV Continuous <Continuous>  lactated ringers. 1000 milliLiter(s) IV Continuous <Continuous>  linezolid  IVPB 600 milliGRAM(s) IV Intermittent every 12 hours  pantoprazole    Tablet 40 milliGRAM(s) Oral before breakfast  tamsulosin 0.4 milliGRAM(s) Oral at bedtime    PRN MEDICATIONS  dextrose Oral Gel 15 Gram(s) Oral once PRN    VITALS:   T(F): 95.6  HR: 83  BP: 102/53  RR: 18  SpO2: 95%    PHYSICAL EXAM:  GENERAL:   ( x ) NAD, lying in bed comfortably     (  ) obtunded     (  ) lethargic     (  ) somnolent    HEAD:   (x  ) Atraumatic     (  ) hematoma     (  ) laceration (specify location:       )     NECK:  (  ) Supple     (  ) neck stiffness     (  ) nuchal rigidity     (  )  no JVD     (  ) JVD present ( -- cm)    HEART:  Rate -->     ( x ) normal rate     (  ) bradycardic     (  ) tachycardic  Rhythm -->     (x  ) regular     (  ) regularly irregular     (  ) irregularly irregular  Murmurs -->     ( x ) normal s1s2     (  ) systolic murmur     (  ) diastolic murmur     (  ) continuous murmur      (  ) S3 present     (  ) S4 present    LUNGS:   ( x )Unlabored respirations     (  ) tachypnea  (  ) B/L air entry     ( x ) decreased breath sounds in:  (location left lower    )    (  ) no adventitious sound     (  ) crackles     (  ) wheezing      (  ) rhonchi      (specify location:       )  (  ) chest wall tenderness (specify location:       )    ABDOMEN:   ( x ) Soft     (  ) tense   |   (x  ) nondistended     (  ) distended   |   (  ) +BS     (  ) hypoactive bowel sounds     (  ) hyperactive bowel sounds  (  ) nontender     (  ) RUQ tenderness     (  ) RLQ tenderness     (  ) LLQ tenderness     (  ) epigastric tenderness     (  ) diffuse tenderness  (  ) Splenomegaly      (  ) Hepatomegaly      (  ) Jaundice     (  ) ecchymosis     EXTREMITIES: 2+ peripheral pulses bilaterally. No clubbing, cyanosis, or edema  ( x ) Normal     (  ) Rash     (  ) ecchymosis     (  ) varicose veins      (  ) pitting edema     (  ) non-pitting edema   (  ) ulceration     (  ) gangrene:     (location:     )    NERVOUS SYSTEM:    ( x ) A&Ox3     (  ) confused     (  ) lethargic  CN II-XII:     (  ) Intact     (  ) deficits found     (Specify:     )   Upper extremities:     (  ) no sensorimotor deficits     (  ) weakness     (  ) loss of proprioception/vibration     (  ) loss of touch/temperature (specify:    )  Lower extremities:     (  ) no sensorimotor deficits     (  ) weakness     (  ) loss of proprioception/vibration     (  ) loss of touch/temperature (specify:    )    SKIN:   (  ) No rashes or lesions     (  ) maculopapular rash     (  ) pustules     (  ) vesicles     (  ) ulcer     (  ) ecchymosis     (specify location:     )        (x  ) Indwelling Smith Catheter:  inserted in ED        LABS:                        10.2   7.93  )-----------( 158      ( 2023 19:29 )             32.0     07-05    135  |  100  |  30<H>  ----------------------------<  155<H>  3.8   |  20  |  0.6<L>    Ca    9.0      2023 19:29    TPro  5.8<L>  /  Alb  3.5  /  TBili  0.5  /  DBili  x   /  AST  18  /  ALT  8   /  AlkPhos  79        Urinalysis Basic - ( 2023 23:02 )    Color: Red / Appearance: Turbid / S.025 / pH: x  Gluc: x / Ketone: Trace  / Bili: Negative / Urobili: <2 mg/dL   Blood: x / Protein: 300 mg/dL / Nitrite: Negative   Leuk Esterase: Moderate / RBC: >720 /HPF / WBC >100 /HPF   Sq Epi: x / Non Sq Epi: x / Bacteria: Many        Troponin T, Serum: <0.01 ng/mL (23 @ 19:29)      CARDIAC MARKERS ( 2023 19:29 )  x     / <0.01 ng/mL / x     / x     / x          RADIOLOGY:    CXR  - Slight interval decrease in left effusion postthoracentesis. No   pneumothorax.    Developing right basilar opacity/effusion.

## 2023-07-06 NOTE — MEDICAL STUDENT PROGRESS NOTE(EDUCATION) - SUBJECTIVE AND OBJECTIVE BOX
SUBJECTIVE:    Patient is a 82y old Female who presents with a chief complaint of UTI   Currently admitted to medicine with the primary diagnosis of VRE infection (vancomycin resistant Enterococcus)       Today is hospital day 1d. This morning she is resting comfortably in bed and reports no new issues or overnight events.     PAST MEDICAL & SURGICAL HISTORY  HTN (hypertension)    Diabetes    Hypercholesteremia    Pancreatic cancer    Hydronephrosis    Colostomy status    History of     History of left hip replacement    H/O carpal tunnel repair      SOCIAL HISTORY:  Negative for smoking/alcohol/drug use.     ALLERGIES:  No Known Allergies    MEDICATIONS:  STANDING MEDICATIONS  atorvastatin 20 milliGRAM(s) Oral at bedtime  dextrose 5%. 1000 milliLiter(s) IV Continuous <Continuous>  dextrose 5%. 1000 milliLiter(s) IV Continuous <Continuous>  dextrose 50% Injectable 25 Gram(s) IV Push once  dextrose 50% Injectable 12.5 Gram(s) IV Push once  dextrose 50% Injectable 25 Gram(s) IV Push once  glucagon  Injectable 1 milliGRAM(s) IntraMuscular once  heparin   Injectable 5000 Unit(s) SubCutaneous every 12 hours  insulin lispro (ADMELOG) corrective regimen sliding scale   SubCutaneous three times a day before meals  lactated ringers. 1000 milliLiter(s) IV Continuous <Continuous>  lactated ringers. 1000 milliLiter(s) IV Continuous <Continuous>  linezolid  IVPB 600 milliGRAM(s) IV Intermittent every 12 hours  pantoprazole    Tablet 40 milliGRAM(s) Oral before breakfast  tamsulosin 0.4 milliGRAM(s) Oral at bedtime    PRN MEDICATIONS  dextrose Oral Gel 15 Gram(s) Oral once PRN    VITALS:   T(F): 95.6  HR: 83  BP: 102/53  RR: 18  SpO2: 95%    LABS:                        10.2   7.93  )-----------( 158      ( 2023 19:29 )             32.0     07-05    135  |  100  |  30<H>  ----------------------------<  155<H>  3.8   |  20  |  0.6<L>    Ca    9.0      2023 19:29    TPro  5.8<L>  /  Alb  3.5  /  TBili  0.5  /  DBili  x   /  AST  18  /  ALT  8   /  AlkPhos  79  07-05      Urinalysis Basic - ( 2023 23:02 )    Color: Red / Appearance: Turbid / S.025 / pH: x  Gluc: x / Ketone: Trace  / Bili: Negative / Urobili: <2 mg/dL   Blood: x / Protein: 300 mg/dL / Nitrite: Negative   Leuk Esterase: Moderate / RBC: >720 /HPF / WBC >100 /HPF   Sq Epi: x / Non Sq Epi: x / Bacteria: Many            Culture - Body Fluid with Gram Stain (collected 2023 10:20)  Source: Pleural Fl Pleural Fluid  Gram Stain (2023 18:48):    polymorphonuclear leukocytes seen    No organisms seen    by cytocentrifuge      CARDIAC MARKERS ( 2023 19:29 )  x     / <0.01 ng/mL / x     / x     / x          RADIOLOGY:  < from: Xray Chest 1 View- PORTABLE-Urgent (Xray Chest 1 View- PORTABLE-Urgent .) (23 @ 11:35) >  Impression:    Slight interval decrease in left effusion postthoracentesis. No   pneumothorax.      PHYSICAL EXAM: done 7:15AM 23  GEN: No acute distress  LUNGS: Right side clear to auscultation, Decreased Breath sounds in Lower left lobe   HEART: Regular, no murmurs  ABD: Soft, non-tender, non-distended.    Intravenous access:   NG tube:   Smith Catheter:   Indwelling Urethral Catheter:     Connect To:  Straight Drainage/[place]    Indication:  Urinary Retention / Obstruction (23 @ 22:27) (not performed) [Active]    ROS:   HEENT: Denies headaches, denies changes in vision, denies congestion  Heart: Denies chest pain, denies palpitations, reports chest tightness  Lungs: denies cough, denies SOB, denies wheezing  GI: Denies N/V, concerned of constipation however her last meal was 2 days ago, decreased appetite   : smith catheter in place, denies any burning or suprapubic pain       SUBJECTIVE:    Patient is a 82y old Female who presents with a chief complaint of UTI   Currently admitted to medicine with the primary diagnosis of VRE infection (vancomycin resistant Enterococcus)       Today is hospital day 1d. This morning she is resting comfortably in bed and reports no new issues or overnight events.     Patient is concerned on whether or not she is constipated as her colostomy bag has been empty for a few hours, however she reports she has not eaten anything in 2 days. She is also concerned she is developing a new bed sore, she has an old bed sore that is healing on mid L back. She is also concerned with the color of her urine but it is unable to determine if any imporvement as her smith bag has not been changed for some time.     PAST MEDICAL & SURGICAL HISTORY  HTN (hypertension)    Diabetes    Hypercholesteremia    Pancreatic cancer    Hydronephrosis    Colostomy status    History of     History of left hip replacement    H/O carpal tunnel repair      SOCIAL HISTORY:  Negative for smoking/alcohol/drug use.     ALLERGIES:  No Known Allergies    MEDICATIONS:  STANDING MEDICATIONS  atorvastatin 20 milliGRAM(s) Oral at bedtime  dextrose 5%. 1000 milliLiter(s) IV Continuous <Continuous>  dextrose 5%. 1000 milliLiter(s) IV Continuous <Continuous>  dextrose 50% Injectable 25 Gram(s) IV Push once  dextrose 50% Injectable 12.5 Gram(s) IV Push once  dextrose 50% Injectable 25 Gram(s) IV Push once  glucagon  Injectable 1 milliGRAM(s) IntraMuscular once  heparin   Injectable 5000 Unit(s) SubCutaneous every 12 hours  insulin lispro (ADMELOG) corrective regimen sliding scale   SubCutaneous three times a day before meals  lactated ringers. 1000 milliLiter(s) IV Continuous <Continuous>  lactated ringers. 1000 milliLiter(s) IV Continuous <Continuous>  linezolid  IVPB 600 milliGRAM(s) IV Intermittent every 12 hours  pantoprazole    Tablet 40 milliGRAM(s) Oral before breakfast  tamsulosin 0.4 milliGRAM(s) Oral at bedtime    PRN MEDICATIONS  dextrose Oral Gel 15 Gram(s) Oral once PRN    VITALS:   T(F): 95.6  HR: 83  BP: 102/53  RR: 18  SpO2: 95%    LABS:                        10.2   7.93  )-----------( 158      ( 2023 19:29 )             32.0     07-05    135  |  100  |  30<H>  ----------------------------<  155<H>  3.8   |  20  |  0.6<L>    Ca    9.0      2023 19:29    TPro  5.8<L>  /  Alb  3.5  /  TBili  0.5  /  DBili  x   /  AST  18  /  ALT  8   /  AlkPhos  79  07-05      Urinalysis Basic - ( 2023 23:02 )    Color: Red / Appearance: Turbid / S.025 / pH: x  Gluc: x / Ketone: Trace  / Bili: Negative / Urobili: <2 mg/dL   Blood: x / Protein: 300 mg/dL / Nitrite: Negative   Leuk Esterase: Moderate / RBC: >720 /HPF / WBC >100 /HPF   Sq Epi: x / Non Sq Epi: x / Bacteria: Many            Culture - Body Fluid with Gram Stain (collected 2023 10:20)  Source: Pleural Fl Pleural Fluid  Gram Stain (2023 18:48):    polymorphonuclear leukocytes seen    No organisms seen    by cytocentrifuge      CARDIAC MARKERS ( 2023 19:29 )  x     / <0.01 ng/mL / x     / x     / x          RADIOLOGY:  < from: Xray Chest 1 View- PORTABLE-Urgent (Xray Chest 1 View- PORTABLE-Urgent .) (23 @ 11:35) >  Impression:    Slight interval decrease in left effusion post thoracentesis. No   pneumothorax.      PHYSICAL EXAM: done 7:15AM 23  GEN: No acute distress  LUNGS: Right side clear to auscultation, Decreased Breath sounds in Lower left lobe   HEART: Regular, no murmurs  ABD: Soft, non-tender, non-distended.    Intravenous access:   NG tube:   Smith Catheter:   Indwelling Urethral Catheter:     Connect To:  Straight Drainage/[place]    Indication:  Urinary Retention / Obstruction (23 @ 22:27) (not performed) [Active]    ROS:   HEENT: Denies headaches, denies changes in vision, denies congestion  Heart: Denies chest pain, denies palpitations, reports chest tightness  Lungs: denies cough, denies SOB, denies wheezing  GI: Denies N/V, concerned of constipation however her last meal was 2 days ago, decreased appetite   : smith catheter in place, denies any burning or suprapubic pain  General: denies fevers, denies chills, denies weakness

## 2023-07-07 NOTE — MEDICAL STUDENT PROGRESS NOTE(EDUCATION) - ASSESSMENT
Hydronephrpsis  Hematuria  Hydronephrosis  Hematuria   Pancreatic cancer  Diabetes   HTN  UTI  Constipation

## 2023-07-07 NOTE — PROGRESS NOTE ADULT - ASSESSMENT
82-year-old female with past medical history of hypertension, diabetes, hyperlipidemia, pancreatic adenocarcinoma status post transverse loop colostomy, right hydronephrosis s/p ureteral stent presenting for a UTI VRE of 1 month duration failed OP treatment and as per the chart was supposed to have a PICC line but procedure delayed presents for IV antibiotics treatment. Last urine culture in 05/25 showed VRE sensitive only to zyvox. Patient is a poor historian reported that suprapubic abdominal discomfort started 1 month ago and the patient was unsuccessfully treated with different antibiotics.     A/P   # VRE UTI failed OP therapy/ Asymptomatic bacteriuria / hydronephrosis   - consulted by ID, will monitor off Abx   - 05/25   Urine Cx showed VRE only sensitive to zyvox   - TOV today, will repeat kidney/bladder US after TOV  - c/w Flomax   -  follow up  - Right double-J ureteral stent with stable moderate hydroureter  - maintain sufficient hydration     # Pancreatic adenocarcinoma  with mets / left sided malignant pleural effusion   - L pleural effusion tapped by pulm, f/u cytology to r/o malignancy  - FU Hem onc - they will discuss starting chemo as an OP  - overall prognosis is poor       # Anemia   -  Labs noticeable for Hb 10.2 at baseline,    - start IV Venofer     # diabetes:   - carb consistent diet   - monitor finger stick  - on Insulin     # Generalized weakness  - PT/ rehab eval  - supportive care, fall and aspiration precautions       # DVT proph: heparin   # Gi proph: protonix   # Dispo: inpatient floor   # Diet: DASH, TLC carb consist     Undermining Type: Entire Wound

## 2023-07-07 NOTE — DISCHARGE NOTE NURSING/CASE MANAGEMENT/SOCIAL WORK - PATIENT PORTAL LINK FT
You can access the FollowMyHealth Patient Portal offered by [facility] by registering at the following website: [email or web address]. By joining [email or web address]’s FollowMyHealth portal, you will also be able to view your health information using other applications (apps) compatible with our system.

## 2023-07-07 NOTE — PROGRESS NOTE ADULT - ASSESSMENT
82-year-old female with past medical history of hypertension, diabetes, hyperlipidemia, pancreatic adenocarcinoma status post transverse loop colostomy, right hydronephrosis s/p ureteral stent presenting for a UTI VRE of 1 month duration failed OP treatment and as per the chart was supposed to have a PICC line but procedure delayed presents for IV antibiotics treatment. Last urine culture in 05/25 showed VRE sensitive only to zyvox. Patient is a poor historian reported that suprapubic abdominal discomfort started 1 month ago and the patient was unsuccessfully treated with different antibiotics. Patient still complains of suprapubic discomfort and burning, and lower back pain, Patient reports mild epigastric discomfort today . Patient denies any SOB, CP, NV, diarrhea, constipation fever, respiratory symptoms.       # VRE UTI failed OP therapy  # possible CAUTI    - UA grossly positive for RBC and WBC   - CTA chest Abdomen pelvis showed::  Decreased mild right hydronephrosis, post double-J ureteral stent placement, with persistent increased enhancement of ureter, suspicious for urinary tract infection.  Previous CTAP showed: Right double-J ureteral stent with stable moderate hydroureter.  - 05/25   Urine Cx showed VRE only sensitive to zyvox   - FU Blood and urine culture   -UA positive  -Per ID, seems colonized w VRE but asymptomatic, monitor off abx  - undergoing trial of void, started at 1315 and ending at 2115 if hasn't voided, if she voids, please order a kidney bladder ultrasound due to previous hydronephrosis and to assess for retention, as per uro.    # Pancreatic mass with mets   # Mets possibly to the lungs with large LL pleural effusion   - CTA chest Abdomen pelvis showed:  * Unchanged ill-defined pancreatic mass.  *  Increased moderate-to-large left pleural effusion with overlying compressive atelectasis. New small right pleural effusion. Unchanged right lung pulmonary nodules, previously characterized on 2/16/2023 PET/CT.  - Previous CTAP showed   * Soft tissue mass adjacent to the sigmoid colon measuring approximately 3.1 x 2.5 cm.  * Right double-J ureteral stent with stable moderate hydroureter.  * Grossly stable ill-defined mass at the pancreatic head, consistent with known pancreatic neoplasm  - L pleural effusion tapped by pulm, f/u cytology to r/o malignancy - exudative in nature by protein ratio and Light's criteria  - Oncology will set up outpt txt for chemo next week.       # Anemia :   -  Labs noticeable for Hb 10.2 at baseline,    - FU iron studies b12 folate     # diabetes:   -last   - ISS   A1C 6.6      # DVT proph: heparin   # Gi proph: protonix   # Dispo: inpatient floor   # Diet: DASH, TLC carb consist

## 2023-07-07 NOTE — MEDICAL STUDENT PROGRESS NOTE(EDUCATION) - PLAN 3
oncology consult pending, will follow their recommendation regarding pancreatic adenocarcinoma and pleural effusion. oncology consult pending, will follow their recommendation regarding pancreatic adenocarcinoma and pleural effusion to start gemcitibine

## 2023-07-07 NOTE — MEDICAL STUDENT PROGRESS NOTE(EDUCATION) - SUBJECTIVE AND OBJECTIVE BOX
DELORIS NORRIS ([id number])    Patient is a 82y old  Female who presents with a chief complaint of UTI    Today is hospital day 2, she is concerned about whether or not she is constipated. Her colostomy bag has been empty since yesterday morning but she has not eaten but she started on Miralax with no improvement yet. She has no other complaints.     INTERVAL HPI/OVERNIGHT EVENTS:    PHYSICAL EXAM:    - GENERAL: Alert and oriented x 3. No acute distress. Well-nourished.  - EYES: EOMI. Anicteric.  - HENT: Moist mucous membranes. No scleral icterus. No cervical lymphadenopathy.  - LUNGS: Clear to auscultation bilaterally. No accessory muscle use.  - CARDIOVASCULAR: Regular rate and rhythm. No murmur. No JVD.  - ABDOMEN: Soft, non-tender and non-distended. No palpable masses.  - EXTREMITIES: No edema. Non-tender.?- SKIN: No rashes or lesions. Warm.  - NEUROLOGIC: No focal neurological deficits. CN II-XII grossly intact, but not individually tested.  - PSYCHIATRIC: Cooperative. Appropriate mood and affect.  -----------------------------------------------------------------  REVIEW OF SYSTEMS (**ALL ROS negative unless bolded**):   - CONSTITUTIONAL: Denies weight loss, fever and chills.  - HEENT: Denies changes in vision and hearing.  - RESPIRATORY: Denies SOB and cough.  - CV: Denies palpitations and CP.  - GI: Denies abdominal pain, nausea, vomiting and diarrhea.  - : Denies dysuria and urinary frequency.  - MSK: Denies myalgia and joint pain.  - SKIN: Denies rash and pruritus.  - NEUROLOGICAL: Denies headache and syncope.  - PSYCHIATRIC: Denies recent changes in mood. Denies anxiety and depression.    FAMILY HISTORY:  FH: bladder cancer (Sibling)      T(C): 35.6 (07-07-23 @ 04:54), Max: 36.8 (07-06-23 @ 20:13)  HR: 91 (07-07-23 @ 04:54) (83 - 91)  BP: 95/51 (07-07-23 @ 04:54) (95/51 - 109/61)  RR: 18 (07-07-23 @ 04:54) (18 - 18)  SpO2: 96% (07-07-23 @ 04:54) (96% - 96%)  Wt(kg): --Vital Signs Last 24 Hrs  T(C): 35.6 (07 Jul 2023 04:54), Max: 36.8 (06 Jul 2023 20:13)  T(F): 96 (07 Jul 2023 04:54), Max: 98.2 (06 Jul 2023 20:13)  HR: 91 (07 Jul 2023 04:54) (83 - 91)  BP: 95/51 (07 Jul 2023 04:54) (95/51 - 109/61)  BP(mean): --  RR: 18 (07 Jul 2023 04:54) (18 - 18)  SpO2: 96% (07 Jul 2023 04:54) (96% - 96%)      No Known Allergies        LABS:      RADIOLOGY & ADDITIONAL TESTS:    atorvastatin 20 milliGRAM(s) Oral at bedtime  dextrose 5%. 1000 milliLiter(s) IV Continuous <Continuous>  dextrose 5%. 1000 milliLiter(s) IV Continuous <Continuous>  dextrose 50% Injectable 25 Gram(s) IV Push once  dextrose 50% Injectable 12.5 Gram(s) IV Push once  dextrose 50% Injectable 25 Gram(s) IV Push once  dextrose Oral Gel 15 Gram(s) Oral once PRN  glucagon  Injectable 1 milliGRAM(s) IntraMuscular once  heparin   Injectable 5000 Unit(s) SubCutaneous every 12 hours  insulin lispro (ADMELOG) corrective regimen sliding scale   SubCutaneous three times a day before meals  lactated ringers. 1000 milliLiter(s) IV Continuous <Continuous>  lactated ringers. 1000 milliLiter(s) IV Continuous <Continuous>  lactulose Syrup 10 Gram(s) Oral once  linezolid  IVPB 600 milliGRAM(s) IV Intermittent every 12 hours  magnesium hydroxide Suspension 30 milliLiter(s) Oral daily PRN  magnesium sulfate  IVPB 2 Gram(s) IV Intermittent once  pantoprazole    Tablet 40 milliGRAM(s) Oral before breakfast  polyethylene glycol 3350 17 Gram(s) Oral daily  potassium chloride  20 mEq/100 mL IVPB 20 milliEquivalent(s) IV Intermittent once  tamsulosin 0.4 milliGRAM(s) Oral at bedtime      HEALTH ISSUES - PROBLEM Dx:         DELORIS NORRIS ([id number])    Patient is a 82y old  Female who presents with a chief complaint of UTI    Today is hospital day 2, she is concerned about whether or not she is constipated. Her colostomy bag has been empty since yesterday morning but she has not eaten but she started on Miralax with no improvement yet. She has no other complaints.     INTERVAL HPI/OVERNIGHT EVENTS:    PHYSICAL EXAM:    - GENERAL: Alert and oriented x 3. No acute distress. Well-nourished.  - LUNGS: Clear to auscultation bilaterally. No accessory muscle use.  - CARDIOVASCULAR: Regular rate and rhythm. No murmur. No JVD.  - ABDOMEN: Firm, non-tender and non-distended. No palpable masses.  - EXTREMITIES: No edema. Non-tender- SKIN: No rashes or lesions. Warm.  -----------------------------------------------------------------  REVIEW OF SYSTEMS (**ALL ROS negative unless bolded**):   - CONSTITUTIONAL: Denies weight loss, fever and chills.  - RESPIRATORY: Denies SOB and cough.  - CV: Denies palpitations and CP.  - GI: Denies abdominal pain, nausea, vomiting and diarrhea.  - : Denies dysuria and urinary frequency.    FAMILY HISTORY:  FH: bladder cancer (Sibling)      T(C): 35.6 (07-07-23 @ 04:54), Max: 36.8 (07-06-23 @ 20:13)  HR: 91 (07-07-23 @ 04:54) (83 - 91)  BP: 95/51 (07-07-23 @ 04:54) (95/51 - 109/61)  RR: 18 (07-07-23 @ 04:54) (18 - 18)  SpO2: 96% (07-07-23 @ 04:54) (96% - 96%)  Wt(kg): --Vital Signs Last 24 Hrs  T(C): 35.6 (07 Jul 2023 04:54), Max: 36.8 (06 Jul 2023 20:13)  T(F): 96 (07 Jul 2023 04:54), Max: 98.2 (06 Jul 2023 20:13)  HR: 91 (07 Jul 2023 04:54) (83 - 91)  BP: 95/51 (07 Jul 2023 04:54) (95/51 - 109/61)  BP(mean): --  RR: 18 (07 Jul 2023 04:54) (18 - 18)  SpO2: 96% (07 Jul 2023 04:54) (96% - 96%)      No Known Allergies        LABS:    Culture - Body Fluid with Gram Stain (07.06.23 @ 10:20)    Gram Stain:   polymorphonuclear leukocytes seen  No organisms seen  by cytocentrifuge   Specimen Source: Pleural Fl Pleural Fluid    RADIOLOGY & ADDITIONAL TESTS:    atorvastatin 20 milliGRAM(s) Oral at bedtime  dextrose 5%. 1000 milliLiter(s) IV Continuous <Continuous>  dextrose 5%. 1000 milliLiter(s) IV Continuous <Continuous>  dextrose 50% Injectable 25 Gram(s) IV Push once  dextrose 50% Injectable 12.5 Gram(s) IV Push once  dextrose 50% Injectable 25 Gram(s) IV Push once  dextrose Oral Gel 15 Gram(s) Oral once PRN  glucagon  Injectable 1 milliGRAM(s) IntraMuscular once  heparin   Injectable 5000 Unit(s) SubCutaneous every 12 hours  insulin lispro (ADMELOG) corrective regimen sliding scale   SubCutaneous three times a day before meals  lactated ringers. 1000 milliLiter(s) IV Continuous <Continuous>  lactated ringers. 1000 milliLiter(s) IV Continuous <Continuous>  lactulose Syrup 10 Gram(s) Oral once  linezolid  IVPB 600 milliGRAM(s) IV Intermittent every 12 hours  magnesium hydroxide Suspension 30 milliLiter(s) Oral daily PRN  magnesium sulfate  IVPB 2 Gram(s) IV Intermittent once  pantoprazole    Tablet 40 milliGRAM(s) Oral before breakfast  polyethylene glycol 3350 17 Gram(s) Oral daily  potassium chloride  20 mEq/100 mL IVPB 20 milliEquivalent(s) IV Intermittent once  tamsulosin 0.4 milliGRAM(s) Oral at bedtime      HEALTH ISSUES - PROBLEM Dx:

## 2023-07-07 NOTE — MEDICAL STUDENT PROGRESS NOTE(EDUCATION) - TRANSITIONS OF CARE/DISPOSITION: (SDOH, ANTICIPATED DC NEEDS)
Order PT to get patient moving, prepare for discharge pending oncology consult.  Order PT to get patient moving, follow up with Dr. Gay next week to initiate Gemcitibine treatment

## 2023-07-07 NOTE — PROGRESS NOTE ADULT - SUBJECTIVE AND OBJECTIVE BOX
24H events:    Patient is a 82y old Female who presents with a chief complaint of   Primary diagnosis of VRE infection (vancomycin resistant Enterococcus)      Day 1:  Day 2:  Day 3:     Today is hospital day 2d. This morning patient was seen and examined at bedside, resting comfortably in bed.    No acute or major events overnight. feeling constipated, no stool in the colostomy bag.    Code Status:    Family communication:  Contact date:  Name of person contacted:  Relationship to patient:  Communication details:  What matters most:    PAST MEDICAL & SURGICAL HISTORY  HTN (hypertension)    Diabetes    Hypercholesteremia    Pancreatic cancer    Hydronephrosis    Colostomy status    History of     History of left hip replacement    H/O carpal tunnel repair      SOCIAL HISTORY:  Social History:      ALLERGIES:  No Known Allergies    MEDICATIONS:  STANDING MEDICATIONS  atorvastatin 20 milliGRAM(s) Oral at bedtime  dextrose 5%. 1000 milliLiter(s) IV Continuous <Continuous>  dextrose 5%. 1000 milliLiter(s) IV Continuous <Continuous>  dextrose 50% Injectable 25 Gram(s) IV Push once  dextrose 50% Injectable 12.5 Gram(s) IV Push once  dextrose 50% Injectable 25 Gram(s) IV Push once  glucagon  Injectable 1 milliGRAM(s) IntraMuscular once  heparin   Injectable 5000 Unit(s) SubCutaneous every 12 hours  insulin lispro (ADMELOG) corrective regimen sliding scale   SubCutaneous three times a day before meals  iron sucrose IVPB 200 milliGRAM(s) IV Intermittent every 24 hours  lactated ringers. 1000 milliLiter(s) IV Continuous <Continuous>  lactated ringers. 1000 milliLiter(s) IV Continuous <Continuous>  pantoprazole    Tablet 40 milliGRAM(s) Oral before breakfast  polyethylene glycol 3350 17 Gram(s) Oral daily  senna 1 Tablet(s) Oral daily  tamsulosin 0.4 milliGRAM(s) Oral at bedtime    PRN MEDICATIONS  bisacodyl 5 milliGRAM(s) Oral every 12 hours PRN  dextrose Oral Gel 15 Gram(s) Oral once PRN  magnesium hydroxide Suspension 30 milliLiter(s) Oral daily PRN    VITALS:   T(F): 97.5  HR: 92  BP: 110/65  RR: 18  SpO2: 98%    PHYSICAL EXAM:  GENERAL:   ( x ) NAD, lying in bed comfortably     (  ) obtunded     (  ) lethargic     (  ) somnolent    HEAD:   ( x ) Atraumatic     (  ) hematoma     (  ) laceration (specify location:       )     NECK:  (  ) Supple     (  ) neck stiffness     (  ) nuchal rigidity     (  )  no JVD     (  ) JVD present ( -- cm)    HEART:  Rate -->     ( x ) normal rate     (  ) bradycardic     (  ) tachycardic  Rhythm -->     ( x) regular     (  ) regularly irregular     (  ) irregularly irregular  Murmurs -->     (x  ) normal s1s2     (  ) systolic murmur     (  ) diastolic murmur     (  ) continuous murmur      (  ) S3 present     (  ) S4 present    LUNGS:   ( x )Unlabored respirations     (  ) tachypnea  (x  ) B/L air entry     (  ) decreased breath sounds in:  (location     )    (  ) no adventitious sound     (  ) crackles     (  ) wheezing      (  ) rhonchi      (specify location:       )  (  ) chest wall tenderness (specify location:       )    ABDOMEN:   ( x ) Soft     (  ) tense   |   (  ) nondistended     ( x ) distended   |   (  ) +BS     (  ) hypoactive bowel sounds     (  ) hyperactive bowel sounds  (  ) nontender     (  ) RUQ tenderness     (  ) RLQ tenderness     (  ) LLQ tenderness     (  ) epigastric tenderness     (  ) diffuse tenderness  (  ) Splenomegaly      (  ) Hepatomegaly      (  ) Jaundice     (  ) ecchymosis     EXTREMITIES: 2+ peripheral pulses bilaterally. No clubbing, cyanosis, or edema  (  ) Normal     (  ) Rash     (  ) ecchymosis     (  ) varicose veins      (  ) pitting edema     (  ) non-pitting edema   (  ) ulceration     (  ) gangrene:     (location:     )    NERVOUS SYSTEM:    ( x ) A&Ox3     (  ) confused     (  ) lethargic  CN II-XII:     (  ) Intact     (  ) deficits found     (Specify:     )   Upper extremities:     (  ) no sensorimotor deficits     (  ) weakness     (  ) loss of proprioception/vibration     (  ) loss of touch/temperature (specify:    )  Lower extremities:     (  ) no sensorimotor deficits     (  ) weakness     (  ) loss of proprioception/vibration     (  ) loss of touch/temperature (specify:    )    SKIN:   (  ) No rashes or lesions     (  ) maculopapular rash     (  ) pustules     (  ) vesicles     (  ) ulcer     (  ) ecchymosis     (specify location:     )        (  ) Indwelling Smith Catheter:  undergoing trial of void, smith was placed in ED    LABS:                        10.8   8.45  )-----------( 161      ( 2023 08:25 )             33.5     07-07    134<L>  |  99  |  12  ----------------------------<  169<H>  3.4<L>   |  23  |  <0.5<L>    Ca    8.9      2023 08:25  Phos  2.6     07-07  Mg     1.6     07-07    TPro  5.3<L>  /  Alb  3.0<L>  /  TBili  0.4  /  DBili  x   /  AST  14  /  ALT  7   /  AlkPhos  81  07-07    PT/INR - ( 2023 08:25 )   PT: 14.40 sec;   INR: 1.25 ratio         PTT - ( 2023 08:25 )  PTT:32.5 sec  Urinalysis Basic - ( 2023 08:25 )    Color: x / Appearance: x / SG: x / pH: x  Gluc: 169 mg/dL / Ketone: x  / Bili: x / Urobili: x   Blood: x / Protein: x / Nitrite: x   Leuk Esterase: x / RBC: x / WBC x   Sq Epi: x / Non Sq Epi: x / Bacteria: x            Culture - Body Fluid with Gram Stain (collected 2023 10:20)  Source: Pleural Fl Pleural Fluid  Gram Stain (2023 18:48):    polymorphonuclear leukocytes seen    No organisms seen    by cytocentrifuge  Preliminary Report (2023 11:42):    No growth to date.    Culture - Fungal, Body Fluid (collected 2023 10:20)  Source: Pleural Fl Pleural Fluid  Preliminary Report (2023 07:15):    Testing in progress    Culture - Urine (collected 2023 23:02)  Source: Clean Catch Clean Catch (Midstream)  Preliminary Report (2023 10:02):    10,000 - 49,000 CFU/mL Gram positive organisms    Culture - Blood (collected 2023 20:32)  Source: .Blood Blood-Peripheral  Preliminary Report (2023 02:02):    No growth at 24 hours    Culture - Blood (collected 2023 20:16)  Source: .Blood Blood-Peripheral  Preliminary Report (2023 02:02):    No growth at 24 hours      CARDIAC MARKERS ( 2023 19:29 )  x     / <0.01 ng/mL / x     / x     / x          RADIOLOGY:

## 2023-07-07 NOTE — PROGRESS NOTE ADULT - SUBJECTIVE AND OBJECTIVE BOX
82y old Female who presents with  VRE infection (vancomycin resistant Enterococcus) in urine, ID recommended to monitor off Abx. She was found to have left sided malignant pleural effusion, underwent thoracocentesis on .   Today pt c/o constipation, weak, was unable to participate with PT today.       PAST MEDICAL & SURGICAL HISTORY  HTN (hypertension)    Diabetes    Hypercholesteremia    Pancreatic cancer    Hydronephrosis    Colostomy status    History of     History of left hip replacement    H/O carpal tunnel repair      SOCIAL HISTORY:  Social History:      ALLERGIES:  No Known Allergies        VITALS:   T(C): 36.4 (2023 14:46), Max: 36.8 (2023 20:13)  T(F): 97.5 (2023 14:46), Max: 98.2 (2023 20:13)  HR: 92 (2023 14:46) (89 - 92)  BP: 110/65 (2023 14:46) (95/51 - 110/65)  BP(mean): --  RR: 18 (2023 14:46) (18 - 18)  SpO2: 98% (2023 14:46) (96% - 98%)        PHYSICAL EXAM:  GENERAL: NAD, awake and alert  NECK: supple, no JVD  CV: S1, S2, RRR  LUNGS: decreased BS, no BS at left base  Abdomen/GI: soft, NT, ND, BS present , colostomy bad noted   Extremities: no edema      LABS:                                   10.8   8.45  )-----------( 161      ( 2023 08:25 )             33.5   07-07    134<L>  |  99  |  12  ----------------------------<  169<H>  3.4<L>   |  23  |  <0.5<L>    Ca    8.9      2023 08:25  Phos  2.6     07-07  Mg     1.6     07-07    TPro  5.3<L>  /  Alb  3.0<L>  /  TBili  0.4  /  DBili  x   /  AST  14  /  ALT  7   /  AlkPhos  81  07-07      Urinalysis Basic - ( 2023 23:02 )    Color: Red / Appearance: Turbid / S.025 / pH: x  Gluc: x / Ketone: Trace  / Bili: Negative / Urobili: <2 mg/dL   Blood: x / Protein: 300 mg/dL / Nitrite: Negative   Leuk Esterase: Moderate / RBC: >720 /HPF / WBC >100 /HPF   Sq Epi: x / Non Sq Epi: x / Bacteria: Many  Troponin T, Serum: <0.01 ng/mL (23 @ 19:29)      CARDIAC MARKERS ( 2023 19:29 )  x     / <0.01 ng/mL / x     / x     / x        Culture - Body Fluid with Gram Stain (23 @ 10:20)   Gram Stain:   polymorphonuclear leukocytes seen   No organisms seen   by cytocentrifuge  Specimen Source: Pleural Fl Pleural Fluid  Culture Results:   No growth to date.Culture - Urine (23 @ 23:02)   Specimen Source: Clean Catch Clean Catch (Midstream)  Culture Results:   10,000 - 49,000 CFU/mL Gram positive organismsCulture - Blood (23 @ 20:32)   Specimen Source: .Blood Blood-Peripheral  Culture Results:   No growth at 24 hours    RADIOLOGY:    CXR  - Slight interval decrease in left effusion postthoracentesis. No   pneumothorax.    Developing right basilar opacity/effusion.  < from: Xray Chest 1 View- PORTABLE-Urgent (Xray Chest 1 View- PORTABLE-Urgent .) (23 @ 11:35) >    Impression:    Slight interval decrease in left effusion postthoracentesis. No   pneumothorax.    Developing right basilar opacity/effusion.    < end of copied text >  < from: CT Angio Chest PE Protocol w/ IV Cont (23 @ 22:26) >  IMPRESSION:  Since 2023:    1. Decreased mild right hydronephrosis, post double-J ureteral stent   placement, with persistent increased enhancement of ureter, suspicious   for urinary tract infection.    2. Unchanged ill-defined pancreatic mass.    3. Increased moderate-to-large left pleural effusion with overlying   compressive atelectasis. New small right pleural effusion. Unchanged   right lung pulmonary nodules, previously characterized on 2023   PET/CT.    4. No evidence of pulmonary embolus.    5. Unchanged previously described soft tissue mass adjacent to the   sigmoid colon.    < end of copied text >    < from: TTE Echo Complete w/o Contrast w/ Doppler (23 @ 09:41) >  Summary:   1. Normal global left ventricular systolic function.   2. Spectral Doppler shows impaired relaxation pattern of left   ventricular myocardial filling (Grade I diastolic dysfunction).   3. Normal left atrial size.   4. Normal right atrial size.   5. Mild thickening of the anterior and posterior mitral valve leaflets.   6. Trace mitral valve regurgitation.   7. Mild tricuspid regurgitation.   8. Mild aortic regurgitation.      MEDICATIONS  (STANDING):  atorvastatin 20 milliGRAM(s) Oral at bedtime  dextrose 5%. 1000 milliLiter(s) (50 mL/Hr) IV Continuous <Continuous>  dextrose 5%. 1000 milliLiter(s) (100 mL/Hr) IV Continuous <Continuous>  dextrose 50% Injectable 25 Gram(s) IV Push once  dextrose 50% Injectable 12.5 Gram(s) IV Push once  dextrose 50% Injectable 25 Gram(s) IV Push once  glucagon  Injectable 1 milliGRAM(s) IntraMuscular once  heparin   Injectable 5000 Unit(s) SubCutaneous every 12 hours  insulin lispro (ADMELOG) corrective regimen sliding scale   SubCutaneous three times a day before meals  iron sucrose IVPB 200 milliGRAM(s) IV Intermittent every 24 hours  lactated ringers. 1000 milliLiter(s) (75 mL/Hr) IV Continuous <Continuous>  lactated ringers. 1000 milliLiter(s) (75 mL/Hr) IV Continuous <Continuous>  pantoprazole    Tablet 40 milliGRAM(s) Oral before breakfast  polyethylene glycol 3350 17 Gram(s) Oral daily  senna 1 Tablet(s) Oral daily  tamsulosin 0.4 milliGRAM(s) Oral at bedtime    MEDICATIONS  (PRN):  bisacodyl 5 milliGRAM(s) Oral every 12 hours PRN Constipation  dextrose Oral Gel 15 Gram(s) Oral once PRN Blood Glucose LESS THAN 70 milliGRAM(s)/deciliter  magnesium hydroxide Suspension 30 milliLiter(s) Oral daily PRN Constipation

## 2023-07-07 NOTE — PROGRESS NOTE ADULT - ATTENDING COMMENTS
Patient also seen by myself. I agree with the Hem-Onc fellow's note above. Situation discussed with her and the patient.

## 2023-07-07 NOTE — MEDICAL STUDENT PROGRESS NOTE(EDUCATION) - PLAN 1
Patient currently has a smith catheter   Urology was consulted and advised to trial void and if successful perform a KUB  Hematuria has been constant for the past few weeks per patient, she is asymptomatic, denies dysuria, denies fever

## 2023-07-07 NOTE — PROGRESS NOTE ADULT - SUBJECTIVE AND OBJECTIVE BOX
DELORIS NORRIS 82y Female  MRN#: [id number]   Hospital Day: 2d    SUBJECTIVE  Patient is a 82y old Female who presents with a chief complaint of Currently admitted to medicine with the primary diagnosis of VRE infection (vancomycin resistant Enterococcus)      INTERVAL HPI AND OVERNIGHT EVENTS:  Patient was examined and seen at bedside. This morning she is resting comfortably in bed and reports no issues or overnight events.    REVIEW OF SYMPTOMS:  CONSTITUTIONAL: No weakness, fevers or chills; No headaches  EYES: No visual changes, eye pain, or discharge  ENT: No vertigo; No ear pain or change in hearing; No sore throat or difficulty swallowing  NECK: No pain or stiffness  RESPIRATORY: No cough, wheezing, or hemoptysis; No shortness of breath  CARDIOVASCULAR: No chest pain or palpitations  GASTROINTESTINAL: No abdominal or epigastric pain; No nausea, vomiting, or hematemesis; No diarrhea or constipation; No melena or hematochezia  GENITOURINARY: No dysuria, frequency or hematuria  MUSCULOSKELETAL: No joint pain, no muscle pain, no weakness  NEUROLOGICAL: No numbness or weakness  SKIN: No itching or rashes    OBJECTIVE  PAST MEDICAL & SURGICAL HISTORY  HTN (hypertension)    Diabetes    Hypercholesteremia    Pancreatic cancer    Hydronephrosis    Colostomy status    History of     History of left hip replacement    H/O carpal tunnel repair      ALLERGIES:  No Known Allergies    MEDICATIONS:  STANDING MEDICATIONS  atorvastatin 20 milliGRAM(s) Oral at bedtime  dextrose 5%. 1000 milliLiter(s) IV Continuous <Continuous>  dextrose 5%. 1000 milliLiter(s) IV Continuous <Continuous>  dextrose 50% Injectable 25 Gram(s) IV Push once  dextrose 50% Injectable 12.5 Gram(s) IV Push once  dextrose 50% Injectable 25 Gram(s) IV Push once  glucagon  Injectable 1 milliGRAM(s) IntraMuscular once  heparin   Injectable 5000 Unit(s) SubCutaneous every 12 hours  insulin lispro (ADMELOG) corrective regimen sliding scale   SubCutaneous three times a day before meals  iron sucrose IVPB 200 milliGRAM(s) IV Intermittent every 24 hours  lactated ringers. 1000 milliLiter(s) IV Continuous <Continuous>  lactated ringers. 1000 milliLiter(s) IV Continuous <Continuous>  pantoprazole    Tablet 40 milliGRAM(s) Oral before breakfast  polyethylene glycol 3350 17 Gram(s) Oral daily  senna 1 Tablet(s) Oral daily  tamsulosin 0.4 milliGRAM(s) Oral at bedtime    PRN MEDICATIONS  bisacodyl 5 milliGRAM(s) Oral every 12 hours PRN  dextrose Oral Gel 15 Gram(s) Oral once PRN  magnesium hydroxide Suspension 30 milliLiter(s) Oral daily PRN      VITAL SIGNS: Last 24 Hours  T(C): 36.4 (2023 14:46), Max: 36.8 (2023 20:13)  T(F): 97.5 (2023 14:46), Max: 98.2 (2023 20:13)  HR: 92 (2023 14:46) (89 - 92)  BP: 110/65 (2023 14:46) (95/51 - 110/65)  BP(mean): --  RR: 18 (2023 14:46) (18 - 18)  SpO2: 98% (2023 14:46) (96% - 98%)    LABS:                        10.8   8.45  )-----------( 161      ( 2023 08:25 )             33.5     07-07    134<L>  |  99  |  12  ----------------------------<  169<H>  3.4<L>   |  23  |  <0.5<L>    Ca    8.9      2023 08:25  Phos  2.6     07-07  Mg     1.6     07-07    TPro  5.3<L>  /  Alb  3.0<L>  /  TBili  0.4  /  DBili  x   /  AST  14  /  ALT  7   /  AlkPhos  81  07-07    PT/INR - ( 2023 08:25 )   PT: 14.40 sec;   INR: 1.25 ratio         PTT - ( 2023 08:25 )  PTT:32.5 sec  Urinalysis Basic - ( 2023 08:25 )    Color: x / Appearance: x / SG: x / pH: x  Gluc: 169 mg/dL / Ketone: x  / Bili: x / Urobili: x   Blood: x / Protein: x / Nitrite: x   Leuk Esterase: x / RBC: x / WBC x   Sq Epi: x / Non Sq Epi: x / Bacteria: x            Culture - Body Fluid with Gram Stain (collected 2023 10:20)  Source: Pleural Fl Pleural Fluid  Gram Stain (2023 18:48):    polymorphonuclear leukocytes seen    No organisms seen    by cytocentrifuge  Preliminary Report (2023 11:42):    No growth to date.    Culture - Fungal, Body Fluid (collected 2023 10:20)  Source: Pleural Fl Pleural Fluid  Preliminary Report (2023 07:15):    Testing in progress    Culture - Urine (collected 2023 23:02)  Source: Clean Catch Clean Catch (Midstream)  Preliminary Report (2023 10:02):    10,000 - 49,000 CFU/mL Gram positive organisms    Culture - Blood (collected 2023 20:32)  Source: .Blood Blood-Peripheral  Preliminary Report (2023 02:02):    No growth at 24 hours    Culture - Blood (collected 2023 20:16)  Source: .Blood Blood-Peripheral  Preliminary Report (2023 02:02):    No growth at 24 hours      CARDIAC MARKERS ( 2023 19:29 )  x     / <0.01 ng/mL / x     / x     / x          RADIOLOGY:      PHYSICAL EXAM:  CONSTITUTIONAL: No acute distress, well-developed, well-groomed, AAOx3  HEAD: Atraumatic, normocephalic  EYES: EOM intact, PERRLA, conjunctiva and sclera clear  ENT: Supple, no masses, no thyromegaly, no bruits, no JVD; moist mucous membranes  PULMONARY: Clear to auscultation bilaterally; no wheezes, rales, or rhonchi  CARDIOVASCULAR: Regular rate and rhythm; no murmurs, rubs, or gallops  GASTROINTESTINAL: Soft, non-tender, non-distended; bowel sounds present  MUSCULOSKELETAL: 2+ peripheral pulses; no clubbing, no cyanosis, no edema  NEUROLOGY: non-focal  SKIN: No rashes or lesions; warm and dry    ASSESSMENT & PLAN  82-year-old patient, known to have hypertension, diabetes, hyperlipidemia,  metastatic pancreatic adenocarcinoma status post transverse loop colostomy and right hydronephrosis s/p ureteral stent, presented for IV Abx for a UTI VRE of 1 month duration.   She failed OP treatment and as per the chart was supposed to have a PICC line but procedure delayed.     We are consulted for history of metastatic pancreatic adenocarcinoma.     #Pancreatic cancer with suspected lung metastasis, Pelvic mass likely metastatic S/P colostomy and ureteral stents for obstructing pericolonic mass  - EUS with biopsy showed adenocarcinoma , CA19.9 : 12,795 CEA: 24  - PET scan 2023: pancreatic mass with SUV : 7.6 , right pericolonic soft tissue density with SUV : 15 , left basilar lung nodular density 2.7 cm , SUV : 3.o and 7 mm left basilar pleural based nodule.  - CT chest/a/P: Since 2023: Decreased mild right hydronephrosis, post double-J ureteral stent placement, with persistent increased enhancement of ureter, suspicious for urinary tract infection. Unchanged ill-defined pancreatic mass. Increased moderate-to-large left pleural effusion with overlying compressive atelectasis. New small right pleural effusion. Unchanged   right lung pulmonary nodules, previously characterized on 2023 PET/CT. Unchanged previously described soft tissue mass adjacent to the sigmoid colon.  - s/p Thoracentesis; follow up results.  - Will discuss with Dr. Mead (her primary oncologist) and Dr. De Santiago (her surgeon) to start chemotherapy tomorrow.      Recommendations:   - outpatient follow with Dr. Mead next week to initiate treatment with Gemcitabine (once weekly) treatment  DELORIS NORRIS 82y Female  MRN#: [id number]   Hospital Day: 2d    SUBJECTIVE  Patient is a 82y old Female currently admitted to medicine with the primary diagnosis of VRE infection (vancomycin resistant Enterococcus)      INTERVAL HPI AND OVERNIGHT EVENTS:  Patient was examined and seen at bedside. This morning she is resting comfortably in bed and reports no issues or overnight events.    REVIEW OF SYMPTOMS:  CONSTITUTIONAL: No weakness, fevers or chills; No headaches  EYES: No visual changes, eye pain, or discharge  ENT: No vertigo; No ear pain or change in hearing; No sore throat or difficulty swallowing  NECK: No pain or stiffness  RESPIRATORY: No cough, wheezing, or hemoptysis; No shortness of breath  CARDIOVASCULAR: No chest pain or palpitations  GASTROINTESTINAL: No abdominal or epigastric pain; No nausea, vomiting, or hematemesis; No diarrhea or constipation; No melena or hematochezia  GENITOURINARY: No dysuria, frequency or hematuria  MUSCULOSKELETAL: No joint pain, no muscle pain, no weakness  NEUROLOGICAL: No numbness or weakness  SKIN: No itching or rashes    OBJECTIVE  PAST MEDICAL & SURGICAL HISTORY  HTN (hypertension)    Diabetes    Hypercholesteremia    Pancreatic cancer    Hydronephrosis    Colostomy status    History of     History of left hip replacement    H/O carpal tunnel repair      ALLERGIES:  No Known Allergies    MEDICATIONS:  STANDING MEDICATIONS  atorvastatin 20 milliGRAM(s) Oral at bedtime  dextrose 5%. 1000 milliLiter(s) IV Continuous <Continuous>  dextrose 5%. 1000 milliLiter(s) IV Continuous <Continuous>  dextrose 50% Injectable 25 Gram(s) IV Push once  dextrose 50% Injectable 12.5 Gram(s) IV Push once  dextrose 50% Injectable 25 Gram(s) IV Push once  glucagon  Injectable 1 milliGRAM(s) IntraMuscular once  heparin   Injectable 5000 Unit(s) SubCutaneous every 12 hours  insulin lispro (ADMELOG) corrective regimen sliding scale   SubCutaneous three times a day before meals  iron sucrose IVPB 200 milliGRAM(s) IV Intermittent every 24 hours  lactated ringers. 1000 milliLiter(s) IV Continuous <Continuous>  lactated ringers. 1000 milliLiter(s) IV Continuous <Continuous>  pantoprazole    Tablet 40 milliGRAM(s) Oral before breakfast  polyethylene glycol 3350 17 Gram(s) Oral daily  senna 1 Tablet(s) Oral daily  tamsulosin 0.4 milliGRAM(s) Oral at bedtime    PRN MEDICATIONS  bisacodyl 5 milliGRAM(s) Oral every 12 hours PRN  dextrose Oral Gel 15 Gram(s) Oral once PRN  magnesium hydroxide Suspension 30 milliLiter(s) Oral daily PRN      VITAL SIGNS: Last 24 Hours  T(C): 36.4 (2023 14:46), Max: 36.8 (2023 20:13)  T(F): 97.5 (2023 14:46), Max: 98.2 (2023 20:13)  HR: 92 (2023 14:46) (89 - 92)  BP: 110/65 (2023 14:46) (95/51 - 110/65)  BP(mean): --  RR: 18 (2023 14:46) (18 - 18)  SpO2: 98% (2023 14:46) (96% - 98%)    LABS:                        10.8   8.45  )-----------( 161      ( 2023 08:25 )             33.5     07-    134<L>  |  99  |  12  ----------------------------<  169<H>  3.4<L>   |  23  |  <0.5<L>    Ca    8.9      2023 08:25  Phos  2.6     07-07  Mg     1.6     07-07    TPro  5.3<L>  /  Alb  3.0<L>  /  TBili  0.4  /  DBili  x   /  AST  14  /  ALT  7   /  AlkPhos  81  07-07    PT/INR - ( 2023 08:25 )   PT: 14.40 sec;   INR: 1.25 ratio         PTT - ( 2023 08:25 )  PTT:32.5 sec  Urinalysis Basic - ( 2023 08:25 )    Color: x / Appearance: x / SG: x / pH: x  Gluc: 169 mg/dL / Ketone: x  / Bili: x / Urobili: x   Blood: x / Protein: x / Nitrite: x   Leuk Esterase: x / RBC: x / WBC x   Sq Epi: x / Non Sq Epi: x / Bacteria: x            Culture - Body Fluid with Gram Stain (collected 2023 10:20)  Source: Pleural Fl Pleural Fluid  Gram Stain (2023 18:48):    polymorphonuclear leukocytes seen    No organisms seen    by cytocentrifuge  Preliminary Report (2023 11:42):    No growth to date.    Culture - Fungal, Body Fluid (collected 2023 10:20)  Source: Pleural Fl Pleural Fluid  Preliminary Report (2023 07:15):    Testing in progress    Culture - Urine (collected 2023 23:02)  Source: Clean Catch Clean Catch (Midstream)  Preliminary Report (2023 10:02):    10,000 - 49,000 CFU/mL Gram positive organisms    Culture - Blood (collected 2023 20:32)  Source: .Blood Blood-Peripheral  Preliminary Report (2023 02:02):    No growth at 24 hours    Culture - Blood (collected 2023 20:16)  Source: .Blood Blood-Peripheral  Preliminary Report (2023 02:02):    No growth at 24 hours      CARDIAC MARKERS ( 2023 19:29 )  x     / <0.01 ng/mL / x     / x     / x          RADIOLOGY:      PHYSICAL EXAM:  CONSTITUTIONAL: No acute distress, slim, well-groomed, AAOx3,   HEAD: Atraumatic, normocephalic  EYES: EOM intact, PERRLA, conjunctivae and sclerae clear  ENT: Supple, no masses, no thyromegaly, no bruits, no JVD; moist mucous membranes  PULMONARY: Clear to auscultation bilaterally; no wheezes, rales, or rhonchi  CARDIOVASCULAR: Regular rate and rhythm; no murmurs, rubs, or gallops  GASTROINTESTINAL: Soft, non-tender, non-distended; bowel sounds present  MUSCULOSKELETAL: 2+ peripheral pulses; no clubbing, no cyanosis, no edema  NEUROLOGY: non-focal  SKIN: No rashes or lesions; warm and dry    ASSESSMENT & PLAN  82-year-old patient, known to have hypertension, diabetes, hyperlipidemia,  metastatic pancreatic adenocarcinoma status post transverse loop colostomy and right hydronephrosis s/p ureteral stent, presented for IV Abx for a UTI VRE of 1 month duration.   She failed OP treatment and as per the chart was supposed to have a PICC line but procedure delayed.     We are consulted for history of metastatic pancreatic adenocarcinoma.     #Pancreatic cancer with suspected lung metastasis, Pelvic mass likely metastatic S/P colostomy and ureteral stents for obstructing pericolonic mass  - EUS with biopsy showed adenocarcinoma , CA19.9 : 12,795 CEA: 24  - PET scan 2023: pancreatic mass with SUV : 7.6 , right pericolonic soft tissue density with SUV : 15 , left basilar lung nodular density 2.7 cm , SUV : 3.o and 7 mm left basilar pleural based nodule.  - CT chest/a/P: Since 2023: Decreased mild right hydronephrosis, post double-J ureteral stent placement, with persistent increased enhancement of ureter, suspicious for urinary tract infection. Unchanged ill-defined pancreatic mass. Increased moderate-to-large left pleural effusion with overlying compressive atelectasis. New small right pleural effusion. Unchanged right lung pulmonary nodules, previously characterized on 2023 PET/CT. Unchanged previously described soft tissue mass adjacent to the sigmoid colon.  - s/p Thoracentesis; follow up results.  - Will discuss with Dr. Mead (her primary oncologist) and Dr. De Santiago (her surgeon) to start chemotherapy tomorrow.      Recommendations:   - Outpatient follow with Dr. Mead next week to initiate treatment with Gemcitabine (once weekly) treatment. The infection should be controlled by then.

## 2023-07-08 NOTE — DIETITIAN NUTRITION RISK NOTIFICATION - TREATMENT: THE FOLLOWING DIET HAS BEEN RECOMMENDED
Diet, Soft and Bite Sized:   Lacto Veg (Accepts Milk & Milk Products)  Free Water Flush Instructions:  chocolate ensure max  Supplement Feeding Modality:  Oral  Ensure Max Cans or Servings Per Day:  3       Frequency:  Daily (07-08-23 @ 11:41) [Pending Verification By Attending]  Diet, Soft and Bite Sized:   Vegan {Accepts Vegetable Products Only}  Supplement Feeding Modality:  Oral  Ensure Plant-Based Cans or Servings Per Day:  1       Frequency:  Three Times a day (07-06-23 @ 14:10) [Active]

## 2023-07-08 NOTE — DIETITIAN INITIAL EVALUATION ADULT - OTHER INFO
Pertinent Medical Information: Per H&P, pt is an 83 y/o female with PMHx of hypertension, diabetes, hyperlipidemia, pancreatic adenocarcinoma status post transverse loop colostomy, right hydronephrosis s/p ureteral stent presenting for a UTI VRE of 1 month duration failed OP treatment.  #Pancreatic cancer with suspected lung metastasis, Pelvic mass likely metastatic S/P colostomy and ureteral stents for obstructing pericolonic mass    Pertinent Subjective Information: Pt reports poor appetite; consuming <50% of meals provided in-house. RD discussed medical nutrition supplements to optimize kcal/pro intake; pt requests Ensure Max 3X/DAILY - chocolate flavor. Pt states she dislikes Ensure Plant.     Weight hx: Pt does not recall UBW. Current dosing weight is 45.4 KG. Previous admission weight was  Pertinent Medical Information: Per H&P, pt is an 81 y/o female with PMHx of hypertension, diabetes, hyperlipidemia, pancreatic adenocarcinoma status post transverse loop colostomy, right hydronephrosis s/p ureteral stent presenting for a UTI VRE of 1 month duration failed OP treatment.  #Pancreatic cancer with suspected lung metastasis, Pelvic mass likely metastatic S/P colostomy and ureteral stents for obstructing pericolonic mass    Pertinent Subjective Information: Pt reports poor appetite; consuming <50% of meals provided in-house. RD discussed medical nutrition supplements to optimize kcal/pro intake; pt requests Ensure Max 3X/DAILY - chocolate flavor. Pt states she dislikes Ensure Plant.     Weight hx: Pt does not recall UBW. Current dosing weight is 45.4 KG. Previous admission weight was 49.9 KG on 4/20/23; 9% unintentional weight loss in over 2 months compared to current dosing weight. Pt meets weight criteria for malnutrition.

## 2023-07-08 NOTE — PROGRESS NOTE ADULT - SUBJECTIVE AND OBJECTIVE BOX
82y old Female who presents with  VRE infection (vancomycin resistant Enterococcus) in urine, ID recommended to monitor off Abx. She was found to have left sided malignant pleural effusion, underwent thoracocentesis on .   Today pt c/o weakness, denies pain, asking for PT.       PAST MEDICAL & SURGICAL HISTORY  HTN (hypertension)    Diabetes    Hypercholesteremia    Pancreatic cancer    Hydronephrosis    Colostomy status    History of     History of left hip replacement    H/O carpal tunnel repair      SOCIAL HISTORY:  Social History:      ALLERGIES:  No Known Allergies        VITALS:   T(C): 36.7 (2023 13:07), Max: 36.7 (2023 13:07)  T(F): 98.1 (2023 13:07), Max: 98.1 (2023 13:07)  HR: 90 (2023 13:07) (88 - 92)  BP: 119/57 (2023 13:07) (103/54 - 119/57)  BP(mean): --  RR: 18 (2023 13:07) (18 - 18)  SpO2: 98% (2023 14:46) (98% - 98%)      PHYSICAL EXAM:  GENERAL: NAD, awake and alert  NECK: supple, no JVD  CV: S1, S2, RRR  LUNGS: decreased BS, no BS at left base  Abdomen/GI: soft, NT, ND, BS present , colostomy bad noted   Extremities: no edema      LABS:                                   10.2   7.65  )-----------( 185      ( 2023 08:52 )             31.8   07-08    136  |  103  |  11  ----------------------------<  128<H>  3.7   |  22  |  <0.5<L>    Ca    8.5      2023 08:52  Phos  2.6     07-  Mg     2.1     -08    TPro  4.7<L>  /  Alb  2.7<L>  /  TBili  0.3  /  DBili  x   /  AST  11  /  ALT  6   /  AlkPhos  81  07-08      Urinalysis Basic - ( 2023 23:02 )    Color: Red / Appearance: Turbid / S.025 / pH: x  Gluc: x / Ketone: Trace  / Bili: Negative / Urobili: <2 mg/dL   Blood: x / Protein: 300 mg/dL / Nitrite: Negative   Leuk Esterase: Moderate / RBC: >720 /HPF / WBC >100 /HPF   Sq Epi: x / Non Sq Epi: x / Bacteria: Many  Troponin T, Serum: <0.01 ng/mL (23 @ 19:29)      CARDIAC MARKERS ( 2023 19:29 )  x     / <0.01 ng/mL / x     / x     / x        Culture - Body Fluid with Gram Stain (23 @ 10:20)   Gram Stain:   polymorphonuclear leukocytes seen   No organisms seen   by cytocentrifuge  Specimen Source: Pleural Fl Pleural Fluid  Culture Results:   No growth to date.Culture - Urine (23 @ 23:02)   Specimen Source: Clean Catch Clean Catch (Midstream)  Culture Results:   10,000 - 49,000 CFU/mL Gram positive organismsCulture - Blood (23 @ 20:32)   Specimen Source: .Blood Blood-Peripheral  Culture Results:   No growth at 24 hours    RADIOLOGY:    CXR  - Slight interval decrease in left effusion postthoracentesis. No   pneumothorax.    Developing right basilar opacity/effusion.  < from: Xray Chest 1 View- PORTABLE-Urgent (Xray Chest 1 View- PORTABLE-Urgent .) (23 @ 11:35) >    Impression:    Slight interval decrease in left effusion postthoracentesis. No   pneumothorax.    Developing right basilar opacity/effusion.    < end of copied text >  < from: CT Angio Chest PE Protocol w/ IV Cont (23 @ 22:26) >  IMPRESSION:  Since 2023:    1. Decreased mild right hydronephrosis, post double-J ureteral stent   placement, with persistent increased enhancement of ureter, suspicious   for urinary tract infection.    2. Unchanged ill-defined pancreatic mass.    3. Increased moderate-to-large left pleural effusion with overlying   compressive atelectasis. New small right pleural effusion. Unchanged   right lung pulmonary nodules, previously characterized on 2023   PET/CT.    4. No evidence of pulmonary embolus.    5. Unchanged previously described soft tissue mass adjacent to the   sigmoid colon.    < end of copied text >    < from: TTE Echo Complete w/o Contrast w/ Doppler (23 @ 09:41) >  Summary:   1. Normal global left ventricular systolic function.   2. Spectral Doppler shows impaired relaxation pattern of left   ventricular myocardial filling (Grade I diastolic dysfunction).   3. Normal left atrial size.   4. Normal right atrial size.   5. Mild thickening of the anterior and posterior mitral valve leaflets.   6. Trace mitral valve regurgitation.   7. Mild tricuspid regurgitation.   8. Mild aortic regurgitation.      MEDICATIONS  (STANDING):  atorvastatin 20 milliGRAM(s) Oral at bedtime  dextrose 5%. 1000 milliLiter(s) (50 mL/Hr) IV Continuous <Continuous>  dextrose 5%. 1000 milliLiter(s) (100 mL/Hr) IV Continuous <Continuous>  dextrose 50% Injectable 25 Gram(s) IV Push once  dextrose 50% Injectable 12.5 Gram(s) IV Push once  dextrose 50% Injectable 25 Gram(s) IV Push once  glucagon  Injectable 1 milliGRAM(s) IntraMuscular once  heparin   Injectable 5000 Unit(s) SubCutaneous every 12 hours  insulin lispro (ADMELOG) corrective regimen sliding scale   SubCutaneous three times a day before meals  iron sucrose IVPB 200 milliGRAM(s) IV Intermittent every 24 hours  lactated ringers. 1000 milliLiter(s) (75 mL/Hr) IV Continuous <Continuous>  lactated ringers. 1000 milliLiter(s) (75 mL/Hr) IV Continuous <Continuous>  pantoprazole    Tablet 40 milliGRAM(s) Oral before breakfast  polyethylene glycol 3350 17 Gram(s) Oral daily  senna 1 Tablet(s) Oral daily  tamsulosin 0.4 milliGRAM(s) Oral at bedtime    MEDICATIONS  (PRN):  bisacodyl 5 milliGRAM(s) Oral every 12 hours PRN Constipation  dextrose Oral Gel 15 Gram(s) Oral once PRN Blood Glucose LESS THAN 70 milliGRAM(s)/deciliter  magnesium hydroxide Suspension 30 milliLiter(s) Oral daily PRN Constipation

## 2023-07-08 NOTE — DIETITIAN INITIAL EVALUATION ADULT - PERTINENT LABORATORY DATA
07-08    136  |  103  |  11  ----------------------------<  128<H>  3.7   |  22  |  <0.5<L>    Ca    8.5      08 Jul 2023 08:52  Phos  2.6     07-07  Mg     2.1     07-08    TPro  4.7<L>  /  Alb  2.7<L>  /  TBili  0.3  /  DBili  x   /  AST  11  /  ALT  6   /  AlkPhos  81  07-08  POCT Blood Glucose.: 138 mg/dL (07-08-23 @ 07:50)  A1C with Estimated Average Glucose Result: 6.6 % (07-07-23 @ 08:25)  A1C with Estimated Average Glucose Result: 7.2 % (05-25-23 @ 11:03)  A1C with Estimated Average Glucose Result: 7.3 % (02-22-23 @ 08:28)

## 2023-07-08 NOTE — DIETITIAN INITIAL EVALUATION ADULT - SIGNS/SYMPTOMS
No Retinal holes, Tears or Detachments. Severe muscle mass and body fat loss  Severe muscle mass and body fat loss; >5% unintentional wt loss in 1 month

## 2023-07-08 NOTE — DIETITIAN INITIAL EVALUATION ADULT - PERTINENT MEDS FT
MEDICATIONS  (STANDING):  atorvastatin 20 milliGRAM(s) Oral at bedtime  dextrose 5%. 1000 milliLiter(s) (50 mL/Hr) IV Continuous <Continuous>  dextrose 5%. 1000 milliLiter(s) (100 mL/Hr) IV Continuous <Continuous>  dextrose 50% Injectable 25 Gram(s) IV Push once  dextrose 50% Injectable 12.5 Gram(s) IV Push once  dextrose 50% Injectable 25 Gram(s) IV Push once  glucagon  Injectable 1 milliGRAM(s) IntraMuscular once  heparin   Injectable 5000 Unit(s) SubCutaneous every 12 hours  insulin lispro (ADMELOG) corrective regimen sliding scale   SubCutaneous three times a day before meals  iron sucrose IVPB 200 milliGRAM(s) IV Intermittent every 24 hours  lactated ringers. 1000 milliLiter(s) (75 mL/Hr) IV Continuous <Continuous>  lactated ringers. 1000 milliLiter(s) (75 mL/Hr) IV Continuous <Continuous>  pantoprazole    Tablet 40 milliGRAM(s) Oral before breakfast  polyethylene glycol 3350 17 Gram(s) Oral daily  senna 1 Tablet(s) Oral daily  tamsulosin 0.4 milliGRAM(s) Oral at bedtime    MEDICATIONS  (PRN):  bisacodyl 5 milliGRAM(s) Oral every 12 hours PRN Constipation  dextrose Oral Gel 15 Gram(s) Oral once PRN Blood Glucose LESS THAN 70 milliGRAM(s)/deciliter  magnesium hydroxide Suspension 30 milliLiter(s) Oral daily PRN Constipation

## 2023-07-08 NOTE — DIETITIAN INITIAL EVALUATION ADULT - NS FNS DIET ORDER
Diet, Soft and Bite Sized:   Vegan {Accepts Vegetable Products Only}  Supplement Feeding Modality:  Oral  Ensure Plant-Based Cans or Servings Per Day:  1       Frequency:  Three Times a day (07-06-23 @ 14:10) [Active]

## 2023-07-08 NOTE — DIETITIAN INITIAL EVALUATION ADULT - OTHER CALCULATIONS
Using ABW: ENERGY: 6070-9987 kcal/day (40-45 kcal/kg); PROTEIN: 68-83 g/day (1.3-1.6 g/kg IBW); FLUID: 1mL/kg or per LIP -- with consideration for age, BMI, pancreatic cancer with suspected lung metastasis, malnutrition

## 2023-07-08 NOTE — DIETITIAN INITIAL EVALUATION ADULT - NAME AND PHONE
Noni [id number] or TEAMS    Nutrition Interventions: Meals, snacks, medical nutrition supplements; Nutrition Monitoring: Diet order, PO intake, weights, labs, NFPF, body composition, BM and tolerance to medical food supplements

## 2023-07-08 NOTE — DIETITIAN INITIAL EVALUATION ADULT - EDUCATION DIETARY MODIFICATIONS
Discussed importance of PO intake, current diet order, restrictions and medical nutrition supplements./(1) partially meets; needs review/practice/verbalization

## 2023-07-08 NOTE — DIETITIAN INITIAL EVALUATION ADULT - ORAL INTAKE PTA/DIET HISTORY
Pt reports poor appetite prior to admit; picks at meals throughout the day. Denies taking vitamin or mineral supplements. Pt drinks Ensure protein shake supplements 1-2X daily (Ensure MAX). NKFA; denies any restrictive cultural or Mandaen food preferences. Pt states she follow a vegetarian diet; soft + bite sized texture.

## 2023-07-08 NOTE — DIETITIAN INITIAL EVALUATION ADULT - ADD RECOMMEND
1. ADD Ensure Max 3X/DAILY -- provides 450 kcal/day total, 90g pro/day total, D/C Ensure Plant per pts request  2. Adjust diet order to soft + bite sized; vegetarian  3. Encourage PO intake and assist during meals prn    Pt is at high nutrition risk; will f/u in 3 days or prn.

## 2023-07-08 NOTE — PROGRESS NOTE ADULT - ASSESSMENT
82-year-old female with past medical history of hypertension, diabetes, hyperlipidemia, pancreatic adenocarcinoma status post transverse loop colostomy, right hydronephrosis s/p ureteral stent presenting for a UTI VRE of 1 month duration failed OP treatment and as per the chart was supposed to have a PICC line but procedure delayed presents for IV antibiotics treatment. Last urine culture in 05/25 showed VRE sensitive only to zyvox. Patient is a poor historian reported that suprapubic abdominal discomfort started 1 month ago and the patient was unsuccessfully treated with different antibiotics.     A/P   # VRE UTI failed OP therapy/ Asymptomatic bacteriuria / hydronephrosis   - consulted by ID, will monitor off Abx   - 05/25   Urine Cx showed VRE only sensitive to zyvox   - passed TOV, f/u  repeat kidney/bladder US   - c/w Flomax   -  follow up  - Right double-J ureteral stent with stable moderate hydroureter  - maintain sufficient hydration     # Pancreatic adenocarcinoma  with mets / left sided malignant pleural effusion   - L pleural effusion tapped by pulm, f/u cytology to r/o malignancy  - Outpatient follow with Dr. Mead next week to initiate treatment with Gemcitabine (once weekly) treatment. The infection should be controlled by then.  - overall prognosis is poor       # Anemia   -  Labs noticeable for Hb 10.2 at baseline,    - start IV Venofer     # diabetes:   - carb consistent diet   - monitor finger stick  - on Insulin     # Generalized weakness  - PT/ rehab eval  - supportive care, fall and aspiration precautions       # DVT proph: heparin   # Gi proph: protonix   # Dispo: inpatient floor   # Diet: DASH, TLC carb consist     normal

## 2023-07-09 NOTE — PROGRESS NOTE ADULT - ASSESSMENT
82-year-old female with past medical history of hypertension, diabetes, hyperlipidemia, pancreatic adenocarcinoma status post transverse loop colostomy, right hydronephrosis s/p ureteral stent presenting for a UTI VRE of 1 month duration failed OP treatment and as per the chart was supposed to have a PICC line but procedure delayed presents for IV antibiotics treatment. Last urine culture in 05/25 showed VRE sensitive only to zyvox. Patient is a poor historian reported that suprapubic abdominal discomfort started 1 month ago and the patient was unsuccessfully treated with different antibiotics.     A/P   # VRE UTI failed OP therapy/ Asymptomatic bacteriuria / hydronephrosis   - consulted by ID, will monitor off Abx   - 05/25   Urine Cx showed VRE only sensitive to zyvox   - passed TOV, f/u  repeat kidney/bladder US   - c/w Flomax   -  follow up  - Right double-J ureteral stent with stable moderate hydroureter  - maintain sufficient hydration     # Pancreatic adenocarcinoma  with mets / left sided malignant pleural effusion   - L pleural effusion tapped by pulm, f/u cytology to r/o malignancy  - Outpatient follow with Dr. Mead next week to initiate treatment with Gemcitabine (once weekly) treatment. The infection should be controlled by then.  - overall prognosis is poor       # Anemia   -  Labs noticeable for Hb 10.2 at baseline,    - started on  IV Venofer     # diabetes:   - carb consistent diet   - monitor finger stick  - on Insulin     # Generalized weakness  - PT/ rehab eval  - supportive care, fall and aspiration precautions       # DVT proph: heparin   # Gi proph: protonix   # Dispo: inpatient floor   # Diet: DASH, TLC carb consist    #Progress Note Handoff  Pending (specify):  supportive care, repeat Kidney/Bladder US, placement to the NH   Family discussion: I spoke with pt, she agreed with a plan of care   Disposition: Home___/SNF_x __/Other________/Unknown at this time________

## 2023-07-09 NOTE — PROGRESS NOTE ADULT - ASSESSMENT
82-year-old female with past medical history of hypertension, diabetes, hyperlipidemia, pancreatic adenocarcinoma status post transverse loop colostomy, right hydronephrosis s/p ureteral stent presenting for a UTI VRE of 1 month duration failed OP treatment and as per the chart was supposed to have a PICC line but procedure delayed presents for IV antibiotics treatment. Last urine culture in 05/25 showed VRE sensitive only to zyvox. Patient is a poor historian reported that suprapubic abdominal discomfort started 1 month ago and the patient was unsuccessfully treated with different antibiotics. Patient still complains of suprapubic discomfort and burning, and lower back pain, Patient reports mild epigastric discomfort today . Patient denies any SOB, CP, NV, diarrhea, constipation fever, respiratory symptoms.     #bacteremia  blood cx from 7/5 grew Gram positive cocci in clusters, started Vanco 1 g Q 24, f/u sensitivities, consider echo to find a source, consult ID      # VRE UTI failed OP therapy  # possible CAUTI    - UA grossly positive for RBC and WBC   - CTA chest Abdomen pelvis showed::  Decreased mild right hydronephrosis, post double-J ureteral stent placement, with persistent increased enhancement of ureter, suspicious for urinary tract infection.  Previous CTAP showed: Right double-J ureteral stent with stable moderate hydroureter.  - 05/25   Urine Cx showed VRE only sensitive to zyvox   - FU Blood and urine culture   -UA positive  -Per ID, seems colonized w VRE but asymptomatic, monitor off abx  - passed TOV    # Pancreatic mass with mets   # Mets possibly to the lungs with large LL pleural effusion   - CTA chest Abdomen pelvis showed:  * Unchanged ill-defined pancreatic mass.  *  Increased moderate-to-large left pleural effusion with overlying compressive atelectasis. New small right pleural effusion. Unchanged right lung pulmonary nodules, previously characterized on 2/16/2023 PET/CT.  - Previous CTAP showed   * Soft tissue mass adjacent to the sigmoid colon measuring approximately 3.1 x 2.5 cm.  * Right double-J ureteral stent with stable moderate hydroureter.  * Grossly stable ill-defined mass at the pancreatic head, consistent with known pancreatic neoplasm  - L pleural effusion tapped by pulm, f/u cytology to r/o malignancy - exudative in nature by protein ratio and Light's criteria  - Oncology will set up outpt txt for chemo next week.       # Anemia :   -  Labs noticeable for Hb 10.2 at baseline,    - FU iron studies b12 folate     # diabetes:   -last   - ISS   A1C 6.6      # DVT proph: heparin   # Gi proph: protonix   # Dispo: inpatient floor   # Diet: DASH, TLC carb consist

## 2023-07-09 NOTE — PROGRESS NOTE ADULT - SUBJECTIVE AND OBJECTIVE BOX
24H events:    Patient is a 82y old Female who presents with a chief complaint of Streptococcus infection     (2023 11:17)    Primary diagnosis of VRE infection (vancomycin resistant Enterococcus)      Day 1:  Day 2:  Day 3:     Today is hospital day 4d. This morning patient was seen and examined at bedside, resting comfortably in bed.    No acute or major events overnight.    Code Status:    Family communication:  Contact date:  Name of person contacted:  Relationship to patient:  Communication details:  What matters most:    PAST MEDICAL & SURGICAL HISTORY  HTN (hypertension)    Diabetes    Hypercholesteremia    Pancreatic cancer    Hydronephrosis    Colostomy status    History of     History of left hip replacement    H/O carpal tunnel repair      SOCIAL HISTORY:  Social History:      ALLERGIES:  No Known Allergies    MEDICATIONS:  STANDING MEDICATIONS  atorvastatin 20 milliGRAM(s) Oral at bedtime  dextrose 5%. 1000 milliLiter(s) IV Continuous <Continuous>  dextrose 5%. 1000 milliLiter(s) IV Continuous <Continuous>  dextrose 50% Injectable 25 Gram(s) IV Push once  dextrose 50% Injectable 12.5 Gram(s) IV Push once  dextrose 50% Injectable 25 Gram(s) IV Push once  glucagon  Injectable 1 milliGRAM(s) IntraMuscular once  heparin   Injectable 5000 Unit(s) SubCutaneous every 12 hours  insulin lispro (ADMELOG) corrective regimen sliding scale   SubCutaneous three times a day before meals  lactated ringers. 1000 milliLiter(s) IV Continuous <Continuous>  lactated ringers. 1000 milliLiter(s) IV Continuous <Continuous>  pantoprazole    Tablet 40 milliGRAM(s) Oral before breakfast  polyethylene glycol 3350 17 Gram(s) Oral daily  senna 1 Tablet(s) Oral daily  tamsulosin 0.4 milliGRAM(s) Oral at bedtime  vancomycin  IVPB      vancomycin  IVPB 1000 milliGRAM(s) IV Intermittent once    PRN MEDICATIONS  bisacodyl 5 milliGRAM(s) Oral every 12 hours PRN  dextrose Oral Gel 15 Gram(s) Oral once PRN  magnesium hydroxide Suspension 30 milliLiter(s) Oral daily PRN    VITALS:   T(F): 97.9  HR: 84  BP: 112/55  RR: 17  SpO2: --    PHYSICAL EXAM:  GENERAL:   ( x ) NAD, lying in bed comfortably     (  ) obtunded     (  ) lethargic     (  ) somnolent    HEAD:   (x  ) Atraumatic     (  ) hematoma     (  ) laceration (specify location:       )     NECK:  (  ) Supple     (  ) neck stiffness     (  ) nuchal rigidity     (  )  no JVD     (  ) JVD present ( -- cm)    HEART:  Rate -->     ( x ) normal rate     (  ) bradycardic     (  ) tachycardic  Rhythm -->     ( x ) regular     (  ) regularly irregular     (  ) irregularly irregular  Murmurs -->     ( x ) normal s1s2     (  ) systolic murmur     (  ) diastolic murmur     (  ) continuous murmur      (  ) S3 present     (  ) S4 present    LUNGS:   ( x )Unlabored respirations     (  ) tachypnea  ( x ) B/L air entry     (  ) decreased breath sounds in:  (location     )    (  ) no adventitious sound     (  ) crackles     (  ) wheezing      (  ) rhonchi      (specify location:       )  (  ) chest wall tenderness (specify location:       )    ABDOMEN:   ( x ) Soft     (  ) tense   |   ( x ) nondistended     (  ) distended   |   (  ) +BS     (  ) hypoactive bowel sounds     (  ) hyperactive bowel sounds  (  ) nontender     (  ) RUQ tenderness     (  ) RLQ tenderness     (  ) LLQ tenderness     (  ) epigastric tenderness     (  ) diffuse tenderness  (  ) Splenomegaly      (  ) Hepatomegaly      (  ) Jaundice     (  ) ecchymosis   stool present in colostomy    EXTREMITIES: 2+ peripheral pulses bilaterally. No clubbing, cyanosis, or edema  (  ) Normal     (  ) Rash     (  ) ecchymosis     (  ) varicose veins      (  ) pitting edema     (  ) non-pitting edema   (  ) ulceration     (  ) gangrene:     (location:     )    NERVOUS SYSTEM:    ( x ) A&Ox3     (  ) confused     (  ) lethargic  CN II-XII:     (  ) Intact     (  ) deficits found     (Specify:     )   Upper extremities:     (  ) no sensorimotor deficits     (  ) weakness     (  ) loss of proprioception/vibration     (  ) loss of touch/temperature (specify:    )  Lower extremities:     (  ) no sensorimotor deficits     (  ) weakness     (  ) loss of proprioception/vibration     (  ) loss of touch/temperature (specify:    )    SKIN:   (  ) No rashes or lesions     (  ) maculopapular rash     (  ) pustules     (  ) vesicles     (  ) ulcer     (  ) ecchymosis     (specify location:     )        (  ) Indwelling Ray Catheter:   passed TO    LABS:                        10.5   8.85  )-----------( 187      ( 2023 08:17 )             32.0     07-09    138  |  104  |  12  ----------------------------<  124<H>  3.7   |  22  |  <0.5<L>    Ca    8.3<L>      2023 08:17  Mg     1.8         TPro  4.6<L>  /  Alb  2.6<L>  /  TBili  0.3  /  DBili  x   /  AST  11  /  ALT  5   /  AlkPhos  84        Urinalysis Basic - ( 2023 08:17 )    Color: x / Appearance: x / SG: x / pH: x  Gluc: 124 mg/dL / Ketone: x  / Bili: x / Urobili: x   Blood: x / Protein: x / Nitrite: x   Leuk Esterase: x / RBC: x / WBC x   Sq Epi: x / Non Sq Epi: x / Bacteria: x                RADIOLOGY:

## 2023-07-09 NOTE — PROGRESS NOTE ADULT - SUBJECTIVE AND OBJECTIVE BOX
82y old Female who presents with  VRE infection (vancomycin resistant Enterococcus) in urine, ID recommended to monitor off Abx. She was found to have left sided malignant pleural effusion, underwent thoracocentesis on .   Today pt c/o weakness, has no specific complaints, asking for PT.       PAST MEDICAL & SURGICAL HISTORY  HTN (hypertension)    Diabetes    Hypercholesteremia    Pancreatic cancer    Hydronephrosis    Colostomy status    History of     History of left hip replacement    H/O carpal tunnel repair      SOCIAL HISTORY:  Social History:      ALLERGIES:  No Known Allergies        VITALS:   T(C): 36.7 (2023 05:23), Max: 36.7 (2023 05:23)  T(F): 98 (2023 05:23), Max: 98 (2023 05:23)  HR: 93 (2023 05:23) (91 - 93)  BP: 116/57 (2023 05:23) (114/57 - 116/57)  BP(mean): --  RR: 18 (2023 05:23) (18 - 18)      PHYSICAL EXAM:  GENERAL: NAD, awake and alert  NECK: supple, no JVD  CV: S1, S2, RRR  LUNGS: decreased BS, no BS at left base  Abdomen/GI: soft, NT, ND, BS present , colostomy bad noted   Extremities: no edema      LABS:                                   10.5   8.85  )-----------( 187      ( 2023 08:17 )             32.0   07-09    138  |  104  |  12  ----------------------------<  124<H>  3.7   |  22  |  <0.5<L>    Ca    8.3<L>      2023 08:17  Mg     1.8     07-09    TPro  4.6<L>  /  Alb  2.6<L>  /  TBili  0.3  /  DBili  x   /  AST  11  /  ALT  5   /  AlkPhos  84  07-09        Urinalysis Basic - ( 2023 23:02 )    Color: Red / Appearance: Turbid / S.025 / pH: x  Gluc: x / Ketone: Trace  / Bili: Negative / Urobili: <2 mg/dL   Blood: x / Protein: 300 mg/dL / Nitrite: Negative   Leuk Esterase: Moderate / RBC: >720 /HPF / WBC >100 /HPF   Sq Epi: x / Non Sq Epi: x / Bacteria: Many  Troponin T, Serum: <0.01 ng/mL (23 @ 19:29)      CARDIAC MARKERS ( 2023 19:29 )  x     / <0.01 ng/mL / x     / x     / x        Culture - Body Fluid with Gram Stain (23 @ 10:20)   Gram Stain:   polymorphonuclear leukocytes seen   No organisms seen   by cytocentrifuge  Specimen Source: Pleural Fl Pleural Fluid  Culture Results:   No growth to date.Culture - Urine (23 @ 23:02)   Specimen Source: Clean Catch Clean Catch (Midstream)  Culture Results:   10,000 - 49,000 CFU/mL Gram positive organismsCulture - Blood (23 @ 20:32)   Specimen Source: .Blood Blood-Peripheral  Culture Results:   No growth at 24 hours    RADIOLOGY:    CXR  - Slight interval decrease in left effusion postthoracentesis. No   pneumothorax.    Developing right basilar opacity/effusion.  < from: Xray Chest 1 View- PORTABLE-Urgent (Xray Chest 1 View- PORTABLE-Urgent .) (23 @ 11:35) >    Impression:    Slight interval decrease in left effusion postthoracentesis. No   pneumothorax.    Developing right basilar opacity/effusion.    < end of copied text >  < from: CT Angio Chest PE Protocol w/ IV Cont (23 @ 22:26) >  IMPRESSION:  Since 2023:    1. Decreased mild right hydronephrosis, post double-J ureteral stent   placement, with persistent increased enhancement of ureter, suspicious   for urinary tract infection.    2. Unchanged ill-defined pancreatic mass.    3. Increased moderate-to-large left pleural effusion with overlying   compressive atelectasis. New small right pleural effusion. Unchanged   right lung pulmonary nodules, previously characterized on 2023   PET/CT.    4. No evidence of pulmonary embolus.    5. Unchanged previously described soft tissue mass adjacent to the   sigmoid colon.    < end of copied text >    < from: TTE Echo Complete w/o Contrast w/ Doppler (23 @ 09:41) >  Summary:   1. Normal global left ventricular systolic function.   2. Spectral Doppler shows impaired relaxation pattern of left   ventricular myocardial filling (Grade I diastolic dysfunction).   3. Normal left atrial size.   4. Normal right atrial size.   5. Mild thickening of the anterior and posterior mitral valve leaflets.   6. Trace mitral valve regurgitation.   7. Mild tricuspid regurgitation.   8. Mild aortic regurgitation.      MEDICATIONS  (STANDING):  atorvastatin 20 milliGRAM(s) Oral at bedtime  dextrose 5%. 1000 milliLiter(s) (100 mL/Hr) IV Continuous <Continuous>  dextrose 5%. 1000 milliLiter(s) (50 mL/Hr) IV Continuous <Continuous>  dextrose 50% Injectable 25 Gram(s) IV Push once  dextrose 50% Injectable 12.5 Gram(s) IV Push once  dextrose 50% Injectable 25 Gram(s) IV Push once  glucagon  Injectable 1 milliGRAM(s) IntraMuscular once  heparin   Injectable 5000 Unit(s) SubCutaneous every 12 hours  insulin lispro (ADMELOG) corrective regimen sliding scale   SubCutaneous three times a day before meals  iron sucrose IVPB 200 milliGRAM(s) IV Intermittent every 24 hours  lactated ringers. 1000 milliLiter(s) (75 mL/Hr) IV Continuous <Continuous>  lactated ringers. 1000 milliLiter(s) (75 mL/Hr) IV Continuous <Continuous>  pantoprazole    Tablet 40 milliGRAM(s) Oral before breakfast  polyethylene glycol 3350 17 Gram(s) Oral daily  senna 1 Tablet(s) Oral daily  tamsulosin 0.4 milliGRAM(s) Oral at bedtime    MEDICATIONS  (PRN):  bisacodyl 5 milliGRAM(s) Oral every 12 hours PRN Constipation  dextrose Oral Gel 15 Gram(s) Oral once PRN Blood Glucose LESS THAN 70 milliGRAM(s)/deciliter  magnesium hydroxide Suspension 30 milliLiter(s) Oral daily PRN Constipation

## 2023-07-10 NOTE — MEDICAL STUDENT PROGRESS NOTE(EDUCATION) - PLAN 4
Patient currently has a transverse colon colostomy bag. Last bowel movement was 7/6 early morning per nurse. She started miralax with no improvement. Plan is to add lactulose and monitor for improvement of bowel movement. She denies eating since yesterday.
patient currently on aTORVASTATIN 20MG HS   continue with current medication regiment

## 2023-07-10 NOTE — PROGRESS NOTE ADULT - SUBJECTIVE AND OBJECTIVE BOX
DELORIS NORRIS  82y, Female  Allergy: No Known Allergies      LOS  5d    CHIEF COMPLAINT: Streptococcus infection     (08 Jul 2023 11:17)      INTERVAL EVENTS/HPI  - No acute events overnight, admission BCX 1/4 + , not ID'ed on PCR  - T(F): , Max: 97.9 (07-09-23 @ 13:38)  - Denies any worsening symptoms  - Tolerating medication  - WBC Count: 8.31 (07-10-23 @ 06:38)  WBC Count: 8.85 (07-09-23 @ 08:17)     - Creatinine: <0.5 (07-10-23 @ 06:38)  Creatinine: <0.5 (07-09-23 @ 08:17)       ROS  General: Denies rigors, nightsweats  HEENT: Denies headache, rhinorrhea, sore throat, eye pain  CV: Denies CP, palpitations  PULM: Denies wheezing, hemoptysis  GI: Denies hematemesis, hematochezia, melena  : Denies discharge, hematuria  MSK: Denies arthralgias, myalgias  SKIN: Denies rash, lesions  NEURO: Denies paresthesias, weakness  PSYCH: Denies depression, anxiety    VITALS:  T(F): 97, Max: 97.9 (07-09-23 @ 13:38)  HR: 92  BP: 151/62  RR: 18Vital Signs Last 24 Hrs  T(C): 36.1 (10 Jul 2023 05:00), Max: 36.6 (09 Jul 2023 13:38)  T(F): 97 (10 Jul 2023 05:00), Max: 97.9 (09 Jul 2023 13:38)  HR: 92 (10 Jul 2023 05:00) (84 - 94)  BP: 151/62 (10 Jul 2023 05:00) (109/56 - 151/62)  BP(mean): --  RR: 18 (10 Jul 2023 05:00) (17 - 18)  SpO2: --        PHYSICAL EXAM:  Gen: NAD, resting in bed  HEENT: Normocephalic, atraumatic  Neck: supple, no lymphadenopathy  CV: Regular rate & regular rhythm  Lungs: decreased BS at bases, no fremitus  Abdomen: Soft, BS present  Ext: Warm, well perfused  Neuro: non focal, awake  Skin: no rash, no erythema  Lines: no phlebitis    FH: Non-contributory  Social Hx: Non-contributory    TESTS & MEASUREMENTS:                        10.0   8.31  )-----------( 207      ( 10 Jul 2023 06:38 )             31.2     07-10    136  |  102  |  10  ----------------------------<  181<H>  3.5   |  24  |  <0.5<L>    Ca    8.1<L>      10 Jul 2023 06:38  Mg     1.7     07-10    TPro  4.4<L>  /  Alb  2.6<L>  /  TBili  0.3  /  DBili  x   /  AST  10  /  ALT  <5  /  AlkPhos  84  07-10      LIVER FUNCTIONS - ( 10 Jul 2023 06:38 )  Alb: 2.6 g/dL / Pro: 4.4 g/dL / ALK PHOS: 84 U/L / ALT: <5 U/L / AST: 10 U/L / GGT: x           Urinalysis Basic - ( 10 Jul 2023 06:38 )    Color: x / Appearance: x / SG: x / pH: x  Gluc: 181 mg/dL / Ketone: x  / Bili: x / Urobili: x   Blood: x / Protein: x / Nitrite: x   Leuk Esterase: x / RBC: x / WBC x   Sq Epi: x / Non Sq Epi: x / Bacteria: x        Culture - Fungal, Body Fluid (collected 07-06-23 @ 10:20)  Source: Pleural Fl Pleural Fluid  Preliminary Report (07-08-23 @ 15:03):    Culture is being performed. Fungal cultures are held for 4 weeks.    Culture - Body Fluid with Gram Stain (collected 07-06-23 @ 10:20)  Source: Pleural Fl Pleural Fluid  Gram Stain (07-06-23 @ 18:48):    polymorphonuclear leukocytes seen    No organisms seen    by cytocentrifuge  Preliminary Report (07-07-23 @ 11:42):    No growth to date.    Culture - Urine (collected 07-05-23 @ 23:02)  Source: Clean Catch Clean Catch (Midstream)  Final Report (07-08-23 @ 15:23):    10,000 - 49,000 CFU/mL Enterococcus faecium    <10,000 CFU/ml Normal Urogenital cabrera present  Organism: Enterococcus faecium (07-08-23 @ 15:23)  Organism: Enterococcus faecium (07-08-23 @ 15:23)      Method Type: MARK      -  Ampicillin: R >8 Predicts results to ampicillin/sulbactam, amoxacillin-clavulanate and  piperacillin-tazobactam.      -  Ciprofloxacin: R >2      -  Levofloxacin: R >4      -  Nitrofurantoin: R >64 Should not be used to treat pyelonephritis.      -  Tetracycline: R >8      -  Vancomycin: S 1    Culture - Blood (collected 07-05-23 @ 20:32)  Source: .Blood Blood-Peripheral  Preliminary Report (07-10-23 @ 02:01):    No growth at 4 days    Culture - Blood (collected 07-05-23 @ 20:16)  Source: .Blood Blood-Peripheral  Gram Stain (07-09-23 @ 17:36):    Growth in aerobic bottle: Gram Positive Cocci in Clusters  Preliminary Report (07-09-23 @ 17:36):    Growth in aerobic bottle: Gram Positive Cocci in Clusters    Direct identification is available within approximately 3-5    hours either by Blood Panel Multiplexed PCR or Direct    MALDI-TOF. Details: [email or web address]  Organism: Blood Culture PCR (07-09-23 @ 20:09)  Organism: Blood Culture PCR (07-09-23 @ 20:09)      Method Type: PCR      -  Blood PCR Panel: NEG            INFECTIOUS DISEASES TESTING  Procalcitonin, Serum: 0.09 (07-07-23 @ 08:25)  COVID-19 PCR: NotDetec (04-23-23 @ 10:59)  COVID-19 PCR: NotDetec (03-10-23 @ 12:40)  COVID-19 PCR: NotDetec (02-27-23 @ 08:10)      INFLAMMATORY MARKERS      RADIOLOGY & ADDITIONAL TESTS:  I have personally reviewed the last available Chest xray  CXR      CT      CARDIOLOGY TESTING  12 Lead ECG:   Ventricular Rate 80 BPM    Atrial Rate 48 BPM    QRS Duration 72 ms    Q-T Interval 372 ms    QTC Calculation(Bazett) 429 ms    R Axis 44 degrees    T Axis 36 degrees    Diagnosis Line Normal sinus rhythm with sinus arrhythmia  Otherwise normal ECG    Confirmed by Aaron Fitzgerald ([id number]) on 7/5/2023 10:03:10 PM (07-05-23 @ 20:48)      MEDICATIONS  atorvastatin 20 Oral at bedtime  dextrose 5%. 1000 IV Continuous <Continuous>  dextrose 5%. 1000 IV Continuous <Continuous>  dextrose 50% Injectable 25 IV Push once  dextrose 50% Injectable 25 IV Push once  dextrose 50% Injectable 12.5 IV Push once  glucagon  Injectable 1 IntraMuscular once  heparin   Injectable 5000 SubCutaneous every 12 hours  insulin lispro (ADMELOG) corrective regimen sliding scale  SubCutaneous three times a day before meals  lactated ringers. 1000 IV Continuous <Continuous>  lactated ringers. 1000 IV Continuous <Continuous>  lactated ringers. 1000 IV Continuous <Continuous>  magnesium sulfate  IVPB 2 IV Intermittent once  pantoprazole    Tablet 40 Oral before breakfast  polyethylene glycol 3350 17 Oral daily  senna 1 Oral daily  tamsulosin 0.4 Oral at bedtime      WEIGHT  Weight (kg): 45.4 (07-05-23 @ 17:19)  Creatinine: <0.5 mg/dL (07-10-23 @ 06:38)      ANTIBIOTICS:      All available historical records have been reviewed

## 2023-07-10 NOTE — MEDICAL STUDENT PROGRESS NOTE(EDUCATION) - PLAN 1
repeat blood cultures  D/C vancomycin per ID   Pending cultures to determine if abx should be restarted

## 2023-07-10 NOTE — MEDICAL STUDENT PROGRESS NOTE(EDUCATION) - PLAN 5
currently on insulin lispro sliding scale subQ TID before meals.   Continue current regimen
currently managed with Lispro sliding scale TID   continue current treatment plan

## 2023-07-10 NOTE — PROGRESS NOTE ADULT - SUBJECTIVE AND OBJECTIVE BOX
82y old Female who presents with  VRE infection (vancomycin resistant Enterococcus) in urine, ID recommended to monitor off Abx. She was found to have left sided malignant pleural effusion, underwent thoracocentesis on .   Today pt c/o weakness, denies pain, fever, chills.       PAST MEDICAL & SURGICAL HISTORY  HTN (hypertension)    Diabetes    Hypercholesteremia    Pancreatic cancer    Hydronephrosis    Colostomy status    History of     History of left hip replacement    H/O carpal tunnel repair      SOCIAL HISTORY:  Social History:      ALLERGIES:  No Known Allergies        VITALS:   T(C): 36.3 (10 Jul 2023 13:21), Max: 36.3 (10 Jul 2023 13:21)  T(F): 97.3 (10 Jul 2023 13:21), Max: 97.3 (10 Jul 2023 13:21)  HR: 60 (10 Jul 2023 13:) (60 - 94)  BP: 99/48 (10 Jul 2023 13:21) (99/48 - 151/62)  BP(mean): --  RR: 18 (10 Jul 2023 13:21) (18 - 18)      PHYSICAL EXAM:  GENERAL: NAD, awake and alert  NECK: supple, no JVD  CV: S1, S2, RRR  LUNGS: decreased BS, no BS at left base  Abdomen/GI: soft, NT, ND, BS present , colostomy bad noted   Extremities: no edema      LABS:                                   10.0   8.31  )-----------( 207      ( 10 Jul 2023 06:38 )             31.2   07-10    136  |  102  |  10  ----------------------------<  181<H>  3.5   |  24  |  <0.5<L>    Ca    8.1<L>      10 Jul 2023 06:38  Mg     1.7     07-10    TPro  4.4<L>  /  Alb  2.6<L>  /  TBili  0.3  /  DBili  x   /  AST  10  /  ALT  <5  /  AlkPhos  84  07-10    Culture - Blood (23 @ 20:16)   - Blood PCR Panel: NEG  Gram Stain:   Growth in aerobic bottle: Gram Positive Cocci in Clusters  Specimen Source: .Blood Blood-Peripheral  Organism: Blood Culture PCR  Culture Results:   Growth in aerobic bottle: Gram Positive Cocci in Clusters   Direct identification is available within approximately 3-5   hours either by Blood Panel Multiplexed PCR or Direct   MALDI-TOF. Details: [email or web address]  Organism Identification: Blood Culture PCR  Method Type: PCRCulture - Body Fluid with Gram Stain (23 @ 10:20)   Gram Stain:   polymorphonuclear leukocytes seen   No organisms seen   by cytocentrifuge  Specimen Source: Pleural Fl Pleural Fluid  Culture Results:   No growth to date.        Urinalysis Basic - ( 2023 23:02 )    Color: Red / Appearance: Turbid / S.025 / pH: x  Gluc: x / Ketone: Trace  / Bili: Negative / Urobili: <2 mg/dL   Blood: x / Protein: 300 mg/dL / Nitrite: Negative   Leuk Esterase: Moderate / RBC: >720 /HPF / WBC >100 /HPF   Sq Epi: x / Non Sq Epi: x / Bacteria: Many  Troponin T, Serum: <0.01 ng/mL (23 @ 19:29)      CARDIAC MARKERS ( 2023 19:29 )  x     / <0.01 ng/mL / x     / x     / x        Culture - Body Fluid with Gram Stain (23 @ 10:20)   Gram Stain:   polymorphonuclear leukocytes seen   No organisms seen   by cytocentrifuge  Specimen Source: Pleural Fl Pleural Fluid  Culture Results:   No growth to date.Culture - Urine (23 @ 23:02)   Specimen Source: Clean Catch Clean Catch (Midstream)  Culture Results:   10,000 - 49,000 CFU/mL Gram positive organismsCulture - Blood (23 @ 20:32)   Specimen Source: .Blood Blood-Peripheral  Culture Results:   No growth at 24 hours    RADIOLOGY:    CXR  - Slight interval decrease in left effusion postthoracentesis. No   pneumothorax.    Developing right basilar opacity/effusion.  < from: Xray Chest 1 View- PORTABLE-Urgent (Xray Chest 1 View- PORTABLE-Urgent .) (23 @ 11:35) >    Impression:    Slight interval decrease in left effusion postthoracentesis. No   pneumothorax.    Developing right basilar opacity/effusion.    < end of copied text >  < from: CT Angio Chest PE Protocol w/ IV Cont (23 @ 22:26) >  IMPRESSION:  Since 2023:    1. Decreased mild right hydronephrosis, post double-J ureteral stent   placement, with persistent increased enhancement of ureter, suspicious   for urinary tract infection.    2. Unchanged ill-defined pancreatic mass.    3. Increased moderate-to-large left pleural effusion with overlying   compressive atelectasis. New small right pleural effusion. Unchanged   right lung pulmonary nodules, previously characterized on 2023   PET/CT.    4. No evidence of pulmonary embolus.    5. Unchanged previously described soft tissue mass adjacent to the   sigmoid colon.    < end of copied text >    < from: TTE Echo Complete w/o Contrast w/ Doppler (23 @ 09:41) >  Summary:   1. Normal global left ventricular systolic function.   2. Spectral Doppler shows impaired relaxation pattern of left   ventricular myocardial filling (Grade I diastolic dysfunction).   3. Normal left atrial size.   4. Normal right atrial size.   5. Mild thickening of the anterior and posterior mitral valve leaflets.   6. Trace mitral valve regurgitation.   7. Mild tricuspid regurgitation.   8. Mild aortic regurgitation.      MEDICATIONS  (STANDING):  atorvastatin 20 milliGRAM(s) Oral at bedtime  dextrose 5%. 1000 milliLiter(s) (50 mL/Hr) IV Continuous <Continuous>  dextrose 5%. 1000 milliLiter(s) (100 mL/Hr) IV Continuous <Continuous>  dextrose 50% Injectable 25 Gram(s) IV Push once  dextrose 50% Injectable 12.5 Gram(s) IV Push once  dextrose 50% Injectable 25 Gram(s) IV Push once  glucagon  Injectable 1 milliGRAM(s) IntraMuscular once  heparin   Injectable 5000 Unit(s) SubCutaneous every 12 hours  insulin lispro (ADMELOG) corrective regimen sliding scale   SubCutaneous three times a day before meals  lactated ringers. 1000 milliLiter(s) (75 mL/Hr) IV Continuous <Continuous>  lactated ringers. 1000 milliLiter(s) (75 mL/Hr) IV Continuous <Continuous>  lactated ringers. 1000 milliLiter(s) (75 mL/Hr) IV Continuous <Continuous>  pantoprazole    Tablet 40 milliGRAM(s) Oral before breakfast  polyethylene glycol 3350 17 Gram(s) Oral daily  senna 1 Tablet(s) Oral daily  tamsulosin 0.4 milliGRAM(s) Oral at bedtime    MEDICATIONS  (PRN):  bisacodyl 5 milliGRAM(s) Oral every 12 hours PRN Constipation  dextrose Oral Gel 15 Gram(s) Oral once PRN Blood Glucose LESS THAN 70 milliGRAM(s)/deciliter  magnesium hydroxide Suspension 30 milliLiter(s) Oral daily PRN Constipation

## 2023-07-10 NOTE — PROGRESS NOTE ADULT - ASSESSMENT
82-year-old female with past medical history of hypertension, diabetes, hyperlipidemia, pancreatic adenocarcinoma status post transverse loop colostomy, right hydronephrosis s/p ureteral stent presenting for a UTI VRE of 1 month duration failed OP treatment and as per the chart was supposed to have a PICC line but procedure delayed presents for IV antibiotics treatment. Last urine culture in 05/25 showed VRE sensitive only to zyvox. Patient is a poor historian reported that suprapubic abdominal discomfort started 1 month ago and the patient was unsuccessfully treated with different antibiotics.     A/P   # VRE UTI failed OP therapy/ Asymptomatic bacteriuria / hydronephrosis   - consulted by ID, will monitor off Abx   - repeat blood Cx ( likely contaminant)   - 05/25   Urine Cx showed VRE only sensitive to zyvox   - passed TOV,   repeat kidney/bladder US noted   - c/w Flomax   -  follow up  - Right double-J ureteral stent with stable moderate hydroureter  - maintain sufficient hydration     # Pancreatic adenocarcinoma  with mets / left sided malignant pleural effusion   - L pleural effusion tapped by pulm, f/u cytology to r/o malignancy  - Outpatient follow with Dr. Mead next week to initiate treatment with Gemcitabine (once weekly) treatment. The infection should be controlled by then.  - overall prognosis is poor       # Anemia   -  Labs noticeable for Hb 10.2 at baseline,    - started on  IV Venofer     # diabetes:   - carb consistent diet   - monitor finger stick  - on Insulin     # Generalized weakness  - PT/ rehab eval  - supportive care, fall and aspiration precautions       # DVT proph: heparin   # Gi proph: protonix   # Dispo: inpatient floor   # Diet: DASH, TLC carb consist    #Progress Note Handoff  Pending (specify):  supportive care, send repeat blood Cx, monitor off Abx,  placement to the NH , PT as tolerated   Family discussion: I spoke with pt, she agreed with a plan of care   Disposition: Home___/SNF_x __/Other________/Unknown at this time________

## 2023-07-10 NOTE — PROGRESS NOTE ADULT - ASSESSMENT
ASSESSMENT  82-year-old female with past medical history of hypertension, diabetes, hyperlipidemia, pancreatic adenocarcinoma status post transverse loop colostomy, right hydronephrosis s/p ureteral stent presenting for a UTI VRE of 1 month duration failed OP treatment and as per the chart was supposed to have a PICC line but procedure delayed presents for IV antibiotics treatment.     IMPRESSION  #admission BCX 1/4 + , not ID'ed on PCR- suspect contaminant   no lines/port   #Asymptomatic- denies symptoms of cystitis or ascending infection    7/5 UA Blood: x / Protein: 300 mg/dL / Nitrite: Negative   Leuk Esterase: Moderate / RBC: >720 /HPF / WBC >100 /HPF   Sq Epi: x / Non Sq Epi: x / Bacteria: Many    5/25 UCX   >100,000 CFU/ml Enterococcus faecium (vancomycin resistant) Ampicillin: R >8    <10,000 CFU/ml Normal Urogenital cabrera present    4/2023 UCX x2  50,000 - 99,000 CFU/mL Pseudomonas aeruginosa S    50,000 - 99,000 CFU/mL Enterococcus faecium (vancomycin resistant)  < from: CT Abdomen and Pelvis w/ IV Cont (07.05.23 @ 22:25) >  Since 6/17/2023:  1. Decreased mild right hydronephrosis, post double-J ureteral stent placement, with persistent increased enhancement of ureter, suspicious for urinary tract infection.  2. Unchanged ill-defined pancreatic mass.  3. Increased moderate-to-large left pleural effusion with overlying compressive atelectasis. New small right pleural effusion. Unchanged   right lung pulmonary nodules, previously characterized on 2/16/2023 PET/CT.  4. No evidence of pulmonary embolus.  5. Unchanged previously described soft tissue mass adjacent to the sigmoid colon.  #Sepsis ruled out on admission   #Pancreatic ca    RECOMMENDATIONS  - Monitor off antibiotics  - Repeat BCX  - f/u ID, suspect contaminant     If any questions, please call or send a message on [employer] Teams  Please continue to update ID with any pertinent new laboratory or radiographic findings  Spectra [id number]

## 2023-07-10 NOTE — MEDICAL STUDENT PROGRESS NOTE(EDUCATION) - ASSESSMENT
Staph infection  Possible Staph infection  Hydronephrosis  pancreatic cancer   Pleural effusion   HTN  diabetes  hyperlipidemia  Pt is 81yo F who presented to the clinic for VRE UTI, she has a past medical history of HTN, DM, HLD, pancreatic adenocarcinoma and transverse volostomy. she was started on Linezolid on admission but advisised to d/c per ID. Trial and void was successful and latest KUB noted debris in the bladder. She is started on fluids to try to flush out any debris. Labs from 7/9/23 noted G+ cocci in clusters and she was imperically started on vancomycin. ID advised this was likely contamination and to d/c vancomycin as the patient is stable, afebrile, and asymptomatic. Pending repeat blood culture patient can be discharged if negative. today (7/10/23) her IV was examined and showed no redness, no swelling, or any signs of infection. If cultures come back + next step is look for any site of infection. Possibly the site of her thoracocentesis (left lower lobe) or bed sores on her back.

## 2023-07-10 NOTE — PROGRESS NOTE ADULT - ASSESSMENT
Impressions  # Large left sided pleural effusion, septations seen on bedside US, suspected malignancy s/p thora  - trace effusion noted on CT A&P 04/2023  # pancreatic adenocarcinoma s/p transverse loop colostomy  #UTI on abx     Plan    S/P left thoracentesis  Pleural fluid cytology showing atypical cells   Follow final results   Remains on room air   If fluid recurs and patient is symptomatic then would recommend pleurx catheter   On DVT ppx   Oncology follow up   Will follow as needed

## 2023-07-10 NOTE — PROGRESS NOTE ADULT - SUBJECTIVE AND OBJECTIVE BOX
24H events:    Patient is a 82y old Female who presents with a chief complaint of VRE UTI        Primary diagnosis of VRE infection (vancomycin resistant Enterococcus)      Day 1:  Day 2:  Day 3:     Today is hospital day 5d. This morning patient was seen and examined at bedside, resting comfortably in bed.    No acute or major events overnight.     Code Status:    Family communication:  Contact date:  Name of person contacted:  Relationship to patient:  Communication details:  What matters most:    PAST MEDICAL & SURGICAL HISTORY  HTN (hypertension)    Diabetes    Hypercholesteremia    Pancreatic cancer    Hydronephrosis    Colostomy status    History of     History of left hip replacement    H/O carpal tunnel repair      SOCIAL HISTORY:  Social History:      ALLERGIES:  No Known Allergies    MEDICATIONS:  STANDING MEDICATIONS  atorvastatin 20 milliGRAM(s) Oral at bedtime  dextrose 5%. 1000 milliLiter(s) IV Continuous <Continuous>  dextrose 5%. 1000 milliLiter(s) IV Continuous <Continuous>  dextrose 50% Injectable 25 Gram(s) IV Push once  dextrose 50% Injectable 12.5 Gram(s) IV Push once  dextrose 50% Injectable 25 Gram(s) IV Push once  glucagon  Injectable 1 milliGRAM(s) IntraMuscular once  heparin   Injectable 5000 Unit(s) SubCutaneous every 12 hours  insulin lispro (ADMELOG) corrective regimen sliding scale   SubCutaneous three times a day before meals  lactated ringers. 1000 milliLiter(s) IV Continuous <Continuous>  lactated ringers. 1000 milliLiter(s) IV Continuous <Continuous>  lactated ringers. 1000 milliLiter(s) IV Continuous <Continuous>  pantoprazole    Tablet 40 milliGRAM(s) Oral before breakfast  polyethylene glycol 3350 17 Gram(s) Oral daily  senna 1 Tablet(s) Oral daily  tamsulosin 0.4 milliGRAM(s) Oral at bedtime    PRN MEDICATIONS  bisacodyl 5 milliGRAM(s) Oral every 12 hours PRN  dextrose Oral Gel 15 Gram(s) Oral once PRN  magnesium hydroxide Suspension 30 milliLiter(s) Oral daily PRN    VITALS:   T(F): 97  HR: 92  BP: 151/62  RR: 18  SpO2: --    PHYSICAL EXAM:  GENERAL:   (x  ) NAD, lying in bed comfortably     (  ) obtunded     (  ) lethargic     (  ) somnolent    HEAD:   (x) Atraumatic     (  ) hematoma     (  ) laceration (specify location:       )     NECK:  (  ) Supple     (  ) neck stiffness     (  ) nuchal rigidity     (  )  no JVD     (  ) JVD present ( -- cm)    HEART:  Rate -->     (x  ) normal rate     (  ) bradycardic     (  ) tachycardic  Rhythm -->     ( x ) regular     (  ) regularly irregular     (  ) irregularly irregular  Murmurs -->     ( x ) normal s1s2     (  ) systolic murmur     (  ) diastolic murmur     (  ) continuous murmur      (  ) S3 present     (  ) S4 present    LUNGS:   ( x )Unlabored respirations     (  ) tachypnea  ( x ) B/L air entry     (  ) decreased breath sounds in:  (location     )    (  ) no adventitious sound     (  ) crackles     (  ) wheezing      (  ) rhonchi      (specify location:       )  (  ) chest wall tenderness (specify location:       )  BM present in colostomy bag    ABDOMEN:   ( x ) Soft     (  ) tense   |   (x  ) nondistended     (  ) distended   |   (  ) +BS     (  ) hypoactive bowel sounds     (  ) hyperactive bowel sounds  (  ) nontender     (  ) RUQ tenderness     (  ) RLQ tenderness     (  ) LLQ tenderness     (  ) epigastric tenderness     (  ) diffuse tenderness  (  ) Splenomegaly      (  ) Hepatomegaly      (  ) Jaundice     (  ) ecchymosis     EXTREMITIES: 2+ peripheral pulses bilaterally. No clubbing, cyanosis, or edema  (  ) Normal     (  ) Rash     (  ) ecchymosis     (  ) varicose veins      (  ) pitting edema     (  ) non-pitting edema   (  ) ulceration     (  ) gangrene:     (location:     )    NERVOUS SYSTEM:    ( x ) A&Ox3     (  ) confused     (  ) lethargic  CN II-XII:     (  ) Intact     (  ) deficits found     (Specify:     )   Upper extremities:     (  ) no sensorimotor deficits     (  ) weakness     (  ) loss of proprioception/vibration     (  ) loss of touch/temperature (specify:    )  Lower extremities:     (  ) no sensorimotor deficits     (  ) weakness     (  ) loss of proprioception/vibration     (  ) loss of touch/temperature (specify:    )    SKIN:   (  ) No rashes or lesions     (  ) maculopapular rash     (  ) pustules     (  ) vesicles     (  ) ulcer     (  ) ecchymosis     (specify location:     )        (  ) Indwelling Ray Catheter:  TOV completed    LABS:                        10.0   8.31  )-----------( 207      ( 10 Jul 2023 06:38 )             31.2     07-10    136  |  102  |  10  ----------------------------<  181<H>  3.5   |  24  |  <0.5<L>    Ca    8.1<L>      10 Jul 2023 06:38  Mg     1.7     07-10    TPro  4.4<L>  /  Alb  2.6<L>  /  TBili  0.3  /  DBili  x   /  AST  10  /  ALT  <5  /  AlkPhos  84  07-10      Urinalysis Basic - ( 10 Jul 2023 06:38 )    Color: x / Appearance: x / SG: x / pH: x  Gluc: 181 mg/dL / Ketone: x  / Bili: x / Urobili: x   Blood: x / Protein: x / Nitrite: x   Leuk Esterase: x / RBC: x / WBC x   Sq Epi: x / Non Sq Epi: x / Bacteria: x                RADIOLOGY:    Kidney and bladder US  - Right-sided double-J ureteral stent with mild    Debris within the urinary bladder itself.

## 2023-07-10 NOTE — PROGRESS NOTE ADULT - SUBJECTIVE AND OBJECTIVE BOX
Patient is a 82y old  Female who presents with a chief complaint of Streptococcus infection     (08 Jul 2023 11:17)        Over Night Events:    On room air   Afebrile           ROS:  See HPI    PHYSICAL EXAM    ICU Vital Signs Last 24 Hrs  T(C): 36.1 (10 Jul 2023 05:00), Max: 36.6 (09 Jul 2023 13:38)  T(F): 97 (10 Jul 2023 05:00), Max: 97.9 (09 Jul 2023 13:38)  HR: 92 (10 Jul 2023 05:00) (84 - 94)  BP: 151/62 (10 Jul 2023 05:00) (109/56 - 151/62)  BP(mean): --  ABP: --  ABP(mean): --  RR: 18 (10 Jul 2023 05:00) (17 - 18)  SpO2: --        General: NAD   HEENT: BRIANA             Lymphatic system: No cervical LN   Lungs: Bilateral BS; decreased left base   Cardiovascular: Regular   Gastrointestinal: Soft, Positive BS  Extremities: No clubbing.  Moves extremities.  Full Range of motion   Skin: Warm, intact  Neurological: No motor or sensory deficit       07-09-23 @ 07:01  -  07-10-23 @ 07:00  --------------------------------------------------------  IN:  Total IN: 0 mL    OUT:    Colostomy (mL): 90 mL    Voided (mL): 400 mL  Total OUT: 490 mL    Total NET: -490 mL          LABS:                            10.0   8.31  )-----------( 207      ( 10 Jul 2023 06:38 )             31.2                                               07-10    136  |  102  |  10  ----------------------------<  181<H>  3.5   |  24  |  <0.5<L>    Ca    8.1<L>      10 Jul 2023 06:38  Mg     1.7     07-10    TPro  4.4<L>  /  Alb  2.6<L>  /  TBili  0.3  /  DBili  x   /  AST  10  /  ALT  <5  /  AlkPhos  84  07-10                                             Urinalysis Basic - ( 10 Jul 2023 06:38 )    Color: x / Appearance: x / SG: x / pH: x  Gluc: 181 mg/dL / Ketone: x  / Bili: x / Urobili: x   Blood: x / Protein: x / Nitrite: x   Leuk Esterase: x / RBC: x / WBC x   Sq Epi: x / Non Sq Epi: x / Bacteria: x                                                  LIVER FUNCTIONS - ( 10 Jul 2023 06:38 )  Alb: 2.6 g/dL / Pro: 4.4 g/dL / ALK PHOS: 84 U/L / ALT: <5 U/L / AST: 10 U/L / GGT: x                                                                                                                                       MEDICATIONS  (STANDING):  atorvastatin 20 milliGRAM(s) Oral at bedtime  dextrose 5%. 1000 milliLiter(s) (50 mL/Hr) IV Continuous <Continuous>  dextrose 5%. 1000 milliLiter(s) (100 mL/Hr) IV Continuous <Continuous>  dextrose 50% Injectable 25 Gram(s) IV Push once  dextrose 50% Injectable 25 Gram(s) IV Push once  dextrose 50% Injectable 12.5 Gram(s) IV Push once  glucagon  Injectable 1 milliGRAM(s) IntraMuscular once  heparin   Injectable 5000 Unit(s) SubCutaneous every 12 hours  insulin lispro (ADMELOG) corrective regimen sliding scale   SubCutaneous three times a day before meals  lactated ringers. 1000 milliLiter(s) (75 mL/Hr) IV Continuous <Continuous>  lactated ringers. 1000 milliLiter(s) (75 mL/Hr) IV Continuous <Continuous>  lactated ringers. 1000 milliLiter(s) (75 mL/Hr) IV Continuous <Continuous>  pantoprazole    Tablet 40 milliGRAM(s) Oral before breakfast  polyethylene glycol 3350 17 Gram(s) Oral daily  senna 1 Tablet(s) Oral daily  tamsulosin 0.4 milliGRAM(s) Oral at bedtime    MEDICATIONS  (PRN):  bisacodyl 5 milliGRAM(s) Oral every 12 hours PRN Constipation  dextrose Oral Gel 15 Gram(s) Oral once PRN Blood Glucose LESS THAN 70 milliGRAM(s)/deciliter  magnesium hydroxide Suspension 30 milliLiter(s) Oral daily PRN Constipation      Xrays: Left pleural effusion                                                                                    ECHO

## 2023-07-10 NOTE — MEDICAL STUDENT PROGRESS NOTE(EDUCATION) - SUBJECTIVE AND OBJECTIVE BOX
DELORIS NORRIS ([id number])    Patient is a 82y old  Female who presents with a chief complaint of Streptococcus infection     (08 Jul 2023 11:17)    Today is hospital day 6, she was started on IV vancomycin and reports no adverse effects. patient's only other complains are trouble urinating for which she was started on Tamsulosin 0.4mg HS     INTERVAL HPI/OVERNIGHT EVENTS:    PHYSICAL EXAM:    - GENERAL: Alert and oriented x 3. No acute distress. Well-nourished.  - LUNGS: Clear to auscultation bilaterally. No accessory muscle use.  - CARDIOVASCULAR: Regular rate and rhythm. No murmur. No JVD.  - ABDOMEN: Soft, non-tender and non-distended. No palpable masses.  - EXTREMITIES: No edema. Non-tender.?- SKIN: No rashes or lesions. Warm. 2 areas of ecchymosis noted on R arm above the elbow, most likely from previous IV sites, no erythema or tenderness   - PSYCHIATRIC: Cooperative. Appropriate mood and affect.  -----------------------------------------------------------------  REVIEW OF SYSTEMS (**ALL ROS negative unless bolded**):   - CONSTITUTIONAL: Denies weight loss, fever and chills.  - HEENT: Denies changes in vision and hearing.  - RESPIRATORY: Denies SOB and cough.  - CV: Denies palpitations and CP.  - GI: Denies abdominal pain, nausea, vomiting and diarrhea.  - : Denies dysuria and urinary frequency. reports issues using bedpan and trouble initiating urination.     FAMILY HISTORY:  FH: bladder cancer (Sibling)      T(C): 36.1 (07-10-23 @ 05:00), Max: 36.6 (07-09-23 @ 13:38)  HR: 92 (07-10-23 @ 05:00) (84 - 94)  BP: 151/62 (07-10-23 @ 05:00) (109/56 - 151/62)  RR: 18 (07-10-23 @ 05:00) (17 - 18)  SpO2: --  Wt(kg): --Vital Signs Last 24 Hrs  T(C): 36.1 (10 Jul 2023 05:00), Max: 36.6 (09 Jul 2023 13:38)  T(F): 97 (10 Jul 2023 05:00), Max: 97.9 (09 Jul 2023 13:38)  HR: 92 (10 Jul 2023 05:00) (84 - 94)  BP: 151/62 (10 Jul 2023 05:00) (109/56 - 151/62)  BP(mean): --  RR: 18 (10 Jul 2023 05:00) (17 - 18)  SpO2: --      No Known Allergies        LABS:    Blood cultures returned with G+ Cocci in clusters      RADIOLOGY & ADDITIONAL TESTS:    < from: US Kidney and Bladder (07.09.23 @ 13:46) >  Right-sided double-J ureteral stent with mild    Debris within the urinary bladder itself.        atorvastatin 20 milliGRAM(s) Oral at bedtime  bisacodyl 5 milliGRAM(s) Oral every 12 hours PRN  dextrose 5%. 1000 milliLiter(s) IV Continuous <Continuous>  dextrose 5%. 1000 milliLiter(s) IV Continuous <Continuous>  dextrose 50% Injectable 25 Gram(s) IV Push once  dextrose 50% Injectable 12.5 Gram(s) IV Push once  dextrose 50% Injectable 25 Gram(s) IV Push once  dextrose Oral Gel 15 Gram(s) Oral once PRN  glucagon  Injectable 1 milliGRAM(s) IntraMuscular once  heparin   Injectable 5000 Unit(s) SubCutaneous every 12 hours  insulin lispro (ADMELOG) corrective regimen sliding scale   SubCutaneous three times a day before meals  lactated ringers. 1000 milliLiter(s) IV Continuous <Continuous>  lactated ringers. 1000 milliLiter(s) IV Continuous <Continuous>  lactated ringers. 1000 milliLiter(s) IV Continuous <Continuous>  magnesium hydroxide Suspension 30 milliLiter(s) Oral daily PRN  pantoprazole    Tablet 40 milliGRAM(s) Oral before breakfast  polyethylene glycol 3350 17 Gram(s) Oral daily  senna 1 Tablet(s) Oral daily  tamsulosin 0.4 milliGRAM(s) Oral at bedtime      HEALTH ISSUES - PROBLEM Dx:         DELORIS NORRIS ([id number])    Patient is a 82y old  Female who presents with a chief complaint of Streptococcus infection     (08 Jul 2023 11:17)    Today is hospital day 6, she was started on IV vancomycin and reports no adverse effects. patient's only other complains are trouble urinating for which she was started on Tamsulosin 0.4mg HS     Vancomycin discontinued, Staph likely contamination of blood sample    INTERVAL HPI/OVERNIGHT EVENTS:Possible     PHYSICAL EXAM:    - GENERAL: Alert and oriented x 3. No acute distress. Well-nourished.  - LUNGS: Clear to auscultation bilaterally. No accessory muscle use.  - CARDIOVASCULAR: Regular rate and rhythm. No murmur. No JVD.  - ABDOMEN: Soft, non-tender and non-distended. No palpable masses.  - EXTREMITIES: No edema. Non-tender.?- SKIN: No rashes or lesions. Warm. 2 areas of ecchymosis noted on R arm above the elbow, most likely from previous IV sites, no erythema or tenderness   - PSYCHIATRIC: Cooperative. Appropriate mood and affect.  -----------------------------------------------------------------  REVIEW OF SYSTEMS (**ALL ROS negative unless bolded**):   - CONSTITUTIONAL: Denies weight loss, fever and chills.  - HEENT: Denies changes in vision and hearing.  - RESPIRATORY: Denies SOB and cough.  - CV: Denies palpitations and CP.  - GI: Denies abdominal pain, nausea, vomiting and diarrhea.  - : Denies dysuria and urinary frequency. reports issues using bedpan and trouble initiating urination.     FAMILY HISTORY:  FH: bladder cancer (Sibling)      T(C): 36.1 (07-10-23 @ 05:00), Max: 36.6 (07-09-23 @ 13:38)  HR: 92 (07-10-23 @ 05:00) (84 - 94)  BP: 151/62 (07-10-23 @ 05:00) (109/56 - 151/62)  RR: 18 (07-10-23 @ 05:00) (17 - 18)  SpO2: --  Wt(kg): --Vital Signs Last 24 Hrs  T(C): 36.1 (10 Jul 2023 05:00), Max: 36.6 (09 Jul 2023 13:38)  T(F): 97 (10 Jul 2023 05:00), Max: 97.9 (09 Jul 2023 13:38)  HR: 92 (10 Jul 2023 05:00) (84 - 94)  BP: 151/62 (10 Jul 2023 05:00) (109/56 - 151/62)  BP(mean): --  RR: 18 (10 Jul 2023 05:00) (17 - 18)  SpO2: --      No Known Allergies        LABS:    Blood cultures returned with G+ Cocci in clusters      RADIOLOGY & ADDITIONAL TESTS:    < from: US Kidney and Bladder (07.09.23 @ 13:46) >  Right-sided double-J ureteral stent with mild    Debris within the urinary bladder itself.        atorvastatin 20 milliGRAM(s) Oral at bedtime  bisacodyl 5 milliGRAM(s) Oral every 12 hours PRN  dextrose 5%. 1000 milliLiter(s) IV Continuous <Continuous>  dextrose 5%. 1000 milliLiter(s) IV Continuous <Continuous>  dextrose 50% Injectable 25 Gram(s) IV Push once  dextrose 50% Injectable 12.5 Gram(s) IV Push once  dextrose 50% Injectable 25 Gram(s) IV Push once  dextrose Oral Gel 15 Gram(s) Oral once PRN  glucagon  Injectable 1 milliGRAM(s) IntraMuscular once  heparin   Injectable 5000 Unit(s) SubCutaneous every 12 hours  insulin lispro (ADMELOG) corrective regimen sliding scale   SubCutaneous three times a day before meals  lactated ringers. 1000 milliLiter(s) IV Continuous <Continuous>  lactated ringers. 1000 milliLiter(s) IV Continuous <Continuous>  lactated ringers. 1000 milliLiter(s) IV Continuous <Continuous>  magnesium hydroxide Suspension 30 milliLiter(s) Oral daily PRN  pantoprazole    Tablet 40 milliGRAM(s) Oral before breakfast  polyethylene glycol 3350 17 Gram(s) Oral daily  senna 1 Tablet(s) Oral daily  tamsulosin 0.4 milliGRAM(s) Oral at bedtime      HEALTH ISSUES - PROBLEM Dx:

## 2023-07-10 NOTE — PROGRESS NOTE ADULT - ASSESSMENT
82-year-old female with past medical history of hypertension, diabetes, hyperlipidemia, pancreatic adenocarcinoma status post transverse loop colostomy, right hydronephrosis s/p ureteral stent presenting for a UTI VRE of 1 month duration failed OP treatment and as per the chart was supposed to have a PICC line but procedure delayed presents for IV antibiotics treatment. Last urine culture in 05/25 showed VRE sensitive only to zyvox. Patient is a poor historian reported that suprapubic abdominal discomfort started 1 month ago and the patient was unsuccessfully treated with different antibiotics. Patient still complains of suprapubic discomfort and burning, and lower back pain, Patient reports mild epigastric discomfort today . Patient denies any SOB, CP, NV, diarrhea, constipation fever, respiratory symptoms.     #bacteremia?  blood cx from 7/5 grew Gram positive cocci in clusters, started Vanco 1 g Q 24  - d/w ID, likely contaminant, f/u sensitivities, DC vanco, f/u repeat blood Cx  - IV fluids 75 cc/ hr  - one IV on R arm that appears uninfected      # VRE UTI failed OP therapy  # possible CAUTI    - UA grossly positive for RBC and WBC   - CTA chest Abdomen pelvis showed::  Decreased mild right hydronephrosis, post double-J ureteral stent placement, with persistent increased enhancement of ureter, suspicious for urinary tract infection.  Previous CTAP showed: Right double-J ureteral stent with stable moderate hydroureter.  - 05/25   Urine Cx showed VRE only sensitive to zyvox   - FU Blood and urine culture   -UA positive  -Per ID, seems colonized w VRE but asymptomatic, monitor off abx  - passed TOV    # Pancreatic mass with mets   # Mets possibly to the lungs with large LL pleural effusion   - CTA chest Abdomen pelvis showed:  * Unchanged ill-defined pancreatic mass.  *  Increased moderate-to-large left pleural effusion with overlying compressive atelectasis. New small right pleural effusion. Unchanged right lung pulmonary nodules, previously characterized on 2/16/2023 PET/CT.  - Previous CTAP showed   * Soft tissue mass adjacent to the sigmoid colon measuring approximately 3.1 x 2.5 cm.  * Right double-J ureteral stent with stable moderate hydroureter.  * Grossly stable ill-defined mass at the pancreatic head, consistent with known pancreatic neoplasm  - L pleural effusion tapped by pulm, f/u cytology to r/o malignancy - exudative in nature by protein ratio and Light's criteria  - Oncology will set up outpt txt for chemo after bacteremia is no longer a concern      # Anemia :   -  Labs noticeable for Hb 10, monitor    - FU iron studies b12 folate     # diabetes:   - ISS   A1C 6.6      # DVT proph: heparin   # Gi proph: protonix   # Dispo: inpatient floor   # Diet: DASH, TLC carb consist

## 2023-07-10 NOTE — MEDICAL STUDENT PROGRESS NOTE(EDUCATION) - PLAN 3
Pt passed trail of void  reports difficulty urinating  -started on tamsulosin 0.4mg HS  - start fluids for next 24hrs to try and flush debris from bladder  -order to PT to encourage urination

## 2023-07-11 NOTE — DISCHARGE NOTE PROVIDER - CARE PROVIDER_API CALL
Ayaka Vasques  Family Medicine  [address] [place] [address] [address]  [place] [address]  Phone: [phone]  Fax: [phone]  Established Patient  Follow Up Time: 2 weeks   Ayaka Vasques  [facility]  [address] [place] [address] [address]  [place] [address]  Phone: [phone]  Fax: [phone]  Established Patient  Follow Up Time: 2 weeks    Micah Umanzor  Pulmonary Disease  [address] [place] [address], [address] [address]  Phone: [phone]  Fax: [phone]  Follow Up Time: 1 week   Ayaka Vasques  Family Medicine  [address] [facility] [address]  [place] [address]  Phone: [phone]  Fax: [phone]  Established Patient  Follow Up Time: 2 weeks    Micah Umanzor  Pulmonary Disease  [address] [place] [address] [address] [place] [address]  Phone: [phone]  Fax: [phone]  Established Patient  Follow Up Time: 1 week    Herminio Fryore  Urology  [address] [place] [address] [address] [place] [address]  Phone: [phone]  Fax: [phone]  Established Patient  Follow Up Time: 2 weeks    Mike Mead  Medical Oncology  [address] [place] [address]  Phone: [phone]  Fax: [phone]  Follow Up Time: 1 week   Ayaka Vasques  Family Medicine  [address] [place] [address] [address]  [place] [address]  Phone: [phone]  Fax: [phone]  Established Patient  Follow Up Time: 2 weeks    Micah Umanzor  Pulmonary Disease  [address] [place] [address] [address] [place] [address]  Phone: [phone]  Fax: [phone]  Established Patient  Follow Up Time: 1 week    Herminio Fry  Urology  [address] [place] [address], [address] [place] [address]  Phone: [phone]  Fax: [phone]  Established Patient  Follow Up Time: 2 weeks

## 2023-07-11 NOTE — DISCHARGE NOTE PROVIDER - NSDCCPCAREPLAN_GEN_ALL_CORE_FT
PRINCIPAL DISCHARGE DIAGNOSIS  Diagnosis: VRE infection (vancomycin resistant Enterococcus)  Assessment and Plan of Treatment:       SECONDARY DISCHARGE DIAGNOSES  Diagnosis: Urinary tract infection  Assessment and Plan of Treatment:     Diagnosis: Pleural effusion, left  Assessment and Plan of Treatment:     Diagnosis: Pancreatic neoplasm  Assessment and Plan of Treatment:      PRINCIPAL DISCHARGE DIAGNOSIS  Diagnosis: Pancreatic cancer  Assessment and Plan of Treatment: you have pancreatic cancer for which you should follow up with the oncology doctor , Dr Mead # Phone: [phone]  in 1 week for outpatient evaluation and possible starting therapy      SECONDARY DISCHARGE DIAGNOSES  Diagnosis: Urinary tract infection  Assessment and Plan of Treatment: You were noted either on arrival or during this hospitalization to have a Urinary Tract Infection. You have already been treated and completed the antibiotics.   You have a double J catheter in your right ureter and you should follow with your urology doctor after 2-3 weeks for this   call Herminio Acuna Phone: [phone]      Diagnosis: Pleural effusion, left  Assessment and Plan of Treatment: The lung is covered  with a tissue called the pleura. The inside of the chest is also lined with pleura. The space between these two  areas is called the pleural space. This space normally contains just a thin layer of fluid; however, some conditions  such as pneumonia, cancer, or congestive heart failure may cause excessive fluid to develop (pleural effusion). your pleural effusion is mostly from your cancer.  To remove this fluid for evaluation (testing) or to reduce the amount of fluid, a procedure called a thoracentesis is done or as in your case a tube catheter called a pleurx catheter is placed for jeremy .   You should follow up with your pulmonary doctor Micah Umanzor  # Phone: [phone] after 2-3 weeks       Diagnosis: Pancreatic neoplasm  Assessment and Plan of Treatment:      PRINCIPAL DISCHARGE DIAGNOSIS  Diagnosis: Pancreatic cancer  Assessment and Plan of Treatment: you have pancreatic cancer for which you decided to forgo chemotherapy and go to [facility].      SECONDARY DISCHARGE DIAGNOSES  Diagnosis: Urinary tract infection  Assessment and Plan of Treatment: You were noted either on arrival or during this hospitalization to have a Urinary Tract Infection. You have already been treated and completed the antibiotics.   You have a double J catheter in your right ureter and you should follow with your urology doctor after 2-3 weeks for this   call Herminio Acuna Phone: [phone]      Diagnosis: Pleural effusion, left  Assessment and Plan of Treatment: The lung is covered  with a tissue called the pleura. The inside of the chest is also lined with pleura. The space between these two  areas is called the pleural space. This space normally contains just a thin layer of fluid; however, some conditions  such as pneumonia, cancer, or congestive heart failure may cause excessive fluid to develop (pleural effusion). your pleural effusion is mostly from your cancer.  To remove this fluid for evaluation (testing) or to reduce the amount of fluid, a procedure called a thoracentesis is done or as in your case a tube catheter called a pleurx catheter is placed for drjames .   You should follow up with your pulmonary doctor Micah Umanzor  # Phone: [phone] after 2-3 weeks       Diagnosis: Pancreatic neoplasm  Assessment and Plan of Treatment:      PRINCIPAL DISCHARGE DIAGNOSIS  Diagnosis: Pancreatic cancer  Assessment and Plan of Treatment: you have pancreatic cancer for which you decided to forgo chemotherapy and go to [facility]. you will be having home hospice care      SECONDARY DISCHARGE DIAGNOSES  Diagnosis: Urinary tract infection  Assessment and Plan of Treatment: You were noted either on arrival or during this hospitalization to have a Urinary Tract Infection. You have already been treated and completed the antibiotics.   You have a double J catheter in your right ureter and you should follow with your urology doctor after 2-3 weeks for this   call Herminio Acuna Phone: [phone]      Diagnosis: Pleural effusion, left  Assessment and Plan of Treatment: The lung is covered  with a tissue called the pleura. The inside of the chest is also lined with pleura. The space between these two  areas is called the pleural space. This space normally contains just a thin layer of fluid; however, some conditions  such as pneumonia, cancer, or congestive heart failure may cause excessive fluid to develop (pleural effusion). your pleural effusion is mostly from your cancer.  To remove this fluid for evaluation (testing) or to reduce the amount of fluid, a procedure called a thoracentesis is done or as in your case a tube catheter called a pleurx catheter is placed for drjames .   You can follow up with your pulmonary doctor Micah Umnazor  # Phone: [phone] after 2-3 weeks       Diagnosis: Pancreatic neoplasm  Assessment and Plan of Treatment:

## 2023-07-11 NOTE — DISCHARGE NOTE PROVIDER - CARE PROVIDERS DIRECT ADDRESSES
,DirectAddress_Unknown ,DirectAddress_Unknown,DirectAddress_Unknown ,DirectAddress_Unknown,DirectAddress_Unknown,[email or web address] ,DirectAddress_Unknown,[email or web address],[email or web address]

## 2023-07-11 NOTE — PROGRESS NOTE ADULT - ASSESSMENT
82-year-old female with past medical history of hypertension, diabetes, hyperlipidemia, pancreatic adenocarcinoma status post transverse loop colostomy, right hydronephrosis s/p ureteral stent presenting for a UTI VRE of 1 month duration failed OP treatment and as per the chart was supposed to have a PICC line but procedure delayed presents for IV antibiotics treatment. Last urine culture in 05/25 showed VRE sensitive only to zyvox. Patient is a poor historian reported that suprapubic abdominal discomfort started 1 month ago and the patient was unsuccessfully treated with different antibiotics. Patient still complains of suprapubic discomfort and burning, and lower back pain, Patient reports mild epigastric discomfort today . Patient denies any SOB, CP, NV, diarrhea, constipation fever, respiratory symptoms.     #bacteremia?  blood cx from 7/5 grew Gram positive cocci in clusters, started Vanco 1 g Q 24  - d/w ID, likely contaminant, f/u sensitivities, DC vanco, f/u repeat blood Cx  - IV fluids 75 cc/ hr  - one IV on R arm that appears uninfected  - grew kocuria, discussed with ID, likely contaminant no need to treat      # VRE UTI failed OP therapy  # possible CAUTI    - UA grossly positive for RBC and WBC   - CTA chest Abdomen pelvis showed::  Decreased mild right hydronephrosis, post double-J ureteral stent placement, with persistent increased enhancement of ureter, suspicious for urinary tract infection.  Previous CTAP showed: Right double-J ureteral stent with stable moderate hydroureter.  - 05/25   Urine Cx showed VRE only sensitive to zyvox   - FU Blood and urine culture   -UA positive  -Per ID, seems colonized w VRE but asymptomatic, monitor off abx  - passed TOV    # Pancreatic mass with mets   # Mets possibly to the lungs with large LL pleural effusion   - CTA chest Abdomen pelvis showed:  * Unchanged ill-defined pancreatic mass.  *  Increased moderate-to-large left pleural effusion with overlying compressive atelectasis. New small right pleural effusion. Unchanged right lung pulmonary nodules, previously characterized on 2/16/2023 PET/CT.  - Previous CTAP showed   * Soft tissue mass adjacent to the sigmoid colon measuring approximately 3.1 x 2.5 cm.  * Right double-J ureteral stent with stable moderate hydroureter.  * Grossly stable ill-defined mass at the pancreatic head, consistent with known pancreatic neoplasm  - L pleural effusion tapped by pulm, f/u cytology to r/o malignancy - exudative in nature by protein ratio and Light's criteria  - Oncology will set up outpt txt for chemo after bacteremia is no longer a concern  -Pulm recs - Pleural fluid cytology showing atypical cells; final cytology and cell block is pending   Remains on room air; denies being short of breath at the moment   If fluid recurs and the patient is symptomatic (dyspnea) then pleurx catheter will be indicated if she is agreeable         # Anemia :   -  Labs noticeable for Hb 10, monitor    - FU iron studies b12 folate     # diabetes:   - ISS   A1C 6.6      # DVT proph: heparin   # Gi proph: protonix   # Dispo: inpatient floor   # Diet: DASH, TLC carb consist

## 2023-07-11 NOTE — DISCHARGE NOTE PROVIDER - NSDCFUSCHEDAPPT_GEN_ALL_CORE_FT
Mike Mead  [facility] PreAdmits  Scheduled Appointment: 07/18/2023    [facility]  AMBCHEMO [address] [place]  Scheduled Appointment: 07/18/2023     Mike Mead  [facility] PreAdmits  Scheduled Appointment: 07/31/2023    Mike Mead  [facility]  HEMONC SI [address]  Scheduled Appointment: 07/31/2023    [facility]  AMBCHEMO SI [address]  Scheduled Appointment: 07/31/2023    Herminio Fry  [facility]  UROLOGY [address]  Scheduled Appointment: 08/17/2023

## 2023-07-11 NOTE — DISCHARGE NOTE PROVIDER - NPI NUMBER (FOR SYSADMIN USE ONLY) :
[id number] [[id number] [[id number]],[[id number]]

## 2023-07-11 NOTE — DISCHARGE NOTE PROVIDER - PROVIDER TOKENS
PROVIDER:[TOKEN:[[id number]],FOLLOWUP:[2 weeks],ESTABLISHEDPATIENT:[T]] PROVIDER:[TOKEN:[[id number]],FOLLOWUP:[2 weeks],ESTABLISHEDPATIENT:[T]],PROVIDER:[TOKEN:[[id number]],FOLLOWUP:[1 week]] PROVIDER:[TOKEN:[[id number]],FOLLOWUP:[2 weeks],ESTABLISHEDPATIENT:[T]],PROVIDER:[TOKEN:[[id number]],FOLLOWUP:[1 week],ESTABLISHEDPATIENT:[T]],PROVIDER:[TOKEN:[[id number]],FOLLOWUP:[2 weeks],ESTABLISHEDPATIENT:[T]],PROVIDER:[TOKEN:[[id number]],FOLLOWUP:[1 week]] PROVIDER:[TOKEN:[[id number]],FOLLOWUP:[2 weeks],ESTABLISHEDPATIENT:[T]],PROVIDER:[TOKEN:[[id number]],FOLLOWUP:[1 week],ESTABLISHEDPATIENT:[T]],PROVIDER:[TOKEN:[[id number]],FOLLOWUP:[2 weeks],ESTABLISHEDPATIENT:[T]]

## 2023-07-11 NOTE — PROGRESS NOTE ADULT - SUBJECTIVE AND OBJECTIVE BOX
82y old Female who presents with  VRE infection (vancomycin resistant Enterococcus) in urine, ID recommended to monitor off Abx. She was found to have left sided malignant pleural effusion, underwent thoracocentesis on .   Today pt c/o weakness, has no other complaints, asking  bout discharge, son at the bedside.       PAST MEDICAL & SURGICAL HISTORY  HTN (hypertension)    Diabetes    Hypercholesteremia    Pancreatic cancer    Hydronephrosis    Colostomy status    History of     History of left hip replacement    H/O carpal tunnel repair      SOCIAL HISTORY:  Social History:      ALLERGIES:  No Known Allergies        VITALS:   T(C): 36.1 (2023 13:33), Max: 36.2 (2023 05:00)  T(F): 97 (2023 13:33), Max: 97.2 (2023 05:00)  HR: 92 (2023 13:33) (53 - 92)  BP: 109/56 (2023 13:33) (93/54 - 109/56)  BP(mean): --  RR: 18 (2023 13:33) (18 - 18)  SpO2: 94% (2023 07:29) (94% - 94%)    Parameters below as of 2023 07:29  Patient On (Oxygen Delivery Method): room air        PHYSICAL EXAM:  GENERAL: NAD, awake and alert  NECK: supple, no JVD  CV: S1, S2, RRR  LUNGS: decreased BS, no BS at left base  Abdomen/GI: soft, NT, ND, BS present , colostomy bad noted   Extremities: no edema      LABS:                                   10.2   8.42  )-----------( 232      ( 2023 08:28 )             32.0   07-11    141  |  104  |  9<L>  ----------------------------<  138<H>  3.5   |  23  |  <0.5<L>    Ca    8.1<L>      2023 08:28  Mg     1.9     11    TPro  4.4<L>  /  Alb  2.6<L>  /  TBili  0.4  /  DBili  x   /  AST  12  /  ALT  6   /  AlkPhos  85      Culture - Blood (23 @ 20:16)   - Blood PCR Panel: NEG  Gram Stain:   Growth in aerobic bottle: Gram Positive Cocci in Clusters  Specimen Source: .Blood Blood-Peripheral  Organism: Blood Culture PCR  Culture Results:   Growth in aerobic bottle: Gram Positive Cocci in Clusters   Direct identification is available within approximately 3-5   hours either by Blood Panel Multiplexed PCR or Direct   MALDI-TOF. Details: [email or web address]  Organism Identification: Blood Culture PCR  Method Type: PCRCulture - Body Fluid with Gram Stain (23 @ 10:20)   Gram Stain:   polymorphonuclear leukocytes seen   No organisms seen   by cytocentrifuge  Specimen Source: Pleural Fl Pleural Fluid  Culture Results:   No growth to date.        Urinalysis Basic - ( 2023 23:02 )    Color: Red / Appearance: Turbid / S.025 / pH: x  Gluc: x / Ketone: Trace  / Bili: Negative / Urobili: <2 mg/dL   Blood: x / Protein: 300 mg/dL / Nitrite: Negative   Leuk Esterase: Moderate / RBC: >720 /HPF / WBC >100 /HPF   Sq Epi: x / Non Sq Epi: x / Bacteria: Many  Troponin T, Serum: <0.01 ng/mL (23 @ 19:29)      CARDIAC MARKERS ( 2023 19:29 )  x     / <0.01 ng/mL / x     / x     / x        Culture - Body Fluid with Gram Stain (23 @ 10:20)   Gram Stain:   polymorphonuclear leukocytes seen   No organisms seen   by cytocentrifuge  Specimen Source: Pleural Fl Pleural Fluid  Culture Results:   No growth to date.Culture - Urine (23 @ 23:02)   Specimen Source: Clean Catch Clean Catch (Midstream)  Culture Results:   10,000 - 49,000 CFU/mL Gram positive organismsCulture - Blood (23 @ 20:32)   Specimen Source: .Blood Blood-Peripheral  Culture Results:   No growth at 24 hours    RADIOLOGY:    CXR  - Slight interval decrease in left effusion postthoracentesis. No   pneumothorax.    Developing right basilar opacity/effusion.  < from: Xray Chest 1 View- PORTABLE-Urgent (Xray Chest 1 View- PORTABLE-Urgent .) (23 @ 11:35) >    Impression:    Slight interval decrease in left effusion postthoracentesis. No   pneumothorax.    Developing right basilar opacity/effusion.    < end of copied text >  < from: CT Angio Chest PE Protocol w/ IV Cont (23 @ 22:26) >  IMPRESSION:  Since 2023:    1. Decreased mild right hydronephrosis, post double-J ureteral stent   placement, with persistent increased enhancement of ureter, suspicious   for urinary tract infection.    2. Unchanged ill-defined pancreatic mass.    3. Increased moderate-to-large left pleural effusion with overlying   compressive atelectasis. New small right pleural effusion. Unchanged   right lung pulmonary nodules, previously characterized on 2023   PET/CT.    4. No evidence of pulmonary embolus.    5. Unchanged previously described soft tissue mass adjacent to the   sigmoid colon.    < end of copied text >    < from: TTE Echo Complete w/o Contrast w/ Doppler (23 @ 09:41) >  Summary:   1. Normal global left ventricular systolic function.   2. Spectral Doppler shows impaired relaxation pattern of left   ventricular myocardial filling (Grade I diastolic dysfunction).   3. Normal left atrial size.   4. Normal right atrial size.   5. Mild thickening of the anterior and posterior mitral valve leaflets.   6. Trace mitral valve regurgitation.   7. Mild tricuspid regurgitation.   8. Mild aortic regurgitation.      MEDICATIONS  (STANDING):  atorvastatin 20 milliGRAM(s) Oral at bedtime  dextrose 5%. 1000 milliLiter(s) (100 mL/Hr) IV Continuous <Continuous>  dextrose 5%. 1000 milliLiter(s) (50 mL/Hr) IV Continuous <Continuous>  dextrose 50% Injectable 25 Gram(s) IV Push once  dextrose 50% Injectable 12.5 Gram(s) IV Push once  dextrose 50% Injectable 25 Gram(s) IV Push once  glucagon  Injectable 1 milliGRAM(s) IntraMuscular once  heparin   Injectable 5000 Unit(s) SubCutaneous every 12 hours  insulin lispro (ADMELOG) corrective regimen sliding scale   SubCutaneous three times a day before meals  lactated ringers. 1000 milliLiter(s) (75 mL/Hr) IV Continuous <Continuous>  lactated ringers. 1000 milliLiter(s) (75 mL/Hr) IV Continuous <Continuous>  pantoprazole    Tablet 40 milliGRAM(s) Oral before breakfast  polyethylene glycol 3350 17 Gram(s) Oral daily  senna 1 Tablet(s) Oral daily  tamsulosin 0.4 milliGRAM(s) Oral at bedtime    MEDICATIONS  (PRN):  bisacodyl 5 milliGRAM(s) Oral every 12 hours PRN Constipation  dextrose Oral Gel 15 Gram(s) Oral once PRN Blood Glucose LESS THAN 70 milliGRAM(s)/deciliter

## 2023-07-11 NOTE — DISCHARGE NOTE PROVIDER - NSDCCPGOAL_GEN_ALL_CORE_FT
Most of the time, your urine is sterile. This means there are no bacteria growing. On the other hand, if you have symptoms of a bladder or kidney infection, bacteria will often be present and growing in your urine.    Sometimes, your health care provider may check your urine for bacteria, even when you do not have any symptoms. If enough bacteria are found in your urine, you have asymptomatic bacteriuria.                Causes  Asymptomatic bacteriuria occurs in a small number of healthy people. It affects women more often than men. The reasons for the lack of symptoms are not well understood.    You are more likely to have this problem if you:    Have a urinary catheter in place  Are female  Are pregnant  Are sexually active (in females)  Have long-term diabetes and are female  Are an older adult  Have recently had a surgical procedure in your urinary tract  Symptoms  There are no symptoms of this problem.    If you have these symptoms, you may have a urinary tract infection, but you do not have asymptomatic bacteriuria.    Burning during urination  Increased urgency to urinate  Increased frequency of urination  Exams and Tests  To diagnose asymptomatic bacteriuria, a urine sample must be sent for a urine culture. Most people with no urinary tract symptoms do not need this test.    You may need a urine culture done as a screening test, even without symptoms, if:    You are pregnant  You have a surgery or procedure planned that involves the bladder, prostate, or other parts of the urinary tract  To make the diagnosis of asymptomatic bacteriuria, the culture must show a large growth of bacteria.    In men, only one culture needs to show growth of bacteria  In women, two different cultures must show growth of bacteria  Treatment  Most people who have bacteria growing in their urine, but no symptoms, do not need treatment. This is because the bacteria are not causing any harm. In fact, treating most people with this problem may make it harder to treat infections in the future.    However, for some people getting a urinary tract infection is more likely or may cause more severe problems. As a result, treatment with antibiotics may be needed if:    You are pregnant.  You recently had a kidney transplant.  You are scheduled for surgery involving the prostate gland or the bladder.  You have kidney stones that have caused an infection.  Your young child has reflux (backward movement of urine from the bladder into ureters or kidneys).  Without symptoms being present, even people who are older adults, have diabetes, or have a catheter in place do not need treatment.    Possible Complications  If it is not treated, you may have a kidney infection if you are at high risk.    When to Contact a Medical Professional  Contact your provider if you have:    Difficulty emptying your bladder  Fever  Flank or back pain  Pain with urination  You will need to be checked for a bladder or kidney infection

## 2023-07-11 NOTE — MEDICAL STUDENT PROGRESS NOTE(EDUCATION) - ASSESSMENT
Pt is 83yo F who presented for a VRE UTI. Her past medical history includes htn, diabetes, hyperlipidemia, pancreatic adenocarcinoma status post transverse colostomy, right hydronephrosis s/p ureteral stent. She failed 1 month OP treatment for OP VRE. Last blood culture showed G+ cocci in clusters which she was started on Vancomycin but later discontinued as it was likely contamination. Right now awaiting repeat culture results. She has had no symtoms of infection since vanco has been discontinued. Her appetite has increased and her bowels are functioning normally. No trouble urinating, and no SOB or chest tightness following up on thoracocentesis. If cultures come back negative she is ready to discharge.  Pt is 81yo F who presented for a VRE UTI. Her past medical history includes htn, diabetes, hyperlipidemia, pancreatic adenocarcinoma status post transverse colostomy, right hydronephrosis s/p ureteral stent. She failed 1 month OP treatment for OP VRE. Last blood culture showed G+ cocci in clusters which she was started on Vancomycin but later discontinued as it was likely contamination. Right now awaiting repeat culture results. She has had no symtoms of infection since vanco has been discontinued. Her appetite has increased and her bowels are functioning normally. No trouble urinating, and no SOB or chest tightness following up on thoracocentesis.

## 2023-07-11 NOTE — DISCHARGE NOTE PROVIDER - HOSPITAL COURSE
82-year-old female with past medical history of hypertension, diabetes, hyperlipidemia, pancreatic adenocarcinoma status post transverse loop colostomy, right hydronephrosis s/p ureteral stent presenting for a UTI VRE of 1 month duration failed OP treatment and as per the chart was supposed to have a PICC line but procedure delayed presents for IV antibiotics treatment. Last urine culture in 05/25 showed VRE sensitive only to zyvox. Patient is a poor historian reported that suprapubic abdominal discomfort started 1 month ago and the patient was unsuccessfully treated with different antibiotics. Patient still complains of suprapubic discomfort and burning, and lower back pain, Patient reports mild epigastric discomfort today . Patient denies any SOB, CP, NV, diarrhea, constipation fever, respiratory symptoms.     Labs noticeable for Hb 10.2 at baseline,   UA grossly positive for RBC and WBC   CTA chest Abdomen pelvis showed:    * Decreased mild right hydronephrosis, post double-J ureteral stent placement, with persistent increased enhancement of ureter, suspicious for urinary tract infection.  * Unchanged ill-defined pancreatic mass.  *  Increased moderate-to-large left pleural effusion with overlying compressive atelectasis. New small right pleural effusion. Unchanged right lung pulmonary nodules, previously characterized on 2/16/2023 PET/CT.    Previous CTAP showed   * Soft tissue mass adjacent to the sigmoid colon measuring approximately 3.1 x 2.5 cm.  * Right double-J ureteral stent with stable moderate hydroureter.  * Grossly stable ill-defined mass at the pancreatic head, consistent with known pancreatic neoplasm.  Vitals wnl    received zyvox in the ED    Admitted to medicine for cystitis  ID recommended monitoring off abx as asymptomatic with no fevers, suspected as colonized with VRE, pt has remained asymptomatic off abx, passed TOV  Pt has pancreatic mass, with pleural effusion in left lung, thoracentesis performed by pulm, studies showed exudative fluid, Pleural fluid cytology showing atypical cells; final cytology and cell block is pending   Remains on room air; denies being short of breath at the moment   If fluid recurs and the patient is symptomatic (dyspnea) then pleurx catheter will be indicated if she is agreeable, per pulm.   Oncology to follow and begin chemo outpatient.   Pt with positive growth on blood cultures, no infectious symptoms, identified as kocuria, likely contaminant per ID, no treatment needed 82-year-old female with past medical history of hypertension, diabetes, hyperlipidemia, pancreatic adenocarcinoma status post transverse loop colostomy, right hydronephrosis s/p ureteral stent presenting for a UTI VRE of 1 month duration failed OP treatment and as per the chart was supposed to have a PICC line but procedure delayed presents for IV antibiotics treatment. Last urine culture in 05/25 showed VRE sensitive only to zyvox. Patient is a poor historian reported that suprapubic abdominal discomfort started 1 month ago and the patient was unsuccessfully treated with different antibiotics. Patient still complains of suprapubic discomfort and burning, and lower back pain, Patient reports mild epigastric discomfort today . Patient denies any SOB, CP, NV, diarrhea, constipation fever, respiratory symptoms.     Labs noticeable for Hb 10.2 at baseline,   UA grossly positive for RBC and WBC   CTA chest Abdomen pelvis showed:    * Decreased mild right hydronephrosis, post double-J ureteral stent placement, with persistent increased enhancement of ureter, suspicious for urinary tract infection.  * Unchanged ill-defined pancreatic mass.  *  Increased moderate-to-large left pleural effusion with overlying compressive atelectasis. New small right pleural effusion. Unchanged right lung pulmonary nodules, previously characterized on 2/16/2023 PET/CT.    Previous CTAP showed   * Soft tissue mass adjacent to the sigmoid colon measuring approximately 3.1 x 2.5 cm.  * Right double-J ureteral stent with stable moderate hydroureter.  * Grossly stable ill-defined mass at the pancreatic head, consistent with known pancreatic neoplasm.  Vitals wnl    received zyvox in the ED    Admitted to medicine for cystitis  ID recommended monitoring off abx as asymptomatic with no fevers, suspected as colonized with VRE, pt has remained asymptomatic off abx, passed TOV  Pt has pancreatic mass, with pleural effusion in left lung, thoracentesis performed by pulm, studies showed exudative fluid, Pleural fluid cytology showing atypical cells; final cytology and cell block is pending   Remains on room air; denies being short of breath at the moment   Pleural effusion recurred, pt feeling some increased work of breathing. pleurex catheter placed by pulm. Pt also given IV lasix  Oncology to follow and begin chemo outpatient.   Pt with positive growth on blood cultures, no infectious symptoms, identified as kocuria, likely contaminant per ID, no treatment needed 82-year-old female with past medical history of hypertension, diabetes, hyperlipidemia, pancreatic adenocarcinoma status post transverse loop colostomy, right hydronephrosis s/p ureteral stent presenting for a UTI VRE of 1 month duration failed OP treatment and as per the chart was supposed to have a PICC line but procedure delayed presents for IV antibiotics treatment. Last urine culture in 05/25 showed VRE sensitive only to zyvox. Patient is a poor historian reported that suprapubic abdominal discomfort started 1 month ago and the patient was unsuccessfully treated with different antibiotics. Patient still complains of suprapubic discomfort and burning, and lower back pain, Patient reports mild epigastric discomfort today . Patient denies any SOB, CP, NV, diarrhea, constipation fever, respiratory symptoms.     Labs noticeable for Hb 10.2 at baseline,   UA grossly positive for RBC and WBC   CTA chest Abdomen pelvis showed:    * Decreased mild right hydronephrosis, post double-J ureteral stent placement, with persistent increased enhancement of ureter, suspicious for urinary tract infection.  * Unchanged ill-defined pancreatic mass.  *  Increased moderate-to-large left pleural effusion with overlying compressive atelectasis. New small right pleural effusion. Unchanged right lung pulmonary nodules, previously characterized on 2/16/2023 PET/CT.    Previous CTAP showed   * Soft tissue mass adjacent to the sigmoid colon measuring approximately 3.1 x 2.5 cm.  * Right double-J ureteral stent with stable moderate hydroureter.  * Grossly stable ill-defined mass at the pancreatic head, consistent with known pancreatic neoplasm.  Vitals wnl    received zyvox in the ED    Admitted to medicine for cystitis  ID recommended monitoring off abx as asymptomatic with no fevers, suspected as colonized with VRE, pt has remained asymptomatic off abx, passed TOV  Pt has pancreatic mass, with pleural effusion in left lung, thoracentesis performed by pulm, studies showed exudative fluid, Pleural fluid cytology showing atypical cells; final cytology and cell block is pending   Remains on room air; denies being short of breath at the moment   Pleural effusion recurred, pt feeling some increased work of breathing. Pleurex catheter placed by pulm. Pt also given IV lasix  Oncology to follow and begin chemo outpatient.   Pt with positive growth on blood cultures, no infectious symptoms, identified as kocuria, likely contaminant per ID, no treatment needed 82-year-old female with past medical history of hypertension, diabetes, hyperlipidemia, pancreatic adenocarcinoma status post transverse loop colostomy, right hydronephrosis s/p ureteral stent presenting for a UTI VRE of 1 month duration failed OP treatment and as per the chart was supposed to have a PICC line but procedure delayed presents for IV antibiotics treatment. Last urine culture in 05/25 showed VRE sensitive only to zyvox. Patient is a poor historian reported that suprapubic abdominal discomfort started 1 month ago and the patient was unsuccessfully treated with different antibiotics. Patient still complains of suprapubic discomfort and burning, and lower back pain, Patient reports mild epigastric discomfort today . Patient denies any SOB, CP, NV, diarrhea, constipation fever, respiratory symptoms.     Labs noticeable for Hb 10.2 at baseline,   UA grossly positive for RBC and WBC   CTA chest Abdomen pelvis showed:    * Decreased mild right hydronephrosis, post double-J ureteral stent placement, with persistent increased enhancement of ureter, suspicious for urinary tract infection.  * Unchanged ill-defined pancreatic mass.  *  Increased moderate-to-large left pleural effusion with overlying compressive atelectasis. New small right pleural effusion. Unchanged right lung pulmonary nodules, previously characterized on 2/16/2023 PET/CT.    Previous CTAP showed   * Soft tissue mass adjacent to the sigmoid colon measuring approximately 3.1 x 2.5 cm.  * Right double-J ureteral stent with stable moderate hydroureter.  * Grossly stable ill-defined mass at the pancreatic head, consistent with known pancreatic neoplasm.  Vitals wnl    received zyvox in the ED    hospital remarks :       82-year-old female with past medical history of hypertension, diabetes, hyperlipidemia, pancreatic adenocarcinoma status post transverse loop colostomy, right hydronephrosis s/p ureteral stent presenting for a UTI VRE of 1 month duration failed OP treatment now admitted for management of asymptomatic UTI VRE c/b positive Bcx that was determined to be contaminated and now pending discharge.    # Pancreatic mass with mets   # Mets possibly to the lungs with large LL pleural effusion   - CTA chest Abdomen pelvis showed:  * Unchanged ill-defined pancreatic mass.  *  Increased moderate-to-large left pleural effusion with overlying compressive atelectasis. New small right pleural effusion. Unchanged right lung pulmonary nodules, previously characterized on 2/16/2023 PET/CT.  - Previous CTAP showed   * Soft tissue mass adjacent to the sigmoid colon measuring approximately 3.1 x 2.5 cm.  * Right double-J ureteral stent with stable moderate hydroureter.  * Grossly stable ill-defined mass at the pancreatic head, consistent with known pancreatic neoplasm  - L pleural effusion tapped by pulm, f/u cytology to r/o malignancy - exudative in nature by protein ratio and Light's criteria  - Oncology will set up outpt txt for chemo after bacteremia is no longer a concern  some hypotension overnight, given 500 cc fluids  - 7/14 pulm placed a pleurx catheter  - CTA chest PE 7/15 - no PE, findings as above  - Per pulm, drain no more than1 L in 48 hrs, call MD when drainage persistently less than 200 cc  - drainage as above 900 cc since 7/14 15:00  - satting 98% on RA  - Discussed with pulm today (7/17), no R lung thora planned prior to discharge, drain L pleurx catheter  - Discussed with hem/onc today (7/17), no plan for inpatient chemo, will plan for outpatient chemo next week w/Dr. Mead  - Appreciate pulm and hem/onc recs    #bacteremia?  blood cx from 7/5 grew Gram positive cocci in clusters, started Vanco 1 g Q 24  - d/w ID, likely contaminant, f/u sensitivities, DC vanco, f/u repeat blood Cx  - IV fluids 75 cc/ hr  - one IV on R arm that appears uninfected  - grew kocuria, discussed with ID, likely contaminant no need to treat    # VRE UTI failed OP therapy  # possible CAUTI    - UA grossly positive for RBC and WBC   - CTA chest Abdomen pelvis showed::  Decreased mild right hydronephrosis, post double-J ureteral stent placement, with persistent increased enhancement of ureter, suspicious for urinary tract infection.  Previous CTAP showed: Right double-J ureteral stent with stable moderate hydroureter.  - 05/25   Urine Cx showed VRE only sensitive to zyvox   - FU Blood and urine culture   -UA positive  -Per ID, seems colonized w VRE but asymptomatic, monitor off abx  - passed TOV    # Anemia :   - s/p IV venofer  - c/w home iron sulfate     # diabetes:   A1C 6.6  - on ISS and FS    #MISC  - DVT PPX: heparin   - GI PPX: protonix  - Bowel reg: Senna, miralax, and dulcolax  - Diet: DASH, TLC carb consist  - Activity: Increase as tolerated   82-year-old female with past medical history of hypertension, diabetes, hyperlipidemia, pancreatic adenocarcinoma status post transverse loop colostomy, right hydronephrosis s/p ureteral stent presenting for a UTI VRE of 1 month duration failed OP treatment and as per the chart was supposed to have a PICC line but procedure delayed presents for IV antibiotics treatment. Last urine culture in 05/25 showed VRE sensitive only to zyvox. Patient is a poor historian reported that suprapubic abdominal discomfort started 1 month ago and the patient was unsuccessfully treated with different antibiotics. Patient still complains of suprapubic discomfort and burning, and lower back pain, Patient reports mild epigastric discomfort today . Patient denies any SOB, CP, NV, diarrhea, constipation fever, respiratory symptoms.     Labs noticeable for Hb 10.2 at baseline,   UA grossly positive for RBC and WBC   CTA chest Abdomen pelvis showed:    * Decreased mild right hydronephrosis, post double-J ureteral stent placement, with persistent increased enhancement of ureter, suspicious for urinary tract infection.  * Unchanged ill-defined pancreatic mass.  *  Increased moderate-to-large left pleural effusion with overlying compressive atelectasis. New small right pleural effusion. Unchanged right lung pulmonary nodules, previously characterized on 2/16/2023 PET/CT.    Previous CTAP showed   * Soft tissue mass adjacent to the sigmoid colon measuring approximately 3.1 x 2.5 cm.  * Right double-J ureteral stent with stable moderate hydroureter.  * Grossly stable ill-defined mass at the pancreatic head, consistent with known pancreatic neoplasm.  Vitals wnl    received zyvox in the ED    hospital remarks :       82-year-old female with past medical history of hypertension, diabetes, hyperlipidemia, pancreatic adenocarcinoma status post transverse loop colostomy, right hydronephrosis s/p ureteral stent presenting for a UTI VRE of 1 month duration failed OP treatment now admitted for management of asymptomatic UTI VRE c/b positive Bcx that was determined to be contaminated and now pending discharge.    # Pancreatic mass with mets   # Mets possibly to the lungs with large LL pleural effusion   - CTA chest Abdomen pelvis showed:  * Unchanged ill-defined pancreatic mass.  *  Increased moderate-to-large left pleural effusion with overlying compressive atelectasis. New small right pleural effusion. Unchanged right lung pulmonary nodules, previously characterized on 2/16/2023 PET/CT.  - Previous CTAP showed   * Soft tissue mass adjacent to the sigmoid colon measuring approximately 3.1 x 2.5 cm.  * Right double-J ureteral stent with stable moderate hydroureter.  * Grossly stable ill-defined mass at the pancreatic head, consistent with known pancreatic neoplasm  - L pleural effusion tapped by pulm, f/u cytology to r/o malignancy - exudative in nature by protein ratio and Light's criteria  - Oncology will set up outpt txt for chemo after bacteremia is no longer a concern  some hypotension overnight, given 500 cc fluids  - 7/14 pulm placed a pleurx catheter  - CTA chest PE 7/15 - no PE, findings as above  - Per pulm, drain no more than1 L in 48 hrs, call MD when drainage persistently less than 200 cc  - drainage as above 900 cc since 7/14 15:00  - satting 98% on RA  - Discussed with pulm today (7/17), no R lung thora planned prior to discharge, drain L pleurx catheter  - Discussed with hem/onc today (7/17), refusing chemo , requested hospice   - Appreciate pulm and hem/onc recs    #bacteremia?  blood cx from 7/5 grew Gram positive cocci in clusters, started Vanco 1 g Q 24  - d/w ID, likely contaminant, f/u sensitivities, DC vanco, f/u repeat blood Cx  - IV fluids 75 cc/ hr  - one IV on R arm that appears uninfected  - grew kocuria, discussed with ID, likely contaminant no need to treat    # VRE UTI failed OP therapy  # possible CAUTI    - UA grossly positive for RBC and WBC   - CTA chest Abdomen pelvis showed::  Decreased mild right hydronephrosis, post double-J ureteral stent placement, with persistent increased enhancement of ureter, suspicious for urinary tract infection.  Previous CTAP showed: Right double-J ureteral stent with stable moderate hydroureter.  - 05/25   Urine Cx showed VRE only sensitive to zyvox   - FU Blood and urine culture   -UA positive  -Per ID, seems colonized w VRE but asymptomatic, monitor off abx  - passed TOV    # Anemia :   - s/p IV venofer  - c/w home iron sulfate     # diabetes:   A1C 6.6  - on ISS and FS    #MISC  - DVT PPX: heparin   - GI PPX: protonix  - Bowel reg: Senna, miralax, and dulcolax  - Diet: DASH, TLC carb consist  - Activity: Increase as tolerated   82-year-old woman with past medical history of hypertension, diabetes, hyperlipidemia, pancreatic adenocarcinoma status post transverse loop colostomy, right hydronephrosis s/p ureteral stent presenting for a UTI VRE of 1 month duration failed OP treatment now admitted for management of asymptomatic UTI VRE c/b positive Bcx that was determined to be contaminated and now pending discharge.    # pt asked for hospice eval  hospice consult placed - for home  can stop ordering labs/studies  can stop all FS    # Pancreatic mass with mets; Mets possibly to the lungs with large Lt malign pleural effusion   - CTA chest Abdomen pelvis showed:  * Unchanged ill-defined pancreatic mass.  * Increased moderate-to-large left pleural effusion with overlying compressive atelectasis. New small right pleural effusion. Unchanged right lung pulmonary nodules, previously characterized on 2/16/2023 PET/CT.  - Previous CTAP showed:  * Soft tissue mass adjacent to the sigmoid colon measuring approximately 3.1 x 2.5 cm.  * Right double-J ureteral stent with stable moderate hydroureter.  * Grossly stable ill-defined mass at the pancreatic head, consistent with known pancreatic neoplasm L pleural exudative effusion tapped by pulm: cytology + for met adeno in pl fluid (7/13)  Onc: no further chemo - hospice  7/14 pulm placed a pleurx catheter - can drain 1L q2-3 days at home - family teaching (d/w RN)  CTA chest PE 7/15 - no PE  on RA  Pulm: no plan for rt side pl eff; outpt pulm f/u as needed  can cont midodrine for now  make morphine liq 6mg po q3 prn pain (d/c IV) - hospice will handle morphine on d/c    # bld cx +  grew kocuria, discussed with ID, likely contaminant, no need to treat    # VRE UTI failed OP therapy  pt had chr smith on arrival (see H&P) - smith d/c'd 7/7 - passed TOV; cont flomax  still has rt Dbl J stent - outpt uro f/u as needed  U/A +  - CTA chest Abdomen pelvis showed::  Decreased mild right hydronephrosis, s/p double-J ureteral stent placement, with persistent increased enhancement of ureter, suspicious for urinary tract infection.  Previous CTAP showed: Right double-J ureteral stent with stable moderate hydroureter.  05/25 Urine Cx showed VRE only sensitive to zyvox   Per ID, seems colonized w VRE and asymptomatic, monitor off abx    # constipation  c/w bowel reg    # Anemia :   s/p IV venofer  can d/c iron sulfate     # diabetes:   A1C 6.6  d/c FS and SSI    # DLD  can d/c statin    # BMI 17.4 = underweight; + malignant cachexia; severe malnutrition  supplement as tolerated/desired  prog poor    # DVT PPX: d/c heparin sc (pt does not want it)    # GI PPX: protonix q24    # Activity: w/ asst to chair    # pt's family is COVID +, but plan is to take pt home (no need to temporarily place pt in NH for respite care)    Dispo: hospice; as above, eventually, pt will go home on hospice - discussed with patient and CM, patient to be discharged home today with HHA in place for SAT and hospice to see pt on SUN)

## 2023-07-11 NOTE — PROGRESS NOTE ADULT - ASSESSMENT
82-year-old female with past medical history of hypertension, diabetes, hyperlipidemia, pancreatic adenocarcinoma status post transverse loop colostomy, right hydronephrosis s/p ureteral stent presenting for a UTI VRE of 1 month duration failed OP treatment and as per the chart was supposed to have a PICC line but procedure delayed presents for IV antibiotics treatment. Last urine culture in 05/25 showed VRE sensitive only to zyvox. Patient is a poor historian reported that suprapubic abdominal discomfort started 1 month ago and the patient was unsuccessfully treated with different antibiotics.     A/P   # VRE UTI failed OP therapy/ Asymptomatic bacteriuria / hydronephrosis   - consulted by ID, will monitor off Abx   - f/u repeat blood Cx ( likely contaminant), confirmed by ID  - 05/25   Urine Cx showed VRE only sensitive to zyvox   - passed TOV,   repeat kidney/bladder US noted   - c/w Flomax   -  follow up  - Right double-J ureteral stent with stable moderate hydroureter  - maintain sufficient hydration     # Pancreatic adenocarcinoma  with mets / left sided malignant pleural effusion   - L pleural effusion tapped by pulm, f/u cytology to r/o malignancy  - Outpatient follow with Dr. Mead next week to initiate treatment with Gemcitabine (once weekly) treatment. The infection should be controlled by then.  - overall prognosis is poor       # Anemia   -  Labs noticeable for Hb 10.2 at baseline,    - started on  IV Venofer     # diabetes:   - carb consistent diet   - monitor finger stick  - on Insulin     # Generalized weakness  - PT/ rehab eval  - supportive care, fall and aspiration precautions       # DVT proph: heparin   # Gi proph: protonix   # Dispo: inpatient floor   # Diet: DASH, TLC carb consist    #Progress Note Handoff  Pending (specify):  supportive care, f/u  repeat blood Cx, monitor off Abx, anticipate discharge home with home care in 24 hours   Family discussion: I spoke with pt and her son at the bedside, they agreed with a plan of care  Disposition: Home_x __/SNF___/Other________/Unknown at this time________

## 2023-07-11 NOTE — DISCHARGE NOTE PROVIDER - NSDCMRMEDTOKEN_GEN_ALL_CORE_FT
bisacodyl 5 mg oral delayed release tablet: 1 tab(s) orally every 12 hours  Ferretts Iron 325 mg (106 mg elemental iron) oral tablet: 1 tab(s) orally 2 times a day  lactulose 10 g/15 mL oral syrup: 15 milliliter(s) orally 3 times a day as needed for  constipation  pantoprazole 40 mg oral delayed release tablet: 1 tab(s) orally once a day (before a meal)  polyethylene glycol 3350 oral powder for reconstitution: 17 gram(s) orally 2 times a day  rosuvastatin 5 mg oral tablet: 1 tab(s) orally once a day  senna leaf extract oral tablet: 2 tab(s) orally once a day (at bedtime)  tamsulosin 0.4 mg oral capsule: 1 cap(s) orally once a day (at bedtime)   bisacodyl 5 mg oral delayed release tablet: 1 tab(s) orally every 12 hours  Ferretts Iron 325 mg (106 mg elemental iron) oral tablet: 1 tab(s) orally 2 times a day  lactulose 10 g/15 mL oral syrup: 15 milliliter(s) orally 3 times a day as needed for  constipation  midodrine 5 mg oral tablet: 1 tab(s) orally 3 times a day  pantoprazole 40 mg oral delayed release tablet: 1 tab(s) orally once a day (before a meal)  polyethylene glycol 3350 oral powder for reconstitution: 17 gram(s) orally 2 times a day  rosuvastatin 5 mg oral tablet: 1 tab(s) orally once a day  senna leaf extract oral tablet: 2 tab(s) orally once a day (at bedtime)  tamsulosin 0.4 mg oral capsule: 1 cap(s) orally once a day (at bedtime)   bisacodyl 5 mg oral delayed release tablet: 1 tab(s) orally every 12 hours  lactulose 10 g/15 mL oral syrup: 15 milliliter(s) orally 3 times a day as needed for  constipation  midodrine 5 mg oral tablet: 1 tab(s) orally 3 times a day  pantoprazole 40 mg oral delayed release tablet: 1 tab(s) orally once a day (before a meal)  polyethylene glycol 3350 oral powder for reconstitution: 17 gram(s) orally 2 times a day  senna leaf extract oral tablet: 2 tab(s) orally once a day (at bedtime)  tamsulosin 0.4 mg oral capsule: 1 cap(s) orally once a day (at bedtime)

## 2023-07-11 NOTE — MEDICAL STUDENT PROGRESS NOTE(EDUCATION) - SUBJECTIVE AND OBJECTIVE BOX
DELORIS NORRIS ([id number])    Patient is a 82y old  Female who presents with a chief complaint of Streptococcus infection    Today is hospital day 6 she reports no fever, no SOB, no signs of infection.      (08 Jul 2023 11:17)      INTERVAL HPI/OVERNIGHT EVENTS:    PHYSICAL EXAM:    - GENERAL: Alert and oriented x 3. No acute distress. Well-nourished.  - EYES: EOMI. Anicteric.  - HENT: Moist mucous membranes. No scleral icterus. No cervical lymphadenopathy.  - LUNGS: Clear to auscultation bilaterally. No accessory muscle use.  - CARDIOVASCULAR: Regular rate and rhythm. No murmur. No JVD.  - ABDOMEN: Soft, non-tender and non-distended. No palpable masses.  - EXTREMITIES: No edema. Non-tender.?- SKIN: No rashes or lesions. Warm.  - NEUROLOGIC: No focal neurological deficits. CN II-XII grossly intact, but not individually tested.  - PSYCHIATRIC: Cooperative. Appropriate mood and affect.  -----------------------------------------------------------------  REVIEW OF SYSTEMS (**ALL ROS negative unless bolded**):   - CONSTITUTIONAL: Denies weight loss, fever and chills.  - HEENT: Denies changes in vision and hearing.  - RESPIRATORY: Denies SOB and cough.  - CV: Denies palpitations and CP.  - GI: Denies abdominal pain, nausea, vomiting and diarrhea.  - : Denies dysuria and urinary frequency.  - MSK: Denies myalgia and joint pain.  - SKIN: Denies rash and pruritus.  - NEUROLOGICAL: Denies headache and syncope.  - PSYCHIATRIC: Denies recent changes in mood. Denies anxiety and depression.    FAMILY HISTORY:  FH: bladder cancer (Sibling)      T(C): 36.2 (07-11-23 @ 05:00), Max: 36.3 (07-10-23 @ 13:21)  HR: 92 (07-11-23 @ 05:00) (60 - 92)  BP: 108/54 (07-11-23 @ 05:00) (93/54 - 108/54)  RR: 18 (07-11-23 @ 05:00) (18 - 18)  SpO2: 94% (07-10-23 @ 20:41) (94% - 94%)  Wt(kg): --Vital Signs Last 24 Hrs  T(C): 36.2 (11 Jul 2023 05:00), Max: 36.3 (10 Jul 2023 13:21)  T(F): 97.2 (11 Jul 2023 05:00), Max: 97.3 (10 Jul 2023 13:21)  HR: 92 (11 Jul 2023 05:00) (60 - 92)  BP: 108/54 (11 Jul 2023 05:00) (93/54 - 108/54)  BP(mean): --  RR: 18 (11 Jul 2023 05:00) (18 - 18)  SpO2: 94% (10 Jul 2023 20:41) (94% - 94%)      No Known Allergies        LABS:      RADIOLOGY & ADDITIONAL TESTS:    atorvastatin 20 milliGRAM(s) Oral at bedtime  bisacodyl 5 milliGRAM(s) Oral every 12 hours PRN  dextrose 5%. 1000 milliLiter(s) IV Continuous <Continuous>  dextrose 5%. 1000 milliLiter(s) IV Continuous <Continuous>  dextrose 50% Injectable 25 Gram(s) IV Push once  dextrose 50% Injectable 12.5 Gram(s) IV Push once  dextrose 50% Injectable 25 Gram(s) IV Push once  dextrose Oral Gel 15 Gram(s) Oral once PRN  glucagon  Injectable 1 milliGRAM(s) IntraMuscular once  heparin   Injectable 5000 Unit(s) SubCutaneous every 12 hours  insulin lispro (ADMELOG) corrective regimen sliding scale   SubCutaneous three times a day before meals  lactated ringers. 1000 milliLiter(s) IV Continuous <Continuous>  lactated ringers. 1000 milliLiter(s) IV Continuous <Continuous>  lactated ringers. 1000 milliLiter(s) IV Continuous <Continuous>  magnesium hydroxide Suspension 30 milliLiter(s) Oral daily PRN  pantoprazole    Tablet 40 milliGRAM(s) Oral before breakfast  polyethylene glycol 3350 17 Gram(s) Oral daily  senna 1 Tablet(s) Oral daily  tamsulosin 0.4 milliGRAM(s) Oral at bedtime      HEALTH ISSUES - PROBLEM Dx:         DELORIS NORRIS ([id number])    Patient is a 82y old  Female who presents with a chief complaint of Streptococcus infection    Today is hospital day 6 she reports no fever, no SOB, no signs of infection.      (08 Jul 2023 11:17)      INTERVAL HPI/OVERNIGHT EVENTS:    PHYSICAL EXAM:    - GENERAL: Alert and oriented x 3. No acute distress. Well-nourished.  - HENT: Moist mucous membranes. No scleral icterus. No cervical lymphadenopathy.  - LUNGS: Clear to auscultation bilaterally. No accessory muscle use.  - CARDIOVASCULAR: Regular rate and rhythm. No murmur. No JVD.  - ABDOMEN: Soft, non-tender and non-distended. No palpable masses.  - EXTREMITIES: No edema. Non-tender.?- SKIN: No rashes or lesions. Warm.  - NEUROLOGIC: No focal neurological deficits. CN II-XII grossly intact, but not individually tested.  - PSYCHIATRIC: Cooperative. Appropriate mood and affect.  -----------------------------------------------------------------  REVIEW OF SYSTEMS (**ALL ROS negative unless bolded**):   - CONSTITUTIONAL: Denies weight loss, fever and chills.  - HEENT: Denies changes in vision and hearing.  - RESPIRATORY: Denies SOB and cough.   - CV: Denies palpitations and CP.  - GI: Denies abdominal pain, nausea, vomiting and diarrhea.  - : Denies dysuria and urinary frequency.  - MSK: Denies myalgia and joint pain.    FAMILY HISTORY:  FH: bladder cancer (Sibling)      T(C): 36.2 (07-11-23 @ 05:00), Max: 36.3 (07-10-23 @ 13:21)  HR: 92 (07-11-23 @ 05:00) (60 - 92)  BP: 108/54 (07-11-23 @ 05:00) (93/54 - 108/54)  RR: 18 (07-11-23 @ 05:00) (18 - 18)  SpO2: 94% (07-10-23 @ 20:41) (94% - 94%)  Wt(kg): --Vital Signs Last 24 Hrs  T(C): 36.2 (11 Jul 2023 05:00), Max: 36.3 (10 Jul 2023 13:21)  T(F): 97.2 (11 Jul 2023 05:00), Max: 97.3 (10 Jul 2023 13:21)  HR: 92 (11 Jul 2023 05:00) (60 - 92)  BP: 108/54 (11 Jul 2023 05:00) (93/54 - 108/54)  BP(mean): --  RR: 18 (11 Jul 2023 05:00) (18 - 18)  SpO2: 94% (10 Jul 2023 20:41) (94% - 94%)      No Known Allergies        LABS:      RADIOLOGY & ADDITIONAL TESTS:    atorvastatin 20 milliGRAM(s) Oral at bedtime  bisacodyl 5 milliGRAM(s) Oral every 12 hours PRN  dextrose 5%. 1000 milliLiter(s) IV Continuous <Continuous>  dextrose 5%. 1000 milliLiter(s) IV Continuous <Continuous>  dextrose 50% Injectable 25 Gram(s) IV Push once  dextrose 50% Injectable 12.5 Gram(s) IV Push once  dextrose 50% Injectable 25 Gram(s) IV Push once  dextrose Oral Gel 15 Gram(s) Oral once PRN  glucagon  Injectable 1 milliGRAM(s) IntraMuscular once  heparin   Injectable 5000 Unit(s) SubCutaneous every 12 hours  insulin lispro (ADMELOG) corrective regimen sliding scale   SubCutaneous three times a day before meals  lactated ringers. 1000 milliLiter(s) IV Continuous <Continuous>  lactated ringers. 1000 milliLiter(s) IV Continuous <Continuous>  lactated ringers. 1000 milliLiter(s) IV Continuous <Continuous>  magnesium hydroxide Suspension 30 milliLiter(s) Oral daily PRN  pantoprazole    Tablet 40 milliGRAM(s) Oral before breakfast  polyethylene glycol 3350 17 Gram(s) Oral daily  senna 1 Tablet(s) Oral daily  tamsulosin 0.4 milliGRAM(s) Oral at bedtime      HEALTH ISSUES - PROBLEM Dx:

## 2023-07-11 NOTE — PROGRESS NOTE ADULT - SUBJECTIVE AND OBJECTIVE BOX
24H events:    Patient is a 82y old Female who presents with a chief complaint of Streptococcus infection     (2023 11:17)    Primary diagnosis of VRE infection (vancomycin resistant Enterococcus)      Day 1:  Day 2:  Day 3:     Today is hospital day 6d. This morning patient was seen and examined at bedside, resting comfortably in bed.    No acute or major events overnight.    Code Status:    Family communication:  Contact date:  Name of person contacted:  Relationship to patient:  Communication details:  What matters most:    PAST MEDICAL & SURGICAL HISTORY  HTN (hypertension)    Diabetes    Hypercholesteremia    Pancreatic cancer    Hydronephrosis    Colostomy status    History of     History of left hip replacement    H/O carpal tunnel repair      SOCIAL HISTORY:  Social History:      ALLERGIES:  No Known Allergies    MEDICATIONS:  STANDING MEDICATIONS  atorvastatin 20 milliGRAM(s) Oral at bedtime  dextrose 5%. 1000 milliLiter(s) IV Continuous <Continuous>  dextrose 5%. 1000 milliLiter(s) IV Continuous <Continuous>  dextrose 50% Injectable 25 Gram(s) IV Push once  dextrose 50% Injectable 12.5 Gram(s) IV Push once  dextrose 50% Injectable 25 Gram(s) IV Push once  glucagon  Injectable 1 milliGRAM(s) IntraMuscular once  heparin   Injectable 5000 Unit(s) SubCutaneous every 12 hours  insulin lispro (ADMELOG) corrective regimen sliding scale   SubCutaneous three times a day before meals  lactated ringers. 1000 milliLiter(s) IV Continuous <Continuous>  lactated ringers. 1000 milliLiter(s) IV Continuous <Continuous>  lactated ringers. 1000 milliLiter(s) IV Continuous <Continuous>  pantoprazole    Tablet 40 milliGRAM(s) Oral before breakfast  polyethylene glycol 3350 17 Gram(s) Oral daily  senna 1 Tablet(s) Oral daily  tamsulosin 0.4 milliGRAM(s) Oral at bedtime    PRN MEDICATIONS  bisacodyl 5 milliGRAM(s) Oral every 12 hours PRN  dextrose Oral Gel 15 Gram(s) Oral once PRN  magnesium hydroxide Suspension 30 milliLiter(s) Oral daily PRN    VITALS:   T(F): 97.2  HR: 53  BP: 108/54  RR: 18  SpO2: 94%    PHYSICAL EXAM:  GENERAL:   ( x ) NAD, lying in bed comfortably     (  ) obtunded     (  ) lethargic     (  ) somnolent    HEAD:   ( x ) Atraumatic     (  ) hematoma     (  ) laceration (specify location:       )     NECK:  (  ) Supple     (  ) neck stiffness     (  ) nuchal rigidity     (  )  no JVD     (  ) JVD present ( -- cm)    HEART:  Rate -->     ( x ) normal rate     (  ) bradycardic     (  ) tachycardic  Rhythm -->     ( x ) regular     (  ) regularly irregular     (  ) irregularly irregular  Murmurs -->     ( x) normal s1s2     (  ) systolic murmur     (  ) diastolic murmur     (  ) continuous murmur      (  ) S3 present     (  ) S4 present    LUNGS:   (x  )Unlabored respirations     (  ) tachypnea  ( x ) B/L air entry     (  ) decreased breath sounds in:  (location     )    (  ) no adventitious sound     (  ) crackles     (  ) wheezing      (  ) rhonchi      (specify location:       )  (  ) chest wall tenderness (specify location:       )    ABDOMEN:   ( x ) Soft     (  ) tense   |   ( x ) nondistended     (  ) distended   |   (  ) +BS     (  ) hypoactive bowel sounds     (  ) hyperactive bowel sounds  (  ) nontender     (  ) RUQ tenderness     (  ) RLQ tenderness     (  ) LLQ tenderness     (  ) epigastric tenderness     (  ) diffuse tenderness  (  ) Splenomegaly      (  ) Hepatomegaly      (  ) Jaundice     (  ) ecchymosis     EXTREMITIES: 2+ peripheral pulses bilaterally. No clubbing, cyanosis, or edema  (  ) Normal     (  ) Rash     (  ) ecchymosis     (  ) varicose veins      (  ) pitting edema     (  ) non-pitting edema   (  ) ulceration     (  ) gangrene:     (location:     )    NERVOUS SYSTEM:    ( x ) A&Ox3     (  ) confused     (  ) lethargic  CN II-XII:     (  ) Intact     (  ) deficits found     (Specify:     )   Upper extremities:     (  ) no sensorimotor deficits     (  ) weakness     (  ) loss of proprioception/vibration     (  ) loss of touch/temperature (specify:    )  Lower extremities:     (  ) no sensorimotor deficits     (  ) weakness     (  ) loss of proprioception/vibration     (  ) loss of touch/temperature (specify:    )    SKIN:   (  ) No rashes or lesions     (  ) maculopapular rash     (  ) pustules     (  ) vesicles     (  ) ulcer     (  ) ecchymosis     (specify location:     )        (  ) Indwelling Ray Catheter:  Passed TO    LABS:                        10.2   8.42  )-----------( 232      ( 2023 08:28 )             32.0     07-11    141  |  104  |  9<L>  ----------------------------<  138<H>  3.5   |  23  |  <0.5<L>    Ca    8.1<L>      2023 08:28  Mg     1.9         TPro  4.4<L>  /  Alb  2.6<L>  /  TBili  0.4  /  DBili  x   /  AST  12  /  ALT  6   /  AlkPhos  85        Urinalysis Basic - ( 2023 08:28 )    Color: x / Appearance: x / SG: x / pH: x  Gluc: 138 mg/dL / Ketone: x  / Bili: x / Urobili: x   Blood: x / Protein: x / Nitrite: x   Leuk Esterase: x / RBC: x / WBC x   Sq Epi: x / Non Sq Epi: x / Bacteria: x                RADIOLOGY:

## 2023-07-11 NOTE — DISCHARGE NOTE PROVIDER - DETAILS OF MALNUTRITION DIAGNOSIS/DIAGNOSES
This patient has been assessed with a concern for Malnutrition and was treated during this hospitalization for the following Nutrition diagnosis/diagnoses:     -  07/08/2023: Severe protein-calorie malnutrition   -  07/08/2023: Underweight (BMI < 19)

## 2023-07-11 NOTE — PROGRESS NOTE ADULT - ASSESSMENT
Impressions  # Large left sided pleural effusion, septations seen on bedside US, suspected malignancy s/p thora  - trace effusion noted on CT A&P 04/2023  # pancreatic adenocarcinoma s/p transverse loop colostomy  #UTI on abx     Plan    S/P left thoracentesis; residual effusion present on CXR  Pleural fluid cytology showing atypical cells; final cytology and cell block is pending   Remains on room air; denies being short of breath at the moment   If fluid recurs and the patient is symptomatic (dyspnea) then pleurx catheter will be indicated if she is agreeable   Oncology on board; treatment plan outpatient; poor functional status   Prognosis is guarded; consider palliative care eval  Recall as needed

## 2023-07-11 NOTE — PROGRESS NOTE ADULT - SUBJECTIVE AND OBJECTIVE BOX
Patient is a 82y old  Female who presents with a chief complaint of Streptococcus infection     (08 Jul 2023 11:17)        Over Night Events:    No events   Remains on room air   Denies dyspnea       ROS:  See HPI    PHYSICAL EXAM    ICU Vital Signs Last 24 Hrs  T(C): 36.2 (11 Jul 2023 05:00), Max: 36.3 (10 Jul 2023 13:21)  T(F): 97.2 (11 Jul 2023 05:00), Max: 97.3 (10 Jul 2023 13:21)  HR: 92 (11 Jul 2023 05:00) (60 - 92)  BP: 108/54 (11 Jul 2023 05:00) (93/54 - 108/54)  BP(mean): --  ABP: --  ABP(mean): --  RR: 18 (11 Jul 2023 05:00) (18 - 18)  SpO2: 94% (10 Jul 2023 20:41) (94% - 94%)        General: NAD   HEENT: BRIANA             Lymphatic system: No cervical LN   Lungs: Bilateral BS; left side is decreased   Cardiovascular: Regular   Gastrointestinal: Soft, Positive BS  Extremities: No clubbing.  Moves extremities.  Full Range of motion   Skin: Warm, intact  Neurological: No motor or sensory deficit       07-10-23 @ 07:01  -  07-11-23 @ 07:00  --------------------------------------------------------  IN:    Lactated Ringers: 900 mL  Total IN: 900 mL    OUT:  Total OUT: 0 mL    Total NET: 900 mL          LABS:                            10.0   8.31  )-----------( 207      ( 10 Jul 2023 06:38 )             31.2                                               07-10    136  |  102  |  10  ----------------------------<  181<H>  3.5   |  24  |  <0.5<L>    Ca    8.1<L>      10 Jul 2023 06:38  Mg     1.7     07-10    TPro  4.4<L>  /  Alb  2.6<L>  /  TBili  0.3  /  DBili  x   /  AST  10  /  ALT  <5  /  AlkPhos  84  07-10                                             Urinalysis Basic - ( 10 Jul 2023 06:38 )    Color: x / Appearance: x / SG: x / pH: x  Gluc: 181 mg/dL / Ketone: x  / Bili: x / Urobili: x   Blood: x / Protein: x / Nitrite: x   Leuk Esterase: x / RBC: x / WBC x   Sq Epi: x / Non Sq Epi: x / Bacteria: x                                                  LIVER FUNCTIONS - ( 10 Jul 2023 06:38 )  Alb: 2.6 g/dL / Pro: 4.4 g/dL / ALK PHOS: 84 U/L / ALT: <5 U/L / AST: 10 U/L / GGT: x                                                                                                                                       MEDICATIONS  (STANDING):  atorvastatin 20 milliGRAM(s) Oral at bedtime  dextrose 5%. 1000 milliLiter(s) (100 mL/Hr) IV Continuous <Continuous>  dextrose 5%. 1000 milliLiter(s) (50 mL/Hr) IV Continuous <Continuous>  dextrose 50% Injectable 25 Gram(s) IV Push once  dextrose 50% Injectable 12.5 Gram(s) IV Push once  dextrose 50% Injectable 25 Gram(s) IV Push once  glucagon  Injectable 1 milliGRAM(s) IntraMuscular once  heparin   Injectable 5000 Unit(s) SubCutaneous every 12 hours  insulin lispro (ADMELOG) corrective regimen sliding scale   SubCutaneous three times a day before meals  lactated ringers. 1000 milliLiter(s) (75 mL/Hr) IV Continuous <Continuous>  lactated ringers. 1000 milliLiter(s) (75 mL/Hr) IV Continuous <Continuous>  lactated ringers. 1000 milliLiter(s) (75 mL/Hr) IV Continuous <Continuous>  pantoprazole    Tablet 40 milliGRAM(s) Oral before breakfast  polyethylene glycol 3350 17 Gram(s) Oral daily  senna 1 Tablet(s) Oral daily  tamsulosin 0.4 milliGRAM(s) Oral at bedtime    MEDICATIONS  (PRN):  bisacodyl 5 milliGRAM(s) Oral every 12 hours PRN Constipation  dextrose Oral Gel 15 Gram(s) Oral once PRN Blood Glucose LESS THAN 70 milliGRAM(s)/deciliter  magnesium hydroxide Suspension 30 milliLiter(s) Oral daily PRN Constipation

## 2023-07-12 NOTE — PROGRESS NOTE ADULT - ASSESSMENT
82-year-old female with past medical history of hypertension, diabetes, hyperlipidemia, pancreatic adenocarcinoma status post transverse loop colostomy, right hydronephrosis s/p ureteral stent presenting for a UTI VRE of 1 month duration failed OP treatment and as per the chart was supposed to have a PICC line but procedure delayed presents for IV antibiotics treatment. Last urine culture in 05/25 showed VRE sensitive only to zyvox. Patient is a poor historian reported that suprapubic abdominal discomfort started 1 month ago and the patient was unsuccessfully treated with different antibiotics. Patient still complains of suprapubic discomfort and burning, and lower back pain, Patient reports mild epigastric discomfort today . Patient denies any SOB, CP, NV, diarrhea, constipation fever, respiratory symptoms.     #bacteremia?  blood cx from 7/5 grew Gram positive cocci in clusters, started Vanco 1 g Q 24  - d/w ID, likely contaminant, f/u sensitivities, DC vanco, f/u repeat blood Cx  - IV fluids 75 cc/ hr  - one IV on R arm that appears uninfected  - grew kocuria, discussed with ID, likely contaminant no need to treat      # VRE UTI failed OP therapy  # possible CAUTI    - UA grossly positive for RBC and WBC   - CTA chest Abdomen pelvis showed::  Decreased mild right hydronephrosis, post double-J ureteral stent placement, with persistent increased enhancement of ureter, suspicious for urinary tract infection.  Previous CTAP showed: Right double-J ureteral stent with stable moderate hydroureter.  - 05/25   Urine Cx showed VRE only sensitive to zyvox   - FU Blood and urine culture   -UA positive  -Per ID, seems colonized w VRE but asymptomatic, monitor off abx  - passed TOV    # Pancreatic mass with mets   # Mets possibly to the lungs with large LL pleural effusion   - CTA chest Abdomen pelvis showed:  * Unchanged ill-defined pancreatic mass.  *  Increased moderate-to-large left pleural effusion with overlying compressive atelectasis. New small right pleural effusion. Unchanged right lung pulmonary nodules, previously characterized on 2/16/2023 PET/CT.  - Previous CTAP showed   * Soft tissue mass adjacent to the sigmoid colon measuring approximately 3.1 x 2.5 cm.  * Right double-J ureteral stent with stable moderate hydroureter.  * Grossly stable ill-defined mass at the pancreatic head, consistent with known pancreatic neoplasm  - L pleural effusion tapped by pulm, f/u cytology to r/o malignancy - exudative in nature by protein ratio and Light's criteria  - Oncology will set up outpt txt for chemo after bacteremia is no longer a concern  -Pulm recs - Pleural fluid cytology showing atypical cells; final cytology and cell block is pending   Remains on room air; denies being short of breath at the moment   If fluid recurs and the patient is symptomatic (dyspnea) then pleurx catheter will be placed   CXR today w Increased bilateral opacities and effusions. Given 40 lasix IV, will f/u xray won,   no fluids, will f/u BNP        # Anemia :   -  Labs noticeable for Hb 10, monitor    - FU iron studies b12 folate     # diabetes:   - ISS   A1C 6.6      # DVT proph: heparin   # Gi proph: protonix   # Dispo: inpatient floor   # Diet: DASH, TLC carb consist

## 2023-07-12 NOTE — MEDICAL STUDENT PROGRESS NOTE(EDUCATION) - PLAN 2
ID advised to d/c Linezolid and monitor, patient has been discontinued and is tolerating well.
patient currently on Lispro before meals.   continue current treatment plan
Follow up with oncology next week regarding initiation of treatment  monitor lungs for any return or worsening of pleural effusion
consult urology for continuation of smith catheter  consult ID regarding continuation of Linezolid and meropenem, advised to d/c linezolid
currently managed with atorvastatin 40mg qd  continue current treatment regimen

## 2023-07-12 NOTE — PROGRESS NOTE ADULT - SUBJECTIVE AND OBJECTIVE BOX
Pt seen and examined at bedside.         VITAL SIGNS (Last 24 hrs):  T(C): 35.8 (07-12-23 @ 13:32), Max: 36.8 (07-11-23 @ 20:31)  HR: 99 (07-12-23 @ 13:32) (93 - 99)  BP: 114/56 (07-12-23 @ 13:32) (114/56 - 162/54)  RR: 18 (07-12-23 @ 13:32) (18 - 18)  SpO2: 95% (07-12-23 @ 05:54) (92% - 95%)        I&O's Summary    11 Jul 2023 07:01  -  12 Jul 2023 07:00  ---------------------------------------------   IN: 75 mL / OUT: 50 mL / NET: 25 mL    12 Jul 2023 07:01  -  12 Jul 2023 15:24  ----------------------------------------------   IN: 525 mL / OUT: 0 mL / NET: 525 mL        PHYSICAL EXAM:  GENERAL: NAD, well-developed  HEAD:  Atraumatic, Normocephalic  EYES: conjunctiva and sclera clear  NECK: Supple, No JVD  CHEST/LUNG:  b/l rales   HEART: Regular rate and rhythm; No murmurs, rubs, or gallops  ABDOMEN: Soft, Nontender, Nondistended; Bowel sounds present  EXTREMITIES:  2+ Peripheral Pulses, No clubbing, cyanosis, or edema  PSYCH: AAOx3  NEUROLOGY: non-focal  SKIN: No rashes or lesions    Labs Reviewed        CBC Full  -  ( 12 Jul 2023 08:51 )  WBC Count : 10.05 K/uL  Hemoglobin : 10.2 g/dL  Hematocrit : 31.5 %  Platelet Count - Automated : 259 K/uL  Mean Cell Volume : 92.4 fL  Mean Cell Hemoglobin : 29.9 pg  Mean Cell Hemoglobin Concentration : 32.4 g/dL  Auto Neutrophil # : 7.96 K/uL  Auto Lymphocyte # : 0.74 K/uL  Auto Monocyte # : 1.14 K/uL  Auto Eosinophil # : 0.14 K/uL  Auto Basophil # : 0.02 K/uL  Auto Neutrophil % : 79.2 %  Auto Lymphocyte % : 7.4 %  Auto Monocyte % : 11.3 %  Auto Eosinophil % : 1.4 %  Auto Basophil % : 0.2 %    BMP:    07-12 @ 08:51    Blood Urea Nitrogen - 9  Calcium - 8.2  Carbond Dioxide - 21  Chloride - 102  Creatinine - <0.5  Glucose - 116  Potassium - 3.6  Sodium - 136           Urine Culture:  07-10 @ 12:32 Urine culture: --    Culture Results:   No growth at 24 hours  Method Type: --  Organism: --  Organism Identification: --  Specimen Source: .Blood None            MEDICATIONS  (STANDING):  atorvastatin 20 milliGRAM(s) Oral at bedtime  dextrose 5%. 1000 milliLiter(s) (100 mL/Hr) IV Continuous <Continuous>  dextrose 5%. 1000 milliLiter(s) (50 mL/Hr) IV Continuous <Continuous>  dextrose 50% Injectable 25 Gram(s) IV Push once  dextrose 50% Injectable 12.5 Gram(s) IV Push once  dextrose 50% Injectable 25 Gram(s) IV Push once  furosemide   Injectable 40 milliGRAM(s) IV Push once  glucagon  Injectable 1 milliGRAM(s) IntraMuscular once  heparin   Injectable 5000 Unit(s) SubCutaneous every 12 hours  insulin lispro (ADMELOG) corrective regimen sliding scale   SubCutaneous three times a day before meals  pantoprazole    Tablet 40 milliGRAM(s) Oral before breakfast  polyethylene glycol 3350 17 Gram(s) Oral daily  senna 1 Tablet(s) Oral daily  tamsulosin 0.4 milliGRAM(s) Oral at bedtime    MEDICATIONS  (PRN):  bisacodyl 5 milliGRAM(s) Oral every 12 hours PRN Constipation  dextrose Oral Gel 15 Gram(s) Oral once PRN Blood Glucose LESS THAN 70 milliGRAM(s)/deciliter

## 2023-07-12 NOTE — MEDICAL STUDENT PROGRESS NOTE(EDUCATION) - PLAN 1
Patient is to follow up with oncology to start gemcitibine in outpatient setting  repeat CXR to ensure no return of L pleural effusion. Patient is to follow up with oncology to start gemcitibine in outpatient setting  repeat CXR to ensure no return of L pleural effusion.  Latest CXR showed increased bilateral opacities and pleural effusion  consult pulm to see if she should have a chest tube placed inpatient or outpatient

## 2023-07-12 NOTE — MEDICAL STUDENT PROGRESS NOTE(EDUCATION) - SUBJECTIVE AND OBJECTIVE BOX
DELORIS NORRIS ([id number])    Patient is a 82y old  Female who presents with a chief complaint of Streptococcus infection     (08 Jul 2023 11:17)      INTERVAL HPI/OVERNIGHT EVENTS:    none    PHYSICAL EXAM:    - GENERAL: Alert and oriented x 3. No acute distress. Well-nourished.  - HENT: Moist mucous membranes. No scleral icterus. No cervical lymphadenopathy.  - LUNGS: Clear to auscultation bilaterally. increased work of breathing, no tachypnea  - CARDIOVASCULAR: Regular rate and rhythm. No murmur. No JVD.  - ABDOMEN: Soft, non-tender and non-distended. No palpable masses.  - EXTREMITIES: No edema. Non-tender.?- SKIN: No rashes or lesions. Warm.  -----------------------------------------------------------------  REVIEW OF SYSTEMS (**ALL ROS negative unless bolded**):   - CONSTITUTIONAL: Denies weight loss, fever and chills.  - HEENT: Denies changes in vision and hearing.  - RESPIRATORY: Denies SOB, wheezing or cough  - CV: Denies palpitations and CP.  - GI: Denies abdominal pain, nausea, vomiting and diarrhea.  - : Denies dysuria and urinary frequency.  - MSK: Denies myalgia and joint pain.    FAMILY HISTORY:  FH: bladder cancer (Sibling)      T(C): 35.9 (07-12-23 @ 05:54), Max: 36.8 (07-11-23 @ 20:31)  HR: 94 (07-12-23 @ 05:54) (92 - 94)  BP: 162/54 (07-12-23 @ 05:54) (109/56 - 162/54)  RR: 18 (07-12-23 @ 05:54) (18 - 18)  SpO2: 95% (07-12-23 @ 05:54) (92% - 95%)  Wt(kg): --Vital Signs Last 24 Hrs  T(C): 35.9 (12 Jul 2023 05:54), Max: 36.8 (11 Jul 2023 20:31)  T(F): 96.6 (12 Jul 2023 05:54), Max: 98.3 (11 Jul 2023 20:31)  HR: 94 (12 Jul 2023 05:54) (92 - 94)  BP: 162/54 (12 Jul 2023 05:54) (109/56 - 162/54)  BP(mean): --  RR: 18 (12 Jul 2023 05:54) (18 - 18)  SpO2: 95% (12 Jul 2023 05:54) (92% - 95%)    Parameters below as of 11 Jul 2023 22:24  Patient On (Oxygen Delivery Method): room air      No Known Allergies        LABS:  Culture - Blood (07.10.23 @ 12:32)    Specimen Source: .Blood None   Culture Results:   No growth at 24 hours      RADIOLOGY & ADDITIONAL TESTS:  < from: Xray Chest 1 View- PORTABLE-Urgent (Xray Chest 1 View- PORTABLE-Urgent .) (07.06.23 @ 11:35) >  Impression:    Slight interval decrease in left effusion postthoracentesis. No   pneumothorax.    Developing right basilar opacity/effusion.    < end of copied text >      atorvastatin 20 milliGRAM(s) Oral at bedtime  bisacodyl 5 milliGRAM(s) Oral every 12 hours PRN  dextrose 5%. 1000 milliLiter(s) IV Continuous <Continuous>  dextrose 5%. 1000 milliLiter(s) IV Continuous <Continuous>  dextrose 50% Injectable 25 Gram(s) IV Push once  dextrose 50% Injectable 12.5 Gram(s) IV Push once  dextrose 50% Injectable 25 Gram(s) IV Push once  dextrose Oral Gel 15 Gram(s) Oral once PRN  glucagon  Injectable 1 milliGRAM(s) IntraMuscular once  heparin   Injectable 5000 Unit(s) SubCutaneous every 12 hours  insulin lispro (ADMELOG) corrective regimen sliding scale   SubCutaneous three times a day before meals  lactated ringers. 1000 milliLiter(s) IV Continuous <Continuous>  lactated ringers. 1000 milliLiter(s) IV Continuous <Continuous>  pantoprazole    Tablet 40 milliGRAM(s) Oral before breakfast  polyethylene glycol 3350 17 Gram(s) Oral daily  senna 1 Tablet(s) Oral daily  tamsulosin 0.4 milliGRAM(s) Oral at bedtime      HEALTH ISSUES - PROBLEM Dx:           DELORIS NORRIS ([id number])    Patient is a 82y old  Female who presents with a chief complaint of Streptococcus infection     (08 Jul 2023 11:17)      INTERVAL HPI/OVERNIGHT EVENTS:    none    PHYSICAL EXAM:    - GENERAL: Alert and oriented x 3. No acute distress. Well-nourished.  - HENT: Moist mucous membranes. No scleral icterus. No cervical lymphadenopathy.  - LUNGS: Clear to auscultation bilaterally. increased work of breathing, no tachypnea  - CARDIOVASCULAR: Regular rate and rhythm. No murmur. No JVD.  - ABDOMEN: Soft, non-tender and non-distended. No palpable masses.  - EXTREMITIES: No edema. Non-tender.?- SKIN: No rashes or lesions. Warm.  -----------------------------------------------------------------  REVIEW OF SYSTEMS (**ALL ROS negative unless bolded**):   - CONSTITUTIONAL: Denies weight loss, fever and chills.  - HEENT: Denies changes in vision and hearing.  - RESPIRATORY: Denies SOB, wheezing or cough  - CV: Denies palpitations and CP.  - GI: Denies abdominal pain, nausea, vomiting and diarrhea.  - : Denies dysuria and urinary frequency.  - MSK: Denies myalgia and joint pain.    FAMILY HISTORY:  FH: bladder cancer (Sibling)      T(C): 35.9 (07-12-23 @ 05:54), Max: 36.8 (07-11-23 @ 20:31)  HR: 94 (07-12-23 @ 05:54) (92 - 94)  BP: 162/54 (07-12-23 @ 05:54) (109/56 - 162/54)  RR: 18 (07-12-23 @ 05:54) (18 - 18)  SpO2: 95% (07-12-23 @ 05:54) (92% - 95%)  Wt(kg): --Vital Signs Last 24 Hrs  T(C): 35.9 (12 Jul 2023 05:54), Max: 36.8 (11 Jul 2023 20:31)  T(F): 96.6 (12 Jul 2023 05:54), Max: 98.3 (11 Jul 2023 20:31)  HR: 94 (12 Jul 2023 05:54) (92 - 94)  BP: 162/54 (12 Jul 2023 05:54) (109/56 - 162/54)  BP(mean): --  RR: 18 (12 Jul 2023 05:54) (18 - 18)  SpO2: 95% (12 Jul 2023 05:54) (92% - 95%)    Parameters below as of 11 Jul 2023 22:24  Patient On (Oxygen Delivery Method): room air      No Known Allergies        LABS:  Culture - Blood (07.10.23 @ 12:32)    Specimen Source: .Blood None   Culture Results:   No growth at 24 hours    Complete Blood Count + Automated Diff (07.12.23 @ 08:51)    WBC Count: 10.05 K/uL   RBC Count: 3.41 M/uL   Hemoglobin: 10.2 g/dL   Hematocrit: 31.5 %   Mean Cell Volume: 92.4 fL   Mean Cell Hemoglobin: 29.9 pg   Mean Cell Hemoglobin Conc: 32.4 g/dL   Red Cell Distrib Width: 17.0 %   Platelet Count - Automated: 259 K/uL   MPV: 10.8 fL   Auto Neutrophil #: 7.96 K/uL   Auto Lymphocyte #: 0.74 K/uL   Auto Monocyte #: 1.14 K/uL   Auto Eosinophil #: 0.14 K/uL   Auto Basophil #: 0.02 K/uL   Auto Neutrophil %: 79.2: Differential percentages must be correlated with absolute numbers for  clinical significance. %   Auto Lymphocyte %: 7.4 %   Auto Monocyte %: 11.3 %   Auto Eosinophil %: 1.4 %   Auto Basophil %: 0.2 %   Auto Immature Granulocyte %: 0.5: (Includes meta, myelo and promyelocytes). Mild elevations in immature  granulocytes may be seen with many inflammatory processes and pregnancy;  clinical correlation suggested. %   Nucleated RBC: 0 /100 WBCs    Comprehensive Metabolic Panel (07.12.23 @ 08:51)    Sodium: 136 mmol/L   Potassium: 3.6 mmol/L   Chloride: 102 mmol/L   Carbon Dioxide: 21 mmol/L   Anion Gap: 13 mmol/L   Blood Urea Nitrogen: 9 mg/dL   Creatinine: <0.5 mg/dL   Glucose: 116 mg/dL   Calcium: 8.2 mg/dL   Protein Total: 4.5 g/dL   Albumin: 2.5 g/dL   Bilirubin Total: 0.4 mg/dL   Alkaline Phosphatase: 92 U/L   Aspartate Aminotransferase (AST/SGOT): 13 U/L   Alanine Aminotransferase (ALT/SGPT): 5 U/L   eGFR: 100: The estimated glomerular filtration rate (eGFR) is calculated using the  2021 CKD-EPI creatinine equation, which does not have a coefficient for  race and is validated in individuals 18 years of age and older (N Engl J  Med 2021; 385:0251-2057). Creatinine-based eGFR may be inaccurate in  various situations including but not limited to extremes of muscle mass,  altered dietary protein intake, or medications that affect renal tubular  creatinine secretion. mL/min/1.73m2        RADIOLOGY & ADDITIONAL TESTS:  < from: Xray Chest 1 View- PORTABLE-Urgent (Xray Chest 1 View- PORTABLE-Urgent .) (07.06.23 @ 11:35) >  Impression:    Slight interval decrease in left effusion postthoracentesis. No   pneumothorax.    Developing right basilar opacity/effusion.    < end of copied text >    < from: Xray Chest 1 View- PORTABLE-Urgent (Xray Chest 1 View- PORTABLE-Urgent .) (07.12.23 @ 09:17) >  Impression:  Increased bilateral opacities and effusions.    < end of copied text >        atorvastatin 20 milliGRAM(s) Oral at bedtime  bisacodyl 5 milliGRAM(s) Oral every 12 hours PRN  dextrose 5%. 1000 milliLiter(s) IV Continuous <Continuous>  dextrose 5%. 1000 milliLiter(s) IV Continuous <Continuous>  dextrose 50% Injectable 25 Gram(s) IV Push once  dextrose 50% Injectable 12.5 Gram(s) IV Push once  dextrose 50% Injectable 25 Gram(s) IV Push once  dextrose Oral Gel 15 Gram(s) Oral once PRN  glucagon  Injectable 1 milliGRAM(s) IntraMuscular once  heparin   Injectable 5000 Unit(s) SubCutaneous every 12 hours  insulin lispro (ADMELOG) corrective regimen sliding scale   SubCutaneous three times a day before meals  lactated ringers. 1000 milliLiter(s) IV Continuous <Continuous>  lactated ringers. 1000 milliLiter(s) IV Continuous <Continuous>  pantoprazole    Tablet 40 milliGRAM(s) Oral before breakfast  polyethylene glycol 3350 17 Gram(s) Oral daily  senna 1 Tablet(s) Oral daily  tamsulosin 0.4 milliGRAM(s) Oral at bedtime      HEALTH ISSUES - PROBLEM Dx:

## 2023-07-12 NOTE — PROGRESS NOTE ADULT - ASSESSMENT
Impressions  # Large left sided pleural effusion, septations seen on bedside US, suspected malignancy s/p thora  - trace effusion noted on CT A&P 04/2023  # pancreatic adenocarcinoma s/p transverse loop colostomy  #UTI on abx     Plan    S/P left thoracentesis; residual effusion present on CXR; repeat CXR today before discharge   Pleural fluid cytology showing atypical cells; final cytology and cell block is pending   Remains on room air; denies being short of breath at the moment   If fluid recurs and the patient is symptomatic then will place pleurx catheter; she is agreeable   Oncology on board; treatment plan outpatient; poor functional status   Prognosis is guarded; consider palliative care eval  Will follow as needed

## 2023-07-12 NOTE — PROGRESS NOTE ADULT - ASSESSMENT
82-year-old female with past medical history of hypertension, diabetes, hyperlipidemia, pancreatic adenocarcinoma status post transverse loop colostomy, right hydronephrosis s/p ureteral stent presenting for a UTI VRE of 1 month duration failed OP treatment and as per the chart was supposed to have a PICC line but procedure delayed presents for IV antibiotics treatment. Last urine culture in 05/25 showed VRE sensitive only to zyvox. Patient is a poor historian reported that suprapubic abdominal discomfort started 1 month ago and the patient was unsuccessfully treated with different antibiotics.       # VRE UTI failed OP therapy/ Asymptomatic bacteriuria / hydronephrosis   - consulted by ID, will monitor off Abx   - f/u repeat blood Cx ( likely contaminant), confirmed by ID  - 05/25: Urine Cx showed VRE only sensitive to zyvox   - passed TOV, repeat kidney/bladder US noted   - c/w Flomax   -  follow up  - Right double-J ureteral stent with stable moderate hydroureter    # Pancreatic adenocarcinoma  with mets / left sided malignant pleural effusion   - L pleural effusion tapped by pulm, f/u cytology to r/o malignancy  - Outpatient follow with Dr. Mead next week to initiate treatment with Gemcitabine (once weekly) treatment.    - overall prognosis is poor   - pulm f/u regarding pleurx catheter     # Anemia, iron def   - s/p 3 days Venofer     # Diabetes   - carb consistent diet   - monitor finger stick  - on Insulin     # Generalized weakness  - PT/ rehab eval  - supportive care, fall and aspiration precautions     # DVT proph: heparin        #Progress Note Handoff  Pending (specify):  fluid overload   Family discussion: plan of care d/w patient   Disposition: Home_x

## 2023-07-12 NOTE — MEDICAL STUDENT PROGRESS NOTE(EDUCATION) - ASSESSMENT
Pt is 83yo F who presented to clinic for VRE UTI. Her past medical history of hypertension, diabetes, hyperlipidemia, pancreatic adenocarcinoma status post transverse loop colostomy, right hydronephrosis s/p ureteral stent presenting for a UTI VRE of 1 month duration failed OP treatment and as per the chart was supposed to have a PICC line but procedure delayed presents for IV antibiotics treatment. Last urine culture in 05/25 showed VRE sensitive only to zyvox. Patient is a poor historian reported that suprapubic abdominal discomfort started 1 month ago and the patient was unsuccessfully treated with different antibiotics. In hospital patient was started on zyvox but later discontinued as it was likely colonization of kidney rather than infection. She was found to have a pleural effusion and underwent a thoracocentesis to remove L sided pleural effusion on 7/6/2023. Blood cultures later came back + for G+ cocci in clusters. She was started on vancomycin but later discontinued per ID as this was likely contamination of the specimen. Repeat cultures came back negative for any growth.  Pt is 83yo F who presented to clinic for VRE UTI. Her past medical history of hypertension, diabetes, hyperlipidemia, pancreatic adenocarcinoma status post transverse loop colostomy, right hydronephrosis s/p ureteral stent presenting for a UTI VRE of 1 month duration failed OP treatment and as per the chart was supposed to have a PICC line but procedure delayed presents for IV antibiotics treatment. Last urine culture in 05/25 showed VRE sensitive only to zyvox. Patient is a poor historian reported that suprapubic abdominal discomfort started 1 month ago and the patient was unsuccessfully treated with different antibiotics. In hospital patient was started on zyvox but later discontinued as it was likely colonization of kidney rather than infection. She was found to have a pleural effusion and underwent a thoracocentesis to remove L sided pleural effusion on 7/6/2023. Blood cultures later came back + for G+ cocci in clusters. She was started on vancomycin but later discontinued per ID as this was likely contamination of the specimen. Repeat cultures came back negative for any growth.

## 2023-07-12 NOTE — MEDICAL STUDENT PROGRESS NOTE(EDUCATION) - TRANSITIONS OF CARE/DISPOSITION: (SDOH, ANTICIPATED DC NEEDS)
Patient is awaiting CXR results  If they show no pleural effusion she can be discharged   if pleural effusion does come back pulmonology will be consulted to place chest tube either in inpatient or outpatient setting

## 2023-07-12 NOTE — PROGRESS NOTE ADULT - SUBJECTIVE AND OBJECTIVE BOX
24H events:    Patient is a 82y old Female who presents with a chief complaint of Streptococcus infection     (2023 11:17)    Primary diagnosis of VRE infection (vancomycin resistant Enterococcus)      Day 1:  Day 2:  Day 3:     Today is hospital day 7d. This morning patient was seen and examined at bedside, resting comfortably in bed.    No acute or major events overnight. no dyspnea    Code Status:    Family communication:  Contact date:  Name of person contacted:  Relationship to patient:  Communication details:  What matters most:    PAST MEDICAL & SURGICAL HISTORY  HTN (hypertension)    Diabetes    Hypercholesteremia    Pancreatic cancer    Hydronephrosis    Colostomy status    History of     History of left hip replacement    H/O carpal tunnel repair      SOCIAL HISTORY:  Social History:      ALLERGIES:  No Known Allergies    MEDICATIONS:  STANDING MEDICATIONS  atorvastatin 20 milliGRAM(s) Oral at bedtime  dextrose 5%. 1000 milliLiter(s) IV Continuous <Continuous>  dextrose 5%. 1000 milliLiter(s) IV Continuous <Continuous>  dextrose 50% Injectable 25 Gram(s) IV Push once  dextrose 50% Injectable 12.5 Gram(s) IV Push once  dextrose 50% Injectable 25 Gram(s) IV Push once  furosemide   Injectable 40 milliGRAM(s) IV Push once  glucagon  Injectable 1 milliGRAM(s) IntraMuscular once  heparin   Injectable 5000 Unit(s) SubCutaneous every 12 hours  insulin lispro (ADMELOG) corrective regimen sliding scale   SubCutaneous three times a day before meals  pantoprazole    Tablet 40 milliGRAM(s) Oral before breakfast  polyethylene glycol 3350 17 Gram(s) Oral daily  senna 1 Tablet(s) Oral daily  tamsulosin 0.4 milliGRAM(s) Oral at bedtime    PRN MEDICATIONS  bisacodyl 5 milliGRAM(s) Oral every 12 hours PRN  dextrose Oral Gel 15 Gram(s) Oral once PRN    VITALS:   T(F): 96.5  HR: 99  BP: 114/56  RR: 18  SpO2: 95%    PHYSICAL EXAM:  GENERAL:   (x  ) NAD, lying in bed comfortably     (  ) obtunded     (  ) lethargic     (  ) somnolent    HEAD:   ( x ) Atraumatic     (  ) hematoma     (  ) laceration (specify location:       )     NECK:  (  ) Supple     (  ) neck stiffness     (  ) nuchal rigidity     (  )  no JVD     (  ) JVD present ( -- cm)    HEART:  Rate -->     (x  ) normal rate     (  ) bradycardic     (  ) tachycardic  Rhythm -->     ( x ) regular     (  ) regularly irregular     (  ) irregularly irregular  Murmurs -->     (  x) normal s1s2     (  ) systolic murmur     (  ) diastolic murmur     (  ) continuous murmur      (  ) S3 present     (  ) S4 present    LUNGS:   ( x )Unlabored respirations     (  ) tachypnea  (  ) B/L air entry     ( x ) decreased breath sounds in:  (location     )  left posterior  (  ) no adventitious sound     (  ) crackles     (  ) wheezing      (  ) rhonchi      (specify location:       )  (  ) chest wall tenderness (specify location:       )    ABDOMEN:   ( x ) Soft     (  ) tense   |   ( x ) nondistended     (  ) distended   |   (  ) +BS     (  ) hypoactive bowel sounds     (  ) hyperactive bowel sounds  (  ) nontender     (  ) RUQ tenderness     (  ) RLQ tenderness     (  ) LLQ tenderness     (  ) epigastric tenderness     (  ) diffuse tenderness  (  ) Splenomegaly      (  ) Hepatomegaly      (  ) Jaundice     (  ) ecchymosis     EXTREMITIES: 2+ peripheral pulses bilaterally. No clubbing, cyanosis, or edema  ( x ) Normal     (  ) Rash     (  ) ecchymosis     (  ) varicose veins      (  ) pitting edema     (  ) non-pitting edema   (  ) ulceration     (  ) gangrene:     (location:     )    NERVOUS SYSTEM:    ( x ) A&Ox3     (  ) confused     (  ) lethargic  CN II-XII:     (  ) Intact     (  ) deficits found     (Specify:     )   Upper extremities:     (  ) no sensorimotor deficits     (  ) weakness     (  ) loss of proprioception/vibration     (  ) loss of touch/temperature (specify:    )  Lower extremities:     (  ) no sensorimotor deficits     (  ) weakness     (  ) loss of proprioception/vibration     (  ) loss of touch/temperature (specify:    )    SKIN:   (  ) No rashes or lesions     (  ) maculopapular rash     (  ) pustules     (  ) vesicles     (  ) ulcer     (  ) ecchymosis     (specify location:     )        LABS:                        10.2   10.05 )-----------( 259      ( 2023 08:51 )             31.5     07-12    136  |  102  |  9<L>  ----------------------------<  116<H>  3.6   |  21  |  <0.5<L>    Ca    8.2<L>      2023 08:51  Mg     1.8     -    TPro  4.5<L>  /  Alb  2.5<L>  /  TBili  0.4  /  DBili  x   /  AST  13  /  ALT  5   /  AlkPhos  92  -      Urinalysis Basic - ( 2023 08:51 )    Color: x / Appearance: x / SG: x / pH: x  Gluc: 116 mg/dL / Ketone: x  / Bili: x / Urobili: x   Blood: x / Protein: x / Nitrite: x   Leuk Esterase: x / RBC: x / WBC x   Sq Epi: x / Non Sq Epi: x / Bacteria: x            Culture - Blood (collected 10 Jul 2023 12:32)  Source: .Blood None  Preliminary Report (2023 22:02):    No growth at 24 hours          RADIOLOGY:    CXR  - Increased bilateral opacities and effusions.

## 2023-07-12 NOTE — PROGRESS NOTE ADULT - SUBJECTIVE AND OBJECTIVE BOX
Patient is a 82y old  Female who presents with a chief complaint of Streptococcus infection     (08 Jul 2023 11:17)        Over Night Events:    On room air   No respiratory issues         ROS:  See HPI    PHYSICAL EXAM    ICU Vital Signs Last 24 Hrs  T(C): 35.9 (12 Jul 2023 05:54), Max: 36.8 (11 Jul 2023 20:31)  T(F): 96.6 (12 Jul 2023 05:54), Max: 98.3 (11 Jul 2023 20:31)  HR: 94 (12 Jul 2023 05:54) (92 - 94)  BP: 162/54 (12 Jul 2023 05:54) (109/56 - 162/54)  BP(mean): --  ABP: --  ABP(mean): --  RR: 18 (12 Jul 2023 05:54) (18 - 18)  SpO2: 95% (12 Jul 2023 05:54) (92% - 95%)    O2 Parameters below as of 11 Jul 2023 22:24  Patient On (Oxygen Delivery Method): room air            General:  HEENT: BRIANA             Lymphatic system: No cervical LN   Lungs: Bilateral BS  Cardiovascular: Regular   Gastrointestinal: Soft, Positive BS  Extremities: No clubbing.  Moves extremities.  Full Range of motion   Skin: Warm, intact  Neurological: No motor or sensory deficit       07-11-23 @ 07:01  -  07-12-23 @ 07:00  --------------------------------------------------------  IN:  Total IN: 0 mL    OUT:    Colostomy (mL): 50 mL  Total OUT: 50 mL    Total NET: -50 mL          LABS:                            10.2   8.42  )-----------( 232      ( 11 Jul 2023 08:28 )             32.0                                               07-11    141  |  104  |  9<L>  ----------------------------<  138<H>  3.5   |  23  |  <0.5<L>    Ca    8.1<L>      11 Jul 2023 08:28  Mg     1.9     07-11    TPro  4.4<L>  /  Alb  2.6<L>  /  TBili  0.4  /  DBili  x   /  AST  12  /  ALT  6   /  AlkPhos  85  07-11                                             Urinalysis Basic - ( 11 Jul 2023 08:28 )    Color: x / Appearance: x / SG: x / pH: x  Gluc: 138 mg/dL / Ketone: x  / Bili: x / Urobili: x   Blood: x / Protein: x / Nitrite: x   Leuk Esterase: x / RBC: x / WBC x   Sq Epi: x / Non Sq Epi: x / Bacteria: x                                                  LIVER FUNCTIONS - ( 11 Jul 2023 08:28 )  Alb: 2.6 g/dL / Pro: 4.4 g/dL / ALK PHOS: 85 U/L / ALT: 6 U/L / AST: 12 U/L / GGT: x                                                  Culture - Blood (collected 10 Jul 2023 12:32)  Source: .Blood None  Preliminary Report (11 Jul 2023 22:02):    No growth at 24 hours                                                                                           MEDICATIONS  (STANDING):  atorvastatin 20 milliGRAM(s) Oral at bedtime  dextrose 5%. 1000 milliLiter(s) (50 mL/Hr) IV Continuous <Continuous>  dextrose 5%. 1000 milliLiter(s) (100 mL/Hr) IV Continuous <Continuous>  dextrose 50% Injectable 25 Gram(s) IV Push once  dextrose 50% Injectable 25 Gram(s) IV Push once  dextrose 50% Injectable 12.5 Gram(s) IV Push once  glucagon  Injectable 1 milliGRAM(s) IntraMuscular once  heparin   Injectable 5000 Unit(s) SubCutaneous every 12 hours  insulin lispro (ADMELOG) corrective regimen sliding scale   SubCutaneous three times a day before meals  lactated ringers. 1000 milliLiter(s) (75 mL/Hr) IV Continuous <Continuous>  lactated ringers. 1000 milliLiter(s) (75 mL/Hr) IV Continuous <Continuous>  pantoprazole    Tablet 40 milliGRAM(s) Oral before breakfast  polyethylene glycol 3350 17 Gram(s) Oral daily  senna 1 Tablet(s) Oral daily  tamsulosin 0.4 milliGRAM(s) Oral at bedtime    MEDICATIONS  (PRN):  bisacodyl 5 milliGRAM(s) Oral every 12 hours PRN Constipation  dextrose Oral Gel 15 Gram(s) Oral once PRN Blood Glucose LESS THAN 70 milliGRAM(s)/deciliter      Xrays: pending for today

## 2023-07-13 NOTE — MEDICAL STUDENT PROGRESS NOTE(EDUCATION) - SUBJECTIVE AND OBJECTIVE BOX
DELORIS NORRIS ([id number])    Patient is a 82y old  Female who presents with a chief complaint of Streptococcus infection    Today is hospital day 8. Latest CXR showed increased pleural effusion and opacities bilaterally. Awaiting pulm consult to prepare for discharge.      (08 Jul 2023 11:17)      INTERVAL HPI/OVERNIGHT EVENTS:    PHYSICAL EXAM:    - GENERAL: Alert and oriented x 3. No acute distress. Well-nourished.    - HENT: Moist mucous membranes. No scleral icterus. No cervical lymphadenopathy.  - LUNGS: Crackles bilaterally. No accessory muscle use.  - CARDIOVASCULAR: Regular rate and rhythm. No murmur. No JVD.  - ABDOMEN: Soft, non-tender and non-distended. No palpable masses.  - EXTREMITIES: No edema. Non-tender.?- SKIN: No rashes or lesions. Warm.  - NEUROLOGIC: No focal neurological deficits. CN II-XII grossly intact, but not individually tested.  - PSYCHIATRIC: Cooperative. Appropriate mood and affect.  -----------------------------------------------------------------  REVIEW OF SYSTEMS (**ALL ROS negative unless bolded**):   - CONSTITUTIONAL: Denies weight loss, fever and chills.  - HEENT: Denies changes in vision and hearing.  - RESPIRATORY: some SOB and chest tightness.  - CV: Denies palpitations and CP.  - GI: Denies abdominal pain, nausea, vomiting and diarrhea.  - : Denies dysuria and urinary frequency.  - MSK: Denies myalgia and joint pain.    FAMILY HISTORY:  FH: bladder cancer (Sibling)      T(C): 36.1 (07-13-23 @ 05:01), Max: 36.6 (07-12-23 @ 20:13)  HR: 60 (07-13-23 @ 08:07) (60 - 99)  BP: 100/55 (07-13-23 @ 08:07) (87/53 - 114/56)  RR: 18 (07-13-23 @ 08:07) (18 - 18)  SpO2: 92% (07-13-23 @ 08:07) (91% - 93%)  Wt(kg): --Vital Signs Last 24 Hrs  T(C): 36.1 (13 Jul 2023 05:01), Max: 36.6 (12 Jul 2023 20:13)  T(F): 96.9 (13 Jul 2023 05:01), Max: 97.8 (12 Jul 2023 20:13)  HR: 60 (13 Jul 2023 08:07) (60 - 99)  BP: 100/55 (13 Jul 2023 08:07) (87/53 - 114/56)  BP(mean): --  RR: 18 (13 Jul 2023 08:07) (18 - 18)  SpO2: 92% (13 Jul 2023 08:07) (91% - 93%)    Parameters below as of 13 Jul 2023 08:07  Patient On (Oxygen Delivery Method): room air      No Known Allergies        LABS:        RADIOLOGY & ADDITIONAL TESTS:  < from: Xray Chest 1 View- PORTABLE-Routine (Xray Chest 1 View- PORTABLE-Routine in AM.) (07.13.23 @ 07:12) >  IMPRESSION:    Unchanged bilateral opacities and effusions, left greater than right.    < end of copied text >      atorvastatin 20 milliGRAM(s) Oral at bedtime  bisacodyl 5 milliGRAM(s) Oral every 12 hours PRN  dextrose 5%. 1000 milliLiter(s) IV Continuous <Continuous>  dextrose 5%. 1000 milliLiter(s) IV Continuous <Continuous>  dextrose 50% Injectable 25 Gram(s) IV Push once  dextrose 50% Injectable 12.5 Gram(s) IV Push once  dextrose 50% Injectable 25 Gram(s) IV Push once  dextrose Oral Gel 15 Gram(s) Oral once PRN  glucagon  Injectable 1 milliGRAM(s) IntraMuscular once  heparin   Injectable 5000 Unit(s) SubCutaneous every 12 hours  insulin lispro (ADMELOG) corrective regimen sliding scale   SubCutaneous three times a day before meals  pantoprazole    Tablet 40 milliGRAM(s) Oral before breakfast  polyethylene glycol 3350 17 Gram(s) Oral daily  senna 1 Tablet(s) Oral daily  tamsulosin 0.4 milliGRAM(s) Oral at bedtime      HEALTH ISSUES - PROBLEM Dx:

## 2023-07-13 NOTE — PHYSICAL THERAPY INITIAL EVALUATION ADULT - PERTINENT HX OF CURRENT PROBLEM, REHAB EVAL
82-year-old female with past medical history of hypertension, diabetes, hyperlipidemia, pancreatic adenocarcinoma status post transverse loop colostomy, right hydronephrosis s/p ureteral stent presenting for a UTI VRE of 1 month duration failed OP treatment and as per the chart was supposed to have a PICC line but procedure delayed presents for IV antibiotics treatmen
82-year-old female with past medical history of hypertension, diabetes, hyperlipidemia, pancreatic adenocarcinoma status post transverse loop colostomy, right hydronephrosis s/p ureteral stent presenting for a UTI VRE of 1 month duration failed OP treatment and as per the chart was supposed to have a PICC line but procedure delayed presents for IV antibiotics treatment. Last urine culture in 05/25 showed VRE sensitive only to zyvox. Patient is a poor historian reported that suprapubic abdominal discomfort started 1 month ago and the patient was unsuccessfully treated with different antibiotics. Patient still complains of suprapubic discomfort and burning, and lower back pain, Patient reports mild epigastric discomfort today . Patient denies any SOB, CP, NV, diarrhea, constipation fever, respiratory symptoms.

## 2023-07-13 NOTE — PHYSICAL THERAPY INITIAL EVALUATION ADULT - ADDITIONAL COMMENTS
Unable to obtain from pt. 2* to confusion. Pt. lives in apartment with  with elevator access.  and son at bedside report that pt. uses RW for ambulation in home. Son and daughter assist with ADLs. Private pay [facility] 24h x 7d.

## 2023-07-13 NOTE — PROGRESS NOTE ADULT - ASSESSMENT
82-year-old female with past medical history of hypertension, diabetes, hyperlipidemia, pancreatic adenocarcinoma status post transverse loop colostomy, right hydronephrosis s/p ureteral stent presenting for a UTI VRE of 1 month duration failed OP treatment and as per the chart was supposed to have a PICC line but procedure delayed presents for IV antibiotics treatment. Last urine culture in 05/25 showed VRE sensitive only to zyvox. Patient is a poor historian reported that suprapubic abdominal discomfort started 1 month ago and the patient was unsuccessfully treated with different antibiotics. Patient still complains of suprapubic discomfort and burning, and lower back pain, Patient reports mild epigastric discomfort today . Patient denies any SOB, CP, NV, diarrhea, constipation fever, respiratory symptoms.     #bacteremia?  blood cx from 7/5 grew Gram positive cocci in clusters, started Vanco 1 g Q 24  - d/w ID, likely contaminant, f/u sensitivities, DC vanco, f/u repeat blood Cx  - IV fluids 75 cc/ hr  - one IV on R arm that appears uninfected  - grew kocuria, discussed with ID, likely contaminant no need to treat      # VRE UTI failed OP therapy  # possible CAUTI    - UA grossly positive for RBC and WBC   - CTA chest Abdomen pelvis showed::  Decreased mild right hydronephrosis, post double-J ureteral stent placement, with persistent increased enhancement of ureter, suspicious for urinary tract infection.  Previous CTAP showed: Right double-J ureteral stent with stable moderate hydroureter.  - 05/25   Urine Cx showed VRE only sensitive to zyvox   - FU Blood and urine culture   -UA positive  -Per ID, seems colonized w VRE but asymptomatic, monitor off abx  - passed TOV    # Pancreatic mass with mets   # Mets possibly to the lungs with large LL pleural effusion   - CTA chest Abdomen pelvis showed:  * Unchanged ill-defined pancreatic mass.  *  Increased moderate-to-large left pleural effusion with overlying compressive atelectasis. New small right pleural effusion. Unchanged right lung pulmonary nodules, previously characterized on 2/16/2023 PET/CT.  - Previous CTAP showed   * Soft tissue mass adjacent to the sigmoid colon measuring approximately 3.1 x 2.5 cm.  * Right double-J ureteral stent with stable moderate hydroureter.  * Grossly stable ill-defined mass at the pancreatic head, consistent with known pancreatic neoplasm  - L pleural effusion tapped by pulm, f/u cytology to r/o malignancy - exudative in nature by protein ratio and Light's criteria  - Oncology will set up outpt txt for chemo after bacteremia is no longer a concern  -Pulm recs - Pleural fluid cytology showing atypical cells; final cytology and cell block is pending   Remains on room air; denies being short of breath at the moment   If fluid recurs and the patient is symptomatic (dyspnea) then pleurx catheter will be placed   CXR today w Increased bilateral opacities and effusions. Given 40 lasix IV, will f/u xray won,   no fluids, will f/u BNP  -         # Anemia :   -  Labs noticeable for Hb 10, monitor    - FU iron studies b12 folate     # diabetes:   - ISS   A1C 6.6      # DVT proph: heparin   # Gi proph: protonix   # Dispo: inpatient floor   # Diet: DASH, TLC carb consist     82-year-old female with past medical history of hypertension, diabetes, hyperlipidemia, pancreatic adenocarcinoma status post transverse loop colostomy, right hydronephrosis s/p ureteral stent presenting for a UTI VRE of 1 month duration failed OP treatment and as per the chart was supposed to have a PICC line but procedure delayed presents for IV antibiotics treatment. Last urine culture in 05/25 showed VRE sensitive only to zyvox. Patient is a poor historian reported that suprapubic abdominal discomfort started 1 month ago and the patient was unsuccessfully treated with different antibiotics. Today, pt is mildly hypotensive s/p lasix, also feels mild increased work of breathing    #bacteremia?  blood cx from 7/5 grew Gram positive cocci in clusters, started Vanco 1 g Q 24  - d/w ID, likely contaminant, f/u sensitivities, DC vanco, f/u repeat blood Cx  - IV fluids 75 cc/ hr  - one IV on R arm that appears uninfected  - grew kocuria, discussed with ID, likely contaminant no need to treat      # VRE UTI failed OP therapy  # possible CAUTI    - UA grossly positive for RBC and WBC   - CTA chest Abdomen pelvis showed::  Decreased mild right hydronephrosis, post double-J ureteral stent placement, with persistent increased enhancement of ureter, suspicious for urinary tract infection.  Previous CTAP showed: Right double-J ureteral stent with stable moderate hydroureter.  - 05/25   Urine Cx showed VRE only sensitive to zyvox   - FU Blood and urine culture   -UA positive  -Per ID, seems colonized w VRE but asymptomatic, monitor off abx  - passed TOV    # Pancreatic mass with mets   # Mets possibly to the lungs with large LL pleural effusion   - CTA chest Abdomen pelvis showed:  * Unchanged ill-defined pancreatic mass.  *  Increased moderate-to-large left pleural effusion with overlying compressive atelectasis. New small right pleural effusion. Unchanged right lung pulmonary nodules, previously characterized on 2/16/2023 PET/CT.  - Previous CTAP showed   * Soft tissue mass adjacent to the sigmoid colon measuring approximately 3.1 x 2.5 cm.  * Right double-J ureteral stent with stable moderate hydroureter.  * Grossly stable ill-defined mass at the pancreatic head, consistent with known pancreatic neoplasm  - L pleural effusion tapped by pulm, f/u cytology to r/o malignancy - exudative in nature by protein ratio and Light's criteria  - Oncology will set up outpt txt for chemo after bacteremia is no longer a concern  -Pulm recs - Pleural fluid cytology showing atypical cells; final cytology and cell block is pending   Remains on room air; denies being short of breath at the moment   If fluid recurs and the patient is symptomatic (dyspnea) then pleurx catheter will be placed   CXR today w Increased bilateral opacities and effusions. Given 40 lasix IV   some hypotension overnight, given 500 cc fluids  - today pulm placed a pleurx catheter, will f/u w xray tomorrow        # Anemia :   -  Labs noticeable for Hb 10, monitor    - FU iron studies b12 folate     # diabetes:   - ISS   A1C 6.6      # DVT proph: heparin   # Gi proph: protonix   # Dispo: inpatient floor   # Diet: DASH, TLC carb consist

## 2023-07-13 NOTE — PROGRESS NOTE ADULT - ASSESSMENT
Impressions  # Large left sided pleural effusion, septations seen on bedside US, suspected malignancy s/p thora  - trace effusion noted on CT A&P 04/2023  # pancreatic adenocarcinoma s/p transverse loop colostomy  #UTI on abx     Plan    S/P left thoracentesis; residual effusion present on CXR; repeat CXR 7/12 reviewed shows reaccumulation of fluid however patient remains on room air and denies any shortness of breath at this time  Pleural fluid cytology showing atypical cells; final cytology and cell block is pending   If fluid recurs and the patient is symptomatic then will place pleurx catheter; she is agreeable   Oncology on board; treatment plan outpatient; poor functional status   Prognosis is guarded; consider palliative care eval  Will follow as needed  Impressions  # Large left sided pleural effusion, septations seen on bedside US, suspected malignancy s/p thora  - trace effusion noted on CT A&P 04/2023  # pancreatic adenocarcinoma  #UTI on abx     Plan    Repeat CXR noted: worsened    Worsened bilateral infiltrates and effusions   Left effusion is larger; positive for adenocarcinoma on cytology and IHC   Given quick recurrence will plan for indwelling tunneled pleural catheter today   Will drain effusion and send for cytology again   Oncology follow up outpatient   Poor overall prognosis; consider pall care eval   DVT ppx with sq heparin   Remains on room air

## 2023-07-13 NOTE — PROGRESS NOTE ADULT - ATTENDING COMMENTS
82-year-old female with past medical history of hypertension, diabetes, hyperlipidemia, pancreatic adenocarcinoma status post transverse loop colostomy, right hydronephrosis s/p ureteral stent presenting for a UTI VRE of 1 month duration failed OP treatment and as per the chart was supposed to have a PICC line but procedure delayed presents for IV antibiotics treatment. Last urine culture in 05/25 showed VRE sensitive only to Zyvox. Patient is a poor historian reported that suprapubic abdominal discomfort started 1 month ago and the patient was unsuccessfully treated with different antibiotics.       # VRE UTI failed OP therapy/ Asymptomatic bacteriuria / hydronephrosis   - consulted by ID, will monitor off Abx   - f/u repeat blood Cx ( likely contaminant), confirmed by ID  - 05/25: Urine Cx showed VRE only sensitive to Zyvox   - passed TOV, repeat kidney/bladder US noted   - c/w Flomax   -  follow up  - Right double-J ureteral stent with stable moderate hydroureter    # Pancreatic adenocarcinoma  with mets / left sided malignant pleural effusion   - L pleural effusion tapped by pulm, f/u cytology to r/o malignancy  - Outpatient follow with Dr. Mead next week to initiate treatment with Gemcitabine (once weekly) treatment.    - overall prognosis is poor   - pulm f/u pleurx catheter today planned     # Anemia, iron def   - s/p 3 days Venofer     # Diabetes   - carb consistent diet   - monitor finger stick  - on Insulin     # Generalized weakness  - PT/ rehab eval  - supportive care, fall and aspiration precautions     # DVT proph: heparin        #Progress Note Handoff  Pending (specify): Pleurx today    Family discussion: plan of care d/w patient and son   Disposition: Home

## 2023-07-13 NOTE — PHYSICAL THERAPY INITIAL EVALUATION ADULT - IMPAIRMENTS FOUND, PT EVAL
arousal, attention, and cognition/cognitive impairment/ergonomics and body mechanics/gait, locomotion, and balance/gross motor/muscle strength/poor safety awareness

## 2023-07-13 NOTE — MEDICAL STUDENT PROGRESS NOTE(EDUCATION) - ASSESSMENT
82-year-old female with past medical history of hypertension, diabetes, hyperlipidemia, pancreatic adenocarcinoma status post transverse loop colostomy, right hydronephrosis s/p ureteral stent presenting for a UTI VRE of 1 month duration failed OP treatment and as per the chart was supposed to have a PICC line but procedure delayed presents for IV antibiotics treatment. Last urine culture in 05/25 showed VRE sensitive only to zyvox. Patient is a poor historian reported that suprapubic abdominal discomfort started 1 month ago and the patient was unsuccessfully treated with different antibiotics. Patient was being prepared for discharge and had repeat CXR prior to discharge which found increased effusion bilaterally. She was started on Lasix 40mg IV to decrease effusion which resulted in some hypotension. She was given 500cc of fluid to increase blood pressure. Latest BP was 100/52. She reports only Mild SOB and no other symptoms. Consulting pulm to see if she should get a drain placed in either the in or outpatient setting.     Pleural effusion  -  82-year-old female with past medical history of hypertension, diabetes, hyperlipidemia, pancreatic adenocarcinoma status post transverse loop colostomy, right hydronephrosis s/p ureteral stent presenting for a UTI VRE of 1 month duration failed OP treatment and as per the chart was supposed to have a PICC line but procedure delayed presents for IV antibiotics treatment. Last urine culture in 05/25 showed VRE sensitive only to zyvox. Patient is a poor historian reported that suprapubic abdominal discomfort started 1 month ago and the patient was unsuccessfully treated with different antibiotics. Patient was being prepared for discharge and had repeat CXR prior to discharge which found increased effusion bilaterally. She was started on Lasix 40mg IV to decrease effusion which resulted in some hypotension. She was given 500cc of fluid to increase blood pressure. Latest BP was 100/52. She reports only Mild SOB and no other symptoms. Consulting pulm to see if she should get a drain placed in either the in or outpatient setting.     #Pleural effusion  - drain lungs bilaterally  - consult pulm to determine if drain should be placed    #pancreatic adenocarcionma   -initiate gemcitabine treatment outpatient    #HTN  -continue Tamsulosin 0.4 HS     #Diabetes   -continue Lispro Before meals     #HLD  - continue atorvastatin 20mg HS

## 2023-07-13 NOTE — PHYSICAL THERAPY INITIAL EVALUATION ADULT - GENERAL OBSERVATIONS, REHAB EVAL
11:05-11:45 Pt. encountered in semifowler in bed in NAD. +Primafit. Son and  at bedside. Judith ADAN aware.
PT IE [id number]. Chart reviewed. pt encountered semi-dutton in bed. In NAD. + IV line, + smith.

## 2023-07-13 NOTE — PROGRESS NOTE ADULT - SUBJECTIVE AND OBJECTIVE BOX
24H events:    Patient is a 82y old Female who presents with a chief complaint of Streptococcus infection     (2023 11:17)    Primary diagnosis of VRE infection (vancomycin resistant Enterococcus)      Day 1:  Day 2:  Day 3:     Today is hospital day 8d. This morning patient was seen and examined at bedside, resting comfortably in bed.    Hypotensive overnight to 87/53 s/p lasix given 500 CC IV fluids. This morning 104/53, endorsing some increased work of breathing.     Code Status:    Family communication:  Contact date:  Name of person contacted:  Relationship to patient:  Communication details:  What matters most:    PAST MEDICAL & SURGICAL HISTORY  HTN (hypertension)    Diabetes    Hypercholesteremia    Pancreatic cancer    Hydronephrosis    Colostomy status    History of     History of left hip replacement    H/O carpal tunnel repair      SOCIAL HISTORY:  Social History:      ALLERGIES:  No Known Allergies    MEDICATIONS:  STANDING MEDICATIONS  atorvastatin 20 milliGRAM(s) Oral at bedtime  dextrose 5%. 1000 milliLiter(s) IV Continuous <Continuous>  dextrose 5%. 1000 milliLiter(s) IV Continuous <Continuous>  dextrose 50% Injectable 25 Gram(s) IV Push once  dextrose 50% Injectable 12.5 Gram(s) IV Push once  dextrose 50% Injectable 25 Gram(s) IV Push once  glucagon  Injectable 1 milliGRAM(s) IntraMuscular once  heparin   Injectable 5000 Unit(s) SubCutaneous every 12 hours  insulin lispro (ADMELOG) corrective regimen sliding scale   SubCutaneous three times a day before meals  pantoprazole    Tablet 40 milliGRAM(s) Oral before breakfast  polyethylene glycol 3350 17 Gram(s) Oral daily  senna 1 Tablet(s) Oral daily  tamsulosin 0.4 milliGRAM(s) Oral at bedtime    PRN MEDICATIONS  bisacodyl 5 milliGRAM(s) Oral every 12 hours PRN  dextrose Oral Gel 15 Gram(s) Oral once PRN    VITALS:   T(F): 98  HR: 86  BP: 104/53  RR: 18  SpO2: 92%    PHYSICAL EXAM:  GENERAL:   ( x ) NAD, lying in bed comfortably     (  ) obtunded     (  ) lethargic     (  ) somnolent  some increased work of breathing  HEAD:   (x  ) Atraumatic     (  ) hematoma     (  ) laceration (specify location:       )     NECK:  (  ) Supple     (  ) neck stiffness     (  ) nuchal rigidity     (  )  no JVD     (  ) JVD present ( -- cm)    HEART:  Rate -->     ( x ) normal rate     (  ) bradycardic     (  ) tachycardic  Rhythm -->     ( x ) regular     (  ) regularly irregular     (  ) irregularly irregular  Murmurs -->     ( x ) normal s1s2     (  ) systolic murmur     (  ) diastolic murmur     (  ) continuous murmur      (  ) S3 present     (  ) S4 present    LUNGS:   ( x )Unlabored respirations     (  ) tachypnea  ( x ) B/L air entry     (  ) decreased breath sounds in:  (location     )    (  ) no adventitious sound     (  ) crackles     (  ) wheezing      (  ) rhonchi      (specify location:       )  (  ) chest wall tenderness (specify location:       )    ABDOMEN:   ( x ) Soft     (  ) tense   |   ( x ) nondistended     (  ) distended   |   (  ) +BS     (  ) hypoactive bowel sounds     (  ) hyperactive bowel sounds  (  ) nontender     (  ) RUQ tenderness     (  ) RLQ tenderness     (  ) LLQ tenderness     (  ) epigastric tenderness     (  ) diffuse tenderness  (  ) Splenomegaly      (  ) Hepatomegaly      (  ) Jaundice     (  ) ecchymosis     EXTREMITIES: 2+ peripheral pulses bilaterally. No clubbing, cyanosis, or edema  (  ) Normal     (  ) Rash     (  ) ecchymosis     (  ) varicose veins      (  ) pitting edema     (  ) non-pitting edema   (  ) ulceration     (  ) gangrene:     (location:     )    NERVOUS SYSTEM:    ( x ) A&Ox3     (  ) confused     (  ) lethargic  CN II-XII:     (  ) Intact     (  ) deficits found     (Specify:     )   Upper extremities:     (  ) no sensorimotor deficits     (  ) weakness     (  ) loss of proprioception/vibration     (  ) loss of touch/temperature (specify:    )  Lower extremities:     (  ) no sensorimotor deficits     (  ) weakness     (  ) loss of proprioception/vibration     (  ) loss of touch/temperature (specify:    )    SKIN:   (  ) No rashes or lesions     (  ) maculopapular rash     (  ) pustules     (  ) vesicles     (  ) ulcer     (  ) ecchymosis     (specify location:     )          LABS:                        10.1   10.53 )-----------( 331      ( 2023 08:39 )             32.2     07-13    134<L>  |  99  |  15  ----------------------------<  114<H>  3.6   |  21  |  0.5<L>    Ca    8.1<L>      2023 08:39  Mg     1.7         TPro  4.6<L>  /  Alb  2.6<L>  /  TBili  0.3  /  DBili  x   /  AST  13  /  ALT  6   /  AlkPhos  89        Urinalysis Basic - ( 2023 08:39 )    Color: x / Appearance: x / SG: x / pH: x  Gluc: 114 mg/dL / Ketone: x  / Bili: x / Urobili: x   Blood: x / Protein: x / Nitrite: x   Leuk Esterase: x / RBC: x / WBC x   Sq Epi: x / Non Sq Epi: x / Bacteria: x                RADIOLOGY:     CXR - Unchanged bilateral opacities and effusions, left greater than right.         24H events:    Patient is a 82y old Female who presents with a chief complaint of Streptococcus infection     (2023 11:17)    Primary diagnosis of VRE infection (vancomycin resistant Enterococcus)      Day 1:  Day 2:  Day 3:     Today is hospital day 8d. This morning patient was seen and examined at bedside, resting comfortably in bed.    Hypotensive overnight to 87/53 s/p lasix given 500 CC IV fluids. This morning 104/53, endorsing some increased work of breathing.     Code Status:    Family communication:  Contact date:  Name of person contacted:  Relationship to patient:  Communication details:  What matters most:    PAST MEDICAL & SURGICAL HISTORY  HTN (hypertension)    Diabetes    Hypercholesteremia    Pancreatic cancer    Hydronephrosis    Colostomy status    History of     History of left hip replacement    H/O carpal tunnel repair      SOCIAL HISTORY:  Social History:      ALLERGIES:  No Known Allergies    MEDICATIONS:  STANDING MEDICATIONS  atorvastatin 20 milliGRAM(s) Oral at bedtime  dextrose 5%. 1000 milliLiter(s) IV Continuous <Continuous>  dextrose 5%. 1000 milliLiter(s) IV Continuous <Continuous>  dextrose 50% Injectable 25 Gram(s) IV Push once  dextrose 50% Injectable 12.5 Gram(s) IV Push once  dextrose 50% Injectable 25 Gram(s) IV Push once  glucagon  Injectable 1 milliGRAM(s) IntraMuscular once  heparin   Injectable 5000 Unit(s) SubCutaneous every 12 hours  insulin lispro (ADMELOG) corrective regimen sliding scale   SubCutaneous three times a day before meals  pantoprazole    Tablet 40 milliGRAM(s) Oral before breakfast  polyethylene glycol 3350 17 Gram(s) Oral daily  senna 1 Tablet(s) Oral daily  tamsulosin 0.4 milliGRAM(s) Oral at bedtime    PRN MEDICATIONS  bisacodyl 5 milliGRAM(s) Oral every 12 hours PRN  dextrose Oral Gel 15 Gram(s) Oral once PRN    VITALS:   T(F): 98  HR: 86  BP: 104/53  RR: 18  SpO2: 92%    PHYSICAL EXAM:  GENERAL:   ( x ) NAD, lying in bed comfortably     (  ) obtunded     (  ) lethargic     (  ) somnolent  some increased work of breathing  HEAD:   (x  ) Atraumatic     (  ) hematoma     (  ) laceration (specify location:       )     NECK:  (  ) Supple     (  ) neck stiffness     (  ) nuchal rigidity     (  )  no JVD     (  ) JVD present ( -- cm)    HEART:  Rate -->     ( x ) normal rate     (  ) bradycardic     (  ) tachycardic  Rhythm -->     ( x ) regular     (  ) regularly irregular     (  ) irregularly irregular  Murmurs -->     ( x ) normal s1s2     (  ) systolic murmur     (  ) diastolic murmur     (  ) continuous murmur      (  ) S3 present     (  ) S4 present    LUNGS:   ( x )Unlabored respirations     (  ) tachypnea  ( x ) B/L air entry     (  x) decreased breath sounds in:  (location   left posterior  )    (  ) no adventitious sound     ( x ) crackles     (  ) wheezing      (  ) rhonchi      (specify location:       )  (  ) chest wall tenderness (specify location:       )    ABDOMEN:   ( x ) Soft     (  ) tense   |   ( x ) nondistended     (  ) distended   |   (  ) +BS     (  ) hypoactive bowel sounds     (  ) hyperactive bowel sounds  (  ) nontender     (  ) RUQ tenderness     (  ) RLQ tenderness     (  ) LLQ tenderness     (  ) epigastric tenderness     (  ) diffuse tenderness  (  ) Splenomegaly      (  ) Hepatomegaly      (  ) Jaundice     (  ) ecchymosis     EXTREMITIES: 2+ peripheral pulses bilaterally. No clubbing, cyanosis, or edema  (  ) Normal     (  ) Rash     (  ) ecchymosis     (  ) varicose veins      (  ) pitting edema     (  ) non-pitting edema   (  ) ulceration     (  ) gangrene:     (location:     )    NERVOUS SYSTEM:    ( x ) A&Ox3     (  ) confused     (  ) lethargic  CN II-XII:     (  ) Intact     (  ) deficits found     (Specify:     )   Upper extremities:     (  ) no sensorimotor deficits     (  ) weakness     (  ) loss of proprioception/vibration     (  ) loss of touch/temperature (specify:    )  Lower extremities:     (  ) no sensorimotor deficits     (  ) weakness     (  ) loss of proprioception/vibration     (  ) loss of touch/temperature (specify:    )    SKIN:   (  ) No rashes or lesions     (  ) maculopapular rash     (  ) pustules     (  ) vesicles     (  ) ulcer     (  ) ecchymosis     (specify location:     )          LABS:                        10.1   10.53 )-----------( 331      ( 2023 08:39 )             32.2     07-13    134<L>  |  99  |  15  ----------------------------<  114<H>  3.6   |  21  |  0.5<L>    Ca    8.1<L>      2023 08:39  Mg     1.7         TPro  4.6<L>  /  Alb  2.6<L>  /  TBili  0.3  /  DBili  x   /  AST  13  /  ALT  6   /  AlkPhos  89        Urinalysis Basic - ( 2023 08:39 )    Color: x / Appearance: x / SG: x / pH: x  Gluc: 114 mg/dL / Ketone: x  / Bili: x / Urobili: x   Blood: x / Protein: x / Nitrite: x   Leuk Esterase: x / RBC: x / WBC x   Sq Epi: x / Non Sq Epi: x / Bacteria: x                RADIOLOGY:     CXR - Unchanged bilateral opacities and effusions, left greater than right.

## 2023-07-13 NOTE — ASU PREOP CHECKLIST - SIDE RAILS UP
done Orbicularis Oris Muscle Flap Text: The defect edges were debeveled with a #15 scalpel blade.  Given that the defect affected the competency of the oral sphincter an obicularis oris muscle flap was deemed most appropriate to restore this competency and normal muscle function.  Using a sterile surgical marker, an appropriate flap was drawn incorporating the defect. The area thus outlined was incised with a #15 scalpel blade.

## 2023-07-13 NOTE — PROGRESS NOTE ADULT - SUBJECTIVE AND OBJECTIVE BOX
Patient is a 82y old  Female who presents with a chief complaint of Streptococcus infection     (08 Jul 2023 11:17)        SUBJECTIVE:      REVIEW OF SYSTEMS:    All Pertinent ROS are negative except per HPI       PHYSICAL EXAM  Vital Signs Last 24 Hrs  T(C): 36.1 (13 Jul 2023 05:01), Max: 36.6 (12 Jul 2023 20:13)  T(F): 96.9 (13 Jul 2023 05:01), Max: 97.8 (12 Jul 2023 20:13)  HR: 60 (13 Jul 2023 08:07) (60 - 99)  BP: 100/55 (13 Jul 2023 08:07) (87/53 - 114/56)  BP(mean): --  RR: 18 (13 Jul 2023 08:07) (18 - 18)  SpO2: 92% (13 Jul 2023 08:07) (91% - 93%)    Parameters below as of 13 Jul 2023 08:07  Patient On (Oxygen Delivery Method): room air        CONSTITUTIONAL:  frail female in NAD    ENT:   Airway patent,   No thrush    CARDIAC:   Normal rate,   regular rhythm.    no edema      RESPIRATORY:   decreased bilateral BS    GASTROINTESTINAL:  Abdomen soft,   non-tender,   no guarding,   + BS    MUSCULOSKELETAL:   range of motion is not limited,  no clubbing, cyanosis    NEUROLOGICAL:   Alert and oriented   no motor or deficits.    SKIN:   Skin normal color for race,   warm, dry   No evidence of rash.      07-12-23 @ 07:01  -  07-13-23 @ 07:00  --------------------------------------------------------  IN:    Lactated Ringers: 525 mL  Total IN: 525 mL    OUT:    Colostomy (mL): 50 mL  Total OUT: 50 mL    Total NET: 475 mL          LABS:                          10.2   10.05 )-----------( 259      ( 12 Jul 2023 08:51 )             31.5                                               07-12    136  |  102  |  9<L>  ----------------------------<  116<H>  3.6   |  21  |  <0.5<L>    Ca    8.2<L>      12 Jul 2023 08:51  Mg     1.8     07-12    TPro  4.5<L>  /  Alb  2.5<L>  /  TBili  0.4  /  DBili  x   /  AST  13  /  ALT  5   /  AlkPhos  92  07-12                                             Urinalysis Basic - ( 12 Jul 2023 08:51 )    Color: x / Appearance: x / SG: x / pH: x  Gluc: 116 mg/dL / Ketone: x  / Bili: x / Urobili: x   Blood: x / Protein: x / Nitrite: x   Leuk Esterase: x / RBC: x / WBC x   Sq Epi: x / Non Sq Epi: x / Bacteria: x                                                  LIVER FUNCTIONS - ( 12 Jul 2023 08:51 )  Alb: 2.5 g/dL / Pro: 4.5 g/dL / ALK PHOS: 92 U/L / ALT: 5 U/L / AST: 13 U/L / GGT: x                                                  Culture - Blood (collected 10 Jul 2023 12:32)  Source: .Blood None  Preliminary Report (12 Jul 2023 22:01):    No growth at 48 Hours                                                    MEDICATIONS  (STANDING):  atorvastatin 20 milliGRAM(s) Oral at bedtime  dextrose 5%. 1000 milliLiter(s) (100 mL/Hr) IV Continuous <Continuous>  dextrose 5%. 1000 milliLiter(s) (50 mL/Hr) IV Continuous <Continuous>  dextrose 50% Injectable 25 Gram(s) IV Push once  dextrose 50% Injectable 12.5 Gram(s) IV Push once  dextrose 50% Injectable 25 Gram(s) IV Push once  glucagon  Injectable 1 milliGRAM(s) IntraMuscular once  heparin   Injectable 5000 Unit(s) SubCutaneous every 12 hours  insulin lispro (ADMELOG) corrective regimen sliding scale   SubCutaneous three times a day before meals  pantoprazole    Tablet 40 milliGRAM(s) Oral before breakfast  polyethylene glycol 3350 17 Gram(s) Oral daily  senna 1 Tablet(s) Oral daily  tamsulosin 0.4 milliGRAM(s) Oral at bedtime    MEDICATIONS  (PRN):  bisacodyl 5 milliGRAM(s) Oral every 12 hours PRN Constipation  dextrose Oral Gel 15 Gram(s) Oral once PRN Blood Glucose LESS THAN 70 milliGRAM(s)/deciliter      X-Rays reviewed    CXR interpreted by me:

## 2023-07-14 NOTE — PROGRESS NOTE ADULT - ATTENDING COMMENTS
82-year-old female with past medical history of hypertension, diabetes, hyperlipidemia, pancreatic adenocarcinoma status post transverse loop colostomy, right hydronephrosis s/p ureteral stent presenting for a UTI VRE of 1 month duration failed OP treatment and as per the chart was supposed to have a PICC line but procedure delayed presents for IV antibiotics treatment. Last urine culture in 05/25 showed VRE sensitive only to Zyvox. Patient is a poor historian reported that suprapubic abdominal discomfort started 1 month ago and the patient was unsuccessfully treated with different antibiotics.       # VRE UTI failed OP therapy/ Asymptomatic bacteriuria / hydronephrosis   - consulted by ID, will monitor off Abx   - f/u repeat blood Cx (likely contaminant), per ID  - 05/25: Urine Cx showed VRE only sensitive to Zyvox   - passed TOV, repeat kidney/bladder US noted   - c/w Flomax   -  follow up  - Right double-J ureteral stent with stable moderate hydroureter    # Acute hypoxemic respiratory failure 2/2 Pleural effusion and pulm edema   # Pancreatic adenocarcinoma  with mets / left sided malignant pleural effusion   - L pleural effusion tapped by pulm, f/u cytology to r/o malignancy  - Outpatient follow with Dr. Mead next week to initiate treatment with Gemcitabine (once weekly) treatment.    - s/p pleurx catheter on 7/13 - 300cc drained today  - IV Lasix 20mg today   - check CTA chest, r/o PE     # Anemia, iron def   - s/p 3 days Venofer     # Diabetes   - carb consistent diet   - monitor finger stick  - on Insulin     # Generalized weakness  - PT/ rehab eval  - supportive care, fall and aspiration precautions     # DVT proph: heparin        #Progress Note Handoff  Pending (specify): CTA chest, r/o PE     Family discussion: plan of care d/w patient    Disposition: Home 82-year-old female with past medical history of hypertension, diabetes, hyperlipidemia, pancreatic adenocarcinoma status post transverse loop colostomy, right hydronephrosis s/p ureteral stent presenting for a UTI VRE of 1 month duration failed OP treatment and as per the chart was supposed to have a PICC line but procedure delayed presents for IV antibiotics treatment. Last urine culture in 05/25 showed VRE sensitive only to Zyvox. Patient is a poor historian reported that suprapubic abdominal discomfort started 1 month ago and the patient was unsuccessfully treated with different antibiotics.       # VRE UTI failed OP therapy/ Asymptomatic bacteriuria / hydronephrosis   - consulted by ID, will monitor off Abx   - f/u repeat blood Cx (likely contaminant), per ID  - 05/25: Urine Cx showed VRE only sensitive to Zyvox   - passed TOV, repeat kidney/bladder US noted   - c/w Flomax   -  follow up  - Right double-J ureteral stent with stable moderate hydroureter    # Acute hypoxemic respiratory failure 2/2 Pleural effusion and pulm edema   # Pancreatic adenocarcinoma  with mets / left sided malignant pleural effusion   - L pleural effusion tapped by pulm, f/u cytology to r/o malignancy  - Outpatient follow with Dr. Mead next week to initiate treatment with Gemcitabine (once weekly) treatment.    - s/p pleurx catheter on 7/13 - 300cc drained today  - IV Lasix 20mg today   - check CTA chest, r/o PE     # Anemia, iron def   - s/p 3 days Venofer     # Diabetes   - carb consistent diet   - monitor finger stick  - on Insulin     # Generalized weakness  - PT/ rehab eval  - supportive care, fall and aspiration precautions     # DVT proph: Heparin        #Progress Note Handoff  Pending (specify): CTA chest, r/o PE, wean off O2   Family discussion: plan of care d/w patient and son [place] over the phone   Disposition: Home

## 2023-07-14 NOTE — PROCEDURE NOTE - NSPROCDETAILS_GEN_ALL_CORE
location identified, draped/prepped, sterile technique used, needle inserted/introduced/connection to evacuated glass bottle/ultrasound assessment of effusion (localization)
location identified, draped/prepped, sterile technique used/supine position/ultrasound guidance

## 2023-07-14 NOTE — PROGRESS NOTE ADULT - SUBJECTIVE AND OBJECTIVE BOX
24H events:    Patient is a 82y old Female who presents with a chief complaint of VRE UTI (2023 10:48)    Primary diagnosis of VRE infection (vancomycin resistant Enterococcus)      Day 1:  Day 2:  Day 3:     Today is hospital day 9d. This morning patient was seen and examined at bedside, resting comfortably in bed.    No acute or major events overnight.    Code Status:    Family communication:  Contact date:  Name of person contacted:  Relationship to patient:  Communication details:  What matters most:    PAST MEDICAL & SURGICAL HISTORY  HTN (hypertension)    Diabetes    Hypercholesteremia    Pancreatic cancer    Hydronephrosis    Colostomy status    History of     History of left hip replacement    H/O carpal tunnel repair      SOCIAL HISTORY:  Social History:      ALLERGIES:  No Known Allergies    MEDICATIONS:  STANDING MEDICATIONS  atorvastatin 20 milliGRAM(s) Oral at bedtime  dextrose 5%. 1000 milliLiter(s) IV Continuous <Continuous>  dextrose 5%. 1000 milliLiter(s) IV Continuous <Continuous>  dextrose 50% Injectable 25 Gram(s) IV Push once  dextrose 50% Injectable 12.5 Gram(s) IV Push once  dextrose 50% Injectable 25 Gram(s) IV Push once  glucagon  Injectable 1 milliGRAM(s) IntraMuscular once  heparin   Injectable 5000 Unit(s) SubCutaneous every 12 hours  insulin lispro (ADMELOG) corrective regimen sliding scale   SubCutaneous three times a day before meals  pantoprazole    Tablet 40 milliGRAM(s) Oral before breakfast  polyethylene glycol 3350 17 Gram(s) Oral daily  senna 1 Tablet(s) Oral daily  tamsulosin 0.4 milliGRAM(s) Oral at bedtime    PRN MEDICATIONS  bisacodyl 5 milliGRAM(s) Oral every 12 hours PRN  dextrose Oral Gel 15 Gram(s) Oral once PRN    VITALS:   T(F): 97  HR: 62  BP: 111/66  RR: 18  SpO2: 95%    PHYSICAL EXAM:  GENERAL:   ( x ) NAD, lying in bed comfortably     (  ) obtunded     (  ) lethargic     (  ) somnolent    HEAD:   ( x ) Atraumatic     (  ) hematoma     (  ) laceration (specify location:       )     NECK:  (  ) Supple     (  ) neck stiffness     (  ) nuchal rigidity     (  )  no JVD     (  ) JVD present ( -- cm)    HEART:  Rate -->     ( x ) normal rate     (  ) bradycardic     (  ) tachycardic  Rhythm -->     ( x ) regular     (  ) regularly irregular     (  ) irregularly irregular  Murmurs -->     ( x ) normal s1s2     (  ) systolic murmur     (  ) diastolic murmur     (  ) continuous murmur      (  ) S3 present     (  ) S4 present    LUNGS:   ( x )Unlabored respirations     (  ) tachypnea  ( x ) B/L air entry     (  ) decreased breath sounds in:  (location    l posterior )    (  ) no adventitious sound     (  ) crackles     (  ) wheezing      (  ) rhonchi      (specify location:       )  (  ) chest wall tenderness (specify location:       )    ABDOMEN:   ( x ) Soft     (  ) tense   |   ( x ) nondistended     (  ) distended   |   (  ) +BS     (  ) hypoactive bowel sounds     (  ) hyperactive bowel sounds  (  ) nontender     (  ) RUQ tenderness     (  ) RLQ tenderness     (  ) LLQ tenderness     (  ) epigastric tenderness     (  ) diffuse tenderness  (  ) Splenomegaly      (  ) Hepatomegaly      (  ) Jaundice     (  ) ecchymosis     EXTREMITIES: 2+ peripheral pulses bilaterally. No clubbing, cyanosis, or edema  (x  ) Normal     (  ) Rash     (  ) ecchymosis     (  ) varicose veins      (  ) pitting edema     (  ) non-pitting edema   (  ) ulceration     (  ) gangrene:     (location:     )    NERVOUS SYSTEM:    ( x ) A&Ox3     (  ) confused     (  ) lethargic  CN II-XII:     (  ) Intact     (  ) deficits found     (Specify:     )   Upper extremities:     (  ) no sensorimotor deficits     (  ) weakness     (  ) loss of proprioception/vibration     (  ) loss of touch/temperature (specify:    )  Lower extremities:     (  ) no sensorimotor deficits     (  ) weakness     (  ) loss of proprioception/vibration     (  ) loss of touch/temperature (specify:    )    SKIN:   (  ) No rashes or lesions     (  ) maculopapular rash     (  ) pustules     (  ) vesicles     (  ) ulcer     (  ) ecchymosis     (specify location:     )      Pleurex drain on left back, wound looks clean and no signs of infection    LABS:                        10.1   10.96 )-----------( 333      ( 2023 07:51 )             31.8     07-14    136  |  100  |  19  ----------------------------<  130<H>  3.3<L>   |  21  |  0.5<L>    Ca    8.1<L>      2023 07:51  Mg     2.0     -    TPro  4.7<L>  /  Alb  2.6<L>  /  TBili  0.3  /  DBili  x   /  AST  12  /  ALT  5   /  AlkPhos  93  -      Urinalysis Basic - ( 2023 07:51 )    Color: x / Appearance: x / SG: x / pH: x  Gluc: 130 mg/dL / Ketone: x  / Bili: x / Urobili: x   Blood: x / Protein: x / Nitrite: x   Leuk Esterase: x / RBC: x / WBC x   Sq Epi: x / Non Sq Epi: x / Bacteria: x                RADIOLOGY:

## 2023-07-14 NOTE — PROGRESS NOTE ADULT - ASSESSMENT
82-year-old female with past medical history of hypertension, diabetes, hyperlipidemia, pancreatic adenocarcinoma status post transverse loop colostomy, right hydronephrosis s/p ureteral stent presenting for a UTI VRE of 1 month duration failed OP treatment and as per the chart was supposed to have a PICC line but procedure delayed presents for IV antibiotics treatment. Last urine culture in 05/25 showed VRE sensitive only to zyvox. Patient is a poor historian reported that suprapubic abdominal discomfort started 1 month ago and the patient was unsuccessfully treated with different antibiotics. Today, pt is normotensive, she also feels mild increased work of breathing    #bacteremia?  blood cx from 7/5 grew Gram positive cocci in clusters, started Vanco 1 g Q 24  - d/w ID, likely contaminant, f/u sensitivities, DC vanco, f/u repeat blood Cx  - IV fluids 75 cc/ hr  - one IV on R arm that appears uninfected  - grew kocuria, discussed with ID, likely contaminant no need to treat      # VRE UTI failed OP therapy  # possible CAUTI    - UA grossly positive for RBC and WBC   - CTA chest Abdomen pelvis showed::  Decreased mild right hydronephrosis, post double-J ureteral stent placement, with persistent increased enhancement of ureter, suspicious for urinary tract infection.  Previous CTAP showed: Right double-J ureteral stent with stable moderate hydroureter.  - 05/25   Urine Cx showed VRE only sensitive to zyvox   - FU Blood and urine culture   -UA positive  -Per ID, seems colonized w VRE but asymptomatic, monitor off abx  - passed TOV    # Pancreatic mass with mets   # Mets possibly to the lungs with large LL pleural effusion   - CTA chest Abdomen pelvis showed:  * Unchanged ill-defined pancreatic mass.  *  Increased moderate-to-large left pleural effusion with overlying compressive atelectasis. New small right pleural effusion. Unchanged right lung pulmonary nodules, previously characterized on 2/16/2023 PET/CT.  - Previous CTAP showed   * Soft tissue mass adjacent to the sigmoid colon measuring approximately 3.1 x 2.5 cm.  * Right double-J ureteral stent with stable moderate hydroureter.  * Grossly stable ill-defined mass at the pancreatic head, consistent with known pancreatic neoplasm  - L pleural effusion tapped by pulm, f/u cytology to r/o malignancy - exudative in nature by protein ratio and Light's criteria  - Oncology will set up outpt txt for chemo after bacteremia is no longer a concern  -Pulm recs - Pleural fluid cytology showing atypical cells; final cytology and cell block is pending   Remains on room air; denies being short of breath at the moment   If fluid recurs and the patient is symptomatic (dyspnea) then pleurx catheter will be placed   CXR today w Increased bilateral opacities and effusions. Given 40 lasix IV   some hypotension overnight, given 500 cc fluids  - 7/14 pulm placed a pleurx catheter, will f/u xray read  - Per pulm, drain no more than1 L in 48 hrs, call MD when drainage persistently less than 200 cc  -lasix 20 IV given      # Anemia :   -  Labs noticeable for Hb 10, monitor    - FU iron studies b12 folate     # diabetes:   - ISS   A1C 6.6      # DVT proph: heparin   # Gi proph: protonix   # Dispo: inpatient floor   # Diet: DASH, TLC carb consist   82-year-old female with past medical history of hypertension, diabetes, hyperlipidemia, pancreatic adenocarcinoma status post transverse loop colostomy, right hydronephrosis s/p ureteral stent presenting for a UTI VRE of 1 month duration failed OP treatment and as per the chart was supposed to have a PICC line but procedure delayed presents for IV antibiotics treatment. Last urine culture in 05/25 showed VRE sensitive only to zyvox. Patient is a poor historian reported that suprapubic abdominal discomfort started 1 month ago and the patient was unsuccessfully treated with different antibiotics. Today, pt is normotensive, she also feels mild increased work of breathing    #bacteremia?  blood cx from 7/5 grew Gram positive cocci in clusters, started Vanco 1 g Q 24  - d/w ID, likely contaminant, f/u sensitivities, DC vanco, f/u repeat blood Cx  - IV fluids 75 cc/ hr  - one IV on R arm that appears uninfected  - grew kocuria, discussed with ID, likely contaminant no need to treat      # VRE UTI failed OP therapy  # possible CAUTI    - UA grossly positive for RBC and WBC   - CTA chest Abdomen pelvis showed::  Decreased mild right hydronephrosis, post double-J ureteral stent placement, with persistent increased enhancement of ureter, suspicious for urinary tract infection.  Previous CTAP showed: Right double-J ureteral stent with stable moderate hydroureter.  - 05/25   Urine Cx showed VRE only sensitive to zyvox   - FU Blood and urine culture   -UA positive  -Per ID, seems colonized w VRE but asymptomatic, monitor off abx  - passed TOV    # Pancreatic mass with mets   # Mets possibly to the lungs with large LL pleural effusion   - CTA chest Abdomen pelvis showed:  * Unchanged ill-defined pancreatic mass.  *  Increased moderate-to-large left pleural effusion with overlying compressive atelectasis. New small right pleural effusion. Unchanged right lung pulmonary nodules, previously characterized on 2/16/2023 PET/CT.  - Previous CTAP showed   * Soft tissue mass adjacent to the sigmoid colon measuring approximately 3.1 x 2.5 cm.  * Right double-J ureteral stent with stable moderate hydroureter.  * Grossly stable ill-defined mass at the pancreatic head, consistent with known pancreatic neoplasm  - L pleural effusion tapped by pulm, f/u cytology to r/o malignancy - exudative in nature by protein ratio and Light's criteria  - Oncology will set up outpt txt for chemo after bacteremia is no longer a concern  -Pulm recs - Pleural fluid cytology showing atypical cells; final cytology and cell block is pending   Remains on room air; denies being short of breath at the moment   If fluid recurs and the patient is symptomatic (dyspnea) then pleurx catheter will be placed   CXR today w Increased bilateral opacities and effusions. Given 40 lasix IV   some hypotension overnight, given 500 cc fluids  - 7/14 pulm placed a pleurx catheter, will f/u xray read  - Per pulm, drain no more than1 L in 48 hrs, call MD when drainage persistently less than 200 cc  -lasix 20 IV given  -drained 3300 cc today from pleurex  deatting to 90% put on 2 L O2      # Anemia :   -  Labs noticeable for Hb 10, monitor    - FU iron studies b12 folate     # diabetes:   - ISS   A1C 6.6      # DVT proph: heparin   # Gi proph: protonix   # Dispo: inpatient floor   # Diet: DASH, TLC carb consist

## 2023-07-15 NOTE — PROGRESS NOTE ADULT - ASSESSMENT
82-year-old female with past medical history of hypertension, diabetes, hyperlipidemia, pancreatic adenocarcinoma status post transverse loop colostomy, right hydronephrosis s/p ureteral stent presenting for a UTI VRE of 1 month duration failed OP treatment and as per the chart was supposed to have a PICC line but procedure delayed presents for IV antibiotics treatment. Last urine culture in 05/25 showed VRE sensitive only to Zyvox. Patient is a poor historian reported that suprapubic abdominal discomfort started 1 month ago and the patient was unsuccessfully treated with different antibiotics.       # Acute hypoxemic respiratory failure 2/2 Pleural effusion and pulm edema   # Pancreatic adenocarcinoma  with mets / left sided malignant pleural effusion   - L pleural effusion tapped by pulm, path positive for malignant cells  - Outpatient follow with Dr. Mead next week to initiate treatment with Gemcitabine (once weekly) treatment.    - s/p pleurx catheter on 7/13     # VRE UTI failed OP therapy/ Asymptomatic bacteriuria / hydronephrosis   - consulted by ID, will monitor off Abx   - f/u repeat blood Cx (likely contaminant), per ID  - 05/25: Urine Cx showed VRE only sensitive to Zyvox   - passed TOV, repeat kidney/bladder US noted   - c/w Flomax   -  follow up  - Right double-J ureteral stent with stable moderate hydroureter    # Anemia, iron def   - s/p 3 days Venofer     # Diabetes   - carb consistent diet   - monitor finger stick  - on Insulin     # Generalized weakness  - PT/ rehab eval  - supportive care, fall and aspiration precautions     # DVT proph: Heparin        #Progress Note Handoff  Pending (specify): wean off O2, pleurx catheter  Family discussion: plan of care d/w patient and son Fransisco over the phone 7/15  Disposition: Home.     82-year-old female with past medical history of hypertension, diabetes, hyperlipidemia, pancreatic adenocarcinoma status post transverse loop colostomy, right hydronephrosis s/p ureteral stent presenting for a UTI VRE of 1 month duration failed OP treatment and as per the chart was supposed to have a PICC line but procedure delayed presents for IV antibiotics treatment. Last urine culture in 05/25 showed VRE sensitive only to Zyvox. Patient is a poor historian reported that suprapubic abdominal discomfort started 1 month ago and the patient was unsuccessfully treated with different antibiotics.       # Acute hypoxemic respiratory failure 2/2 Pleural effusion and pulm edema   # Pancreatic adenocarcinoma  with mets / left sided malignant pleural effusion   - L pleural effusion tapped by pulm, path positive for malignant cells  - Outpatient follow with Dr. Mead next week to initiate treatment with Gemcitabine (once weekly) treatment.    - s/p pleurx catheter on 7/13   - CTA Chest: No PE. Status post left chest tube placement with decrease in left pleural effusion, now moderate in size. Increase in right large pleural effusion. Mild increase in right upper lobe consolidative opacity. Increased nodularity along the oblique fissure. Findings may represent inflammatory/infectious response versus malignant progression.  - No leukocytosis, no cough, and afebrile - doubt infectious process     # VRE UTI failed OP therapy/ Asymptomatic bacteriuria / hydronephrosis   - consulted by ID, will monitor off Abx   - f/u repeat blood Cx (likely contaminant), per ID  - 05/25: Urine Cx showed VRE only sensitive to Zyvox   - passed TOV, repeat kidney/bladder US noted   - c/w Flomax   -  follow up  - Right double-J ureteral stent with stable moderate hydroureter    # Anemia, iron def   - s/p 3 days Venofer     # Diabetes   - carb consistent diet   - monitor finger stick  - on Insulin     # Generalized weakness  - PT/ rehab eval  - supportive care, fall and aspiration precautions     # DVT proph: Heparin        #Progress Note Handoff  Pending (specify): wean off O2, pleurx catheter  Family discussion: plan of care d/w patient and son Fransisco over the phone 7/15  Disposition: Home.

## 2023-07-15 NOTE — PROGRESS NOTE ADULT - SUBJECTIVE AND OBJECTIVE BOX
Pt seen and examined at bedside.         VITAL SIGNS (Last 24 hrs):  T(C): 35.6 (07-15-23 @ 12:14), Max: 36.2 (07-14-23 @ 20:09)  HR: 87 (07-15-23 @ 12:14) (87 - 99)  BP: 97/53 (07-15-23 @ 12:14) (91/53 - 97/53)  RR: 18 (07-15-23 @ 12:14) (16 - 18)  SpO2: 94% (07-15-23 @ 07:06) (89% - 98%)  Wt(kg): --  Daily     Daily     I&O's Summary    14 Jul 2023 07:01  -  15 Jul 2023 07:00  --------------------------------------------------------  IN: 0 mL / OUT: 450 mL / NET: -450 mL        PHYSICAL EXAM:  GENERAL: NAD  HEAD:  Atraumatic, Normocephalic  EYES: conjunctiva and sclera clear  NECK: Supple, No JVD  CHEST/LUNG: Pleurex on left side   HEART: Regular rate and rhythm; No murmurs, rubs, or gallops  ABDOMEN: Soft, Nontender, Nondistended;  +colostomy   EXTREMITIES:  2+ Peripheral Pulses, No clubbing, cyanosis, or edema  PSYCH: AAOx3  NEUROLOGY: non-focal  SKIN: No rashes or lesions    Labs Reviewed  Spoke to patient in regards to abnormal labs.    CBC Full  -  ( 15 Jul 2023 09:12 )  WBC Count : 10.71 K/uL  Hemoglobin : 10.3 g/dL  Hematocrit : 32.5 %  Platelet Count - Automated : 317 K/uL  Mean Cell Volume : 93.4 fL  Mean Cell Hemoglobin : 29.6 pg  Mean Cell Hemoglobin Concentration : 31.7 g/dL  Auto Neutrophil # : 8.88 K/uL  Auto Lymphocyte # : 0.66 K/uL  Auto Monocyte # : 0.96 K/uL  Auto Eosinophil # : 0.12 K/uL  Auto Basophil # : 0.04 K/uL  Auto Neutrophil % : 82.8 %  Auto Lymphocyte % : 6.2 %  Auto Monocyte % : 9.0 %  Auto Eosinophil % : 1.1 %  Auto Basophil % : 0.4 %    BMP:    07-15 @ 09:12    Blood Urea Nitrogen - 22  Calcium - 8.3  Carbond Dioxide - 21  Chloride - 101  Creatinine - <0.5  Glucose - 134  Potassium - 3.7  Sodium - 137              MEDICATIONS  (STANDING):  atorvastatin 20 milliGRAM(s) Oral at bedtime  dextrose 5%. 1000 milliLiter(s) (100 mL/Hr) IV Continuous <Continuous>  dextrose 5%. 1000 milliLiter(s) (50 mL/Hr) IV Continuous <Continuous>  dextrose 50% Injectable 25 Gram(s) IV Push once  dextrose 50% Injectable 12.5 Gram(s) IV Push once  dextrose 50% Injectable 25 Gram(s) IV Push once  glucagon  Injectable 1 milliGRAM(s) IntraMuscular once  heparin   Injectable 5000 Unit(s) SubCutaneous every 12 hours  insulin lispro (ADMELOG) corrective regimen sliding scale   SubCutaneous three times a day before meals  pantoprazole    Tablet 40 milliGRAM(s) Oral before breakfast  polyethylene glycol 3350 17 Gram(s) Oral daily  senna 1 Tablet(s) Oral daily  tamsulosin 0.4 milliGRAM(s) Oral at bedtime    MEDICATIONS  (PRN):  bisacodyl 5 milliGRAM(s) Oral every 12 hours PRN Constipation  dextrose Oral Gel 15 Gram(s) Oral once PRN Blood Glucose LESS THAN 70 milliGRAM(s)/deciliter         Pt seen and examined at bedside. Denies CP, SOB.         VITAL SIGNS (Last 24 hrs):  T(C): 35.6 (07-15-23 @ 12:14), Max: 36.2 (07-14-23 @ 20:09)  HR: 87 (07-15-23 @ 12:14) (87 - 99)  BP: 97/53 (07-15-23 @ 12:14) (91/53 - 97/53)  RR: 18 (07-15-23 @ 12:14) (16 - 18)  SpO2: 94% (07-15-23 @ 07:06) (89% - 98%)  Wt(kg): --  Daily     Daily     I&O's Summary    14 Jul 2023 07:01  -  15 Jul 2023 07:00  --------------------------------------------------------  IN: 0 mL / OUT: 450 mL / NET: -450 mL        PHYSICAL EXAM:  GENERAL: NAD  HEAD:  Atraumatic, Normocephalic  EYES: conjunctiva and sclera clear  NECK: Supple, No JVD  CHEST/LUNG: Pleurex on left side   HEART: Regular rate and rhythm; No murmurs, rubs, or gallops  ABDOMEN: Soft, Nontender, Nondistended;  +colostomy   EXTREMITIES:  2+ Peripheral Pulses, No clubbing, cyanosis, or edema  PSYCH: AAOx3  NEUROLOGY: non-focal  SKIN: No rashes or lesions    Labs Reviewed  Spoke to patient in regards to abnormal labs.    CBC Full  -  ( 15 Jul 2023 09:12 )  WBC Count : 10.71 K/uL  Hemoglobin : 10.3 g/dL  Hematocrit : 32.5 %  Platelet Count - Automated : 317 K/uL  Mean Cell Volume : 93.4 fL  Mean Cell Hemoglobin : 29.6 pg  Mean Cell Hemoglobin Concentration : 31.7 g/dL  Auto Neutrophil # : 8.88 K/uL  Auto Lymphocyte # : 0.66 K/uL  Auto Monocyte # : 0.96 K/uL  Auto Eosinophil # : 0.12 K/uL  Auto Basophil # : 0.04 K/uL  Auto Neutrophil % : 82.8 %  Auto Lymphocyte % : 6.2 %  Auto Monocyte % : 9.0 %  Auto Eosinophil % : 1.1 %  Auto Basophil % : 0.4 %    BMP:    07-15 @ 09:12    Blood Urea Nitrogen - 22  Calcium - 8.3  Carbond Dioxide - 21  Chloride - 101  Creatinine - <0.5  Glucose - 134  Potassium - 3.7  Sodium - 137              MEDICATIONS  (STANDING):  atorvastatin 20 milliGRAM(s) Oral at bedtime  dextrose 5%. 1000 milliLiter(s) (100 mL/Hr) IV Continuous <Continuous>  dextrose 5%. 1000 milliLiter(s) (50 mL/Hr) IV Continuous <Continuous>  dextrose 50% Injectable 25 Gram(s) IV Push once  dextrose 50% Injectable 12.5 Gram(s) IV Push once  dextrose 50% Injectable 25 Gram(s) IV Push once  glucagon  Injectable 1 milliGRAM(s) IntraMuscular once  heparin   Injectable 5000 Unit(s) SubCutaneous every 12 hours  insulin lispro (ADMELOG) corrective regimen sliding scale   SubCutaneous three times a day before meals  pantoprazole    Tablet 40 milliGRAM(s) Oral before breakfast  polyethylene glycol 3350 17 Gram(s) Oral daily  senna 1 Tablet(s) Oral daily  tamsulosin 0.4 milliGRAM(s) Oral at bedtime    MEDICATIONS  (PRN):  bisacodyl 5 milliGRAM(s) Oral every 12 hours PRN Constipation  dextrose Oral Gel 15 Gram(s) Oral once PRN Blood Glucose LESS THAN 70 milliGRAM(s)/deciliter

## 2023-07-16 NOTE — PROGRESS NOTE ADULT - ATTENDING COMMENTS
82-year-old female with past medical history of hypertension, diabetes, hyperlipidemia, pancreatic adenocarcinoma status post transverse loop colostomy, right hydronephrosis s/p ureteral stent presenting for a UTI VRE of 1 month duration failed OP treatment and as per the chart was supposed to have a PICC line but procedure delayed presents for IV antibiotics treatment. Last urine culture in 05/25 showed VRE sensitive only to Zyvox. Patient is a poor historian reported that suprapubic abdominal discomfort started 1 month ago and the patient was unsuccessfully treated with different antibiotics.       # Acute hypoxemic respiratory failure 2/2 Pleural effusion and pulm edema   # Pancreatic adenocarcinoma  with mets / left sided malignant pleural effusion   - L pleural effusion tapped by pulm, path positive for malignant cells  - Outpatient follow with Dr. Mead next week to initiate treatment with Gemcitabine (once weekly) treatment.    - s/p pleurx catheter on 7/13 - 900cc drained so far   - CTA Chest: No PE. Status post left chest tube placement with decrease in left pleural effusion, now moderate in size. Increase in right large pleural effusion. Mild increase in right upper lobe consolidative opacity. Increased nodularity along the oblique fissure. Findings may represent inflammatory/infectious response versus malignant progression.  - No leukocytosis, no cough, and afebrile - doubt infectious process     # VRE UTI failed OP therapy/ Asymptomatic bacteriuria / hydronephrosis   - consulted by ID, will monitor off Abx   - f/u repeat blood Cx (likely contaminant), per ID  - 05/25: Urine Cx showed VRE only sensitive to Zyvox   - passed TOV, repeat kidney/bladder US noted   - c/w Flomax   -  follow up  - Right double-J ureteral stent with stable moderate hydroureter    # Anemia, iron def   - s/p 3 days Venofer     # Diabetes   - carb consistent diet   - monitor finger stick  - on Insulin     # Generalized weakness  - PT/ rehab eval  - supportive care, fall and aspiration precautions     # DVT proph: Heparin        #Progress Note Handoff  Pending (specify):  pulm f/u for Pleurx placement on right side   Family discussion: plan of care d/w patient and son Fransisco over the phone 7/15  Disposition: Home

## 2023-07-16 NOTE — PROGRESS NOTE ADULT - SUBJECTIVE AND OBJECTIVE BOX
24H events:    Patient is a 82y old Female who presents with a chief complaint of VRE UTI (2023 10:48)    Primary diagnosis of VRE infection (vancomycin resistant Enterococcus)      Day 1:  Day 2:  Day 3:     Today is hospital day 11d. This morning patient was seen and examined at bedside, resting comfortably in bed.    No acute or major events overnight.    Code Status:    Family communication:  Contact date:  Name of person contacted:  Relationship to patient:  Communication details:  What matters most:    PAST MEDICAL & SURGICAL HISTORY  HTN (hypertension)    Diabetes    Hypercholesteremia    Pancreatic cancer    Hydronephrosis    Colostomy status    History of     History of left hip replacement    H/O carpal tunnel repair      SOCIAL HISTORY:  Social History:      ALLERGIES:  No Known Allergies    MEDICATIONS:  STANDING MEDICATIONS  atorvastatin 20 milliGRAM(s) Oral at bedtime  dextrose 5%. 1000 milliLiter(s) IV Continuous <Continuous>  dextrose 5%. 1000 milliLiter(s) IV Continuous <Continuous>  dextrose 50% Injectable 25 Gram(s) IV Push once  dextrose 50% Injectable 12.5 Gram(s) IV Push once  dextrose 50% Injectable 25 Gram(s) IV Push once  glucagon  Injectable 1 milliGRAM(s) IntraMuscular once  heparin   Injectable 5000 Unit(s) SubCutaneous every 12 hours  insulin lispro (ADMELOG) corrective regimen sliding scale   SubCutaneous three times a day before meals  pantoprazole    Tablet 40 milliGRAM(s) Oral before breakfast  polyethylene glycol 3350 17 Gram(s) Oral daily  senna 1 Tablet(s) Oral daily  tamsulosin 0.4 milliGRAM(s) Oral at bedtime    PRN MEDICATIONS  bisacodyl 5 milliGRAM(s) Oral every 12 hours PRN  dextrose Oral Gel 15 Gram(s) Oral once PRN    VITALS:   T(F): 97  HR: 88  BP: 97/53  RR: 18  SpO2: 98%    PHYSICAL EXAM:  GENERAL:   ( x ) NAD, lying in bed comfortably     (  ) obtunded     (  ) lethargic     (  ) somnolent    HEAD:   ( x ) Atraumatic     (  ) hematoma     (  ) laceration (specify location:       )     NECK:  (  ) Supple     (  ) neck stiffness     (  ) nuchal rigidity     (  )  no JVD     (  ) JVD present ( -- cm)    HEART:  Rate -->     (x  ) normal rate     (  ) bradycardic     (  ) tachycardic  Rhythm -->     ( x ) regular     (  ) regularly irregular     (  ) irregularly irregular  Murmurs -->     (  x) normal s1s2     (  ) systolic murmur     (  ) diastolic murmur     (  ) continuous murmur      (  ) S3 present     (  ) S4 present    LUNGS:   ( x )Unlabored respirations     (  ) tachypnea  ( x ) B/L air entry     (  ) decreased breath sounds in:  (location    )    (  ) no adventitious sound     ( x ) crackles     (  ) wheezing      (  ) rhonchi      (specify location:       )  (  ) chest wall tenderness (specify location:       )    ABDOMEN:   ( x ) Soft     (  ) tense   |   ( x ) nondistended     (  ) distended   |   (  ) +BS     (  ) hypoactive bowel sounds     (  ) hyperactive bowel sounds  (x  ) nontender     (  ) RUQ tenderness     (  ) RLQ tenderness     (  ) LLQ tenderness     (  ) epigastric tenderness     (  ) diffuse tenderness  (  ) Splenomegaly      (  ) Hepatomegaly      (  ) Jaundice     (  ) ecchymosis     EXTREMITIES: 2+ peripheral pulses bilaterally. No clubbing, cyanosis, or edema  ( x ) Normal     (  ) Rash     (  ) ecchymosis     (  ) varicose veins      (  ) pitting edema     (  ) non-pitting edema   (  ) ulceration     (  ) gangrene:     (location:     )    NERVOUS SYSTEM:    ( x ) A&Ox3     (  ) confused     (  ) lethargic  CN II-XII:     (  ) Intact     (  ) deficits found     (Specify:     )   Upper extremities:     (  ) no sensorimotor deficits     (  ) weakness     (  ) loss of proprioception/vibration     (  ) loss of touch/temperature (specify:    )  Lower extremities:     (  ) no sensorimotor deficits     (  ) weakness     (  ) loss of proprioception/vibration     (  ) loss of touch/temperature (specify:    )    SKIN:   (  ) No rashes or lesions     (  ) maculopapular rash     (  ) pustules     (  ) vesicles     (  ) ulcer     (  ) ecchymosis     (specify location:     )        LABS:                        11.0   10.68 )-----------( 309      ( 2023 09:55 )             35.2     07-15    137  |  101  |  22<H>  ----------------------------<  134<H>  3.7   |  21  |  <0.5<L>    Ca    8.3<L>      15 Jul 2023 09:12  Mg     1.9     07-15    TPro  4.8<L>  /  Alb  2.6<L>  /  TBili  0.3  /  DBili  x   /  AST  13  /  ALT  6   /  AlkPhos  101  07-15      Urinalysis Basic - ( 15 Jul 2023 09:12 )    Color: x / Appearance: x / SG: x / pH: x  Gluc: 134 mg/dL / Ketone: x  / Bili: x / Urobili: x   Blood: x / Protein: x / Nitrite: x   Leuk Esterase: x / RBC: x / WBC x   Sq Epi: x / Non Sq Epi: x / Bacteria: x                RADIOLOGY:    CTA PE 7/15 - No pulmonary embolism.    Status post left chesttube placement with decrease in left pleural   effusion, now moderate in size. Increase in right large pleural effusion.    Mild increase in right upper lobe consolidative opacity. Increased   nodularity along the oblique fissure. Findings may represent   inflammatory/infectious response versus malignant progression.    New left hilar soft tissue thickening.

## 2023-07-16 NOTE — PROGRESS NOTE ADULT - ASSESSMENT
82-year-old female with past medical history of hypertension, diabetes, hyperlipidemia, pancreatic adenocarcinoma status post transverse loop colostomy, right hydronephrosis s/p ureteral stent presenting for a UTI VRE of 1 month duration failed OP treatment and as per the chart was supposed to have a PICC line but procedure delayed presents for IV antibiotics treatment. Last urine culture in 05/25 showed VRE sensitive only to zyvox. Patient is a poor historian reported that suprapubic abdominal discomfort started 1 month ago and the patient was unsuccessfully treated with different antibiotics. Today, pt is satting 98 off O2. 900 CC of fluid have been drained since friday 7/14 (7/14 15:00 300 cc; 7/14 19:00 300cc; 7/15 7:00 300 cc)    #bacteremia?  blood cx from 7/5 grew Gram positive cocci in clusters, started Vanco 1 g Q 24  - d/w ID, likely contaminant, f/u sensitivities, DC vanco, f/u repeat blood Cx  - IV fluids 75 cc/ hr  - one IV on R arm that appears uninfected  - grew kocuria, discussed with ID, likely contaminant no need to treat      # VRE UTI failed OP therapy  # possible CAUTI    - UA grossly positive for RBC and WBC   - CTA chest Abdomen pelvis showed::  Decreased mild right hydronephrosis, post double-J ureteral stent placement, with persistent increased enhancement of ureter, suspicious for urinary tract infection.  Previous CTAP showed: Right double-J ureteral stent with stable moderate hydroureter.  - 05/25   Urine Cx showed VRE only sensitive to zyvox   - FU Blood and urine culture   -UA positive  -Per ID, seems colonized w VRE but asymptomatic, monitor off abx  - passed TOV    # Pancreatic mass with mets   # Mets possibly to the lungs with large LL pleural effusion   - CTA chest Abdomen pelvis showed:  * Unchanged ill-defined pancreatic mass.  *  Increased moderate-to-large left pleural effusion with overlying compressive atelectasis. New small right pleural effusion. Unchanged right lung pulmonary nodules, previously characterized on 2/16/2023 PET/CT.  - Previous CTAP showed   * Soft tissue mass adjacent to the sigmoid colon measuring approximately 3.1 x 2.5 cm.  * Right double-J ureteral stent with stable moderate hydroureter.  * Grossly stable ill-defined mass at the pancreatic head, consistent with known pancreatic neoplasm  - L pleural effusion tapped by pulm, f/u cytology to r/o malignancy - exudative in nature by protein ratio and Light's criteria  - Oncology will set up outpt txt for chemo after bacteremia is no longer a concern  some hypotension overnight, given 500 cc fluids  - 7/14 pulm placed a pleurx catheter  - CTA chest PE 7/15 - no PE, findings as above  - Per pulm, drain no more than1 L in 48 hrs, call MD when drainage persistently less than 200 cc  - drainage as above 900 cc since 7/14 15:00  -satting 98% on RA      # Anemia :   s/p IV venofer    # diabetes:   - ISS   A1C 6.6      # DVT proph: heparin   # Gi proph: protonix   # Dispo: inpatient floor   # Diet: DASH, TLC carb consist

## 2023-07-17 NOTE — PROGRESS NOTE ADULT - ATTENDING COMMENTS
Patient was seen earlier today for metastatic pancreatic cancer status post multiple complications.  She is bedbound now,  cachectic on examination ECOG is a 4.  I had a discussion with the patient explained that I am not sure she would benefit from chemotherapy at this time given her deconditioning and frail state I suggested outpatient follow-up with Dr. Georges and depending on her clinical status in my opinion hospice is appropriate at this time.   If she starr significant improvement done they can consider single agent chemotherapy.

## 2023-07-17 NOTE — ADVANCED PRACTICE NURSE CONSULT - ASSESSMENT
History of Present Illness:   82-year-old female with past medical history of hypertension, diabetes, hyperlipidemia, pancreatic adenocarcinoma status post transverse loop colostomy, right hydronephrosis s/p ureteral stent presenting for a UTI VRE of 1 month duration failed OP treatment and as per the chart was supposed to have a PICC line but procedure delayed presents for IV antibiotics treatment. Last urine culture in 05/25 showed VRE sensitive only to zyvox. Patient is a poor historian reported that suprapubic abdominal discomfort started 1 month ago and the patient was unsuccessfully treated with different antibiotics. Patient still complains of suprapubic discomfort and burning, and lower back pain, Patient reports mild epigastric discomfort today . Patient denies any SOB, CP, NV, diarrhea, constipation fever, respiratory symptoms.     Allergies and Intolerances:        Allergies:  	No Known Allergies:     Home Medications:   * Patient Currently Takes Medications as of 25-May-2023 11:32 documented in Structured Notes  · 	senna leaf extract oral tablet: 2 tab(s) orally once a day (at bedtime)  · 	bisacodyl 5 mg oral delayed release tablet: 1 tab(s) orally every 12 hours  · 	lactulose 10 g/15 mL oral syrup: 15 milliliter(s) orally 3 times a day as needed for  constipation  · 	tamsulosin 0.4 mg oral capsule: 1 cap(s) orally once a day (at bedtime)  · 	pantoprazole 40 mg oral delayed release tablet: 1 tab(s) orally once a day (before a meal)  · 	rosuvastatin 5 mg oral tablet: 1 tab(s) orally once a day  · 	polyethylene glycol 3350 oral powder for reconstitution: 17 gram(s) orally 2 times a day  · 	Ferretts Iron 325 mg (106 mg elemental iron) oral tablet: 1 tab(s) orally 2 times a day    Patient received lying in bed. Alert and awake. Limited mobility. Incontinent of urine. Colostomy to left lower quadrant, pouching system intact.     Type of Wound: Stage 2 Pressure Injury  Location: Right buttock  Measurements: ~0.5cmx0.5cm  Tunneling /Undermining: No  Wound bed: Pink partial thickness skin loss  Wound edges: Intact  Periwound: Intact  Wound exudate: None  Wound odor: No  Induration, erythema, warmth: No  Wound pain: No    Skin to bilateral heels intact at time of assessment.

## 2023-07-17 NOTE — ADVANCED PRACTICE NURSE CONSULT - RECOMMEDATIONS
Cleanse wound with soap and water.   Pat dry apply triad and Allevyn twice a day and prn for soiling.   Maintain pressure injury prevention.   Keep skin clean.   Maintain incontinence care.   Monitor wound for changes and notify provider   Case discussed with primary RN

## 2023-07-17 NOTE — PROGRESS NOTE ADULT - SUBJECTIVE AND OBJECTIVE BOX
DELORIS NORRIS 82y Female  MRN#: [id number]   Hospital Day: 12d    SUBJECTIVE  Patient is a 82y old Female who presents with a chief complaint of VRE UTI (2023 10:48)  Currently admitted to medicine with the primary diagnosis of VRE infection (vancomycin resistant Enterococcus)      INTERVAL HPI AND OVERNIGHT EVENTS:  Patient was examined and seen at bedside. This morning she is resting comfortably in bed and reports no issues or overnight events.    REVIEW OF SYMPTOMS:  CONSTITUTIONAL: No weakness, fevers or chills; No headaches  EYES: No visual changes, eye pain, or discharge  ENT: No vertigo; No ear pain or change in hearing; No sore throat or difficulty swallowing  NECK: No pain or stiffness  RESPIRATORY: No cough, wheezing, or hemoptysis; No shortness of breath  CARDIOVASCULAR: No chest pain or palpitations  GASTROINTESTINAL: No abdominal or epigastric pain; No nausea, vomiting, or hematemesis; No diarrhea or constipation; No melena or hematochezia  GENITOURINARY: No dysuria, frequency or hematuria  MUSCULOSKELETAL: No joint pain, no muscle pain, no weakness  NEUROLOGICAL: No numbness or weakness  SKIN: No itching or rashes    OBJECTIVE  PAST MEDICAL & SURGICAL HISTORY  HTN (hypertension)    Diabetes    Hypercholesteremia    Pancreatic cancer    Hydronephrosis    Colostomy status    History of     History of left hip replacement    H/O carpal tunnel repair      ALLERGIES:  No Known Allergies    MEDICATIONS:  STANDING MEDICATIONS  atorvastatin 20 milliGRAM(s) Oral at bedtime  dextrose 5%. 1000 milliLiter(s) IV Continuous <Continuous>  dextrose 5%. 1000 milliLiter(s) IV Continuous <Continuous>  dextrose 50% Injectable 25 Gram(s) IV Push once  dextrose 50% Injectable 12.5 Gram(s) IV Push once  dextrose 50% Injectable 25 Gram(s) IV Push once  ferrous    sulfate 325 milliGRAM(s) Oral two times a day  glucagon  Injectable 1 milliGRAM(s) IntraMuscular once  heparin   Injectable 5000 Unit(s) SubCutaneous every 12 hours  insulin lispro (ADMELOG) corrective regimen sliding scale   SubCutaneous three times a day before meals  pantoprazole    Tablet 40 milliGRAM(s) Oral before breakfast  polyethylene glycol 3350 17 Gram(s) Oral daily  senna 1 Tablet(s) Oral daily  tamsulosin 0.4 milliGRAM(s) Oral at bedtime    PRN MEDICATIONS  bisacodyl 5 milliGRAM(s) Oral every 12 hours PRN  dextrose Oral Gel 15 Gram(s) Oral once PRN      VITAL SIGNS: Last 24 Hours  T(C): 36.3 (2023 13:42), Max: 36.3 (2023 13:42)  T(F): 97.4 (2023 13:42), Max: 97.4 (2023 13:42)  HR: 86 (2023 13:42) (69 - 86)  BP: 106/58 (2023 13:42) (96/54 - 106/58)  BP(mean): --  RR: 18 (2023 13:42) (18 - 18)  SpO2: 97% (2023 08:17) (96% - 97%)    LABS:                        10.2   9.63  )-----------( 309      ( 2023 10:12 )             32.1     07-17    139  |  103  |  19  ----------------------------<  168<H>  3.7   |  24  |  <0.5<L>    Ca    8.3<L>      2023 10:12  Mg     1.7     07-17    TPro  4.7<L>  /  Alb  2.6<L>  /  TBili  0.4  /  DBili  x   /  AST  15  /  ALT  6   /  AlkPhos  108  07-17      Urinalysis Basic - ( 2023 10:12 )    Color: x / Appearance: x / SG: x / pH: x  Gluc: 168 mg/dL / Ketone: x  / Bili: x / Urobili: x   Blood: x / Protein: x / Nitrite: x   Leuk Esterase: x / RBC: x / WBC x   Sq Epi: x / Non Sq Epi: x / Bacteria: x    PHYSICAL EXAM:  CONSTITUTIONAL: No acute distress, frail appearing, AAOx3  HEAD: Atraumatic, normocephalic  EYES: EOM intact, PERRLA, conjunctiva and sclera clear  PULMONARY: Clear to auscultation bilaterally  CARDIOVASCULAR: Regular rate and rhythm  GASTROINTESTINAL: Ostomy, soft nontender, nondistended  MUSCULOSKELETAL: no edema

## 2023-07-17 NOTE — PROGRESS NOTE ADULT - ASSESSMENT
82-year-old patient, known to have hypertension, diabetes, hyperlipidemia,  metastatic pancreatic adenocarcinoma status post transverse loop colostomy and right hydronephrosis s/p ureteral stent, presented for IV Abx for a UTI VRE of 1 month duration. She failed OP treatment and as per the chart was supposed to have a PICC line but procedure delayed.     We are consulted for history of metastatic pancreatic adenocarcinoma.     #Pancreatic cancer with suspected lung metastasis, Pelvic mass likely metastatic S/P colostomy and ureteral stents for obstructing pericolonic mass  - EUS with biopsy showed adenocarcinoma , CA19.9 : 12,795 CEA: 24  - PET scan 2/2023: pancreatic mass with SUV : 7.6 , right pericolonic soft tissue density with SUV : 15 , left basilar lung nodular density 2.7 cm , SUV : 3.o and 7 mm left basilar pleural based nodule.  - CT chest/a/P: Since 6/17/2023: Decreased mild right hydronephrosis, post double-J ureteral stent placement, with persistent increased enhancement of ureter, suspicious for urinary tract infection. Unchanged ill-defined pancreatic mass. Increased moderate-to-large left pleural effusion with overlying compressive atelectasis. New small right pleural effusion. Unchanged right lung pulmonary nodules, previously characterized on 2/16/2023 PET/CT. Unchanged previously described soft tissue mass adjacent to the sigmoid colon.  - s/p Thoracentesis; follow up results.  - Will discuss with Dr. Mead (her primary oncologist) and Dr. De Santiago (her surgeon) to start chemotherapy tomorrow.      Recommendations:   - Discussed with patient at bedside side effects of single agent regimen, Gemcitabine. Given the patients functional status (ECOG 4), would not recommend starting chemotherapy as an inpatient. It is likely that patient would not be able to tolerate systemic therapy at all given her level of deconditioning   - outpatient follow up with Dr. Mead for consideration of systemic chemotherapy

## 2023-07-17 NOTE — PROGRESS NOTE ADULT - ASSESSMENT
82-year-old female with past medical history of hypertension, diabetes, hyperlipidemia, pancreatic adenocarcinoma status post transverse loop colostomy, right hydronephrosis s/p ureteral stent presenting for a UTI VRE of 1 month duration failed OP treatment now admitted for management of asymptomatic UTI VRE c/b positive Bcx that was determined to be contaminated and now pending discharge.    #bacteremia?  blood cx from 7/5 grew Gram positive cocci in clusters, started Vanco 1 g Q 24  - d/w ID, likely contaminant, f/u sensitivities, DC vanco, f/u repeat blood Cx  - IV fluids 75 cc/ hr  - one IV on R arm that appears uninfected  - grew kocuria, discussed with ID, likely contaminant no need to treat      # VRE UTI failed OP therapy  # possible CAUTI    - UA grossly positive for RBC and WBC   - CTA chest Abdomen pelvis showed::  Decreased mild right hydronephrosis, post double-J ureteral stent placement, with persistent increased enhancement of ureter, suspicious for urinary tract infection.  Previous CTAP showed: Right double-J ureteral stent with stable moderate hydroureter.  - 05/25   Urine Cx showed VRE only sensitive to zyvox   - FU Blood and urine culture   -UA positive  -Per ID, seems colonized w VRE but asymptomatic, monitor off abx  - passed TOV    # Pancreatic mass with mets   # Mets possibly to the lungs with large LL pleural effusion   - CTA chest Abdomen pelvis showed:  * Unchanged ill-defined pancreatic mass.  *  Increased moderate-to-large left pleural effusion with overlying compressive atelectasis. New small right pleural effusion. Unchanged right lung pulmonary nodules, previously characterized on 2/16/2023 PET/CT.  - Previous CTAP showed   * Soft tissue mass adjacent to the sigmoid colon measuring approximately 3.1 x 2.5 cm.  * Right double-J ureteral stent with stable moderate hydroureter.  * Grossly stable ill-defined mass at the pancreatic head, consistent with known pancreatic neoplasm  - L pleural effusion tapped by pulm, f/u cytology to r/o malignancy - exudative in nature by protein ratio and Light's criteria  - Oncology will set up outpt txt for chemo after bacteremia is no longer a concern  some hypotension overnight, given 500 cc fluids  - 7/14 pulm placed a pleurx catheter  - CTA chest PE 7/15 - no PE, findings as above  - Per pulm, drain no more than1 L in 48 hrs, call MD when drainage persistently less than 200 cc  - drainage as above 900 cc since 7/14 15:00  - satting 98% on RA  - Discussed with pulm today (7/17)      # Anemia :   - s/p IV venofer  - c/w home iron sulfate     # diabetes:   A1C 6.6  - on ISS and FS    #MISC  - DVT PPX: heparin   - GI PPX: protonix   - Dispo: D/c home, patient's family informed us today that everyone in the family is now COVID positive  - Diet: DASH, TLC carb consist   82-year-old female with past medical history of hypertension, diabetes, hyperlipidemia, pancreatic adenocarcinoma status post transverse loop colostomy, right hydronephrosis s/p ureteral stent presenting for a UTI VRE of 1 month duration failed OP treatment now admitted for management of asymptomatic UTI VRE c/b positive Bcx that was determined to be contaminated and now pending discharge.    # Pancreatic mass with mets   # Mets possibly to the lungs with large LL pleural effusion   - CTA chest Abdomen pelvis showed:  * Unchanged ill-defined pancreatic mass.  *  Increased moderate-to-large left pleural effusion with overlying compressive atelectasis. New small right pleural effusion. Unchanged right lung pulmonary nodules, previously characterized on 2/16/2023 PET/CT.  - Previous CTAP showed   * Soft tissue mass adjacent to the sigmoid colon measuring approximately 3.1 x 2.5 cm.  * Right double-J ureteral stent with stable moderate hydroureter.  * Grossly stable ill-defined mass at the pancreatic head, consistent with known pancreatic neoplasm  - L pleural effusion tapped by pulm, f/u cytology to r/o malignancy - exudative in nature by protein ratio and Light's criteria  - Oncology will set up outpt txt for chemo after bacteremia is no longer a concern  some hypotension overnight, given 500 cc fluids  - 7/14 pulm placed a pleurx catheter  - CTA chest PE 7/15 - no PE, findings as above  - Per pulm, drain no more than1 L in 48 hrs, call MD when drainage persistently less than 200 cc  - drainage as above 900 cc since 7/14 15:00  - satting 98% on RA  - Discussed with pulm today (7/17), no R lung thora planned prior to discharge, drain L pleurx catheter  - Discussed with hem/onc today (7/17), no plan for inpatient chemo, will plan for outpatient chemo next week w/Dr. Mead  - Appreciate pulm and hem/onc recs    #bacteremia?  blood cx from 7/5 grew Gram positive cocci in clusters, started Vanco 1 g Q 24  - d/w ID, likely contaminant, f/u sensitivities, DC vanco, f/u repeat blood Cx  - IV fluids 75 cc/ hr  - one IV on R arm that appears uninfected  - grew kocuria, discussed with ID, likely contaminant no need to treat    # VRE UTI failed OP therapy  # possible CAUTI    - UA grossly positive for RBC and WBC   - CTA chest Abdomen pelvis showed::  Decreased mild right hydronephrosis, post double-J ureteral stent placement, with persistent increased enhancement of ureter, suspicious for urinary tract infection.  Previous CTAP showed: Right double-J ureteral stent with stable moderate hydroureter.  - 05/25   Urine Cx showed VRE only sensitive to zyvox   - FU Blood and urine culture   -UA positive  -Per ID, seems colonized w VRE but asymptomatic, monitor off abx  - passed TOV    # Anemia :   - s/p IV venofer  - c/w home iron sulfate     # diabetes:   A1C 6.6  - on ISS and FS    #MISC  - DVT PPX: heparin   - GI PPX: protonix  - Bowel reg: Senna, miralax, and dulcolax  - Diet: DASH, TLC carb consist  - Activity: Increase as tolerated  - Dispo: Pending discharge home, patient's family informed us today that everyone in the family is now COVID positive

## 2023-07-17 NOTE — PROGRESS NOTE ADULT - SUBJECTIVE AND OBJECTIVE BOX
----------Daily Progress Note----------    HISTORY OF PRESENT ILLNESS:  82-year-old female with past medical history of hypertension, diabetes, hyperlipidemia, pancreatic adenocarcinoma status post transverse loop colostomy, right hydronephrosis s/p ureteral stent presenting for a UTI VRE of 1 month duration failed OP treatment now admitted for management of UTI VRE c/b positive Bcx that was determined to be contaminated and now pending discharge.    Currently admitted to medicine with the primary diagnosis of VRE infection (vancomycin resistant Enterococcus)       Today is hospital day 12d.     ED course:  82-year-old female with past medical history of hypertension, diabetes, hyperlipidemia, pancreatic adenocarcinoma status post transverse loop colostomy, right hydronephrosis s/p ureteral stent presenting for a UTI VRE of 1 month duration failed OP treatment and as per the chart was supposed to have a PICC line but procedure delayed presents for IV antibiotics treatment. Last urine culture in  showed VRE sensitive only to zyvox. Patient is a poor historian reported that suprapubic abdominal discomfort started 1 month ago and the patient was unsuccessfully treated with different antibiotics. Patient still complains of suprapubic discomfort and burning, and lower back pain, Patient reports mild epigastric discomfort today . Patient denies any SOB, CP, NV, diarrhea, constipation fever, respiratory symptoms.     Patient was done through the OP MedRec, Patient is not aware of the medications she take , please confirm MedRec in AM with [facility], [phone].      Labs noticeable for Hb 10.2 at baseline,   UA grossly positive for RBC and WBC   CTA chest Abdomen pelvis showed:    * Decreased mild right hydronephrosis, post double-J ureteral stent placement, with persistent increased enhancement of ureter, suspicious for urinary tract infection.  * Unchanged ill-defined pancreatic mass.  *  Increased moderate-to-large left pleural effusion with overlying compressive atelectasis. New small right pleural effusion. Unchanged right lung pulmonary nodules, previously characterized on 2023 PET/CT.    Previous CTAP showed   * Soft tissue mass adjacent to the sigmoid colon measuring approximately 3.1 x 2.5 cm.  * Right double-J ureteral stent with stable moderate hydroureter.  * Grossly stable ill-defined mass at the pancreatic head, consistent with known pancreatic neoplasm.    VS insignificant     Vital Signs Last 24 Hrs  T(C): 36.2 (2023 23:43), Max: 36.6 (2023 17:19)  T(F): 97.1 (2023 23:43), Max: 97.9 (2023 17:19)  HR: 78 (2023 23:43) (78 - 103)  BP: 118/74 (2023 23:43) (101/72 - 118/74)  RR: 18 (2023 23:43) (18 - 18)  SpO2: 100% (2023 23:43) (98% - 100%)  Patient On (Oxygen Delivery Method): room air      INTERVAL HOSPITAL COURSE / OVERNIGHT EVENTS:    Patient was examined and seen at bedside. This morning she is resting comfortably in bed and reports no new issues or overnight events.     Review of Systems: Otherwise unremarkable     <<<<<PAST MEDICAL & SURGICAL HISTORY>>>>>  HTN (hypertension)    Diabetes    Hypercholesteremia    Pancreatic cancer    Hydronephrosis    Colostomy status    History of     History of left hip replacement    H/O carpal tunnel repair      ALLERGIES  No Known Allergies      Home Medications:  Ferretts Iron 325 mg (106 mg elemental iron) oral tablet: 1 tab(s) orally 2 times a day (25 May 2023 11:32)        MEDICATIONS  STANDING MEDICATIONS  atorvastatin 20 milliGRAM(s) Oral at bedtime  dextrose 5%. 1000 milliLiter(s) IV Continuous <Continuous>  dextrose 5%. 1000 milliLiter(s) IV Continuous <Continuous>  dextrose 50% Injectable 25 Gram(s) IV Push once  dextrose 50% Injectable 12.5 Gram(s) IV Push once  dextrose 50% Injectable 25 Gram(s) IV Push once  ferrous    sulfate 325 milliGRAM(s) Oral two times a day  glucagon  Injectable 1 milliGRAM(s) IntraMuscular once  heparin   Injectable 5000 Unit(s) SubCutaneous every 12 hours  insulin lispro (ADMELOG) corrective regimen sliding scale   SubCutaneous three times a day before meals  pantoprazole    Tablet 40 milliGRAM(s) Oral before breakfast  polyethylene glycol 3350 17 Gram(s) Oral daily  senna 1 Tablet(s) Oral daily  tamsulosin 0.4 milliGRAM(s) Oral at bedtime    PRN MEDICATIONS  bisacodyl 5 milliGRAM(s) Oral every 12 hours PRN  dextrose Oral Gel 15 Gram(s) Oral once PRN    VITALS:  T(F): 97.4  HR: 86  BP: 106/58  RR: 18  SpO2: 97%    <<<<<PHYSICAL EXAM>>>>>  GENERAL: Well developed, well nourished and in no acute distress. Resting comfortably in bed.  HEENT: Normocephalic, atraumatic  PULMONARY: Clear to auscultation bilaterally. No rales, rhonchi, or wheezing.  CARDIOVASCULAR: RRR, no M/R/G, S1-S2  GASTROINTESTINAL: Soft, non-tender, non-distended, no guarding.  SKIN/EXTREMITIES: Warm, 2+ dorsalis pedis pulses edema, no UE or LE edema  NEUROLOGIC: AxO3    <<<<<LABS>>>>>                        10.2   9.63  )-----------( 309      ( 2023 10:12 )             32.1         139  |  103  |  19  ----------------------------<  168<H>  3.7   |  24  |  <0.5<L>    Ca    8.3<L>      2023 10:12  Mg     1.7         TPro  4.7<L>  /  Alb  2.6<L>  /  TBili  0.4  /  DBili  x   /  AST  15  /  ALT  6   /  AlkPhos  108        Urinalysis Basic - ( 2023 10:12 )    Color: x / Appearance: x / SG: x / pH: x  Gluc: 168 mg/dL / Ketone: x  / Bili: x / Urobili: x   Blood: x / Protein: x / Nitrite: x   Leuk Esterase: x / RBC: x / WBC x   Sq Epi: x / Non Sq Epi: x / Bacteria: x          [id number]        <<<<<RADIOLOGY>>>>>          ----------------------------------------------------------------------------------------------------------------------------------------------------------------------------------------------- ----------Daily Progress Note----------    HISTORY OF PRESENT ILLNESS:  82-year-old female with past medical history of hypertension, diabetes, hyperlipidemia, pancreatic adenocarcinoma status post transverse loop colostomy, right hydronephrosis s/p ureteral stent presenting for a UTI VRE of 1 month duration failed OP treatment now admitted for management of UTI VRE c/b positive Bcx that was determined to be contaminated and now pending discharge.       Today is hospital day 12d.     ED course:  82-year-old female with past medical history of hypertension, diabetes, hyperlipidemia, pancreatic adenocarcinoma status post transverse loop colostomy, right hydronephrosis s/p ureteral stent presenting for a UTI VRE of 1 month duration failed OP treatment and as per the chart was supposed to have a PICC line but procedure delayed presents for IV antibiotics treatment. Last urine culture in  showed VRE sensitive only to zyvox. Patient is a poor historian reported that suprapubic abdominal discomfort started 1 month ago and the patient was unsuccessfully treated with different antibiotics. Patient still complains of suprapubic discomfort and burning, and lower back pain, Patient reports mild epigastric discomfort today . Patient denies any SOB, CP, NV, diarrhea, constipation fever, respiratory symptoms.     Patient was done through the OP MedRec, Patient is not aware of the medications she take , please confirm MedRec in AM with [facility], [phone].      Labs noticeable for Hb 10.2 at baseline,   UA grossly positive for RBC and WBC   CTA chest Abdomen pelvis showed:    * Decreased mild right hydronephrosis, post double-J ureteral stent placement, with persistent increased enhancement of ureter, suspicious for urinary tract infection.  * Unchanged ill-defined pancreatic mass.  *  Increased moderate-to-large left pleural effusion with overlying compressive atelectasis. New small right pleural effusion. Unchanged right lung pulmonary nodules, previously characterized on 2023 PET/CT.    Previous CTAP showed   * Soft tissue mass adjacent to the sigmoid colon measuring approximately 3.1 x 2.5 cm.  * Right double-J ureteral stent with stable moderate hydroureter.  * Grossly stable ill-defined mass at the pancreatic head, consistent with known pancreatic neoplasm.    VS insignificant     Vital Signs Last 24 Hrs  T(C): 36.2 (2023 23:43), Max: 36.6 (2023 17:19)  T(F): 97.1 (2023 23:43), Max: 97.9 (2023 17:19)  HR: 78 (2023 23:43) (78 - 103)  BP: 118/74 (2023 23:43) (101/72 - 118/74)  RR: 18 (2023 23:43) (18 - 18)  SpO2: 100% (2023 23:43) (98% - 100%)  Patient On (Oxygen Delivery Method): room air      INTERVAL HOSPITAL COURSE / OVERNIGHT EVENTS:  O/N events:  None    24 hr events:  None    Patient was examined and seen at bedside. This morning she is resting comfortably in bed and reports no new issues or overnight events.     Review of Systems: Otherwise unremarkable     <<<<<PAST MEDICAL & SURGICAL HISTORY>>>>>  HTN (hypertension)    Diabetes    Hypercholesteremia    Pancreatic cancer    Hydronephrosis    Colostomy status    History of     History of left hip replacement    H/O carpal tunnel repair      ALLERGIES  No Known Allergies      Home Medications:  Ferretts Iron 325 mg (106 mg elemental iron) oral tablet: 1 tab(s) orally 2 times a day (25 May 2023 11:32)        MEDICATIONS  STANDING MEDICATIONS  atorvastatin 20 milliGRAM(s) Oral at bedtime  dextrose 5%. 1000 milliLiter(s) IV Continuous <Continuous>  dextrose 5%. 1000 milliLiter(s) IV Continuous <Continuous>  dextrose 50% Injectable 25 Gram(s) IV Push once  dextrose 50% Injectable 12.5 Gram(s) IV Push once  dextrose 50% Injectable 25 Gram(s) IV Push once  ferrous    sulfate 325 milliGRAM(s) Oral two times a day  glucagon  Injectable 1 milliGRAM(s) IntraMuscular once  heparin   Injectable 5000 Unit(s) SubCutaneous every 12 hours  insulin lispro (ADMELOG) corrective regimen sliding scale   SubCutaneous three times a day before meals  pantoprazole    Tablet 40 milliGRAM(s) Oral before breakfast  polyethylene glycol 3350 17 Gram(s) Oral daily  senna 1 Tablet(s) Oral daily  tamsulosin 0.4 milliGRAM(s) Oral at bedtime    PRN MEDICATIONS  bisacodyl 5 milliGRAM(s) Oral every 12 hours PRN  dextrose Oral Gel 15 Gram(s) Oral once PRN    VITALS:  T(F): 97.4  HR: 86  BP: 106/58  RR: 18  SpO2: 97%    <<<<<PHYSICAL EXAM>>>>>  GENERAL: Well developed, well nourished and in no acute distress. Resting comfortably in bed.  HEENT: Normocephalic, atraumatic  PULMONARY: Clear to auscultation bilaterally. No rales, rhonchi, or wheezing.  CARDIOVASCULAR: RRR, no M/R/G, S1-S2  GASTROINTESTINAL: Soft, non-tender, non-distended, no guarding.  SKIN/EXTREMITIES: Warm, 2+ dorsalis pedis pulses edema, no UE or LE edema  NEUROLOGIC: AxO3    <<<<<LABS>>>>>                        10.2   9.63  )-----------( 309      ( 2023 10:12 )             32.1         139  |  103  |  19  ----------------------------<  168<H>  3.7   |  24  |  <0.5<L>    Ca    8.3<L>      2023 10:12  Mg     1.7         TPro  4.7<L>  /  Alb  2.6<L>  /  TBili  0.4  /  DBili  x   /  AST  15  /  ALT  6   /  AlkPhos  108        Urinalysis Basic - ( 2023 10:12 )    Color: x / Appearance: x / SG: x / pH: x  Gluc: 168 mg/dL / Ketone: x  / Bili: x / Urobili: x   Blood: x / Protein: x / Nitrite: x   Leuk Esterase: x / RBC: x / WBC x   Sq Epi: x / Non Sq Epi: x / Bacteria: x        <<<<<RADIOLOGY>>>>>  ACC: [id number] EXAM:  XR CHEST PORTABLE URGENT 1V   ORDERED BY: PERI JASSO     PROCEDURE DATE:  2023      INTERPRETATION:  Clinical History / Reason for exam: pleural effusion    Comparison : Chest radiograph: 2023    Technique/Positioning: Frontal view of the chest    Findings:    Support devices: Pleural catheter unchanged in position.    Cardiac/mediastinum/hilum: No significant change    Skeleton/soft tissues: No significant change.    Lung parenchyma/Pleura: Stable bilateral opacities and effusions.    Impression:  Stable bilateral opacities and effusions.    --- End of Report ---    RAMYA CHO MD; Attending Radiologist  This document has been electronically signed. 2023  1:48PM            ACC: [id number] EXAM:  CT ANGIO CHEST PULM ART WAWIC   ORDERED BY: GARCÍA   YU     PROCEDURE DATE:  07/15/2023      INTERPRETATION:  CLINICAL INDICATION: Hypoxia. History of pancreatic and   pericolonic mass.    TECHNIQUE:  CTA of the thorax was performed after administration of contrast per the   PE protocol. Sagittal and coronal reformats were performed as well as 3D   reconstructions.  Intravenous contrast: 65 cc Omnipaque 350    COMPARISON: CTA chest 2023.    INTERPRETATION:    PULMONARY ARTERIES: No pulmonary emboli.    AIRWAYS/LUNGS/PLEURA: No debris within the central airway. Increase in   large right pleural effusion. Status post left chest tube terminating   within the left lung with decrease in moderate left pleural effusion,   previously large. There is adjacent bilateral passive atelectasis.   Increased nodularity along the oblique fissure abutting the left lower   lobe (series 3 image 50). Increased in size of right upper lobe   consolidative opacity measuring 1.9 x0.9 cm, previously 1.7 x 0.8 cm   (series 4 image 91). Stable right upper lobe ill-defined focus of   groundglass opacity (series 4 image 63).    MEDIASTINUM: Left hilar soft tissue thickening measuring 1 x 0.9 cm, new   (series 4 image 82) stable 0.7 cm left thyroid gland nodule.    HEART AND VESSELS: Heart size is within normal limits. Small pericardial   effusion. Ascending thoracic aorta and main pulmonary artery are normal   in caliber. Coronary artery, aortic valve, aortic arch, and descending   aorta calcifications.    UPPER ABDOMEN: Partially visualized upper abdomen ascites.    BONES AND SOFT TISSUES: Degenerative changes.    TUBES AND LINES: Left chest tube as above.    IMPRESSION:    No pulmonary embolism.    Status post left chesttube placement with decrease in left pleural   effusion, now moderate in size. Increase in right large pleural effusion.    Mild increase in right upper lobe consolidative opacity. Increased   nodularity along the oblique fissure. Findings may represent   inflammatory/infectious response versus malignant progression.    New left hilar soft tissue thickening.    --- End of Report ---    JAYDEN ESPINOZA MD; Resident Radiologist  This document has been electronically signed.  ROBERT SUE MD;Attending Radiologist  This document has been electronically signed. Jul 15 2023  7:45AM    -----------------------------------------------------------------------------------------------------------------------------------------------------------------------------------------------

## 2023-07-17 NOTE — PROGRESS NOTE ADULT - ATTENDING COMMENTS
82-year-old female with past medical history of hypertension, diabetes, hyperlipidemia, pancreatic adenocarcinoma status post transverse loop colostomy, right hydronephrosis s/p ureteral stent presenting for a UTI VRE of 1 month duration failed OP treatment and as per the chart was supposed to have a PICC line but procedure delayed presents for IV antibiotics treatment. Last urine culture in 05/25 showed VRE sensitive only to Zyvox. Patient is a poor historian reported that suprapubic abdominal discomfort started 1 month ago and the patient was unsuccessfully treated with different antibiotics.       # Acute hypoxemic respiratory failure 2/2 Pleural effusion and pulm edema   # Pancreatic adenocarcinoma  with mets / left sided malignant pleural effusion   - L pleural effusion tapped by pulm, path positive for malignant cells  - Outpatient follow with Dr. Mead next week to initiate treatment with Gemcitabine (once weekly) treatment.    - s/p pleurx catheter on 7/13 - 900cc drained so far   - CTA Chest: No PE. Status post left chest tube placement with decrease in left pleural effusion, now moderate in size. Increase in right large pleural effusion. Mild increase in right upper lobe consolidative opacity. Increased nodularity along the oblique fissure. Findings may represent inflammatory/infectious response versus malignant progression.  - No leukocytosis, no cough, and afebrile - doubt infectious process     # VRE UTI failed OP therapy/ Asymptomatic bacteriuria / hydronephrosis   - consulted by ID, will monitor off Abx   - f/u repeat blood Cx (likely contaminant), per ID  - 05/25: Urine Cx showed VRE only sensitive to Zyvox   - passed TOV, repeat kidney/bladder US noted   - c/w Flomax   -  follow up  - Right double-J ureteral stent with stable moderate hydroureter    # Anemia, iron def   - s/p 3 days Venofer     # Diabetes   - carb consistent diet   - monitor finger stick  - on Insulin     # Generalized weakness  - PT/ rehab eval  - supportive care, fall and aspiration precautions     # DVT proph: Heparin        #Progress Note Handoff  Pending (specify):  pulm and heme onc f/u, placement   Family discussion: plan of care d/w patient    Disposition: SNF vs home

## 2023-07-18 NOTE — PROGRESS NOTE ADULT - SUBJECTIVE AND OBJECTIVE BOX
DELORIS NORRIS 82y Female  MRN#: [id number]   Hospital Day: 13d    SUBJECTIVE  Patient is a 82y old Female who presents with a chief complaint of VRE UTI (2023 10:48)  Currently admitted to medicine with the primary diagnosis of VRE infection (vancomycin resistant Enterococcus)      INTERVAL HPI AND OVERNIGHT EVENTS:  Patient was examined and seen at bedside. This morning she is resting comfortably in bed and reports no issues or overnight events.    REVIEW OF SYMPTOMS:  CONSTITUTIONAL: No weakness, fevers or chills; No headaches  EYES: No visual changes, eye pain, or discharge  ENT: No vertigo; No ear pain or change in hearing; No sore throat or difficulty swallowing  NECK: No pain or stiffness  RESPIRATORY: No cough, wheezing, or hemoptysis; No shortness of breath  CARDIOVASCULAR: No chest pain or palpitations  GASTROINTESTINAL: No abdominal or epigastric pain; No nausea, vomiting, or hematemesis; No diarrhea or constipation; No melena or hematochezia  GENITOURINARY: No dysuria, frequency or hematuria  MUSCULOSKELETAL: No joint pain, no muscle pain, no weakness  NEUROLOGICAL: No numbness or weakness  SKIN: No itching or rashes    OBJECTIVE  PAST MEDICAL & SURGICAL HISTORY  HTN (hypertension)    Diabetes    Hypercholesteremia    Pancreatic cancer    Hydronephrosis    Colostomy status    History of     History of left hip replacement    H/O carpal tunnel repair      ALLERGIES:  No Known Allergies    MEDICATIONS:  STANDING MEDICATIONS  atorvastatin 20 milliGRAM(s) Oral at bedtime  dextrose 5%. 1000 milliLiter(s) IV Continuous <Continuous>  dextrose 5%. 1000 milliLiter(s) IV Continuous <Continuous>  dextrose 50% Injectable 25 Gram(s) IV Push once  dextrose 50% Injectable 25 Gram(s) IV Push once  dextrose 50% Injectable 12.5 Gram(s) IV Push once  ferrous    sulfate 325 milliGRAM(s) Oral two times a day  glucagon  Injectable 1 milliGRAM(s) IntraMuscular once  heparin   Injectable 5000 Unit(s) SubCutaneous every 12 hours  insulin lispro (ADMELOG) corrective regimen sliding scale   SubCutaneous three times a day before meals  pantoprazole    Tablet 40 milliGRAM(s) Oral before breakfast  polyethylene glycol 3350 17 Gram(s) Oral daily  senna 1 Tablet(s) Oral daily  tamsulosin 0.4 milliGRAM(s) Oral at bedtime    PRN MEDICATIONS  bisacodyl 5 milliGRAM(s) Oral every 12 hours PRN  dextrose Oral Gel 15 Gram(s) Oral once PRN      VITAL SIGNS: Last 24 Hours  T(C): 36.3 (2023 13:00), Max: 36.6 (2023 22:16)  T(F): 97.3 (2023 13:00), Max: 97.9 (2023 22:16)  HR: 99 (2023 13:00) (89 - 99)  BP: 97/53 (2023 13:00) (97/53 - 112/58)  BP(mean): 82 (2023 22:16) (82 - 82)  RR: 18 (2023 05:10) (18 - 18)  SpO2: 98% (2023 13:00) (98% - 98%)    LABS:                        11.3   9.26  )-----------( 288      ( 2023 08:05 )             35.8     07-18    137  |  102  |  19  ----------------------------<  121<H>  3.6   |  21  |  <0.5<L>    Ca    8.2<L>      2023 08:05  Mg     2.1     -18    TPro  4.7<L>  /  Alb  2.6<L>  /  TBili  0.4  /  DBili  x   /  AST  15  /  ALT  6   /  AlkPhos  108  07-17      Urinalysis Basic - ( 2023 08:05 )    Color: x / Appearance: x / SG: x / pH: x  Gluc: 121 mg/dL / Ketone: x  / Bili: x / Urobili: x   Blood: x / Protein: x / Nitrite: x   Leuk Esterase: x / RBC: x / WBC x   Sq Epi: x / Non Sq Epi: x / Bacteria: x      PHYSICAL EXAM:  CONSTITUTIONAL: No acute distress,  AAOx3  ENT: moist mucous membranes  PULMONARY: Clear to auscultation bilaterally  CARDIOVASCULAR: Regular rate and rhythm; no murmurs  GASTROINTESTINAL: Soft, non-tender, non-distended;  MUSCULOSKELETAL:no edema  NEUROLOGY: non-focal  SKIN: No rashes or lesions; warm and dry

## 2023-07-18 NOTE — PROGRESS NOTE ADULT - ASSESSMENT
82-year-old female with past medical history of hypertension, diabetes, hyperlipidemia, pancreatic adenocarcinoma status post transverse loop colostomy, right hydronephrosis s/p ureteral stent presenting for a UTI VRE of 1 month duration failed OP treatment now admitted for management of asymptomatic UTI VRE c/b positive Bcx that was determined to be contaminated and now pending discharge.    # Pancreatic mass with mets   # Mets possibly to the lungs with large LL pleural effusion   - CTA chest Abdomen pelvis showed:  * Unchanged ill-defined pancreatic mass.  *  Increased moderate-to-large left pleural effusion with overlying compressive atelectasis. New small right pleural effusion. Unchanged right lung pulmonary nodules, previously characterized on 2/16/2023 PET/CT.  - Previous CTAP showed   * Soft tissue mass adjacent to the sigmoid colon measuring approximately 3.1 x 2.5 cm.  * Right double-J ureteral stent with stable moderate hydroureter.  * Grossly stable ill-defined mass at the pancreatic head, consistent with known pancreatic neoplasm  - L pleural effusion tapped by pulm, f/u cytology to r/o malignancy - exudative in nature by protein ratio and Light's criteria  - Oncology will set up outpt txt for chemo after bacteremia is no longer a concern  some hypotension overnight, given 500 cc fluids  - 7/14 pulm placed a pleurx catheter  - CTA chest PE 7/15 - no PE, findings as above  - Per pulm, drain no more than1 L in 48 hrs, call MD when drainage persistently less than 200 cc  - drainage as above 900 cc since 7/14 15:00  - satting 98% on RA  - Discussed with pulm today (7/17), no R lung thora planned prior to discharge, drain L pleurx catheter  - Discussed with hem/onc today (7/17), no plan for inpatient chemo, will plan for outpatient chemo next week w/Dr. Mead  - Appreciate pulm and hem/onc recs    #bacteremia?  blood cx from 7/5 grew Gram positive cocci in clusters, started Vanco 1 g Q 24  - d/w ID, likely contaminant, f/u sensitivities, DC vanco, f/u repeat blood Cx  - IV fluids 75 cc/ hr  - one IV on R arm that appears uninfected  - grew kocuria, discussed with ID, likely contaminant no need to treat    # VRE UTI failed OP therapy  # possible CAUTI    - UA grossly positive for RBC and WBC   - CTA chest Abdomen pelvis showed::  Decreased mild right hydronephrosis, post double-J ureteral stent placement, with persistent increased enhancement of ureter, suspicious for urinary tract infection.  Previous CTAP showed: Right double-J ureteral stent with stable moderate hydroureter.  - 05/25   Urine Cx showed VRE only sensitive to zyvox   - FU Blood and urine culture   -UA positive  -Per ID, seems colonized w VRE but asymptomatic, monitor off abx  - passed TOV    # Anemia :   - s/p IV venofer  - c/w home iron sulfate     # diabetes:   A1C 6.6  - on ISS and FS    #MISC  - DVT PPX: heparin   - GI PPX: protonix  - Bowel reg: Senna, miralax, and dulcolax  - Diet: DASH, TLC carb consist  - Activity: Increase as tolerated  - Dispo: Pending discharge home, patient's family informed us today that everyone in the family is now COVID positive   82-year-old female with past medical history of hypertension, diabetes, hyperlipidemia, pancreatic adenocarcinoma status post transverse loop colostomy, right hydronephrosis s/p ureteral stent presenting for a UTI VRE of 1 month duration failed OP treatment now admitted for management of asymptomatic UTI VRE c/b positive Bcx that was determined to be contaminated and now pending discharge.    # Pancreatic mass with mets   # Mets possibly to the lungs with large LL pleural effusion   - CTA chest Abdomen pelvis showed:  * Unchanged ill-defined pancreatic mass.  *  Increased moderate-to-large left pleural effusion with overlying compressive atelectasis. New small right pleural effusion. Unchanged right lung pulmonary nodules, previously characterized on 2/16/2023 PET/CT.  - Previous CTAP showed   * Soft tissue mass adjacent to the sigmoid colon measuring approximately 3.1 x 2.5 cm.  * Right double-J ureteral stent with stable moderate hydroureter.  * Grossly stable ill-defined mass at the pancreatic head, consistent with known pancreatic neoplasm  - L pleural effusion tapped by pulm, f/u cytology to r/o malignancy - exudative in nature by protein ratio and Light's criteria  - Oncology will set up outpt txt for chemo after bacteremia is no longer a concern  some hypotension overnight, given 500 cc fluids  - 7/14 pulm placed a pleurx catheter  - CTA chest PE 7/15 - no PE, findings as above  - Per pulm, drain no more than1 L in 48 hrs, call MD when drainage persistently less than 200 cc  - drainage as above 900 cc since 7/14 15:00  - satting 98% on RA  - Discussed with pulm today (7/17), no R lung thora planned prior to discharge, drain L pleurx catheter  - Discussed with hem/onc today (7/17), no plan for inpatient chemo, will plan for outpatient chemo next week w/Dr. Mead  - Appreciate pulm and hem/onc recs    #bacteremia?  blood cx from 7/5 grew Gram positive cocci in clusters, started Vanco 1 g Q 24  - d/w ID, likely contaminant, f/u sensitivities, DC vanco, f/u repeat blood Cx  - IV fluids 75 cc/ hr  - one IV on R arm that appears uninfected  - grew kocuria, discussed with ID, likely contaminant no need to treat    # VRE UTI failed OP therapy  # possible CAUTI    - UA grossly positive for RBC and WBC   - CTA chest Abdomen pelvis showed::  Decreased mild right hydronephrosis, post double-J ureteral stent placement, with persistent increased enhancement of ureter, suspicious for urinary tract infection.  Previous CTAP showed: Right double-J ureteral stent with stable moderate hydroureter.  - 05/25   Urine Cx showed VRE only sensitive to zyvox   - FU Blood and urine culture   -UA positive  -Per ID, seems colonized w VRE but asymptomatic, monitor off abx  - passed TOV    # Anemia :   - s/p IV venofer  - c/w home iron sulfate     # diabetes:   A1C 6.6  - on ISS and FS    #MISC  - DVT PPX: heparin   - GI PPX: protonix  - Bowel reg: Senna, miralax, and dulcolax  - Diet: DASH, TLC carb consist  - Activity: Increase as tolerated  - Dispo: Discharge pending placement since patient's family is COVID positive

## 2023-07-18 NOTE — PROGRESS NOTE ADULT - SUBJECTIVE AND OBJECTIVE BOX
----------Daily Progress Note----------    HISTORY OF PRESENT ILLNESS:  82-year-old female with past medical history of hypertension, diabetes, hyperlipidemia, pancreatic adenocarcinoma status post transverse loop colostomy, right hydronephrosis s/p ureteral stent presenting for a UTI VRE of 1 month duration failed OP treatment now admitted for management of UTI VRE c/b positive Bcx that was determined to be contaminated and now pending discharge.     Today is hospital day 13d.     ED course:  82-year-old female with past medical history of hypertension, diabetes, hyperlipidemia, pancreatic adenocarcinoma status post transverse loop colostomy, right hydronephrosis s/p ureteral stent presenting for a UTI VRE of 1 month duration failed OP treatment and as per the chart was supposed to have a PICC line but procedure delayed presents for IV antibiotics treatment. Last urine culture in  showed VRE sensitive only to zyvox. Patient is a poor historian reported that suprapubic abdominal discomfort started 1 month ago and the patient was unsuccessfully treated with different antibiotics. Patient still complains of suprapubic discomfort and burning, and lower back pain, Patient reports mild epigastric discomfort today . Patient denies any SOB, CP, NV, diarrhea, constipation fever, respiratory symptoms.     Patient was done through the OP MedRec, Patient is not aware of the medications she take , please confirm MedRec in AM with [facility], [phone].      Labs noticeable for Hb 10.2 at baseline,   UA grossly positive for RBC and WBC   CTA chest Abdomen pelvis showed:    * Decreased mild right hydronephrosis, post double-J ureteral stent placement, with persistent increased enhancement of ureter, suspicious for urinary tract infection.  * Unchanged ill-defined pancreatic mass.  *  Increased moderate-to-large left pleural effusion with overlying compressive atelectasis. New small right pleural effusion. Unchanged right lung pulmonary nodules, previously characterized on 2023 PET/CT.    Previous CTAP showed   * Soft tissue mass adjacent to the sigmoid colon measuring approximately 3.1 x 2.5 cm.  * Right double-J ureteral stent with stable moderate hydroureter.  * Grossly stable ill-defined mass at the pancreatic head, consistent with known pancreatic neoplasm.    VS insignificant     Vital Signs Last 24 Hrs  T(C): 36.2 (2023 23:43), Max: 36.6 (2023 17:19)  T(F): 97.1 (2023 23:43), Max: 97.9 (2023 17:19)  HR: 78 (2023 23:43) (78 - 103)  BP: 118/74 (2023 23:43) (101/72 - 118/74)  RR: 18 (2023 23:43) (18 - 18)  SpO2: 100% (2023 23:43) (98% - 100%)  Patient On (Oxygen Delivery Method): room air    INTERVAL HOSPITAL COURSE / OVERNIGHT EVENTS:  O/N events:  None    24 hr events:  None    Patient was examined and seen at bedside. This morning she is resting comfortably in bed and reports no new issues or overnight events.     Review of Systems: Otherwise unremarkable     <<<<<PAST MEDICAL & SURGICAL HISTORY>>>>>  HTN (hypertension)    Diabetes    Hypercholesteremia    Pancreatic cancer    Hydronephrosis    Colostomy status    History of     History of left hip replacement    H/O carpal tunnel repair      ALLERGIES  No Known Allergies      Home Medications:  Ferretts Iron 325 mg (106 mg elemental iron) oral tablet: 1 tab(s) orally 2 times a day (25 May 2023 11:32)        MEDICATIONS  STANDING MEDICATIONS  atorvastatin 20 milliGRAM(s) Oral at bedtime  dextrose 5%. 1000 milliLiter(s) IV Continuous <Continuous>  dextrose 5%. 1000 milliLiter(s) IV Continuous <Continuous>  dextrose 50% Injectable 25 Gram(s) IV Push once  dextrose 50% Injectable 12.5 Gram(s) IV Push once  dextrose 50% Injectable 25 Gram(s) IV Push once  ferrous    sulfate 325 milliGRAM(s) Oral two times a day  glucagon  Injectable 1 milliGRAM(s) IntraMuscular once  heparin   Injectable 5000 Unit(s) SubCutaneous every 12 hours  insulin lispro (ADMELOG) corrective regimen sliding scale   SubCutaneous three times a day before meals  pantoprazole    Tablet 40 milliGRAM(s) Oral before breakfast  polyethylene glycol 3350 17 Gram(s) Oral daily  senna 1 Tablet(s) Oral daily  tamsulosin 0.4 milliGRAM(s) Oral at bedtime    PRN MEDICATIONS  bisacodyl 5 milliGRAM(s) Oral every 12 hours PRN  dextrose Oral Gel 15 Gram(s) Oral once PRN    VITALS:  T(F): 97.7  HR: 89  BP: 102/57  RR: 18  SpO2: 97%    <<<<<PHYSICAL EXAM>>>>>  GENERAL: Well developed, well nourished and in no acute distress. Resting comfortably in bed.  HEENT: Normocephalic, atraumatic  PULMONARY: Clear to auscultation bilaterally. No rales, rhonchi, or wheezing.  CARDIOVASCULAR: RRR, no M/R/G, S1-S2  GASTROINTESTINAL: Soft, non-tender, non-distended, no guarding.  SKIN/EXTREMITIES: Warm, 2+ dorsalis pedis pulses edema, no UE or LE edema  NEUROLOGIC: AxO3    <<<<<LABS>>>>>                        10.2   9.63  )-----------( 309      ( 2023 10:12 )             32.1         139  |  103  |  19  ----------------------------<  168<H>  3.7   |  24  |  <0.5<L>    Ca    8.3<L>      2023 10:12  Mg     1.7         TPro  4.7<L>  /  Alb  2.6<L>  /  TBili  0.4  /  DBili  x   /  AST  15  /  ALT  6   /  AlkPhos  108        Urinalysis Basic - ( 2023 10:12 )    Color: x / Appearance: x / SG: x / pH: x  Gluc: 168 mg/dL / Ketone: x  / Bili: x / Urobili: x   Blood: x / Protein: x / Nitrite: x   Leuk Esterase: x / RBC: x / WBC x   Sq Epi: x / Non Sq Epi: x / Bacteria: x          [id number]        <<<<<RADIOLOGY>>>>>    ACC: [id number] EXAM:  XR CHEST PORTABLE URGENT 1V   ORDERED BY: PERI JASSO     PROCEDURE DATE:  2023      INTERPRETATION:  Clinical History / Reason for exam: pleural effusion    Comparison : Chest radiograph: 2023    Technique/Positioning: Frontal view of the chest    Findings:    Support devices: Pleural catheter unchanged in position.    Cardiac/mediastinum/hilum: No significant change    Skeleton/soft tissues: No significant change.    Lung parenchyma/Pleura: Stable bilateral opacities and effusions.    Impression:  Stable bilateral opacities and effusions.    --- End of Report ---    RAMYA CHO MD; Attending Radiologist  This document has been electronically signed. 2023  1:48PM            ACC: [id number] EXAM:  CT ANGIO CHEST PULM ART WAWI   ORDERED BY: RICKY NICHOLAS     PROCEDURE DATE:  07/15/2023      INTERPRETATION:  CLINICAL INDICATION: Hypoxia. History of pancreatic and   pericolonic mass.    TECHNIQUE:  CTA of the thorax was performed after administration of contrast per the   PE protocol. Sagittal and coronal reformats were performed as well as 3D   reconstructions.  Intravenous contrast: 65 cc Omnipaque 350    COMPARISON: CTA chest 2023.    INTERPRETATION:    PULMONARY ARTERIES: No pulmonary emboli.    AIRWAYS/LUNGS/PLEURA: No debris within the central airway. Increase in   large right pleural effusion. Status post left chest tube terminating   within the left lung with decrease in moderate left pleural effusion,   previously large. There is adjacent bilateral passive atelectasis.   Increased nodularity along the oblique fissure abutting the left lower   lobe (series 3 image 50). Increased in size of right upper lobe   consolidative opacity measuring 1.9 x0.9 cm, previously 1.7 x 0.8 cm   (series 4 image 91). Stable right upper lobe ill-defined focus of   groundglass opacity (series 4 image 63).    MEDIASTINUM: Left hilar soft tissue thickening measuring 1 x 0.9 cm, new   (series 4 image 82) stable 0.7 cm left thyroid gland nodule.    HEART AND VESSELS: Heart size is within normal limits. Small pericardial   effusion. Ascending thoracic aorta and main pulmonary artery are normal   in caliber. Coronary artery, aortic valve, aortic arch, and descending   aorta calcifications.    UPPER ABDOMEN: Partially visualized upper abdomen ascites.    BONES AND SOFT TISSUES: Degenerative changes.    TUBES AND LINES: Left chest tube as above.    IMPRESSION:    No pulmonary embolism.    Status post left chesttube placement with decrease in left pleural   effusion, now moderate in size. Increase in right large pleural effusion.    Mild increase in right upper lobe consolidative opacity. Increased   nodularity along the oblique fissure. Findings may represent   inflammatory/infectious response versus malignant progression.    New left hilar soft tissue thickening.    --- End of Report ---    JAYDEN ESPINOZA MD; Resident Radiologist  This document has been electronically signed.  ROBERT SUE MD;Attending Radiologist  This document has been electronically signed. Jul 15   7:45AM    -----------------------------------------------------------------------------------------------------------------------------------------------------------------------------------------------

## 2023-07-18 NOTE — PROGRESS NOTE ADULT - ATTENDING COMMENTS
She was seen earlier today with team.  I spoke with her daughter because the patient was undecided if she would want to proceed with chemotherapy.  I explained that her ECOG is 3-4–supportive measures are reasonable but if she wanted to proceed with treatment I can give her gemcitabine we will give her 20% dose reduction and I explained the benefits as well as risk in detail including the response rates of just around 10%, 6-11% Median survival is just under 6 months but this was in patient with ECOG of 0-1 in clinical trials    She had NGS that showed  MSI Negtive, TMB low but she has BRCA2 mutation and KRAS.       given the somatic BRCA2 mutation, its tempting to offer platenium based doublet such as FOLFOX or Hardy/Cis given her ECOG of 3-4 it is unlikely she will tolerate this and this is consistent with recommendations, from major guidelines such as NCCN to just proceed with best supportive measures versus consideration of single agent chemotherapy even in the setting of somatic or germline BRCA2 mutation    She will require genetic testing and I am not sure if this can be done as an inpatient but I will reach out to our  tomorrow.    This was all explained to the daughter initially on the phone call and then we communicated again  After the visit but I will also reach out to her daughter tomorrow to finalize plan since she was considering to initiate chemotherapy with single agent gemcitabine which we can possibly start tomorrow

## 2023-07-18 NOTE — PROGRESS NOTE ADULT - ATTENDING COMMENTS
82-year-old female with past medical history of hypertension, diabetes, hyperlipidemia, pancreatic adenocarcinoma status post transverse loop colostomy, right hydronephrosis s/p ureteral stent presenting for a UTI VRE of 1 month duration failed OP treatment and as per the chart was supposed to have a PICC line but procedure delayed presents for IV antibiotics treatment. Last urine culture in 05/25 showed VRE sensitive only to Zyvox. Patient is a poor historian reported that suprapubic abdominal discomfort started 1 month ago and the patient was unsuccessfully treated with different antibiotics.       # Acute hypoxemic respiratory failure 2/2 Pleural effusion and pulm edema   # Pancreatic adenocarcinoma  with mets / left sided malignant pleural effusion   - L pleural effusion tapped by pulm, path positive for malignant cells  - s/p pleurx catheter on 7/13 - 850cc drained today 7/18/23   - CTA Chest: No PE. Status post left chest tube placement with decrease in left pleural effusion, now moderate in size. Increase in right large pleural effusion. Mild increase in right upper lobe consolidative opacity. Increased nodularity along the oblique fissure. Findings may represent inflammatory/infectious response versus malignant progression.  - No leukocytosis, no cough, and afebrile - doubt infectious process   - Outpatient follow with Dr. Mead    - Per Dr. Tripathi - not a candidate for chemo inpatient due to poor functional status, ECOG 4   - Palliative care consult     # VRE UTI failed OP therapy/ Asymptomatic bacteriuria / hydronephrosis   - consulted by ID, will monitor off Abx   - f/u repeat blood Cx (likely contaminant), per ID  - 05/25: Urine Cx showed VRE only sensitive to Zyvox   - passed TOV, repeat kidney/bladder US noted   - c/w Flomax   -  follow up  - Right double-J ureteral stent with stable moderate hydroureter    # Anemia, iron def   - s/p 3 days Venofer     # Diabetes Mellitus   - carb consistent diet   - monitor finger stick  - on Insulin     # Generalized weakness  - PT/ rehab eval  - supportive care, fall and aspiration precautions     # DVT proph: Heparin        #Progress Note Handoff  Pending (specify):  placement   Family discussion: Daughter Klarissa Sequeira updated by resident [phone]   Disposition: [facility] 82-year-old female with past medical history of hypertension, diabetes, hyperlipidemia, pancreatic adenocarcinoma status post transverse loop colostomy, right hydronephrosis s/p ureteral stent presenting for a UTI VRE of 1 month duration failed OP treatment and as per the chart was supposed to have a PICC line but procedure delayed presents for IV antibiotics treatment. Last urine culture in 05/25 showed VRE sensitive only to Zyvox. Patient is a poor historian reported that suprapubic abdominal discomfort started 1 month ago and the patient was unsuccessfully treated with different antibiotics.       # Pancreatic adenocarcinoma    # Left sided malignant pleural effusion s/p thora then Pleurx placement   - Hx of pancreatic cancer with right inflammatory/neoplastic process right pelvis causing right ureter and sigmoid obstruction s/p transverse colostomy and right ureteric stent  - L pleural effusion tapped by pulm, path positive for malignant cells  - s/p pleurx catheter on 7/13 - 850cc drained today 7/18/23   - CTA Chest: No PE. Status post left chest tube placement with decrease in left pleural effusion, now moderate in size. Increase in right large pleural effusion. Mild increase in right upper lobe consolidative opacity. Increased nodularity along the oblique fissure. Findings may represent inflammatory/infectious response versus malignant progression.  - No leukocytosis, no cough, and afebrile - doubt infectious process   - per pulmonary, right effusion does not need drainage at the moment   - Outpatient follow up with Dr. Mead    - Per Dr. Tripathi - not a candidate for chemo inpatient due to poor functional status, ECOG 4   - Palliative care consult     # VRE UTI failed OP therapy/ Asymptomatic bacteriuria / hydronephrosis   - consulted by ID, will monitor off Abx   - f/u repeat blood Cx (likely contaminant), per ID  - 05/25: Urine Cx showed VRE only sensitive to Zyvox   - passed TOV, repeat kidney/bladder US noted   - c/w Flomax   -  follow up  - Right double-J ureteral stent with stable moderate hydroureter    # Anemia, iron def   - s/p 3 days Venofer     # Diabetes Mellitus   - carb consistent diet   - monitor finger stick  - on Insulin     # Generalized weakness  - PT/ rehab eval  - supportive care, fall and aspiration precautions     # DVT proph: Heparin        #Progress Note Handoff  Pending (specify):  placement   Family discussion: Daughter Klarissa Sequeira updated by resident [phone]   Disposition: [facility]

## 2023-07-18 NOTE — PROGRESS NOTE ADULT - ASSESSMENT
82-year-old patient, known to have hypertension, diabetes, hyperlipidemia,  metastatic pancreatic adenocarcinoma status post transverse loop colostomy and right hydronephrosis s/p ureteral stent, presented for IV Abx for a UTI VRE of 1 month duration. She failed OP treatment and as per the chart was supposed to have a PICC line but procedure delayed.     We are consulted for history of metastatic pancreatic adenocarcinoma.     #Pancreatic cancer with suspected lung metastasis, Pelvic mass likely metastatic S/P colostomy and ureteral stents for obstructing pericolonic mass  - EUS with biopsy showed adenocarcinoma , CA19.9 : 12,795 CEA: 24  - PET scan 2/2023: pancreatic mass with SUV : 7.6 , right pericolonic soft tissue density with SUV : 15 , left basilar lung nodular density 2.7 cm , SUV : 3.o and 7 mm left basilar pleural based nodule.  - CT chest/a/P: Since 6/17/2023: Decreased mild right hydronephrosis, post double-J ureteral stent placement, with persistent increased enhancement of ureter, suspicious for urinary tract infection. Unchanged ill-defined pancreatic mass. Increased moderate-to-large left pleural effusion with overlying compressive atelectasis. New small right pleural effusion. Unchanged right lung pulmonary nodules, previously characterized on 2/16/2023 PET/CT. Unchanged previously described soft tissue mass adjacent to the sigmoid colon.  - s/p Thoracentesis; follow up results.  - Will discuss with Dr. Mead (her primary oncologist) and Dr. De Santiago (her surgeon) to start chemotherapy tomorrow.      Recommendations:   - Discussed with patients daughter, Klarissa, over the phone risk vs. benefits of chemotherapy including prognosis and side effects  - proposed regimen would be dose reduced single agent Gemcitabine   - patients daughter to discuss with patient and family if they would like to proceed with chemotherapy

## 2023-07-19 NOTE — CONSULT NOTE ADULT - PROBLEM SELECTOR RECOMMENDATION 9
- patient opting for hospice  - hospice consult noted; plan for [facility] at home   - has support from daughter, son, and HHA  - supportive care

## 2023-07-19 NOTE — CONSULT NOTE ADULT - CONVERSATION DETAILS
Discussed with patient, confirmed wishes for DNR/DNI, MOLST in chart and completed. Plan for home with hospice with [facility].

## 2023-07-19 NOTE — PROGRESS NOTE ADULT - SUBJECTIVE AND OBJECTIVE BOX
DELORIS NORRIS  82y  Female  ***My note supersedes ALL resident notes that I sign.  My corrections for their notes are in my note.***    I can be reached directly on [employer]. My office number is [phone]. My personal cell number is [phone].    INTERVAL EVENTS: Here for f/u of cancer. Pt has decided to go for hospice, she does NOT want anymore cancer-directed care. pt was comfortable and no needs.    T(F): 96.8 (07-19-23 @ 12:41), Max: 97.1 (07-19-23 @ 05:00)  HR: 86 (07-19-23 @ 12:41) (60 - 86)  BP: 104/55 (07-19-23 @ 12:41) (92/50 - 104/55)  RR: 16 (07-19-23 @ 12:41) (16 - 18)  SpO2: --    BMI 17.4    Gen: NAD; thin; frail  HEENT: PERRL, EOMI, mouth clr, nose clr  Neck: no nodes, no JVD, thyroid nl  chest: left pigtail pleural cath  lungs: clr  hrt: s1 s2 rrr no murmur  abd: soft, NT/ND, no HS megaly; + colostomy  ext: no edema, no c/c  neuro: aa ox3, cn intact, can move all 4 ext    LABS:                      11.4    (    94.0   9.04  )-----------( ---------      277      ( 19 Jul 2023 08:58 )             36.1    (    17.9     --   (   --   (   --      07-19-23 @ 08:58  ----------------------               --   (   --   (   --                             -----                        --  Ca  --   Mg  2.0    P   --     Urinalysis Basic - ( 18 Jul 2023 08:05 )    Color: x / Appearance: x / SG: x / pH: x  Gluc: 121 mg/dL / Ketone: x  / Bili: x / Urobili: x   Blood: x / Protein: x / Nitrite: x   Leuk Esterase: x / RBC: x / WBC x   Sq Epi: x / Non Sq Epi: x / Bacteria: x    CAPILLARY BLOOD GLUCOSE  POCT Blood Glucose.: 126 (07-19-23 @ 11:32)  POCT Blood Glucose.: 146 (07-19-23 @ 07:38)  POCT Blood Glucose.: 121 (07-18-23 @ 16:32)  POCT Blood Glucose.: 140 (07-18-23 @ 12:44)  POCT Blood Glucose.: 120 (07-18-23 @ 08:13)  POCT Blood Glucose.: 124 (07-17-23 @ 21:36)  POCT Blood Glucose.: 132 (07-17-23 @ 16:55)  POCT Blood Glucose.: 140 (07-17-23 @ 12:01)    RADIOLOGY & ADDITIONAL TESTS:  < from: Xray Chest 1 View- PORTABLE-Urgent (Xray Chest 1 View- PORTABLE-Urgent .) (07.16.23 @ 09:51) >  Impression:  Stable bilateral opacities and effusions.    < end of copied text >    < from: CT Angio Chest PE Protocol w/ IV Cont (07.15.23 @ 01:02) >  IMPRESSION:    No pulmonary embolism.    Status post left chesttube placement with decrease in left pleural   effusion, now moderate in size. Increase in right large pleural effusion.    Mild increase in right upper lobe consolidative opacity. Increased   nodularity along the oblique fissure. Findings may represent   inflammatory/infectious response versus malignant progression.    New left hilar soft tissue thickening.    < end of copied text >    < from: TTE Echo Complete w/o Contrast w/ Doppler (07.07.23 @ 14:55) >  Summary:   1. Normal global left ventricular systolic function.   2. LV Ejection Fraction by Mendez's Method with a biplane EF of 56 %.   3. Spectral Doppler shows impaired relaxation pattern of left   ventricular myocardial filling (Grade I diastolic dysfunction).   4. Normal right ventricular size and function.   5. There is no evidence of pericardial effusion.   6. Large pleural effusion in the left lateral region.   7. Mild aortic regurgitation.    < end of copied text >    MEDICATIONS:    atorvastatin 20 milliGRAM(s) Oral at bedtime  bisacodyl 5 milliGRAM(s) Oral every 12 hours PRN  ferrous    sulfate 325 milliGRAM(s) Oral two times a day  heparin   Injectable 5000 Unit(s) SubCutaneous every 12 hours  insulin lispro (ADMELOG) corrective regimen sliding scale   SubCutaneous three times a day before meals  midodrine. 5 milliGRAM(s) Oral three times a day  pantoprazole    Tablet 40 milliGRAM(s) Oral before breakfast  polyethylene glycol 3350 17 Gram(s) Oral daily  senna 1 Tablet(s) Oral daily  tamsulosin 0.4 milliGRAM(s) Oral at bedtime

## 2023-07-19 NOTE — CHART NOTE - NSCHARTNOTEFT_GEN_A_CORE
Pt was seen and examined at bedside independently, pt is c/o left sided chest pain, underwent thoracocentesis this morning.   Case was discussed with resident's during teaching rounds, will monitor off Abx ( as per ID) and consult palliative care.
Registered Dietitian Follow-Up     Patient Profile Reviewed                           Yes [x]   No []     Nutrition History Previously Obtained        Yes [x]  No []       Pertinent Subjective Information: Pt reports appetite improving; consuming 1/3 of meals + 100% of Ensure Max provided in-house. Tolerating diet texture/consistency well. No nausea or vomiting reported.      Pertinent Medical Information: 83 y/o female with PMHx of hypertension, diabetes, hyperlipidemia, pancreatic adenocarcinoma status post transverse loop colostomy, right hydronephrosis s/p ureteral stent presenting for a UTI VRE of 1 month duration failed OP treatment and as per the chart was supposed to have a PICC line but procedure delayed presents for IV antibiotics treatment.   # Pancreatic mass with mets   # Mets possibly to the lungs with large LL pleural effusion     Diet order: Diet, Soft and Bite Sized:   Lacto Veg (Accepts Milk & Milk Products)  Free Water Flush Instructions:  chocolate ensure max  Supplement Feeding Modality:  Oral  Ensure Max Cans or Servings Per Day:  3       Frequency:  Daily (07-08-23 @ 11:41) [Active]    Anthropometrics:  Height (cm): 162.6 (07-08-23 @ 11:17)  Weight (kg): 45.4 (07-05-23 @ 17:19) -- no new weights taken  BMI (kg/m2): 17.2 (07-08-23 @ 11:17)  IBW: 52.3 KG    MEDICATIONS  (STANDING):  atorvastatin 20 milliGRAM(s) Oral at bedtime  dextrose 5%. 1000 milliLiter(s) (100 mL/Hr) IV Continuous <Continuous>  dextrose 5%. 1000 milliLiter(s) (50 mL/Hr) IV Continuous <Continuous>  dextrose 50% Injectable 25 Gram(s) IV Push once  dextrose 50% Injectable 12.5 Gram(s) IV Push once  dextrose 50% Injectable 25 Gram(s) IV Push once  glucagon  Injectable 1 milliGRAM(s) IntraMuscular once  heparin   Injectable 5000 Unit(s) SubCutaneous every 12 hours  insulin lispro (ADMELOG) corrective regimen sliding scale   SubCutaneous three times a day before meals  lactated ringers. 1000 milliLiter(s) (75 mL/Hr) IV Continuous <Continuous>  lactated ringers. 1000 milliLiter(s) (75 mL/Hr) IV Continuous <Continuous>  pantoprazole    Tablet 40 milliGRAM(s) Oral before breakfast  polyethylene glycol 3350 17 Gram(s) Oral daily  senna 1 Tablet(s) Oral daily  tamsulosin 0.4 milliGRAM(s) Oral at bedtime    MEDICATIONS  (PRN):  bisacodyl 5 milliGRAM(s) Oral every 12 hours PRN Constipation  dextrose Oral Gel 15 Gram(s) Oral once PRN Blood Glucose LESS THAN 70 milliGRAM(s)/deciliter    Pertinent Labs: 7/11 @ 08:28 - RBC 3.52; H/H 10.2/32; BUN 9; Cr <0.5; ; Ca 8.1    Finger Sticks:  POCT Blood Glucose.: 113 mg/dL (07-12 @ 08:57)  POCT Blood Glucose.: 120 mg/dL (07-11 @ 21:00)  POCT Blood Glucose.: 115 mg/dL (07-11 @ 16:51)    Physical Findings:  - Appearance: alert  - GI function: colostomy; last BM on 7/11   - Tubes: no feeding tube  - Oral/Mouth cavity: tolerating diet texture/consistency well  - Skin: intact; no pressure injuries noted  - Edema: no edema noted     Nutrition Requirements:  Weight Used: Using ABW and IBW -- with consideration for age, BMI, pancreatic cancer with suspected lung metastasis, malnutrition     Estimated Energy Needs    Continue [x]  Adjust []  ENERGY: 8514-9847 kcal/day (40-45 kcal/kg)     Estimated Protein Needs    Continue [x]  Adjust []  PROTEIN: 68-83 g/day (1.3-1.6 g/kg IBW)     Estimated Fluid Needs        Continue [x]  Adjust []  FLUID: 1mL/kg or per LIP    [x] Previous Nutrition Diagnosis: Severe Malnutrition             [x] Ongoing          [] Resolved    [] No active nutrition diagnosis identified at this time     Nutrition Education: Importance of PO intake     Goal/Expected Outcome: Pt to meet >75% of estimated needs within 5-7 days     Indicator/Monitoring: Diet order, PO intake, weights, labs, NFPF, body composition, BM and tolerance to medical food supplements.    Recommendation:  1. Continue with current diet order + Ensure MAX 3X/DAILY  2. Encourage PO intake and assist during meals     Pt is at moderate nutrition risk; will f/u in 5-7 days or prn.    RD to remain available: Noni Cruz [phone] or TONY
82-year-old patient, known to have hypertension, diabetes, hyperlipidemia,  metastatic pancreatic adenocarcinoma status post transverse loop colostomy and right hydronephrosis s/p ureteral stent, presented for IV Abx for a UTI VRE of 1 month duration. She failed OP treatment and as per the chart was supposed to have a PICC line but procedure delayed.     We are consulted for history of metastatic pancreatic adenocarcinoma.     #Pancreatic cancer with suspected lung metastasis, Pelvic mass likely metastatic S/P colostomy and ureteral stents for obstructing pericolonic mass  - EUS with biopsy showed adenocarcinoma , CA19.9 : 12,795 CEA: 24  - PET scan 2/2023: pancreatic mass with SUV : 7.6 , right pericolonic soft tissue density with SUV : 15 , left basilar lung nodular density 2.7 cm , SUV : 3.o and 7 mm left basilar pleural based nodule.  - CT chest/a/P: Since 6/17/2023: Decreased mild right hydronephrosis, post double-J ureteral stent placement, with persistent increased enhancement of ureter, suspicious for urinary tract infection. Unchanged ill-defined pancreatic mass. Increased moderate-to-large left pleural effusion with overlying compressive atelectasis. New small right pleural effusion. Unchanged right lung pulmonary nodules, previously characterized on 2/16/2023 PET/CT. Unchanged previously described soft tissue mass adjacent to the sigmoid colon.  - s/p Thoracentesis and pleurex  - patient was offered single agent chemotherapy, Gemcitabine, D1,D8,D15 every 28 days but has declined treatment     Recommendations:   - after discussion with patient at bedside and daughter Klarissa (over the phone), they have decided not to proceed with chemotherapy and would like home hospice services  - please place palliative care & hospice consult and inform  of change in disposition planning
PALLIATIVE MEDICINE INTERDISCIPLINARY TEAM NOTE    Provider:                                                Met with: [ x  ] Patient  [   ] Family  [   ] Other:    Primary Language: [ x  ] English [   ] Other*:                      *Interpretation provided by:    SUPPORT DIAGNOSES            (Check all that apply)    [ x  ] EOL issues  [  x ] Advanced Illness  [   ] Cultural / spiritual concerns  [   ] Pain / suffering  [   ] Dementia / AMS  [   ] Other:  [  x ] AD issues  [ x  ] Grief / loss / sadness  [   ] Discharge issues  [ x  ] Distress / coping    PSYCHOSOCIAL ASSESSMENT OF PATIENT         (Check all that apply)    [ x  ] Initial Assessment            [   ] Reassessment          [   ] Not Applicable this visit    Pain/suffering acuity:  [ x  ] None to mild (0-3)           [   ] Moderate (4-6)        [   ] High (7-10)    Mental Status:  [ x  ] Alert/oriented (x3)          [   ] Confused/Altered(x2/x1)         [   ] Non-resp    Functional status:  [   ] Independent w ADLs      [ x  ] Needs Assistance             [   ] Bedbound/Full Care    Coping:  [  x ] Coping well                     [   ] Coping w/difficulty            [   ] Poor coping    Support system:  [ x  ] Strong                              [   ] Adequate                        [   ] Inadequate      Past history and medications for:     [ ] Anxiety       [ ] Depression    [ ] Sleep disorders       SERVICE PROVIDED  [  x ]Discharge support / facilitation  [ x  ]AD / goals of care counseling                                  [x   ]EOL / death / bereavement counseling  [ x  ]Counseling / support                                                [   ] Family meeting  [   ]Prayer / sacrament / ritual                                      [   ] Referral   [   ]Other                                                                       NOTE and Plan of Care (PoC):    patient is a 83 y/o F with pmhx HTN, DM, HLD, pancreatic adenocarcinoma, right hydronephrosis presenting for UTI. Met with patient at bedside. AOx4. Reviewed role of palliative care and palliative SW with patient. She knows palliative care from the passing of her . She discussed her hx of being in and out of hospital and rehabs leading up to current admission. We discussed advance directives including code status and hospice at length. patient wished to put in place DNR/DNI. She discussed her daughter knows her wishes and also open to speaking with hospice care and focusing on comfort in her EOL - hoping she can be at home at peace. all questions answered. support rendered. contact info provided.     MOLST completed and placed in chart.     CM notified re: hospice referral.     bedside RN notified.

## 2023-07-19 NOTE — CONSULT NOTE ADULT - PROBLEM SELECTOR RECOMMENDATION 3
- s/p thoracentesis  - no plans for repeat thoracentesis  - monitor for dyspnea, currently comfortable
Urology

## 2023-07-19 NOTE — PROGRESS NOTE ADULT - SUBJECTIVE AND OBJECTIVE BOX
SUBJECTIVE:  HPI:  82-year-old female with past medical history of hypertension, diabetes, hyperlipidemia, pancreatic adenocarcinoma status post transverse loop colostomy, right hydronephrosis s/p ureteral stent presenting for a UTI VRE of 1 month duration failed OP treatment and as per the chart was supposed to have a PICC line but procedure delayed presents for IV antibiotics treatment. Last urine culture in  showed VRE sensitive only to zyvox. Patient is a poor historian reported that suprapubic abdominal discomfort started 1 month ago and the patient was unsuccessfully treated with different antibiotics. Patient still complains of suprapubic discomfort and burning, and lower back pain, Patient reports mild epigastric discomfort today . Patient denies any SOB, CP, NV, diarrhea, constipation fever, respiratory symptoms.     Patient was done through the OP MedRec, Patient is not aware of the medications she take , please confirm [facility] in [facility], [phone].      Labs noticeable for Hb 10.2 at baseline,   UA grossly positive for RBC and WBC   CTA chest Abdomen pelvis showed:    * Decreased mild right hydronephrosis, post double-J ureteral stent placement, with persistent increased enhancement of ureter, suspicious for urinary tract infection.  * Unchanged ill-defined pancreatic mass.  *  Increased moderate-to-large left pleural effusion with overlying compressive atelectasis. New small right pleural effusion. Unchanged right lung pulmonary nodules, previously characterized on 2023 PET/CT.    Previous CTAP showed   * Soft tissue mass adjacent to the sigmoid colon measuring approximately 3.1 x 2.5 cm.  * Right double-J ureteral stent with stable moderate hydroureter.  * Grossly stable ill-defined mass at the pancreatic head, consistent with known pancreatic neoplasm.    VS insignificant     Vital Signs Last 24 Hrs  T(C): 36.2 (2023 23:43), Max: 36.6 (2023 17:19)  T(F): 97.1 (2023 23:43), Max: 97.9 (2023 17:19)  HR: 78 (2023 23:43) (78 - 103)  BP: 118/74 (2023 23:43) (101/72 - 118/74)  RR: 18 (2023 23:43) (18 - 18)  SpO2: 100% (2023 23:43) (98% - 100%)  Patient On (Oxygen Delivery Method): room air             (2023 02:54)      Patient is a 82y old Female who presents with a chief complaint of VRE UTI (2023 10:48)    Currently admitted to medicine with the primary diagnosis of Pancreatic cancer       Today is hospital day 14d.     PAST MEDICAL & SURGICAL HISTORY  HTN (hypertension)    Diabetes    Hypercholesteremia    Pancreatic cancer    Hydronephrosis    Colostomy status    History of     History of left hip replacement    H/O carpal tunnel repair        ALLERGIES:  No Known Allergies    MEDICATIONS:  ACTIVE MEDICATIONS  atorvastatin 20 milliGRAM(s) Oral at bedtime  bisacodyl 5 milliGRAM(s) Oral every 12 hours PRN  dextrose 5%. 1000 milliLiter(s) IV Continuous <Continuous>  dextrose 5%. 1000 milliLiter(s) IV Continuous <Continuous>  dextrose 50% Injectable 25 Gram(s) IV Push once  dextrose 50% Injectable 12.5 Gram(s) IV Push once  dextrose 50% Injectable 25 Gram(s) IV Push once  dextrose Oral Gel 15 Gram(s) Oral once PRN  ferrous    sulfate 325 milliGRAM(s) Oral two times a day  glucagon  Injectable 1 milliGRAM(s) IntraMuscular once  heparin   Injectable 5000 Unit(s) SubCutaneous every 12 hours  insulin lispro (ADMELOG) corrective regimen sliding scale   SubCutaneous three times a day before meals  midodrine. 5 milliGRAM(s) Oral three times a day  pantoprazole    Tablet 40 milliGRAM(s) Oral before breakfast  polyethylene glycol 3350 17 Gram(s) Oral daily  senna 1 Tablet(s) Oral daily  tamsulosin 0.4 milliGRAM(s) Oral at bedtime      VITALS:   T(F): 97.1  HR: 60  BP: 92/50  RR: 18  SpO2: 98%    LABS:                        11.4   9.04  )-----------( 277      ( 2023 08:58 )             36.1     07-18    137  |  102  |  19  ----------------------------<  121<H>  3.6   |  21  |  <0.5<L>    Ca    8.2<L>      2023 08:05  Mg     2.0     -        Urinalysis Basic - ( 2023 08:05 )    Color: x / Appearance: x / SG: x / pH: x  Gluc: 121 mg/dL / Ketone: x  / Bili: x / Urobili: x   Blood: x / Protein: x / Nitrite: x   Leuk Esterase: x / RBC: x / WBC x   Sq Epi: x / Non Sq Epi: x / Bacteria: x                    PHYSICAL EXAM:  GEN: No acute distress but looks ill and cachetic   LUNGS: Clear to auscultation bilaterally   HEART: S1/S2 present.    ABD: Soft, MILD tenderness in epigastric area  NEURO: AAOX3

## 2023-07-19 NOTE — CONSULT NOTE ADULT - ASSESSMENT
82F with PMH of HTN, DM, HLD, pancreatic CA, R hydronephrosis s/o ureteral stent, here with VRE UTI, possibly colonized and being monitored off antibiotics Patient has been planned for chemo for mets pancreatic CA/ Found to have L pleural effusion, s/p thora, no plans for repeat thoracentesis. Patient is now opting for hospice. Palliative consulted for GOC.

## 2023-07-19 NOTE — CONSULT NOTE ADULT - PROBLEM SELECTOR RECOMMENDATION 5
- will follow  ______________  Arthur Varma MD  Palliative Medicine  [facility]   of Geriatric and Palliative Medicine  [phone]

## 2023-07-19 NOTE — CONSULT NOTE ADULT - SUBJECTIVE AND OBJECTIVE BOX
HPI: 82F with PMH of HTN, DM, HLD, pancreatic CA, R hydronephrosis s/o ureteral stent, here with VRE UTI, possibly colonized and being monitored off antibiotics Patient has been planned for chemo for mets pancreatic CA/ Found to have L pleural effusion, s/p thora, no plans for repeat thoracentesis. Patient is now opting for hospice. Palliative consulted for GOC.    # Anemia :   - s/p IV venofer  - c/w home iron sulfate   - hb stable around 11  - labs go with JACOB     # diabetes:   A1C 6.6  - on ISS and FS  - FS controlled 120-140's     #MISC  - DVT PPX: heparin   - GI PPX: protonix  - Bowel reg: Senna, miralax, and dulcolax  - Diet: DASH, TLC carb consist  - Activity: Increase as tolerated  - Dispo: Discharge to home hospice , pending CM       PERTINENT PM/SXH:   HTN (hypertension)    Diabetes    Hypercholesteremia    Pancreatic cancer    Hydronephrosis    Colostomy status      History of     History of left hip replacement    H/O carpal tunnel repair      FAMILY HISTORY:  FH: bladder cancer (Sibling)      ITEMS NOT CHECKED ARE NOT PRESENT    SOCIAL HISTORY:   Significant other/partner[ ]  Children[ ]  Tenriism/Spirituality:  Substance hx:  [ ]   Tobacco hx:  [ ]   Alcohol hx: [ ]   Living Situation: [ ]Home  [ ]Long term care  [ ]Rehab [ ]Other  Home Services: [ ] HHA [ ] Visting RN [ ] Hospice  Occupation:  Home Opioid hx:  [ ] Y [ ] N [ ] I-Stop Reference No:    ADVANCE DIRECTIVES:    [ ] Full Code [ ] DNR  MOLST  [ ]  Living Will  [ ]   DECISION MAKER(s):  [ ] Health Care Proxy(s)  [ ] Surrogate(s)  [ ] Guardian           Name(s): Phone Number(s):    BASELINE (I)ADL(s) (prior to admission):  [place]: [ ]Total  [ ] Moderate [ ]Dependent  Palliative Performance Status Version 2:         %    http://npcrc.org/files/news/palliative_performance_scale_ppsv2.pdf    Allergies    No Known Allergies    Intolerances    MEDICATIONS  (STANDING):  atorvastatin 20 milliGRAM(s) Oral at bedtime  dextrose 5%. 1000 milliLiter(s) (100 mL/Hr) IV Continuous <Continuous>  dextrose 5%. 1000 milliLiter(s) (50 mL/Hr) IV Continuous <Continuous>  dextrose 50% Injectable 25 Gram(s) IV Push once  dextrose 50% Injectable 12.5 Gram(s) IV Push once  dextrose 50% Injectable 25 Gram(s) IV Push once  ferrous    sulfate 325 milliGRAM(s) Oral two times a day  glucagon  Injectable 1 milliGRAM(s) IntraMuscular once  heparin   Injectable 5000 Unit(s) SubCutaneous every 12 hours  insulin lispro (ADMELOG) corrective regimen sliding scale   SubCutaneous three times a day before meals  midodrine. 5 milliGRAM(s) Oral three times a day  pantoprazole    Tablet 40 milliGRAM(s) Oral before breakfast  polyethylene glycol 3350 17 Gram(s) Oral daily  senna 1 Tablet(s) Oral daily  tamsulosin 0.4 milliGRAM(s) Oral at bedtime    MEDICATIONS  (PRN):  bisacodyl 5 milliGRAM(s) Oral every 12 hours PRN Constipation  dextrose Oral Gel 15 Gram(s) Oral once PRN Blood Glucose LESS THAN 70 milliGRAM(s)/deciliter      PRESENT SYMPTOMS: [ ]Unable to obtain due to poor mentation   Source if other than patient:  [ ]Family   [ ]Team     Pain: [ ]yes [ ]no  QOL impact -   Location -                    Aggravating factors -  Quality -  Radiation -  Timing-  Severity (0-10 scale):  Minimal acceptable level (0-10 scale):     CPOT:    https://www.sccm.org/getattachment/[email or web address]/Critical-Care-Pain-Observation-Tool-(CPOT)    PAIN AD Score:   http://geriatrictoolkit.[place].[facility]/cog/painad.pdf (press ctrl +  left click to view)    Dyspnea:                           [ ]None[ ]Mild [ ]Moderate [ ]Severe     Respiratory Distress Observation Scale (RDOS):   A score of 0 to 2 signifies little or no respiratory distress, 3 signifies mild distress, scores 4 to 6 indicate moderate distress, and scores greater than 7 signify severe distress  https://www.[facility].ca/sites/default/files/PDFS/674558-nseqjfgdxlt-ebxshqgu-jtgmqvovirw-namfr.pdf    Anxiety:                             [ ]None[ ]Mild [ ]Moderate [ ]Severe   Fatigue:                             [ ]None[ ]Mild [ ]Moderate [ ]Severe   Nausea:                             [ ]None[ ]Mild [ ]Moderate [ ]Severe   Loss of appetite:              [ ]None[ ]Mild [ ]Moderate [ ]Severe   Constipation:                    [ ]None[ ]Mild [ ]Moderate [ ]Severe    Other Symptoms:  [ ]All other review of systems negative     Palliative Performance Status Version 2:         %    http://[facility].org/files/news/palliative_performance_scale_ppsv2.pdf  PHYSICAL EXAM:  Vital Signs Last 24 Hrs  T(C): 36 (2023 12:41), Max: 36.2 (2023 05:00)  T(F): 96.8 (2023 12:41), Max: 97.1 (2023 05:00)  HR: 86 (2023 12:41) (60 - 86)  BP: 104/55 (2023 12:41) (92/50 - 104/55)  BP(mean): --  RR: 16 (2023 12:41) (16 - 18)  SpO2: --     I&O's Summary    2023 07:01  -  2023 07:00  --------------------------------------------------------  IN: 0 mL / OUT: 850 mL / NET: -850 mL        GENERAL:  [X ] No acute distress [ ]Lethargic  [ ]Unarousable  [ ]Verbal  [ ]Non-Verbal [ ]Cachexia    BEHAVIORAL/PSYCH:  [ ]Alert and Oriented x  [ ] Anxiety [ ] Delirium [ ] Agitation [X ] Calm   EYES: [ ] No scleral icterus [ ] Scleral icterus [ ] Closed  ENMT:  [ ]Dry mouth  [ ]No external oral lesions [ ] No external ear or nose lesions  CARDIOVASCULAR:  [ ]Regular [ ]Irregular [ ]Tachy [ ]Not Tachy  [ ]Andrea [ ] Edema [ ] No edema  PULMONARY:  [ ]Tachypnea  [ ]Audible excessive secretions [X ] No labored breathing [ ] labored breathing  GASTROINTESTINAL: [ ]Soft  [ ]Distended  [ X]Not distended [ ]Non tender [ ]Tender  MUSCULOSKELETAL: [ ]No clubbing [ ] clubbing  [ ] No cyanosis [ ] cyanosis  NEUROLOGIC: [ ]No focal deficits  [ ]Follows commands  [ ]Does not follow commands  [ ]Cognitive impairment  [ ]Dysphagia  [ ]Dysarthria  [ ]Paresis   SKIN: [ ] Jaundiced [X ] Non-jaundiced [ ]Rash [ ]No Rash [ ] Warm [ ] Dry  MISC/LINES: [ ] ET tube [ ] Trach [ ]NGT/OGT [ ]PEG [ ]Ray    CRITICAL CARE:  [ ] Shock Present  [ ]Septic [ ]Cardiogenic [ ]Neurologic [ ]Hypovolemic  [ ]  Vasopressors [ ]  Inotropes   [ ]Respiratory failure present [ ]Mechanical ventilation [ ]Non-invasive ventilatory support [ ]High flow  [ ]Acute  [ ]Chronic [ ]Hypoxic  [ ]Hypercarbic [ ]Other  [ ]Other organ failure     LABS: reviewed by me                        11.4   9.04  )-----------( 277      ( 2023 08:58 )             36.1   07    137  |  102  |  19  ----------------------------<  121<H>  3.6   |  21  |  <0.5<L>    Ca    8.2<L>      2023 08:05  Mg     2.0             Urinalysis Basic - ( 2023 08:05 )    Color: x / Appearance: x / SG: x / pH: x  Gluc: 121 mg/dL / Ketone: x  / Bili: x / Urobili: x   Blood: x / Protein: x / Nitrite: x   Leuk Esterase: x / RBC: x / WBC x   Sq Epi: x / Non Sq Epi: x / Bacteria: x      RADIOLOGY & ADDITIONAL STUDIES: reviewed by me    PROTEIN CALORIE MALNUTRITION PRESENT: [ ]mild [ ]moderate [ ]severe [ ]underweight [ ]morbid obesity  https://www.andeal.org/vault/2440/web/files/ONC/Table_Clinical%20Characteristics%20to%20Document%20Malnutrition-White%20JV%20et%20al%2020.pdf    Height (cm): 160 (23 @ 13:20), 162.6 (23 @ 08:16), 162.6 (23 @ 10:30)  Weight (kg): 44.6 (23 @ 13:19), 49 (23 @ 10:30), 49.9 (23 @ 11:57)  BMI (kg/m2): 17.4 (23 @ 13:19), 19.1 (23 @ 13:20), 18.5 (23 @ 08:16)    [ ]PPSV2 < or = to 30% [ ]significant weight loss  [ ]poor nutritional intake  [ ]anasarca      [ ]Artificial Nutrition      Palliative Care Spiritual/Emotional Screening Tool Question  Severity (0-4):                    OR                    [ ] Unable to determine/NA  Score of 2 or greater indicates recommendation of Chaplaincy referral  Chaplaincy Referral: [ ] Yes [ ] Refused [ ] Following     Caregiver [place]:  [ ] Yes [ ] No [ ] Deferred  Social Work Referral [ ]  Patient and Family Centered Care Referral [ ]    Anticipatory Grief Present: [ ] Yes [ ] No [ ] Deferred  Social Work Referral [ ]  Patient and Family Centered Care Referral [ ]    REFERRALS:   [ ]Chaplaincy  [ ]Hospice  [ ]Child Life  [ ]Social Work  [ ]Case management [ ]Holistic Therapy     Palliative care education provided to patient and/or family    Goals of Care Document:     ______________  Arthur Varma MD  Palliative Medicine  [facility]   of Geriatric and Palliative Medicine  [phone]   HPI: 82F with PMH of HTN, DM, HLD, pancreatic CA, R hydronephrosis s/o ureteral stent, here with VRE UTI, possibly colonized and being monitored off antibiotics Patient has been planned for chemo for mets pancreatic CA/ Found to have L pleural effusion, s/p thora, no plans for repeat thoracentesis. Patient is now opting for hospice. Palliative consulted for GOC.    PERTINENT PM/SXH:   HTN (hypertension)    Diabetes    Hypercholesteremia    Pancreatic cancer    Hydronephrosis    Colostomy status      History of     History of left hip replacement    H/O carpal tunnel repair      FAMILY HISTORY:  FH: bladder cancer (Sibling)      ITEMS NOT CHECKED ARE NOT PRESENT    SOCIAL HISTORY:   Significant other/partner[ ]  Children[ ]  Samaritan/Spirituality:  Substance hx:  [ ]   Tobacco hx:  [ ]   Alcohol hx: [ ]   Living Situation: [ ]Home  [ ]Long term care  [ ]Rehab [ ]Other  Home Services: [ ] HHA [ ] Visting RN [ ] Hospice  Occupation:  Home Opioid hx:  [ ] Y [ ] N [ ] I-Stop Reference No:    ADVANCE DIRECTIVES:    [ ] Full Code [ ] DNR  MOLST  [ ]  Living Will  [ ]   DECISION MAKER(s):  [ ] Health Care Proxy(s)  [ ] Surrogate(s)  [ ] Guardian           Name(s): Phone Number(s):    BASELINE (I)ADL(s) (prior to admission):  [place]: [ ]Total  [ ] Moderate [ ]Dependent  Palliative Performance Status Version 2:         %    http://[facility].org/files/news/palliative_performance_scale_ppsv2.pdf    Allergies    No Known Allergies    Intolerances    MEDICATIONS  (STANDING):  atorvastatin 20 milliGRAM(s) Oral at bedtime  dextrose 5%. 1000 milliLiter(s) (100 mL/Hr) IV Continuous <Continuous>  dextrose 5%. 1000 milliLiter(s) (50 mL/Hr) IV Continuous <Continuous>  dextrose 50% Injectable 25 Gram(s) IV Push once  dextrose 50% Injectable 12.5 Gram(s) IV Push once  dextrose 50% Injectable 25 Gram(s) IV Push once  ferrous    sulfate 325 milliGRAM(s) Oral two times a day  glucagon  Injectable 1 milliGRAM(s) IntraMuscular once  heparin   Injectable 5000 Unit(s) SubCutaneous every 12 hours  insulin lispro (ADMELOG) corrective regimen sliding scale   SubCutaneous three times a day before meals  midodrine. 5 milliGRAM(s) Oral three times a day  pantoprazole    Tablet 40 milliGRAM(s) Oral before breakfast  polyethylene glycol 3350 17 Gram(s) Oral daily  senna 1 Tablet(s) Oral daily  tamsulosin 0.4 milliGRAM(s) Oral at bedtime    MEDICATIONS  (PRN):  bisacodyl 5 milliGRAM(s) Oral every 12 hours PRN Constipation  dextrose Oral Gel 15 Gram(s) Oral once PRN Blood Glucose LESS THAN 70 milliGRAM(s)/deciliter      PRESENT SYMPTOMS: [ ]Unable to obtain due to poor mentation   Source if other than patient:  [ ]Family   [ ]Team     Pain: [ ]yes [ ]no  QOL impact -   Location -                    Aggravating factors -  Quality -  Radiation -  Timing-  Severity (0-10 scale):  Minimal acceptable level (0-10 scale):     CPOT:    https://www.[facility].org/getattachment/[email or web address]/Critical-Care-Pain-Observation-Tool-(CPOT)    PAIN AD Score:   http://geriatrictoolkit.[facility]/cog/painad.pdf (press ctrl +  left click to view)    Dyspnea:                           [ ]None[ ]Mild [ ]Moderate [ ]Severe     Respiratory Distress Observation Scale (RDOS):   A score of 0 to 2 signifies little or no respiratory distress, 3 signifies mild distress, scores 4 to 6 indicate moderate distress, and scores greater than 7 signify severe distress  https://www.[facility].ca/sites/default/files/PDFS/705239-pjvcxjotntx-trodngfg-mmsnmsrlgtz-xzqux.pdf    Anxiety:                             [ ]None[ ]Mild [ ]Moderate [ ]Severe   Fatigue:                             [ ]None[ ]Mild [ ]Moderate [ ]Severe   Nausea:                             [ ]None[ ]Mild [ ]Moderate [ ]Severe   Loss of appetite:              [ ]None[ ]Mild [ ]Moderate [ ]Severe   Constipation:                    [ ]None[ ]Mild [ ]Moderate [ ]Severe    Other Symptoms:  [ ]All other review of systems negative     Palliative Performance Status Version 2:         %    http://[facility].org/files/news/palliative_performance_scale_ppsv2.pdf  PHYSICAL EXAM:  Vital Signs Last 24 Hrs  T(C): 36 (2023 12:41), Max: 36.2 (2023 05:00)  T(F): 96.8 (2023 12:41), Max: 97.1 (2023 05:00)  HR: 86 (2023 12:41) (60 - 86)  BP: 104/55 (2023 12:41) (92/50 - 104/55)  BP(mean): --  RR: 16 (2023 12:41) (16 - 18)  SpO2: --     I&O's Summary    2023 07:01  -  2023 07:00  --------------------------------------------------------  IN: 0 mL / OUT: 850 mL / NET: -850 mL        GENERAL:  [X ] No acute distress [ ]Lethargic  [ ]Unarousable  [ ]Verbal  [ ]Non-Verbal [ ]Cachexia    BEHAVIORAL/PSYCH:  [ ]Alert and Oriented x  [ ] Anxiety [ ] Delirium [ ] Agitation [X ] Calm   EYES: [ ] No scleral icterus [ ] Scleral icterus [ ] Closed  ENMT:  [ ]Dry mouth  [ ]No external oral lesions [ ] No external ear or nose lesions  CARDIOVASCULAR:  [ ]Regular [ ]Irregular [ ]Tachy [ ]Not Tachy  [ ]Andrea [ ] Edema [ ] No edema  PULMONARY:  [ ]Tachypnea  [ ]Audible excessive secretions [X ] No labored breathing [ ] labored breathing  GASTROINTESTINAL: [ ]Soft  [ ]Distended  [ X]Not distended [ ]Non tender [ ]Tender  MUSCULOSKELETAL: [ ]No clubbing [ ] clubbing  [ ] No cyanosis [ ] cyanosis  NEUROLOGIC: [ ]No focal deficits  [ ]Follows commands  [ ]Does not follow commands  [ ]Cognitive impairment  [ ]Dysphagia  [ ]Dysarthria  [ ]Paresis   SKIN: [ ] Jaundiced [X ] Non-jaundiced [ ]Rash [ ]No Rash [ ] Warm [ ] Dry  MISC/LINES: [ ] ET tube [ ] Trach [ ]NGT/OGT [ ]PEG [ ]Ray    CRITICAL CARE:  [ ] Shock Present  [ ]Septic [ ]Cardiogenic [ ]Neurologic [ ]Hypovolemic  [ ]  Vasopressors [ ]  Inotropes   [ ]Respiratory failure present [ ]Mechanical ventilation [ ]Non-invasive ventilatory support [ ]High flow  [ ]Acute  [ ]Chronic [ ]Hypoxic  [ ]Hypercarbic [ ]Other  [ ]Other organ failure     LABS: reviewed by me                        11.4   9.04  )-----------( 277      ( 2023 08:58 )             36.1       137  |  102  |  19  ----------------------------<  121<H>  3.6   |  21  |  <0.5<L>    Ca    8.2<L>      2023 08:05  Mg     2.0             Urinalysis Basic - ( 2023 08:05 )    Color: x / Appearance: x / SG: x / pH: x  Gluc: 121 mg/dL / Ketone: x  / Bili: x / Urobili: x   Blood: x / Protein: x / Nitrite: x   Leuk Esterase: x / RBC: x / WBC x   Sq Epi: x / Non Sq Epi: x / Bacteria: x      RADIOLOGY & ADDITIONAL STUDIES: reviewed by me    PROTEIN CALORIE MALNUTRITION PRESENT: [ ]mild [ ]moderate [ ]severe [ ]underweight [ ]morbid obesity  https://www.andeal.org/vault/2440/web/files/ONC/Table_Clinical%20Characteristics%20to%20Document%20Malnutrition-White%20JV%20et%20al%2020.pdf    Height (cm): 160 (23 @ 13:20), 162.6 (23 @ 08:16), 162.6 (23 @ 10:30)  Weight (kg): 44.6 (23 @ 13:19), 49 (23 @ 10:30), 49.9 (23 @ 11:57)  BMI (kg/m2): 17.4 (23 @ 13:19), 19.1 (23 @ 13:20), 18.5 (23 @ 08:16)    [ ]PPSV2 < or = to 30% [ ]significant weight loss  [ ]poor nutritional intake  [ ]anasarca      [ ]Artificial Nutrition      Palliative Care Spiritual/Emotional Screening Tool Question  Severity (0-4):                    OR                    [ ] Unable to determine/NA  Score of 2 or greater indicates recommendation of Chaplaincy referral  Chaplaincy Referral: [ ] Yes [ ] Refused [ ] Following     Caregiver [place]:  [ ] Yes [ ] No [ ] Deferred  Social Work Referral [ ]  Patient and Family Centered Care Referral [ ]    Anticipatory Grief Present: [ ] Yes [ ] No [ ] Deferred  Social Work Referral [ ]  Patient and Family Centered Care Referral [ ]    REFERRALS:   [ ]Chaplaincy  [ ]Hospice  [ ]Child Life  [ ]Social Work  [ ]Case management [ ]Holistic Therapy     Palliative care education provided to patient and/or family    Goals of Care Document:     ______________  Arthur Varma MD  Palliative Medicine  [facility]   of Geriatric and Palliative Medicine  [phone]   HPI: 82F with PMH of HTN, DM, HLD, pancreatic CA, R hydronephrosis s/o ureteral stent, here with VRE UTI, possibly colonized and being monitored off antibiotics Patient has been planned for chemo for mets pancreatic CA/ Found to have L pleural effusion, s/p thora, no plans for repeat thoracentesis. Patient is now opting for hospice. Palliative consulted for GOC.    PERTINENT PM/SXH:   HTN (hypertension)    Diabetes    Hypercholesteremia    Pancreatic cancer    Hydronephrosis    Colostomy status      History of     History of left hip replacement    H/O carpal tunnel repair      FAMILY HISTORY:  FH: bladder cancer (Sibling)      ITEMS NOT CHECKED ARE NOT PRESENT    SOCIAL HISTORY:   Significant other/partner[ ]  Children[ ]  Latter-day/Spirituality:  Substance hx:  [ ]   Tobacco hx:  [ ]   Alcohol hx: [ ]   Living Situation: [ ]Home  [ ]Long term care  [ ]Rehab [ ]Other  Home Services: [ ] HHA [ ] Visting RN [ ] Hospice  Occupation:  Home Opioid hx:  [ ] Y [ ] N [ ] I-Stop Reference No:    ADVANCE DIRECTIVES:    [ ] Full Code [ ] DNR  MOLST  [ ]  Living Will  [ ]   DECISION MAKER(s):  [ ] Health Care Proxy(s)  [ ] Surrogate(s)  [ ] Guardian           Name(s): Phone Number(s):    BASELINE (I)ADL(s) (prior to admission):  [place]: [ ]Total  [ ] Moderate [ ]Dependent  Palliative Performance Status Version 2:         %    http://[facility].org/files/news/palliative_performance_scale_ppsv2.pdf    Allergies    No Known Allergies    Intolerances    MEDICATIONS  (STANDING):  atorvastatin 20 milliGRAM(s) Oral at bedtime  dextrose 5%. 1000 milliLiter(s) (100 mL/Hr) IV Continuous <Continuous>  dextrose 5%. 1000 milliLiter(s) (50 mL/Hr) IV Continuous <Continuous>  dextrose 50% Injectable 25 Gram(s) IV Push once  dextrose 50% Injectable 12.5 Gram(s) IV Push once  dextrose 50% Injectable 25 Gram(s) IV Push once  ferrous    sulfate 325 milliGRAM(s) Oral two times a day  glucagon  Injectable 1 milliGRAM(s) IntraMuscular once  heparin   Injectable 5000 Unit(s) SubCutaneous every 12 hours  insulin lispro (ADMELOG) corrective regimen sliding scale   SubCutaneous three times a day before meals  midodrine. 5 milliGRAM(s) Oral three times a day  pantoprazole    Tablet 40 milliGRAM(s) Oral before breakfast  polyethylene glycol 3350 17 Gram(s) Oral daily  senna 1 Tablet(s) Oral daily  tamsulosin 0.4 milliGRAM(s) Oral at bedtime    MEDICATIONS  (PRN):  bisacodyl 5 milliGRAM(s) Oral every 12 hours PRN Constipation  dextrose Oral Gel 15 Gram(s) Oral once PRN Blood Glucose LESS THAN 70 milliGRAM(s)/deciliter      PRESENT SYMPTOMS: [ ]Unable to obtain due to poor mentation   Source if other than patient:  [ ]Family   [ ]Team     Pain: [ ]yes [X ]no  QOL impact -   Location -                    Aggravating factors -  Quality -  Radiation -  Timing-  Severity (0-10 scale):  Minimal acceptable level (0-10 scale):     CPOT:    https://www.[facility].org/getattachment/[email or web address]/Critical-Care-Pain-Observation-Tool-(CPOT)    PAIN AD Score:   http://geriatrictoolkit.[facility]/cog/painad.pdf (press ctrl +  left click to view)    Dyspnea:                           [ ]None[ ]Mild [ ]Moderate [ ]Severe     Respiratory Distress Observation Scale (RDOS):   A score of 0 to 2 signifies little or no respiratory distress, 3 signifies mild distress, scores 4 to 6 indicate moderate distress, and scores greater than 7 signify severe distress  https://www.[facility].ca/sites/default/files/PDFS/002982-xnvndebcoiy-nayfzruw-zogzuoozuun-dmbki.pdf    Anxiety:                             [ ]None[ ]Mild [ ]Moderate [ ]Severe   Fatigue:                             [ ]None[ ]Mild [ ]Moderate [ ]Severe   Nausea:                             [ ]None[ ]Mild [ ]Moderate [ ]Severe   Loss of appetite:              [ ]None[ ]Mild [ ]Moderate [ ]Severe   Constipation:                    [ ]None[ ]Mild [ ]Moderate [ ]Severe    Other Symptoms:  [X ]All other review of systems negative     Palliative Performance Status Version 2:         %    http://npcrc.org/files/news/palliative_performance_scale_ppsv2.pdf  PHYSICAL EXAM:  Vital Signs Last 24 Hrs  T(C): 36 (2023 12:41), Max: 36.2 (2023 05:00)  T(F): 96.8 (2023 12:41), Max: 97.1 (2023 05:00)  HR: 86 (2023 12:41) (60 - 86)  BP: 104/55 (2023 12:41) (92/50 - 104/55)  BP(mean): --  RR: 16 (2023 12:41) (16 - 18)  SpO2: --     I&O's Summary    2023 07:01  -  2023 07:00  --------------------------------------------------------  IN: 0 mL / OUT: 850 mL / NET: -850 mL        GENERAL:  [X ] No acute distress [ ]Lethargic  [ ]Unarousable  [ ]Verbal  [ ]Non-Verbal [ ]Cachexia    BEHAVIORAL/PSYCH:  [ ]Alert and Oriented x  [ ] Anxiety [ ] Delirium [ ] Agitation [X ] Calm   EYES: [X ] No scleral icterus [ ] Scleral icterus [ ] Closed  ENMT:  [ ]Dry mouth  [ ]No external oral lesions [X ] No external ear or nose lesions  CARDIOVASCULAR:  [ ]Regular [ ]Irregular [ ]Tachy [ ]Not Tachy  [ ]Andrea [ ] Edema [ ] No edema  PULMONARY:  [ ]Tachypnea  [ ]Audible excessive secretions [X ] No labored breathing [ ] labored breathing  GASTROINTESTINAL: [ ]Soft  [ ]Distended  [ X]Not distended [ ]Non tender [ ]Tender  MUSCULOSKELETAL: [ ]No clubbing [ ] clubbing  [X ] No cyanosis [ ] cyanosis  NEUROLOGIC: [ ]No focal deficits  [X ]Follows commands  [ ]Does not follow commands  [ ]Cognitive impairment  [ ]Dysphagia  [ ]Dysarthria  [ ]Paresis   SKIN: [ ] Jaundiced [X ] Non-jaundiced [ ]Rash [ ]No Rash [ ] Warm [ ] Dry  MISC/LINES: [ ] ET tube [ ] Trach [ ]NGT/OGT [ ]PEG [ ]Ray    CRITICAL CARE:  [ ] Shock Present  [ ]Septic [ ]Cardiogenic [ ]Neurologic [ ]Hypovolemic  [ ]  Vasopressors [ ]  Inotropes   [ ]Respiratory failure present [ ]Mechanical ventilation [ ]Non-invasive ventilatory support [ ]High flow  [ ]Acute  [ ]Chronic [ ]Hypoxic  [ ]Hypercarbic [ ]Other  [ ]Other organ failure     LABS: reviewed by me                        11.4   9.04  )-----------( 277      ( 2023 08:58 )             36.1       137  |  102  |  19  ----------------------------<  121<H>  3.6   |  21  |  <0.5<L>    Ca    8.2<L>      2023 08:05  Mg     2.0             Urinalysis Basic - ( 2023 08:05 )    Color: x / Appearance: x / SG: x / pH: x  Gluc: 121 mg/dL / Ketone: x  / Bili: x / Urobili: x   Blood: x / Protein: x / Nitrite: x   Leuk Esterase: x / RBC: x / WBC x   Sq Epi: x / Non Sq Epi: x / Bacteria: x      RADIOLOGY & ADDITIONAL STUDIES: reviewed by me    PROTEIN CALORIE MALNUTRITION PRESENT: [ ]mild [ ]moderate [ ]severe [ ]underweight [ ]morbid obesity  https://www.andeal.org/vault/2440/web/files/ONC/Table_Clinical%20Characteristics%20to%20Document%20Malnutrition-White%20JV%20et%20al%144200.pdf    Height (cm): 160 (23 @ 13:20), 162.6 (23 @ 08:16), 162.6 (23 @ 10:30)  Weight (kg): 44.6 (23 @ 13:19), 49 (23 @ 10:30), 49.9 (23 @ 11:57)  BMI (kg/m2): 17.4 (23 @ 13:19), 19.1 (23 @ 13:20), 18.5 (23 @ 08:16)    [ ]PPSV2 < or = to 30% [ ]significant weight loss  [ ]poor nutritional intake  [ ]anasarca      [ ]Artificial Nutrition      Palliative Care Spiritual/Emotional Screening Tool Question  Severity (0-4):                    OR                    [ ] Unable to determine/NA  Score of 2 or greater indicates recommendation of Chaplaincy referral  Chaplaincy Referral: [ ] Yes [ ] Refused [ ] Following     Caregiver [place]:  [ ] Yes [ ] No [ ] Deferred  Social Work Referral [ ]  Patient and Family Centered Care Referral [ ]    Anticipatory Grief Present: [ ] Yes [ ] No [ ] Deferred  Social Work Referral [ ]  Patient and Family Centered Care Referral [ ]    REFERRALS:   [ ]Chaplaincy  [ ]Hospice  [ ]Child Life  [ ]Social Work  [ ]Case management [ ]Holistic Therapy     Palliative care education provided to patient and/or family    Goals of Care Document:     ______________  Arthur Varma MD  Palliative Medicine  [facility]   of Geriatric and Palliative Medicine  [phone]

## 2023-07-19 NOTE — PROGRESS NOTE ADULT - ASSESSMENT
82-year-old woman with past medical history of hypertension, diabetes, hyperlipidemia, pancreatic adenocarcinoma status post transverse loop colostomy, right hydronephrosis s/p ureteral stent presenting for a UTI VRE of 1 month duration failed OP treatment now admitted for management of asymptomatic UTI VRE c/b positive Bcx that was determined to be contaminated and now pending discharge.    # pt asked for hospice eval  hospice consult placed - for home  can stop ordering labs/studies  can stop all FS    # Pancreatic mass with mets; Mets possibly to the lungs with large Lt malign pleural effusion   - CTA chest Abdomen pelvis showed:  * Unchanged ill-defined pancreatic mass.  * Increased moderate-to-large left pleural effusion with overlying compressive atelectasis. New small right pleural effusion. Unchanged right lung pulmonary nodules, previously characterized on 2/16/2023 PET/CT.  - Previous CTAP showed:  * Soft tissue mass adjacent to the sigmoid colon measuring approximately 3.1 x 2.5 cm.  * Right double-J ureteral stent with stable moderate hydroureter.  * Grossly stable ill-defined mass at the pancreatic head, consistent with known pancreatic neoplasm  L pleural exudative effusion tapped by pulm: cytology + for met adeno in pl fluid (7/13)  Onc: no further chemo - hospice  7/14 pulm placed a pleurx catheter - can drain 1L q2-3 days at home - family teaching  CTA chest PE 7/15 - no PE  on RA  Pulm: no plan for rt side pl eff; outpt pulm f/u as needed  can cont midodrine for now    # bld cx +  grew kocuria, discussed with ID, likely contaminant, no need to treat    # VRE UTI failed OP therapy  pt had chr smith on arrival (see H&P) - smith d/c'd 7/7 - passed TOV; cont flomax  still has rt Dbl J stent - outpt uro f/u as needed  U/A +  - CTA chest Abdomen pelvis showed::  Decreased mild right hydronephrosis, s/p double-J ureteral stent placement, with persistent increased enhancement of ureter, suspicious for urinary tract infection.  Previous CTAP showed: Right double-J ureteral stent with stable moderate hydroureter.  05/25 Urine Cx showed VRE only sensitive to zyvox   Per ID, seems colonized w VRE and asymptomatic, monitor off abx    # constipation  c/w bowel reg    # Anemia :   s/p IV venofer  can d/c iron sulfate     # diabetes:   A1C 6.6  d/c FS and SSI    # DLD  can d/c statin    # DVT PPX: heparin sc (OK to d/c, if pt does not want)    # GI PPX: protonix q24    # Activity: w/ asst to chair    # pt's family is COVID +, but plan is to take pt home (not temp place in [place])    Dispo: hospice; as above  eventually, pt will go home on hospice - f/u CM - anticipate d/c won   82-year-old woman with past medical history of hypertension, diabetes, hyperlipidemia, pancreatic adenocarcinoma status post transverse loop colostomy, right hydronephrosis s/p ureteral stent presenting for a UTI VRE of 1 month duration failed OP treatment now admitted for management of asymptomatic UTI VRE c/b positive Bcx that was determined to be contaminated and now pending discharge.    # pt asked for hospice eval  hospice consult placed - for home  can stop ordering labs/studies  can stop all FS    # Pancreatic mass with mets; Mets possibly to the lungs with large Lt malign pleural effusion   - CTA chest Abdomen pelvis showed:  * Unchanged ill-defined pancreatic mass.  * Increased moderate-to-large left pleural effusion with overlying compressive atelectasis. New small right pleural effusion. Unchanged right lung pulmonary nodules, previously characterized on 2/16/2023 PET/CT.  - Previous CTAP showed:  * Soft tissue mass adjacent to the sigmoid colon measuring approximately 3.1 x 2.5 cm.  * Right double-J ureteral stent with stable moderate hydroureter.  * Grossly stable ill-defined mass at the pancreatic head, consistent with known pancreatic neoplasm  L pleural exudative effusion tapped by pulm: cytology + for met adeno in pl fluid (7/13)  Onc: no further chemo - hospice  7/14 pulm placed a pleurx catheter - can drain 1L q2-3 days at home - family teaching  CTA chest PE 7/15 - no PE  on RA  Pulm: no plan for rt side pl eff; outpt pulm f/u as needed  can cont midodrine for now    # bld cx +  grew kocuria, discussed with ID, likely contaminant, no need to treat    # VRE UTI failed OP therapy  pt had chr smith on arrival (see H&P) - smith d/c'd 7/7 - passed TOV; cont flomax  still has rt Dbl J stent - outpt uro f/u as needed  U/A +  - CTA chest Abdomen pelvis showed::  Decreased mild right hydronephrosis, s/p double-J ureteral stent placement, with persistent increased enhancement of ureter, suspicious for urinary tract infection.  Previous CTAP showed: Right double-J ureteral stent with stable moderate hydroureter.  05/25 Urine Cx showed VRE only sensitive to zyvox   Per ID, seems colonized w VRE and asymptomatic, monitor off abx    # constipation  c/w bowel reg    # Anemia :   s/p IV venofer  can d/c iron sulfate     # diabetes:   A1C 6.6  d/c FS and SSI    # DLD  can d/c statin    # BMI 17.4 = underweight; + malignant cachexia; severe malnutrition  supplement as tolerated  prog poor    # DVT PPX: heparin sc (OK to d/c, if pt does not want)    # GI PPX: protonix q24    # Activity: w/ asst to chair    # pt's family is COVID +, but plan is to take pt home (not temp place in NH)    Dispo: hospice; as above  eventually, pt will go home on hospice - f/u CM - anticipate d/c won

## 2023-07-19 NOTE — HOSPICE CARE NOTE - CONVESATION DETAILS
Intake has spoken to daughter Esha - of which she would like to take the patient home and avoid rehab if possible, as the patients wishes to be comfortable. Family wishes to stay with services and agency that works with Dr. Vasques,  patient is comfortable with agency that has been provided in the past. Would require daily plurex drains as per daughter, as she stated   Dr. Vasques stated Hospice would provide. Please request services with [facility] as [facility] is the agency of which the family is comfortable with. MELANIE Whitaker made aware.

## 2023-07-19 NOTE — GOALS OF CARE CONVERSATION - ADVANCED CARE PLANNING - CONVERSATION DETAILS
Met with patient at bedside. AOx4. Reviewed role of palliative care and palliative SW with patient. She knows palliative care from the passing of her . She discussed her hx of being in and out of hospital and rehabs leading up to current admission. We discussed advance directives including code status and hospice at length. patient wished to put in place DNR/DNI. She discussed her daughter knows her wishes and also open to speaking with hospice care and focusing on comfort in her EOL - hoping she can be at home at peace. all questions answered. support rendered. contact info provided.     MOLST completed and placed in chart.     CM notified re: hospice referral.     bedside RN notified.

## 2023-07-19 NOTE — PROGRESS NOTE ADULT - ASSESSMENT
· Assessment    82-year-old female with past medical history of hypertension, diabetes, hyperlipidemia, pancreatic adenocarcinoma status post transverse loop colostomy, right hydronephrosis s/p ureteral stent presenting for a UTI VRE of 1 month duration failed OP treatment now admitted for management of asymptomatic UTI VRE c/b positive Bcx that was determined to be contaminated and now pending discharge.    # Pancreatic mass with mets   # Mets possibly to the lungs with large LL pleural effusion   - CTA chest Abdomen pelvis showed:  * Unchanged ill-defined pancreatic mass.  *  Increased moderate-to-large left pleural effusion with overlying compressive atelectasis. New small right pleural effusion. Unchanged right lung pulmonary nodules, previously characterized on 2/16/2023 PET/CT.  - Previous CTAP showed   * Soft tissue mass adjacent to the sigmoid colon measuring approximately 3.1 x 2.5 cm.  * Right double-J ureteral stent with stable moderate hydroureter.  * Grossly stable ill-defined mass at the pancreatic head, consistent with known pancreatic neoplasm  - L pleural effusion tapped by pulm, f/u cytology to r/o malignancy - exudative in nature by protein ratio and Light's criteria  - Oncology will set up outpt txt for chemo after bacteremia is no longer a concern  some hypotension overnight, given 500 cc fluids  - 7/14 pulm placed a pleurx catheter  - CTA chest PE 7/15 - no PE, findings as above  - Per pulm, drain no more than1 L in 48 hrs, call MD when drainage persistently less than 200 cc  - drained around 850 cc yesterday   - satting 98% on RA  - Discussed with pulm (7/17), no R lung thora planned prior to discharge, drain L pleurx catheter  - Discussed with hem/onc (7/17), possible chemotherapy as inpatient , will finalize a note today.   - Appreciate pulm and hem/onc recs    #bacteremia?  blood cx from 7/5 grew Gram positive cocci in clusters, started Vanco 1 g Q 24  - d/w ID, likely contaminant, f/u sensitivities, DC vanco, f/u repeat blood Cx  - IV fluids 75 cc/ hr  - one IV on R arm that appears uninfected  - grew kocuria, discussed with ID, likely contaminant no need to treat    # VRE UTI failed OP therapy  - UA grossly positive for RBC and WBC   - CTA chest Abdomen pelvis showed::  Decreased mild right hydronephrosis, post double-J ureteral stent placement, with persistent increased enhancement of ureter, suspicious for urinary tract infection.  Previous CTAP showed: Right double-J ureteral stent with stable moderate hydroureter.  - 05/25   Urine Cx showed VRE only sensitive to zyvox   -UA positive  -Per ID, seems colonized w VRE but asymptomatic, monitor off abx  - passed TOV    # Anemia :   - s/p IV venofer  - c/w home iron sulfate   - hb stable around 11  - labs go with JACOB     # diabetes:   A1C 6.6  - on ISS and FS  - FS controlled 120-140's     #MISC  - DVT PPX: heparin   - GI PPX: protonix  - Bowel reg: Senna, miralax, and dulcolax  - Diet: DASH, TLC carb consist  - Activity: Increase as tolerated  - Dispo: Discharge pending hem-onc f/u for poss inpatient chemotherapy   · Assessment    82-year-old female with past medical history of hypertension, diabetes, hyperlipidemia, pancreatic adenocarcinoma status post transverse loop colostomy, right hydronephrosis s/p ureteral stent presenting for a UTI VRE of 1 month duration failed OP treatment now admitted for management of asymptomatic UTI VRE c/b positive Bcx that was determined to be contaminated and now pending discharge.    # Pancreatic mass with mets   # Mets possibly to the lungs with large LL pleural effusion   - CTA chest Abdomen pelvis showed:  * Unchanged ill-defined pancreatic mass.  *  Increased moderate-to-large left pleural effusion with overlying compressive atelectasis. New small right pleural effusion. Unchanged right lung pulmonary nodules, previously characterized on 2/16/2023 PET/CT.  - Previous CTAP showed   * Soft tissue mass adjacent to the sigmoid colon measuring approximately 3.1 x 2.5 cm.  * Right double-J ureteral stent with stable moderate hydroureter.  * Grossly stable ill-defined mass at the pancreatic head, consistent with known pancreatic neoplasm  - L pleural effusion tapped by pulm, f/u cytology to r/o malignancy - exudative in nature by protein ratio and Light's criteria  - Oncology will set up outpt txt for chemo after bacteremia is no longer a concern  some hypotension overnight, given 500 cc fluids  - 7/14 pulm placed a pleurx catheter  - CTA chest PE 7/15 - no PE, findings as above  - Per pulm, drain no more than1 L in 48 hrs, call MD when drainage persistently less than 200 cc  - drained around 850 cc yesterday   - satting 98% on RA  - Discussed with pulm (7/17), no R lung thora planned prior to discharge, drain L pleurx catheter  - Discussed with hem/onc (7/17), patient and family refusing chemo and wants hospice ; hospice consulted   - Appreciate pulm and hem/onc recs    #bacteremia?  blood cx from 7/5 grew Gram positive cocci in clusters, started Vanco 1 g Q 24  - d/w ID, likely contaminant, f/u sensitivities, DC vanco, f/u repeat blood Cx  - IV fluids 75 cc/ hr  - one IV on R arm that appears uninfected  - grew kocuria, discussed with ID, likely contaminant no need to treat    # VRE UTI failed OP therapy  - UA grossly positive for RBC and WBC   - CTA chest Abdomen pelvis showed::  Decreased mild right hydronephrosis, post double-J ureteral stent placement, with persistent increased enhancement of ureter, suspicious for urinary tract infection.  Previous CTAP showed: Right double-J ureteral stent with stable moderate hydroureter.  - 05/25   Urine Cx showed VRE only sensitive to zyvox   -UA positive  -Per ID, seems colonized w VRE but asymptomatic, monitor off abx  - passed TOV    # Anemia :   - s/p IV venofer  - c/w home iron sulfate   - hb stable around 11  - labs go with JACOB     # diabetes:   A1C 6.6  - on ISS and FS  - FS controlled 120-140's     #MISC  - DVT PPX: heparin   - GI PPX: protonix  - Bowel reg: Senna, miralax, and dulcolax  - Diet: DASH, TLC carb consist  - Activity: Increase as tolerated  - Dispo: Discharge to home hospice , pending CM

## 2023-07-20 NOTE — PROGRESS NOTE ADULT - ASSESSMENT
Assessment    82-year-old female with past medical history of hypertension, diabetes, hyperlipidemia, pancreatic adenocarcinoma status post transverse loop colostomy, right hydronephrosis s/p ureteral stent presenting for a UTI VRE of 1 month duration failed OP treatment now admitted for management of asymptomatic UTI VRE c/b positive Bcx that was determined to be contaminated and now pending discharge.    # Pancreatic mass with mets   # Mets to the lungs with large LL pleural effusion ( pleural fluid cytology came back positive )   - CTA chest Abdomen pelvis showed:  * Unchanged ill-defined pancreatic mass.  *  Increased moderate-to-large left pleural effusion with overlying compressive atelectasis. New small right pleural effusion. Unchanged right lung pulmonary nodules, previously characterized on 2/16/2023 PET/CT.  - Previous CTAP showed   * Soft tissue mass adjacent to the sigmoid colon measuring approximately 3.1 x 2.5 cm.  * Right double-J ureteral stent with stable moderate hydroureter.  * Grossly stable ill-defined mass at the pancreatic head, consistent with known pancreatic neoplasm  - L pleural effusion tapped by pulm, f/u cytology to r/o malignancy - exudative in nature by protein ratio and Light's criteria  - Oncology will set up outpt txt for chemo after bacteremia is no longer a concern  some hypotension overnight, given 500 cc fluids  - 7/14 pulm placed a pleurx catheter  - CTA chest PE 7/15 - no PE, findings as above  - Per pulm, drain no more than1 L in 48 hrs, call MD when drainage persistently less than 200 cc  - drained around 850 cc yesterday   - satting 98% on RA  - Discussed with pulm (7/17), no R lung thora planned prior to discharge, drain L pleurx catheter , family to be taught how use at home ; drain 1 L every 2-3 days   - Discussed with hem/onc (7/17), patient and family refusing chemo and wants hospice ; hospice consulted   - Appreciate pulm and hem/onc recs    #bacteremia?  blood cx from 7/5 grew Gram positive cocci in clusters, started Vanco 1 g Q 24  - d/w ID, likely contaminant, f/u sensitivities, DC vanco, f/u repeat blood Cx  - IV fluids 75 cc/ hr  - one IV on R arm that appears uninfected  - grew kocuria, discussed with ID, likely contaminant no need to treat    # VRE UTI failed OP therapy  - UA grossly positive for RBC and WBC   - CTA chest Abdomen pelvis showed::  Decreased mild right hydronephrosis, post double-J ureteral stent placement, with persistent increased enhancement of ureter, suspicious for urinary tract infection.  Previous CTAP showed: Right double-J ureteral stent with stable moderate hydroureter.  - 05/25   Urine Cx showed VRE only sensitive to zyvox   -UA positive  -Per ID, seems colonized w VRE but asymptomatic, monitor off abx  - passed TOV    # Anemia :   - s/p IV venofer  - d/c home iron sulfate   - hb stable around 11  - labs go with JACOB     # diabetes:   A1C 6.6  - on ISS and FS  - FS controlled 120-140's  - d/c FS monitoring      #MISC  - DVT PPX: heparin   - GI PPX: protonix  - Bowel reg: Senna, miralax, and dulcolax  - Diet: DASH, TLC carb consist  - Activity: Increase as tolerated  - Dispo: Discharge to home hospice , pending CM  , stop labs       Assessment    82-year-old female with past medical history of hypertension, diabetes, hyperlipidemia, pancreatic adenocarcinoma status post transverse loop colostomy, right hydronephrosis s/p ureteral stent presenting for a UTI VRE of 1 month duration failed OP treatment now admitted for management of asymptomatic UTI VRE c/b positive Bcx that was determined to be contaminated and now pending discharge.    # Pancreatic mass with mets   # Mets to the lungs with large LL pleural effusion ( pleural fluid cytology came back positive )   - CTA chest Abdomen pelvis showed:  * Unchanged ill-defined pancreatic mass.  *  Increased moderate-to-large left pleural effusion with overlying compressive atelectasis. New small right pleural effusion. Unchanged right lung pulmonary nodules, previously characterized on 2/16/2023 PET/CT.  - Previous CTAP showed   * Soft tissue mass adjacent to the sigmoid colon measuring approximately 3.1 x 2.5 cm.  * Right double-J ureteral stent with stable moderate hydroureter.  * Grossly stable ill-defined mass at the pancreatic head, consistent with known pancreatic neoplasm  - L pleural effusion tapped by pulm, f/u cytology to r/o malignancy - exudative in nature by protein ratio and Light's criteria  - Oncology will set up outpt txt for chemo after bacteremia is no longer a concern  some hypotension overnight, given 500 cc fluids  - 7/14 pulm placed a pleurx catheter  - CTA chest PE 7/15 - no PE, findings as above  - Per pulm, drain no more than1 L in 48 hrs, call MD when drainage persistently less than 200 cc  - satting 98% on RA  - Discussed with pulm (7/17), no R lung thora planned prior to discharge, drain L pleurx catheter , family to be taught how use at home ; drain 1 L every 2-3 days   - Discussed with hem/onc (7/17), patient and family refusing chemo and wants hospice ; hospice consulted   - Appreciate pulm and hem/onc recs  - give morphine 6 mg p.o q3 PRN for pain     #bacteremia?  blood cx from 7/5 grew Gram positive cocci in clusters, started Vanco 1 g Q 24  - d/w ID, likely contaminant, f/u sensitivities, DC vanco, f/u repeat blood Cx  - IV fluids 75 cc/ hr  - one IV on R arm that appears uninfected  - grew kocuria, discussed with ID, likely contaminant no need to treat  - has mild hypotension / started on midodrine 5 mg Q8     # VRE UTI failed OP therapy  - UA grossly positive for RBC and WBC   - CTA chest Abdomen pelvis showed::  Decreased mild right hydronephrosis, post double-J ureteral stent placement, with persistent increased enhancement of ureter, suspicious for urinary tract infection.  Previous CTAP showed: Right double-J ureteral stent with stable moderate hydroureter.  - 05/25   Urine Cx showed VRE only sensitive to zyvox   -UA positive  -Per ID, seems colonized w VRE but asymptomatic, monitor off abx  - passed TOV    # Anemia :   - s/p IV venofer  - d/c home iron sulfate   - hb stable around 11  - labs go with JACOB     # diabetes:   A1C 6.6  - on ISS and FS  - FS controlled 120-140's  - d/c FS monitoring      #MISC  - DVT PPX: dc'ed / patient in hospice and requested to stop it   - GI PPX: protonix  - Bowel reg: Senna, miralax, and dulcolax  - Diet: DASH, TLC carb consist  - Activity: Increase as tolerated  - Dispo: Discharge to home hospice , pending CM  , stop labs

## 2023-07-20 NOTE — PROGRESS NOTE ADULT - SUBJECTIVE AND OBJECTIVE BOX
ALLIDAHIANADELORIS  82y  Female  ***My note supersedes ALL resident notes that I sign.  My corrections for their notes are in my note.***    I can be reached directly on [facility] [employer]. My office number is [phone]. My personal cell number is [phone].    INTERVAL EVENTS: Here for f/u of panc cancer. Pt is comfortable. She asked me to stop the DVT ppx.    T(F): 96.8 (07-20-23 @ 13:14), Max: 97.6 (07-19-23 @ 20:34)  HR: 85 (07-20-23 @ 13:14) (76 - 89)  BP: 105/54 (07-20-23 @ 13:14) (99/61 - 121/59)  RR: 16 (07-20-23 @ 13:14) (16 - 18)  SpO2: --    Gen: NAD; thin; frail  HEENT: PERRL, EOMI, mouth clr, nose clr  Neck: no nodes, no JVD, thyroid nl  chest: left pigtail pleural cath  lungs: clr  hrt: s1 s2 rrr no murmur  abd: soft, NT/ND, no HS megaly; + colostomy  ext: no edema, no c/c  neuro: aa ox3, cn intact, can move all 4 ext    LABS:    RADIOLOGY & ADDITIONAL TESTS:    MEDICATIONS:    atorvastatin 20 milliGRAM(s) Oral at bedtime  bisacodyl 5 milliGRAM(s) Oral every 12 hours PRN  heparin   Injectable 5000 Unit(s) SubCutaneous every 12 hours  midodrine. 5 milliGRAM(s) Oral three times a day  morphine  - Injectable 2 milliGRAM(s) IV Push every 4 hours PRN  pantoprazole    Tablet 40 milliGRAM(s) Oral before breakfast  polyethylene glycol 3350 17 Gram(s) Oral daily  senna 1 Tablet(s) Oral daily  tamsulosin 0.4 milliGRAM(s) Oral at bedtime

## 2023-07-20 NOTE — PROGRESS NOTE ADULT - PROBLEM SELECTOR PLAN 5
- will sign off  - reconsult PRN  ______________  Arthur Varma MD  Palliative Medicine  [facility]   of Geriatric and Palliative Medicine  [phone]

## 2023-07-20 NOTE — PROGRESS NOTE ADULT - SUBJECTIVE AND OBJECTIVE BOX
SUBJECTIVE:  HPI:  82-year-old female with past medical history of hypertension, diabetes, hyperlipidemia, pancreatic adenocarcinoma status post transverse loop colostomy, right hydronephrosis s/p ureteral stent presenting for a UTI VRE of 1 month duration failed OP treatment and as per the chart was supposed to have a PICC line but procedure delayed presents for IV antibiotics treatment. Last urine culture in  showed VRE sensitive only to zyvox. Patient is a poor historian reported that suprapubic abdominal discomfort started 1 month ago and the patient was unsuccessfully treated with different antibiotics. Patient still complains of suprapubic discomfort and burning, and lower back pain, Patient reports mild epigastric discomfort today . Patient denies any SOB, CP, NV, diarrhea, constipation fever, respiratory symptoms.     Patient was done through the OP MedRec, Patient is not aware of the medications she take , please confirm [facility]Rec in AM [facility], [phone].      Labs noticeable for Hb 10.2 at baseline,   UA grossly positive for RBC and WBC   CTA chest Abdomen pelvis showed:    * Decreased mild right hydronephrosis, post double-J ureteral stent placement, with persistent increased enhancement of ureter, suspicious for urinary tract infection.  * Unchanged ill-defined pancreatic mass.  *  Increased moderate-to-large left pleural effusion with overlying compressive atelectasis. New small right pleural effusion. Unchanged right lung pulmonary nodules, previously characterized on 2023 PET/CT.    Previous CTAP showed   * Soft tissue mass adjacent to the sigmoid colon measuring approximately 3.1 x 2.5 cm.  * Right double-J ureteral stent with stable moderate hydroureter.  * Grossly stable ill-defined mass at the pancreatic head, consistent with known pancreatic neoplasm.    VS insignificant     Vital Signs Last 24 Hrs  T(C): 36.2 (2023 23:43), Max: 36.6 (2023 17:19)  T(F): 97.1 (2023 23:43), Max: 97.9 (2023 17:19)  HR: 78 (2023 23:43) (78 - 103)  BP: 118/74 (2023 23:43) (101/72 - 118/74)  RR: 18 (2023 23:43) (18 - 18)  SpO2: 100% (2023 23:43) (98% - 100%)  Patient On (Oxygen Delivery Method): room air             (2023 02:54)      Patient is a 82y old Female who presents with a chief complaint of UTI (2023 10:29)    Currently admitted to medicine with the primary diagnosis of Pancreatic cancer       Today is hospital day 15d.     PAST MEDICAL & SURGICAL HISTORY  HTN (hypertension)    Diabetes    Hypercholesteremia    Pancreatic cancer    Hydronephrosis    Colostomy status    History of     History of left hip replacement    H/O carpal tunnel repair        ALLERGIES:  No Known Allergies    MEDICATIONS:  ACTIVE MEDICATIONS  atorvastatin 20 milliGRAM(s) Oral at bedtime  bisacodyl 5 milliGRAM(s) Oral every 12 hours PRN  dextrose 5%. 1000 milliLiter(s) IV Continuous <Continuous>  dextrose 5%. 1000 milliLiter(s) IV Continuous <Continuous>  dextrose 50% Injectable 25 Gram(s) IV Push once  dextrose 50% Injectable 12.5 Gram(s) IV Push once  dextrose 50% Injectable 25 Gram(s) IV Push once  dextrose Oral Gel 15 Gram(s) Oral once PRN  glucagon  Injectable 1 milliGRAM(s) IntraMuscular once  heparin   Injectable 5000 Unit(s) SubCutaneous every 12 hours  insulin lispro (ADMELOG) corrective regimen sliding scale   SubCutaneous three times a day before meals  midodrine. 5 milliGRAM(s) Oral three times a day  pantoprazole    Tablet 40 milliGRAM(s) Oral before breakfast  polyethylene glycol 3350 17 Gram(s) Oral daily  senna 1 Tablet(s) Oral daily  tamsulosin 0.4 milliGRAM(s) Oral at bedtime      VITALS:   T(F): 96.3  HR: 82  BP: 99/61  RR: 18  SpO2: --    LABS:                        11.4   9.04  )-----------( 277      ( 2023 08:58 )             36.1       Mg     2.0     07-19                        PHYSICAL EXAM:  GEN: No acute distress , but cachectic and ill looking   LUNGS: Clear to auscultation bilaterally   HEART: S1/S2 present.    ABD: Soft, non-tender, non-distended.   EXT: No pedal edema  NEURO: AAOX3

## 2023-07-20 NOTE — PROGRESS NOTE ADULT - SUBJECTIVE AND OBJECTIVE BOX
HPI: 82F with PMH of HTN, DM, HLD, pancreatic CA, R hydronephrosis s/o ureteral stent, here with VRE UTI, possibly colonized and being monitored off antibiotics Patient has been planned for chemo for mets pancreatic CA/ Found to have L pleural effusion, s/p thora, no plans for repeat thoracentesis. Patient is now opting for hospice. Palliative consulted for [facility].    INTERVAL EVENTS:  7/20/23: patient is comfortable today    ADVANCE DIRECTIVES:    [ ] Full Code [X ] DNR  MOLST  [X ]  Living Will  [ ]   DECISION MAKER(s):  [ ] Health Care Proxy(s)  [ ] Surrogate(s)  [ ] Guardian           Name(s): Phone Number(s):    BASELINE (I)ADL(s) (prior to admission):  [place]: [ ]Total  [ ] Moderate [ ]Dependent  Palliative Performance Status Version 2:         %    [email or web address]    Allergies    No Known Allergies    Intolerances    MEDICATIONS  (STANDING):  atorvastatin 20 milliGRAM(s) Oral at bedtime  dextrose 5%. 1000 milliLiter(s) (100 mL/Hr) IV Continuous <Continuous>  dextrose 5%. 1000 milliLiter(s) (50 mL/Hr) IV Continuous <Continuous>  dextrose 50% Injectable 25 Gram(s) IV Push once  dextrose 50% Injectable 12.5 Gram(s) IV Push once  dextrose 50% Injectable 25 Gram(s) IV Push once  ferrous    sulfate 325 milliGRAM(s) Oral two times a day  glucagon  Injectable 1 milliGRAM(s) IntraMuscular once  heparin   Injectable 5000 Unit(s) SubCutaneous every 12 hours  insulin lispro (ADMELOG) corrective regimen sliding scale   SubCutaneous three times a day before meals  midodrine. 5 milliGRAM(s) Oral three times a day  pantoprazole    Tablet 40 milliGRAM(s) Oral before breakfast  polyethylene glycol 3350 17 Gram(s) Oral daily  senna 1 Tablet(s) Oral daily  tamsulosin 0.4 milliGRAM(s) Oral at bedtime    MEDICATIONS  (PRN):  bisacodyl 5 milliGRAM(s) Oral every 12 hours PRN Constipation  dextrose Oral Gel 15 Gram(s) Oral once PRN Blood Glucose LESS THAN 70 milliGRAM(s)/deciliter      PRESENT SYMPTOMS: [ ]Unable to obtain due to poor mentation   Source if other than patient:  [ ]Family   [ ]Team     Pain: [ ]yes [X ]no  QOL impact -   Location -                    Aggravating factors -  Quality -  Radiation -  Timing-  Severity (0-10 scale):  Minimal acceptable level (0-10 scale):     CPOT:    [email or web address]://[email or web address]/Critical-Care-Pain-Observation-Tool-(CPOT)    PAIN AD Score:   [email or web address]/painad.pdf (press ctrl +  left click to view)    Dyspnea:                           [ ]None[ ]Mild [ ]Moderate [ ]Severe     Respiratory Distress Observation Scale (RDOS):   A score of 0 to 2 signifies little or no respiratory distress, 3 signifies mild distress, scores 4 to 6 indicate moderate distress, and scores greater than 7 signify severe distress  [email or web address]://[email or web address]/sites/default/files/[email or web address]-yzwidfklseq-nfzsdheg-zvgdfkjewxv-cdbny.pdf    Anxiety:                             [ ]None[ ]Mild [ ]Moderate [ ]Severe   Fatigue:                             [ ]None[ ]Mild [ ]Moderate [ ]Severe   Nausea:                             [ ]None[ ]Mild [ ]Moderate [ ]Severe   Loss of appetite:              [ ]None[ ]Mild [ ]Moderate [ ]Severe   Constipation:                    [ ]None[ ]Mild [ ]Moderate [ ]Severe    Other Symptoms:  [X ]All other review of systems negative     Palliative Performance Status Version 2:         %    [email or web address]  PHYSICAL EXAM:  Vital Signs Last 24 Hrs  T(C): 36 (19 Jul 2023 12:41), Max: 36.2 (19 Jul 2023 05:00)  T(F): 96.8 (19 Jul 2023 12:41), Max: 97.1 (19 Jul 2023 05:00)  HR: 86 (19 Jul 2023 12:41) (60 - 86)  BP: 104/55 (19 Jul 2023 12:41) (92/50 - 104/55)  BP(mean): --  RR: 16 (19 Jul 2023 12:41) (16 - 18)  SpO2: --     I&O's Summary    18 Jul 2023 07:01  -  19 Jul 2023 07:00  --------------------------------------------------------  IN: 0 mL / OUT: 850 mL / NET: -850 mL        GENERAL:  [X ] No acute distress [ ]Lethargic  [ ]Unarousable  [ ]Verbal  [ ]Non-Verbal [ ]Cachexia    BEHAVIORAL/PSYCH:  [ ]Alert and Oriented x  [ ] Anxiety [ ] Delirium [ ] Agitation [X ] Calm   EYES: [X ] No scleral icterus [ ] Scleral icterus [ ] Closed  ENMT:  [ ]Dry mouth  [ ]No external oral lesions [X ] No external ear or nose lesions  CARDIOVASCULAR:  [ ]Regular [ ]Irregular [ ]Tachy [ ]Not Tachy  [ ]Andrea [ ] Edema [ ] No edema  PULMONARY:  [ ]Tachypnea  [ ]Audible excessive secretions [X ] No labored breathing [ ] labored breathing  GASTROINTESTINAL: [ ]Soft  [ ]Distended  [ X]Not distended [ ]Non tender [ ]Tender  MUSCULOSKELETAL: [ ]No clubbing [ ] clubbing  [X ] No cyanosis [ ] cyanosis  NEUROLOGIC: [ ]No focal deficits  [X ]Follows commands  [ ]Does not follow commands  [ ]Cognitive impairment  [ ]Dysphagia  [ ]Dysarthria  [ ]Paresis   SKIN: [ ] Jaundiced [X ] Non-jaundiced [ ]Rash [ ]No Rash [ ] Warm [ ] Dry  MISC/LINES: [ ] ET tube [ ] Trach [ ]NGT/OGT [ ]PEG [ ]Ray    CRITICAL CARE:  [ ] Shock Present  [ ]Septic [ ]Cardiogenic [ ]Neurologic [ ]Hypovolemic  [ ]  Vasopressors [ ]  Inotropes   [ ]Respiratory failure present [ ]Mechanical ventilation [ ]Non-invasive ventilatory support [ ]High flow  [ ]Acute  [ ]Chronic [ ]Hypoxic  [ ]Hypercarbic [ ]Other  [ ]Other organ failure     LABS: reviewed by me                          11.4   9.04  )-----------( 277      ( 19 Jul 2023 08:58 )             36.1       Mg     2.0     07-19          RADIOLOGY & ADDITIONAL STUDIES: reviewed by me    PROTEIN CALORIE MALNUTRITION PRESENT: [ ]mild [ ]moderate [ ]severe [ ]underweight [ ]morbid obesity  https://www.andeal.org/vault/2440/web/files/ONC/Table_Clinical%20Characteristics%20to%20Document%20Malnutrition-White%20JV%20et%20al%202012.pdf    Height (cm): 160 (07-13-23 @ 13:20), 162.6 (06-08-23 @ 08:16), 162.6 (05-25-23 @ 10:30)  Weight (kg): 44.6 (07-17-23 @ 13:19), 49 (05-25-23 @ 10:30), 49.9 (04-20-23 @ 11:57)  BMI (kg/m2): 17.4 (07-17-23 @ 13:19), 19.1 (07-13-23 @ 13:20), 18.5 (06-08-23 @ 08:16)    [ ]PPSV2 < or = to 30% [ ]significant weight loss  [ ]poor nutritional intake  [ ]anasarca      [ ]Artificial Nutrition      Palliative Care Spiritual/Emotional Screening Tool Question  Severity (0-4):                    OR                    [X ] Unable to determine/NA  Score of 2 or greater indicates recommendation of Chaplaincy referral  Chaplaincy Referral: [ ] Yes [ ] Refused [ ] Following     Caregiver [place]:  [ ] Yes [ ] No [X ] Deferred  Social Work Referral [ ]  Patient and Family Centered Care Referral [ ]    Anticipatory Grief Present: [ ] Yes [ ] No [ X] Deferred  Social Work Referral [ ]  Patient and Family Centered Care Referral [ ]    REFERRALS:   [ ]Chaplaincy  [ ]Hospice  [ ]Child Life  [X ]Social Work  [ ]Case management [ ]Holistic Therapy     Palliative care education provided to patient and/or family    Goals of Care Document:     ______________  Arthur Varma MD  Palliative Medicine  [facility]   of Geriatric and Palliative Medicine  [phone]

## 2023-07-20 NOTE — PROGRESS NOTE ADULT - ASSESSMENT
82-year-old woman with past medical history of hypertension, diabetes, hyperlipidemia, pancreatic adenocarcinoma status post transverse loop colostomy, right hydronephrosis s/p ureteral stent presenting for a UTI VRE of 1 month duration failed OP treatment now admitted for management of asymptomatic UTI VRE c/b positive Bcx that was determined to be contaminated and now pending discharge.    # pt asked for hospice eval  hospice consult placed - for home  can stop ordering labs/studies  can stop all FS    # Pancreatic mass with mets; Mets possibly to the lungs with large Lt malign pleural effusion   - CTA chest Abdomen pelvis showed:  * Unchanged ill-defined pancreatic mass.  * Increased moderate-to-large left pleural effusion with overlying compressive atelectasis. New small right pleural effusion. Unchanged right lung pulmonary nodules, previously characterized on 2/16/2023 PET/CT.  - Previous CTAP showed:  * Soft tissue mass adjacent to the sigmoid colon measuring approximately 3.1 x 2.5 cm.  * Right double-J ureteral stent with stable moderate hydroureter.  * Grossly stable ill-defined mass at the pancreatic head, consistent with known pancreatic neoplasm  L pleural exudative effusion tapped by pulm: cytology + for met adeno in pl fluid (7/13)  Onc: no further chemo - hospice  7/14 pulm placed a pleurx catheter - can drain 1L q2-3 days at home - family teaching (d/w RN)  CTA chest PE 7/15 - no PE  on RA  Pulm: no plan for rt side pl eff; outpt pulm f/u as needed  can cont midodrine for now  make morphine liq 6mg po q3 prn pain (d/c IV)    # bld cx +  grew kocuria, discussed with ID, likely contaminant, no need to treat    # VRE UTI failed OP therapy  pt had chr smith on arrival (see H&P) - smith d/c'd 7/7 - passed TOV; cont flomax  still has rt Dbl J stent - outpt uro f/u as needed  U/A +  - CTA chest Abdomen pelvis showed::  Decreased mild right hydronephrosis, s/p double-J ureteral stent placement, with persistent increased enhancement of ureter, suspicious for urinary tract infection.  Previous CTAP showed: Right double-J ureteral stent with stable moderate hydroureter.  05/25 Urine Cx showed VRE only sensitive to zyvox   Per ID, seems colonized w VRE and asymptomatic, monitor off abx    # constipation  c/w bowel reg    # Anemia :   s/p IV venofer  can d/c iron sulfate     # diabetes:   A1C 6.6  d/c FS and SSI    # DLD  can d/c statin    # BMI 17.4 = underweight; + malignant cachexia; severe malnutrition  supplement as tolerated  prog poor    # DVT PPX: d/c heparin sc (pt does not want it)    # GI PPX: protonix q24    # Activity: w/ asst to chair    # pt's family is COVID +, but plan is to take pt home (not temp place in NH)    Dispo: hospice; as above  eventually, pt will go home on hospice - f/u CM - anticipate d/c won

## 2023-07-20 NOTE — PROGRESS NOTE ADULT - PROBLEM SELECTOR PLAN 3
- s/p thoracentesis  - no plans for repeat thoracentesis  - monitor for dyspnea, currently comfortable.

## 2023-07-20 NOTE — PROGRESS NOTE ADULT - PROBLEM SELECTOR PLAN 1
- patient opting for hospice  - hospice consult noted; plan for [facility] hospice at home   - has support from daughter, son, and HHA  - supportive care.

## 2023-07-21 NOTE — PROGRESS NOTE ADULT - NUTRITIONAL ASSESSMENT
This patient has been assessed with a concern for Malnutrition and has been determined to have a diagnosis/diagnoses of Severe protein-calorie malnutrition and Underweight (BMI < 19).    This patient is being managed with:   Diet Soft and Bite Sized-  Lacto Veg (Accepts Milk & Milk Products)  Free Water Flush Instructions:  chocolate ensure max  Supplement Feeding Modality:  Oral  Ensure Max Cans or Servings Per Day:  3       Frequency:  Daily  Entered: Jul 8 2023 11:40AM  
This patient has been assessed with a concern for Malnutrition and has been determined to have a diagnosis/diagnoses of Severe protein-calorie malnutrition and Underweight (BMI < 19).    This patient is being managed with:   Diet Soft and Bite Sized-  Lacto Veg (Accepts Milk & Milk Products)  Free Water Flush Instructions:  chocolate ensure max  Supplement Feeding Modality:  Oral  Ensure Max Cans or Servings Per Day:  3       Frequency:  Daily  Entered: Jul 8 2023 11:40AM    Diet Soft and Bite Sized-  Vegan {Accepts Vegetable Products Only}  Supplement Feeding Modality:  Oral  Ensure Plant-Based Cans or Servings Per Day:  1       Frequency:  Three Times a day  Entered: Jul 6 2023  2:10PM    The following pending diet order is being considered for treatment of Severe protein-calorie malnutrition and Underweight (BMI < 19):null
This patient has been assessed with a concern for Malnutrition and has been determined to have a diagnosis/diagnoses of Severe protein-calorie malnutrition and Underweight (BMI < 19).    This patient is being managed with:   Diet Soft and Bite Sized-  Lacto Veg (Accepts Milk & Milk Products)  Free Water Flush Instructions:  chocolate ensure max  Supplement Feeding Modality:  Oral  Ensure Max Cans or Servings Per Day:  3       Frequency:  Daily  Entered: Jul 8 2023 11:40AM  
This patient has been assessed with a concern for Malnutrition and has been determined to have a diagnosis/diagnoses of Severe protein-calorie malnutrition and Underweight (BMI < 19).    This patient is being managed with:   Diet Soft and Bite Sized-  Lacto Veg (Accepts Milk & Milk Products)  Free Water Flush Instructions:  chocolate ensure max  Supplement Feeding Modality:  Oral  Ensure Max Cans or Servings Per Day:  3       Frequency:  Daily  Entered: Jul 8 2023 11:40AM    Diet Soft and Bite Sized-  Vegan {Accepts Vegetable Products Only}  Supplement Feeding Modality:  Oral  Ensure Plant-Based Cans or Servings Per Day:  1       Frequency:  Three Times a day  Entered: Jul 6 2023  2:10PM    The following pending diet order is being considered for treatment of Severe protein-calorie malnutrition and Underweight (BMI < 19):null
This patient has been assessed with a concern for Malnutrition and has been determined to have a diagnosis/diagnoses of Severe protein-calorie malnutrition and Underweight (BMI < 19).    This patient is being managed with:   Diet Soft and Bite Sized-  Lacto Veg (Accepts Milk & Milk Products)  Free Water Flush Instructions:  chocolate ensure max  Supplement Feeding Modality:  Oral  Ensure Max Cans or Servings Per Day:  3       Frequency:  Daily  Entered: Jul 8 2023 11:40AM  
This patient has been assessed with a concern for Malnutrition and has been determined to have a diagnosis/diagnoses of Severe protein-calorie malnutrition and Underweight (BMI < 19).    This patient is being managed with:   Diet Soft and Bite Sized-  Lacto Veg (Accepts Milk & Milk Products)  Free Water Flush Instructions:  chocolate ensure max  Supplement Feeding Modality:  Oral  Ensure Max Cans or Servings Per Day:  3       Frequency:  Daily  Entered: Jul 8 2023 11:40AM  
This patient has been assessed with a concern for Malnutrition and has been determined to have a diagnosis/diagnoses of Severe protein-calorie malnutrition and Underweight (BMI < 19).    This patient is being managed with:   Diet Soft and Bite Sized-  Lacto Veg (Accepts Milk & Milk Products)  Free Water Flush Instructions:  chocolate ensure max  Supplement Feeding Modality:  Oral  Ensure Max Cans or Servings Per Day:  3       Frequency:  Daily  Entered: Jul 8 2023 11:40AM    Diet Soft and Bite Sized-  Vegan {Accepts Vegetable Products Only}  Supplement Feeding Modality:  Oral  Ensure Plant-Based Cans or Servings Per Day:  1       Frequency:  Three Times a day  Entered: Jul 6 2023  2:10PM    The following pending diet order is being considered for treatment of Severe protein-calorie malnutrition and Underweight (BMI < 19):null
This patient has been assessed with a concern for Malnutrition and has been determined to have a diagnosis/diagnoses of Severe protein-calorie malnutrition and Underweight (BMI < 19).    This patient is being managed with:   Diet Soft and Bite Sized-  Lacto Veg (Accepts Milk & Milk Products)  Free Water Flush Instructions:  chocolate ensure max  Supplement Feeding Modality:  Oral  Ensure Max Cans or Servings Per Day:  3       Frequency:  Daily  Entered: Jul 8 2023 11:40AM    Diet Soft and Bite Sized-  Vegan {Accepts Vegetable Products Only}  Supplement Feeding Modality:  Oral  Ensure Plant-Based Cans or Servings Per Day:  1       Frequency:  Three Times a day  Entered: Jul 6 2023  2:10PM    The following pending diet order is being considered for treatment of Severe protein-calorie malnutrition and Underweight (BMI < 19):null
This patient has been assessed with a concern for Malnutrition and has been determined to have a diagnosis/diagnoses of Underweight (BMI < 19) and Severe protein-calorie malnutrition.    This patient is being managed with:   Diet Soft and Bite Sized-  Lacto Veg (Accepts Milk & Milk Products)  Free Water Flush Instructions:  chocolate ensure max  Supplement Feeding Modality:  Oral  Ensure Max Cans or Servings Per Day:  3       Frequency:  Daily  Entered: Jul 8 2023 11:40AM  
This patient has been assessed with a concern for Malnutrition and has been determined to have a diagnosis/diagnoses of Severe protein-calorie malnutrition and Underweight (BMI < 19).    This patient is being managed with:   Diet Soft and Bite Sized-  Lacto Veg (Accepts Milk & Milk Products)  Free Water Flush Instructions:  chocolate ensure max  Supplement Feeding Modality:  Oral  Ensure Max Cans or Servings Per Day:  3       Frequency:  Daily  Entered: Jul 8 2023 11:40AM  
This patient has been assessed with a concern for Malnutrition and has been determined to have a diagnosis/diagnoses of Underweight (BMI < 19) and Severe protein-calorie malnutrition.    This patient is being managed with:   Diet Soft and Bite Sized-  Lacto Veg (Accepts Milk & Milk Products)  Free Water Flush Instructions:  chocolate ensure max  Supplement Feeding Modality:  Oral  Ensure Max Cans or Servings Per Day:  3       Frequency:  Daily  Entered: Jul 8 2023 11:40AM

## 2023-07-21 NOTE — PROGRESS NOTE ADULT - SUBJECTIVE AND OBJECTIVE BOX
SUBJECTIVE:  HPI:  82-year-old female with past medical history of hypertension, diabetes, hyperlipidemia, pancreatic adenocarcinoma status post transverse loop colostomy, right hydronephrosis s/p ureteral stent presenting for a UTI VRE of 1 month duration failed OP treatment and as per the chart was supposed to have a PICC line but procedure delayed presents for IV antibiotics treatment. Last urine culture in  showed VRE sensitive only to zyvox. Patient is a poor historian reported that suprapubic abdominal discomfort started 1 month ago and the patient was unsuccessfully treated with different antibiotics. Patient still complains of suprapubic discomfort and burning, and lower back pain, Patient reports mild epigastric discomfort today . Patient denies any SOB, CP, NV, diarrhea, constipation fever, respiratory symptoms.     Patient was done through the OP MedRec, Patient is not aware of the medications she take , please confirm [facility], [phone].      Labs noticeable for Hb 10.2 at baseline,   UA grossly positive for RBC and WBC   CTA chest Abdomen pelvis showed:    * Decreased mild right hydronephrosis, post double-J ureteral stent placement, with persistent increased enhancement of ureter, suspicious for urinary tract infection.  * Unchanged ill-defined pancreatic mass.  *  Increased moderate-to-large left pleural effusion with overlying compressive atelectasis. New small right pleural effusion. Unchanged right lung pulmonary nodules, previously characterized on 2023 PET/CT.    Previous CTAP showed   * Soft tissue mass adjacent to the sigmoid colon measuring approximately 3.1 x 2.5 cm.  * Right double-J ureteral stent with stable moderate hydroureter.  * Grossly stable ill-defined mass at the pancreatic head, consistent with known pancreatic neoplasm.    VS insignificant     Vital Signs Last 24 Hrs  T(C): 36.2 (2023 23:43), Max: 36.6 (2023 17:19)  T(F): 97.1 (2023 23:43), Max: 97.9 (2023 17:19)  HR: 78 (2023 23:43) (78 - 103)  BP: 118/74 (2023 23:43) (101/72 - 118/74)  RR: 18 (2023 23:43) (18 - 18)  SpO2: 100% (2023 23:43) (98% - 100%)  Patient On (Oxygen Delivery Method): room air             (2023 02:54)      Patient is a 82y old Female who presents with a chief complaint of UTI (2023 10:29)    Currently admitted to medicine with the primary diagnosis of Pancreatic cancer       Today is hospital day 15d.     PAST MEDICAL & SURGICAL HISTORY  HTN (hypertension)    Diabetes    Hypercholesteremia    Pancreatic cancer    Hydronephrosis    Colostomy status    History of     History of left hip replacement    H/O carpal tunnel repair        ALLERGIES:  No Known Allergies    MEDICATIONS:  ACTIVE MEDICATIONS  atorvastatin 20 milliGRAM(s) Oral at bedtime  bisacodyl 5 milliGRAM(s) Oral every 12 hours PRN  dextrose 5%. 1000 milliLiter(s) IV Continuous <Continuous>  dextrose 5%. 1000 milliLiter(s) IV Continuous <Continuous>  dextrose 50% Injectable 25 Gram(s) IV Push once  dextrose 50% Injectable 12.5 Gram(s) IV Push once  dextrose 50% Injectable 25 Gram(s) IV Push once  dextrose Oral Gel 15 Gram(s) Oral once PRN  glucagon  Injectable 1 milliGRAM(s) IntraMuscular once  heparin   Injectable 5000 Unit(s) SubCutaneous every 12 hours  insulin lispro (ADMELOG) corrective regimen sliding scale   SubCutaneous three times a day before meals  midodrine. 5 milliGRAM(s) Oral three times a day  pantoprazole    Tablet 40 milliGRAM(s) Oral before breakfast  polyethylene glycol 3350 17 Gram(s) Oral daily  senna 1 Tablet(s) Oral daily  tamsulosin 0.4 milliGRAM(s) Oral at bedtime      VITALS:   T(F): 96.3  HR: 82  BP: 99/61  RR: 18  SpO2: --    LABS:                        11.4   9.04  )-----------( 277      ( 2023 08:58 )             36.1       Mg     2.0     07-19                        PHYSICAL EXAM:  GEN: No acute distress , but cachectic and ill looking   LUNGS: Clear to auscultation bilaterally   HEART: S1/S2 present.    ABD: Soft, non-tender, non-distended.   EXT: No pedal edema  NEURO: AAOX3

## 2023-07-21 NOTE — PROGRESS NOTE ADULT - SUBJECTIVE AND OBJECTIVE BOX
ALLIDENISEDELORIS BRYANT  82y  Female  ***My note supersedes ALL resident notes that I sign.  My corrections for their notes are in my note.***    I can be reached directly on [place] [facility]. My office number is [phone]. My personal cell number is [phone].    INTERVAL EVENTS: Here for f/u of panc cancer. Pt comfortable. No issues.    T(F): 97.3 (07-21-23 @ 04:42), Max: 97.3 (07-21-23 @ 04:42)  HR: 84 (07-21-23 @ 04:42) (84 - 89)  BP: 95/54 (07-21-23 @ 12:04) (95/54 - 103/55)  RR: 18 (07-21-23 @ 04:42) (18 - 18)  SpO2: --    Gen: NAD; thin; frail  HEENT: PERRL, EOMI, mouth clr, nose clr  Neck: no nodes, no JVD, thyroid nl  chest: left pigtail pleural cath  lungs: clr  hrt: s1 s2 rrr no murmur  abd: soft, NT/ND, no HS megaly; + colostomy  ext: no edema, no c/c  neuro: aa ox3, cn intact, can move all 4 ext    LABS:    RADIOLOGY & ADDITIONAL TESTS:      MEDICATIONS:    bisacodyl 5 milliGRAM(s) Oral every 12 hours PRN  midodrine. 5 milliGRAM(s) Oral three times a day  morphine   Solution 6 milliGRAM(s) Oral every 3 hours PRN  pantoprazole    Tablet 40 milliGRAM(s) Oral before breakfast  polyethylene glycol 3350 17 Gram(s) Oral daily  senna 1 Tablet(s) Oral daily  tamsulosin 0.4 milliGRAM(s) Oral at bedtime

## 2023-07-21 NOTE — PROGRESS NOTE ADULT - ASSESSMENT
Assessment    82-year-old female with past medical history of hypertension, diabetes, hyperlipidemia, pancreatic adenocarcinoma status post transverse loop colostomy, right hydronephrosis s/p ureteral stent presenting for a UTI VRE of 1 month duration failed OP treatment now admitted for management of asymptomatic UTI VRE c/b positive Bcx that was determined to be contaminated and now pending discharge.    # Pancreatic mass with mets   # Mets to the lungs with large LL pleural effusion ( pleural fluid cytology came back positive )   - CTA chest Abdomen pelvis showed:  * Unchanged ill-defined pancreatic mass.  *  Increased moderate-to-large left pleural effusion with overlying compressive atelectasis. New small right pleural effusion. Unchanged right lung pulmonary nodules, previously characterized on 2/16/2023 PET/CT.  - Previous CTAP showed   * Soft tissue mass adjacent to the sigmoid colon measuring approximately 3.1 x 2.5 cm.  * Right double-J ureteral stent with stable moderate hydroureter.  * Grossly stable ill-defined mass at the pancreatic head, consistent with known pancreatic neoplasm  - L pleural effusion tapped by pulm, f/u cytology to r/o malignancy - exudative in nature by protein ratio and Light's criteria  - Oncology will set up outpt txt for chemo after bacteremia is no longer a concern  some hypotension overnight, given 500 cc fluids  - 7/14 pulm placed a pleurx catheter  - CTA chest PE 7/15 - no PE, findings as above  - Per pulm, drain no more than1 L in 48 hrs, call MD when drainage persistently less than 200 cc  - satting 98% on RA  - Discussed with pulm (7/17), no R lung thora planned prior to discharge, drain L pleurx catheter , family to be taught how use at home ; drain 1 L every 2-3 days   - Discussed with hem/onc (7/17), patient and family refusing chemo and wants hospice ; hospice consulted   - Appreciate pulm and hem/onc recs  - give morphine 6 mg p.o q3 PRN for pain     #bacteremia?  blood cx from 7/5 grew Gram positive cocci in clusters, started Vanco 1 g Q 24  - d/w ID, likely contaminant, f/u sensitivities, DC vanco, f/u repeat blood Cx  - IV fluids 75 cc/ hr  - one IV on R arm that appears uninfected  - grew kocuria, discussed with ID, likely contaminant no need to treat  - has mild hypotension / started on midodrine 5 mg Q8     # VRE UTI failed OP therapy  - UA grossly positive for RBC and WBC   - CTA chest Abdomen pelvis showed::  Decreased mild right hydronephrosis, post double-J ureteral stent placement, with persistent increased enhancement of ureter, suspicious for urinary tract infection.  Previous CTAP showed: Right double-J ureteral stent with stable moderate hydroureter.  - 05/25   Urine Cx showed VRE only sensitive to zyvox   -UA positive  -Per ID, seems colonized w VRE but asymptomatic, monitor off abx  - passed TOV    # Anemia :   - s/p IV venofer  - d/c home iron sulfate   - hb stable around 11  - labs go with JACOB     # diabetes:   A1C 6.6  - on ISS and FS  - FS controlled 120-140's  - d/c FS monitoring      #MISC  - DVT PPX: dc'ed / patient in hospice and requested to stop it   - GI PPX: protonix  - Bowel reg: Senna, miralax, and dulcolax  - Diet: DASH, TLC carb consist  - Activity: Increase as tolerated  - Dispo: Discharge to home hospice , pending CM  , stop labs

## 2023-07-21 NOTE — PROGRESS NOTE ADULT - ASSESSMENT
82-year-old woman with past medical history of hypertension, diabetes, hyperlipidemia, pancreatic adenocarcinoma status post transverse loop colostomy, right hydronephrosis s/p ureteral stent presenting for a UTI VRE of 1 month duration failed OP treatment now admitted for management of asymptomatic UTI VRE c/b positive Bcx that was determined to be contaminated and now pending discharge.    # pt asked for hospice eval  hospice consult placed - for home  can stop ordering labs/studies  can stop all FS    # Pancreatic mass with mets; Mets possibly to the lungs with large Lt malign pleural effusion   - CTA chest Abdomen pelvis showed:  * Unchanged ill-defined pancreatic mass.  * Increased moderate-to-large left pleural effusion with overlying compressive atelectasis. New small right pleural effusion. Unchanged right lung pulmonary nodules, previously characterized on 2/16/2023 PET/CT.  - Previous CTAP showed:  * Soft tissue mass adjacent to the sigmoid colon measuring approximately 3.1 x 2.5 cm.  * Right double-J ureteral stent with stable moderate hydroureter.  * Grossly stable ill-defined mass at the pancreatic head, consistent with known pancreatic neoplasm  L pleural exudative effusion tapped by pulm: cytology + for met adeno in pl fluid (7/13)  Onc: no further chemo - hospice  7/14 pulm placed a pleurx catheter - can drain 1L q2-3 days at home - family teaching (d/w RN)  CTA chest PE 7/15 - no PE  on RA  Pulm: no plan for rt side pl eff; outpt pulm f/u as needed  can cont midodrine for now  make morphine liq 6mg po q3 prn pain (d/c IV) - hospice will handle morphine on d/c    # bld cx +  grew kocuria, discussed with ID, likely contaminant, no need to treat    # VRE UTI failed OP therapy  pt had chr smith on arrival (see H&P) - smith d/c'd 7/7 - passed TOV; cont flomax  still has rt Dbl J stent - outpt uro f/u as needed  U/A +  - CTA chest Abdomen pelvis showed::  Decreased mild right hydronephrosis, s/p double-J ureteral stent placement, with persistent increased enhancement of ureter, suspicious for urinary tract infection.  Previous CTAP showed: Right double-J ureteral stent with stable moderate hydroureter.  05/25 Urine Cx showed VRE only sensitive to zyvox   Per ID, seems colonized w VRE and asymptomatic, monitor off abx    # constipation  c/w bowel reg    # Anemia :   s/p IV venofer  can d/c iron sulfate     # diabetes:   A1C 6.6  d/c FS and SSI    # DLD  can d/c statin    # BMI 17.4 = underweight; + malignant cachexia; severe malnutrition  supplement as tolerated/desired  prog poor    # DVT PPX: d/c heparin sc (pt does not want it)    # GI PPX: protonix q24    # Activity: w/ asst to chair    # pt's family is COVID +, but plan is to take pt home (no need to temporarily place pt in NH for respite care)    Dispo: hospice; as above  eventually, pt will go home on hospice - f/u CM - anticipate d/c won (HHA in place for SAT and hospice to see pt on SUN)

## 2023-07-22 NOTE — PROGRESS NOTE ADULT - SUBJECTIVE AND OBJECTIVE BOX
SUBJECTIVE:    Patient is a 82y old Female who presents with a chief complaint of VRE +ve urine cx (2023 09:48)    Currently admitted to medicine with the primary diagnosis of Pancreatic cancer    Today is hospital day 17d. This morning she is resting comfortably in bed and reports no new issues or overnight events.     PAST MEDICAL & SURGICAL HISTORY  HTN (hypertension)    Diabetes    Hypercholesteremia    Pancreatic cancer    Hydronephrosis    Colostomy status    History of     History of left hip replacement    H/O carpal tunnel repair      SOCIAL HISTORY:  Negative for smoking/alcohol/drug use.     ALLERGIES:  No Known Allergies    MEDICATIONS:  STANDING MEDICATIONS  dextrose 5%. 1000 milliLiter(s) IV Continuous <Continuous>  dextrose 5%. 1000 milliLiter(s) IV Continuous <Continuous>  dextrose 50% Injectable 25 Gram(s) IV Push once  dextrose 50% Injectable 12.5 Gram(s) IV Push once  dextrose 50% Injectable 25 Gram(s) IV Push once  glucagon  Injectable 1 milliGRAM(s) IntraMuscular once  midodrine. 5 milliGRAM(s) Oral three times a day  pantoprazole    Tablet 40 milliGRAM(s) Oral before breakfast  polyethylene glycol 3350 17 Gram(s) Oral daily  senna 1 Tablet(s) Oral daily  tamsulosin 0.4 milliGRAM(s) Oral at bedtime    PRN MEDICATIONS  bisacodyl 5 milliGRAM(s) Oral every 12 hours PRN  dextrose Oral Gel 15 Gram(s) Oral once PRN  morphine   Solution 6 milliGRAM(s) Oral every 3 hours PRN    VITALS:   T(F): 97  HR: 83  BP: 94/50  RR: 18  SpO2: 97%     PHYSICAL EXAM:  Gen: NAD; thin; frail  HEENT: PERRL, EOMI, mouth clr, nose clr  Neck: no nodes, no JVD, thyroid nl  chest: left pigtail pleural cath  lungs: clr  hrt: s1 s2 rrr no murmur  abd: soft, NT/ND, no HS megaly; + colostomy  ext: no edema, no c/c  neuro: aa ox3, cn intact, can move all 4 ext

## 2023-07-22 NOTE — PROGRESS NOTE ADULT - PROVIDER SPECIALTY LIST ADULT
Heme/Onc
Internal Medicine
Pulmonology
Heme/Onc
Heme/Onc
Hospitalist
Hospitalist
Internal Medicine
Pulmonology
Hospitalist
Hospitalist
Internal Medicine
Pulmonology
Pulmonology
Hospitalist
Hospitalist
Internal Medicine
Infectious Disease
Palliative Care

## 2023-07-22 NOTE — PROGRESS NOTE ADULT - ASSESSMENT
82-year-old woman with past medical history of hypertension, diabetes, hyperlipidemia, pancreatic adenocarcinoma status post transverse loop colostomy, right hydronephrosis s/p ureteral stent presenting for a UTI VRE of 1 month duration failed OP treatment now admitted for management of asymptomatic UTI VRE c/b positive Bcx that was determined to be contaminated and now pending discharge.    # pt asked for hospice eval  hospice consult placed - for home  can stop ordering labs/studies  can stop all FS    # Pancreatic mass with mets; Mets possibly to the lungs with large Lt malign pleural effusion   - CTA chest Abdomen pelvis showed:  * Unchanged ill-defined pancreatic mass.  * Increased moderate-to-large left pleural effusion with overlying compressive atelectasis. New small right pleural effusion. Unchanged right lung pulmonary nodules, previously characterized on 2/16/2023 PET/CT.  - Previous CTAP showed:  * Soft tissue mass adjacent to the sigmoid colon measuring approximately 3.1 x 2.5 cm.  * Right double-J ureteral stent with stable moderate hydroureter.  * Grossly stable ill-defined mass at the pancreatic head, consistent with known pancreatic neoplasm  L pleural exudative effusion tapped by pulm: cytology + for met adeno in pl fluid (7/13)  Onc: no further chemo - hospice  7/14 pulm placed a pleurx catheter - can drain 1L q2-3 days at home - family teaching (d/w RN)  CTA chest PE 7/15 - no PE  on RA  Pulm: no plan for rt side pl eff; outpt pulm f/u as needed  can cont midodrine for now  make morphine liq 6mg po q3 prn pain (d/c IV) - hospice will handle morphine on d/c    # bld cx +  grew kocuria, discussed with ID, likely contaminant, no need to treat    # VRE UTI failed OP therapy  pt had chr smith on arrival (see H&P) - msith d/c'd 7/7 - passed TOV; cont flomax  still has rt Dbl J stent - outpt uro f/u as needed  U/A +  - CTA chest Abdomen pelvis showed::  Decreased mild right hydronephrosis, s/p double-J ureteral stent placement, with persistent increased enhancement of ureter, suspicious for urinary tract infection.  Previous CTAP showed: Right double-J ureteral stent with stable moderate hydroureter.  05/25 Urine Cx showed VRE only sensitive to zyvox   Per ID, seems colonized w VRE and asymptomatic, monitor off abx    # constipation  c/w bowel reg    # Anemia :   s/p IV venofer  can d/c iron sulfate     # diabetes:   A1C 6.6  d/c FS and SSI    # DLD  can d/c statin    # BMI 17.4 = underweight; + malignant cachexia; severe malnutrition  supplement as tolerated/desired  prog poor    # DVT PPX: d/c heparin sc (pt does not want it)  # GI PPX: protonix q24  # Activity: w/ asst to chair  # pt's family is COVID +, but plan is to take pt home (no need to temporarily place pt in NH for respite care)  Dispo: hospice; as above, eventually, pt will go home on hospice - d/c today (HHA in place for SAT and hospice to see pt on SUN)

## 2023-08-01 NOTE — CDI QUERY NOTE - NSCDIOTHERTXTBX_GEN_ALL_CORE_HH
Based on your professional judgment and the clinical indicators provided below, please clarify if asymptomatic UTI VRE can be further specified as:    •  Asymptomatic VRE bacteruria without UTI associated with chronic indwelling urethral catheter could not be excluded at the time of discharge    •  Asymptomatic VRE bacteruria without UTI was unrelated to chronic indwelling urethral catheter     •  Other (please specify):    •  Clinically unable to determine      CLINICAL INDICATORS    7/6 H&P: …. # VRE UTI failed OP therapy. # possible CAUTI. - UA grossly positive for RBC and WBC … - 05/25   Urine Cx showed VRE only sensitive to zyvox. - received Zyvox in ED. - cw zyvox … - chronic smith …    7/6 Consult Note Adult-Infectious Disease Attending: … #Asymptomatic- denies symptoms of cystitis or ascending infection … - Monitor off antibiotics, asymptomatic. - Please recall ID PRN. Please inform ID of any patient clinical change or any new pertinent laboratory or radiographic data.    7/7 Order: Discontinue Urethral Catheter    7/10 Progress Note Adult-Infectious Disease Attending: … #admission BCX 1/4 + , not ID'ed on PCR- suspect contaminant. no lines/port. #Asymptomatic- denies symptoms of cystitis or ascending infection.   7/5 UA Blood: x / Protein: 300 mg/dL / Nitrite: Negative. Leuk Esterase: Moderate / RBC: >720 /HPF / WBC >100 /HPF. Sq Epi: x / Non Sq Epi: x / Bacteria: Many …     7/22 Progress Note Adult-Hospitalist Fellow: … Patient is a 82y old Female who presents with a chief complaint of VRE +ve urine cx … # VRE UTI failed OP therapy. pt had chr smith on arrival (see H&P) - smith d/c'd 7/7 - passed TOV; cont Flomax. still has rt Dbl J stent - outpt uro f/u as needed. U/A + …    Discharge Note: …  presenting for a UTI VRE of 1 month duration failed OP treatment now admitted for management of asymptomatic UTI VRE … pt had chr smith on arrival (see H&P) - smith d/c'd 7/7 - passed TOV; cont Flomax. still has rt Dbl J stent - outpt uro f/u as needed. U/A + … 05/25 Urine Cx showed VRE only sensitive to zyvox. Per ID, seems colonized w VRE and asymptomatic, monitor off abx …       Thank you,  Kristina Neil RN, CCS, CCDS  [phone]

## 2023-08-01 NOTE — DISCUSSION/SUMMARY
[FreeTextEntry1] : REASON FOR VISIT: Ms. Rebecca Orellana is a 82-year-old female who was called on 8/1/2023 for a discussion regarding her negative genetic test results related to hereditary cancer predisposition. Her son was on the phone at the time of the call and the results were reviewed with him.   RELEVANT MEDICAL AND SURGICAL HISTORY:  The patient was diagnosed with metastatic pancreatic cancer adenocarcinoma at the age of 82. Somatic tumor genetic testing detected genetics alterations in the KRAS (p.Zpa21Kcs) and the BRCA2 (p.[id number]) genes.  OTHER MEDICAL AND SURGICAL HISTORY: - Diabetes (250.00) (E11.9) - Diverticulosis, sigmoid (562.10) (K57.30) - Hydronephrosis (591) (N13.30) - Large bowel obstruction (560.9) (K56.609) - Malignant neoplasm of body of pancreas (157.1) (C25.1) - Pancreatic mass (577.8) (K86.89) - Status post colostomy (V44.3) (Z93.3)  CANCER SCREENING HISTORY: BREAST: Unknown GYN: Unknown Colon: Last colonoscopy 2/24/2023, no polyps noted. In 9/2022 colonoscopy detected 1 serrated adenoma and a tubular adenoma. Skin: Unknown  SOCIAL HISTORY: - Tobacco-product use: Former smoker  FAMILY HISTORY: Paternal ancestry was reported as Italian and maternal ancestry was reported as Polish. A detailed family history of cancer was ascertained, see below for pedigree. Of note: - Sister with colon cancer and breast cancer in her 40s - Father with colon cancer in his 70s - Paternal grandmother with abdominal cancer  The remaining family history is unremarkable. According to the patient there are no other known cases of significant cancers in the family. To her knowledge no one else in the family has had germline testing for cancer susceptibility.  TEST RESULTS: NEGATIVE   NO pathogenic (disease-causing) variants or variants of uncertain significance were detected in the following genes: APC*, GRECIA*, AXIN2, BARD1, BMPR1A, BRCA1, BRCA2, BRIP1, CDH1, CDKN2A (p14ARF), CDKN2A ([id number]), CHEK2, EPCAM*, GREM1*, MEN1*, MLH1*, MSH2*, MSH3*, MSH6*, MUTYH, NF1*, NTHL1, PALB2, PMS2*, POLD1*, POLE, PTEN*, RAD51C, RAD51D, SMAD4, STK11, TP53, TSC1*, TSC2, VHL  RESULTS INTERPRETATION AND ASSESSMENT: Given Ms. Corea current reported family history of cancer, and her negative genetic test results, the following screening guidelines and risk-reducing recommendations were discussed: Pancreatic: Long-term management and surveillance should be based on the patients on- or post-treatment protocol as recommended by her surgeons and/or oncologist.  Other: In the absence of other indications, the patient should practice age-appropriate cancer screening for all other organ systems as recommended for the general population.   It is recommended that the patient discuss the importance of pursuing cancer genetic testing and counseling with her other relatives. There may be a pathogenic variant in the family which the patient did not inherit. We would be happy to meet with her family members or refer them to a genetic expert in their area.   We also discussed the limitations of negative results: 1. The cause of the patients personal and family history of cancer remains unknown. The cancer may have developed randomly, or due to environmental factors.  2. This negative result does not completely rule out a hereditary basis for the reported history due to limitations in technology or a variant being present in an unidentified gene. 3. Variants in other genes would not be identified by this analysis, so this negative result does not rule out the possibility of having a mutation in a different hereditary cancer gene or the possibility of ever developing cancer. 4. It is possible there is a hereditary cancer predisposition gene mutation in the family, but the patient did not inherit it.   Our knowledge of genetics and inherited cancer conditions is changing rapidly, therefore, we recommend that the patient contact our office, every 2 to 3 years, to discuss relevant advances in cancer genetics. We emphasize the importance of re-contacting us with updates regarding her personal and family history of cancer as well as any updates regarding additional cancer genetic test results performed for the patient and/or family members.  Such updates could possibly change our risk assessment and recommendations.   PLAN: 1. Long-term management and surveillance should be based on the patients personal and family history and general population guidelines for all other cancers. 2. The patient was encouraged to contact us every 2-3 years to discuss relevant advances in cancer genetics, or sooner if there are any changes in her personal or family history of cancer.   For any additional questions please call Cancer Genetics at [phone] or [phone].     Piper Boles MS, [facility] [place] Genetic Counselor, Cancer Genetics

## 2023-08-17 ENCOUNTER — APPOINTMENT (OUTPATIENT)
Dept: UROLOGY | Facility: CLINIC | Age: 83
End: 2023-08-17

## 2023-09-20 NOTE — REASON FOR VISIT
RECOMMENDATIONS:     NEW ORDERS       Orders Placed This Encounter    gabapentin (NEURONTIN) 300 MG capsule       Sig: Take 1 capsule by mouth at bedtime.       Dispense:  30 capsule       Refill:  3    diclofenac (CATAFLAM) 50 MG tablet       Sig: Take 1 tablet by mouth 2 times daily as needed (headache).       Dispense:  60 tablet       Refill:  3      IMAGING  No new imaging recommended today      MEDICATIONS      New Prescriptions     No medications on file      Ok to take tizanidine as needed     Patient is taking diclofenac (Cataflam, Voltaren). The patient was advised that non-steroidal anti-inflammatory drugs (NSAIDs) have two very important potential side effects: gastrointestinal irritation and renal injuries. The patient was advised not to take the oral formulations of these medications on an empty stomach and not to exceed the daily recommended dose for the medication they are taking. Ok to take twice a day or two once a day, no more than two tabs in one day.     In the presence of a neuropathic component of pain, the patient is advised to continue the use of gabapentin (Neurontin).  The risks of taking gabapentin include sedation, mental status changes, and peripheral edema. Take one tablet at night every night to see how your headaches respond.      INTERVENTIONS   PLAN: CERVICAL radiofrequency ablation right C2-3  05777/4     In the context of predominantly axial moderate to severe chronic neck pain > 3 months despite conservative measures and causing functional deficit, the option of radiofrequency ablation was discussed. Radiofrequency ablation (RFA) is a procedure where heat is applied to the nerves, changing their structure/composition resulting in fewer pain signals being transmitted. This pain is not better explained by any other diagnosis, and there is no other untreated radiculopathy or neurogenic claudication that supersedes this diagnosis.      - Repeat thermal facet joint RFA at these same  anatomic sites is reasonable and necessary as @HE@ had a minimum of consistent 50% improvement in pain for at least six (6) months or at least 50% consistent improvement in the ability to perform previously painful movements and ADLs.     Risks of the procedure are rare and include bleeding, infection, medication effects, tissue damage, or ineffectiveness/worsening. The risk of serious complication requiring hospitalization or death is roughly one in tens of thousands. These risks were discussed with the patient during today's clinic visit. Will preemptively plan for procedure, but typically prior authorization must be obtained first. Patient understands that procedure will need to be delayed if prior authorization is declined. Peer to peers are not offered if lack of a course of physical therapy is the reason for denial.     Procedure Instructions:  - : Please do not drive, operate heavy machinery, or make legal decisions on the entire day of the procedure. If you are traveling by public transporation/rideshare, you will still need a responsible adult with you.  - INFECTION: Please contact the clinic with any new signs of infection including rash, fever, etc. Procedures will be rescheduled if there has been an infection in the last 2 weeks to avoid the risk of spine or joint infection.   - MEDICATIONS: The procedure may include fentanyl, midazolam, local anesthetic (e.g. lidocaine), and chloraprep cleaning solution. Please notify us right away if you have an allergy or intolerance.  - FASTING: You need to fast prior to this procedure if you are having light sedation.      REFERRALS  None [facility] soon     FOLLOW UP  Return in about 3 months (around 12/20/2023).      APPOINTMENT / PROCEDURE INSTRUCTION    You are scheduled for a procedure, Radiofrequency Ablation, with Scarlett Maria MD    *Failure to attend a procedural visit without prior notification may result in dismissal from the practice*      Vaccines   These should be avoided within 2 weeks before or 1 week after procedures with steroid as the steroid can interfere with vaccine response.    IIf You Get Sick, Have an Infection or Have a Surgical Procedure, call us at [phone]. For your benefit, it is possible that the injection may need to be rescheduled.     Location and Arrival time  [facility] at [address], in [facility]. Your procedure is scheduled on 10/23/2023 at 8:00 am.  Please arrive at 7:00 am.    Driving   You cannot drive or operate heavy machinery for the entire day of your procedure- you must have a   If you use public transportation, you must have a responsible adult accompany you    Medications  Take your scheduled medications as prescribed. If your blood pressure is too high then the procedure may be cancelled. You may continue to take Nonsteroidal Antiinflammatory medications.     Diet  Since you are having IV sedation, you should have nothing to eat or drink for 8 hours before your procedure. You may have a sip of water with your prescribed medications.     Other Preparation:        Please notify the Day Surgery Staff if you have a pacemaker or implanted defibrillator   Shower or bathe on the morning of your procedure day to decrease the risk of infection.    *We understand that unplanned events may cause you to miss your appointments.  If you are unable to attend the appointment for your procedure, give us at least 24 hours of notice if possible.  Please be aware that if you miss your procedure appointment without notifying us in advance, we may no longer be able to serve you as your pain management provider.*    *Failure to follow these instructions may result in your injection / procedure being cancelled. *        [Consultation] : a consultation visit for

## 2023-10-01 PROBLEM — K86.89 PANCREATIC MASS: Status: ACTIVE | Noted: 2023-01-01

## 2023-12-14 NOTE — PROGRESS NOTE ADULT - ASSESSMENT
82F hx of newly diagnosed Pancreatic CA, with ? Mets to lung, as well as diverticulitis (possible neoplastic process of sigmoid colon), presenting with a near complete large bowel obstruction    Plan:  -Pain Control: Multimodal   -Pulmonary: Incentive Spirometry q1 hr while awake  -GI/FEN: , Monitor Strict Intake/Output [id number], Replete Electrolytes PRN, smith inserted  -ABX:  Zosyn  -PPX: Lovenox, PTX  -Preop 3/7 for tentative OR 3/8 for laparoscopic assisted diverting colostomy    [facility]  Spectra [id number] Declines

## 2024-01-03 NOTE — ED PROVIDER NOTE - CADM POA PRESS ULCER
Jared Land MD  You5 minutes ago (12:02 PM)       Sorry, will do from now on.  I thought the diabetic supplies just went to the pharmacy.      No

## 2024-02-26 NOTE — PHYSICAL THERAPY INITIAL EVALUATION ADULT - DIAGNOSIS, PT EVAL
Patient Specific Otc Recommendations (Will Not Stick From Patient To Patient): OTC moisturizer
Detail Level: Detailed
Gait dysfunction due to weakness, urinary retention, decreased ostomy output.

## 2024-04-24 NOTE — ASU PREOP CHECKLIST - DENTURES
Detail Level: Zone
Initiate Treatment: cetirizine 10 mg tablet Twice a day\\nQuantity: 120.0 Tablet  Days Supply: 30\\nSig: Take 1 -2 tablets orally twice a day.
no

## 2024-07-20 NOTE — H&P PST ADULT - NSANTHOSAYNRD_GEN_A_CORE
normal (ped)... No. REJI screening performed.  STOP BANG Legend: 0-2 = LOW Risk; 3-4 = INTERMEDIATE Risk; 5-8 = HIGH Risk

## 2024-10-02 NOTE — PROCEDURAL SAFETY CHECKLIST WITH OR WITHOUT SEDATION - NSPREALLERGYSED_GEN_ALL_CORE
Anesthesia Post-op Note    Patient: [facility]  Procedure(s) Performed: COLONOSCOPY  Anesthesia type: MAC    Vitals Value Taken Time   Temp 36.5 10/02/24 1030   Pulse 52 10/02/24 1030   Resp 17 10/02/24 1030   SpO2 100 % 10/02/24 1030   /59 10/02/24 1030         Patient Location: PACU Phase 1  Post-op Vital Signs:stable  Level of Consciousness: awake and alert  Respiratory Status: spontaneous ventilation  Cardiovascular stable  Hydration: euvolemic  Pain Management: adequately controlled  Handoff: Handoff to receiving clinician was performed and questions were answered  Vomiting: none  Nausea: None  Airway Patency:patent  Post-op Assessment: no complications and patient tolerated procedure well      No notable events documented.                       done

## 2025-03-12 NOTE — ED ADULT NURSE NOTE - NS ED NURSE REPORT GIVEN DT
[Alert] : alert [No Acute Distress] : no acute distress [Normal Sclera/Conjunctiva] : normal sclera/conjunctiva [EOMI] : extra ocular movement intact [No LAD] : no lymphadenopathy [Thyroid Not Enlarged] : the thyroid was not enlarged [No Thyroid Nodules] : no palpable thyroid nodules [No Respiratory Distress] : no respiratory distress [Clear to Auscultation] : lungs were clear to auscultation bilaterally [Normal S1, S2] : normal S1 and S2 [Regular Rhythm] : with a regular rhythm [Normal Bowel Sounds] : normal bowel sounds [Not Tender] : non-tender [Not Distended] : not distended [Soft] : abdomen soft [Normal Anterior Cervical Nodes] : no anterior cervical lymphadenopathy [Normal Gait] : normal gait [No Rash] : no rash [Normal Reflexes] : deep tendon reflexes were 2+ and symmetric [Normal Affect] : the affect was normal [Normal Mood] : the mood was normal [de-identified] : mild edema b/l [de-identified] : last foot exam in October 2024 21-Feb-2023 20:44

## 2025-07-17 NOTE — BRIEF OPERATIVE NOTE - IV INFUSIONS - CRYSTALLOIDS
Please contact your Oncologist if you have any questions regarding the Imfinzi that you received today.    You are instructed to call the office or go to the Emergency Dept. If you experience any of the following symptoms:    Chills,temperature of 100.4 or higher or any symptoms of infection.  Dizziness/lightheadedness   Acute nausea or vomiting-not relieved by medications  Headaches-not relieved by medications  New chest pain or pressure  New rash /itching  New shortness of breath      Make sure you are drinking 48 to 64 ounces of water daily-if you are unable to drink fluids let us know right away.     Physician's Instructions:     1000